# Patient Record
Sex: FEMALE | Race: WHITE | NOT HISPANIC OR LATINO | Employment: OTHER | URBAN - METROPOLITAN AREA
[De-identification: names, ages, dates, MRNs, and addresses within clinical notes are randomized per-mention and may not be internally consistent; named-entity substitution may affect disease eponyms.]

---

## 2017-01-27 ENCOUNTER — OFFICE VISIT (OUTPATIENT)
Dept: GASTROENTEROLOGY | Facility: CLINIC | Age: 77
End: 2017-01-27
Attending: INTERNAL MEDICINE
Payer: MEDICARE

## 2017-01-27 ENCOUNTER — PRE VISIT (OUTPATIENT)
Dept: ORTHOPEDICS | Facility: CLINIC | Age: 77
End: 2017-01-27

## 2017-01-27 VITALS
WEIGHT: 130.4 LBS | TEMPERATURE: 97.8 F | HEART RATE: 66 BPM | BODY MASS INDEX: 25.6 KG/M2 | DIASTOLIC BLOOD PRESSURE: 80 MMHG | SYSTOLIC BLOOD PRESSURE: 173 MMHG | HEIGHT: 60 IN

## 2017-01-27 DIAGNOSIS — M25.552 HIP PAIN, LEFT: ICD-10-CM

## 2017-01-27 DIAGNOSIS — Z94.4 HISTORY OF LIVER TRANSPLANT (H): Primary | ICD-10-CM

## 2017-01-27 PROCEDURE — 99212 OFFICE O/P EST SF 10 MIN: CPT | Mod: ZF

## 2017-01-27 ASSESSMENT — PAIN SCALES - GENERAL: PAINLEVEL: MILD PAIN (3)

## 2017-01-27 NOTE — MR AVS SNAPSHOT
After Visit Summary   1/27/2017    Lesli Lorenzana    MRN: [id number]           Patient Information     Date Of Birth          1940        Visit Information        Provider Department      1/27/2017 10:30 AM Tucker Muhammad MD [facility]        Today's Diagnoses     History of liver transplant (H)    -  1     Hip pain, left            Follow-ups after your visit        Follow-up notes from your care team     Return in about 6 months (around 7/27/2017).      Your next 10 appointments already scheduled     Feb 01, 2017  8:00 AM   LAB with AN LAB   [facility]    [address] [address] [phone]           Patient must bring picture ID.  Patient should be prepared to give a urine specimen  Please do not eat 10-12 hours before your appointment if you are coming in fasting for labs on lipids, cholesterol, or glucose (sugar).  Pregnant women should follow their Care Team instructions. Water with medications is okay. Do not drink coffee or other fluids.   If you have concerns about taking  your medications, please ask at office or if scheduling via [employer], send a message by clicking on Secure Messaging, Message Your Care Team.            Feb 06, 2017  8:45 AM   (Arrive by 8:30 AM)   NEW HIP with Zhen Armstrong MD   [facility] ([facility])    [address] [place] [id number] [phone]            Mar 23, 2017 10:30 AM   (Arrive by 10:15 AM)   Return Visit with Nain Lagunas MD   [facility] ([facility])    [address] [place] [id number] [phone]            Jul 28, 2017 10:00 AM   (Arrive by 9:45 AM)   Return Liver Transplant with Tucker Muhammad MD   [facility] ([facility])    [address] [place] [id number] [phone]               Who to contact     If you have questions or need follow up information about today's clinic visit or your schedule please contact [facility] directly at [phone].  Normal or non-critical lab and imaging results will be communicated to you by MyChart, letter or phone within 4 business days after the clinic has received the results. If you do not hear from us within 7 days, please contact the clinic through RingCaptchahart or phone. If you have a critical or abnormal lab result, we will notify you by phone as soon as possible.  Submit refill requests through [employer]ista or call your pharmacy and they will forward the refill request to us. Please allow 3 business days for your refill to be completed.          Additional Information About Your Visit        NeoVista Information     [employer]ista gives you secure access to your electronic health record. If you see a primary care provider, you can also send messages to your care team and make appointments. If you have questions, please call your primary care clinic.  If you do not have a primary care provider, please call [phone] and they will assist you.        Care EveryWhere ID     This is your Care EveryWhere ID. This could be used by other organizations to access your [place] medical records  [id number]        Your Vitals Were     Pulse Temperature Height BMI (Body Mass Index)          66 97.8  F (36.6  C) (Oral) 1.524 m (5') 25.47 kg/m2         Blood Pressure from Last 3 Encounters:   01/27/17 173/80   12/09/16 160/86   10/28/16 163/86    Weight from Last 3 Encounters:   01/27/17 59.149 kg (130 lb 6.4 oz)   12/09/16 61.689 kg (136 lb)   12/05/16 61.689 kg (136 lb)              We Performed the Following     OFFICE CONSULTATION,LEVEL IV        Primary Care Provider Office Phone # Fax #    Dee Awan [phone]       [facility] [address] [address] [facility] [address]        Thank you!     Thank you for choosing [facility]   for your care. Our goal is always to provide you with excellent care. Hearing back from our patients is one way we can continue to improve our services. Please take a few minutes to complete the written survey that you may receive in the mail after your visit with us. Thank you!             Your Updated Medication List - Protect others around you: Learn how to safely use, store and throw away your medicines at www.disposemymeds.org.          This list is accurate as of: 1/27/17 10:57 AM.  Always use your most recent med list.                   Brand Name Dispense Instructions for use    aspirin 81 MG tablet     90 tablet    Take 1 tablet by mouth daily.       CALCIUM 500 + D PO      1 tablet daily       isosorbide mononitrate 60 MG 24 hr tablet    IMDUR    180 tablet    Take 1 tablet (60 mg) by mouth 2 times daily       lisinopril 2.5 MG tablet    PRINIVIL/Zestril    93 tablet    Take 1 tablet (2.5 mg) by mouth daily       metoprolol 50 MG tablet    LOPRESSOR    182 tablet    Take 1 tablet (50 mg) by mouth 2 times daily       MULTI-VITAMIN PO      Take  by mouth daily.       nystatin-triamcinolone cream    MYCOLOG II     Apply  topically 4 times daily as needed.       tacrolimus 0.5 MG capsule    PROGRAF - GENERIC EQUIVALENT    270 capsule    Take 0.5 mg (1 cap) every AM, take 1 mg, (2 caps) every PM.

## 2017-01-27 NOTE — NURSING NOTE
Chief Complaint   Patient presents with     RECHECK     6 month follow Liver Tx       Initial /80 mmHg  Pulse 66  Temp(Src) 97.8  F (36.6  C) (Oral)  Ht 1.524 m (5')  Wt 59.149 kg (130 lb 6.4 oz)  BMI 25.47 kg/m2 Estimated body mass index is 25.47 kg/(m^2) as calculated from the following:    Height as of this encounter: 1.524 m (5').    Weight as of this encounter: 59.149 kg (130 lb 6.4 oz).  BP completed using cuff size: regular

## 2017-01-27 NOTE — Clinical Note
1/27/2017       RE: Lesli Lorenzana  [address] [facility]  [place] [address]     Dear Colleague,    Thank you for referring your patient, Lesli Lorenzana, to the [facility]PAT[facility] at [facility]. Please see a copy of my visit note below.    I had the pleasure of seeing Lesli Lorenzana for followup in the Liver Transplantation Clinic at the [facility] on 01/27/2017.  Ms. Lorenzana returns for followup now 9 years status post liver transplantation.      She is for the most part doing well at this time.  Her major complaint is that of left hip pain.  She has seen her primary for it, and she moderate degenerative arthritis of that hip.  It really is very painful for her to do anything.  She has had 3 steroid injections which have worked for short periods of time, and she has seen an orthopedic surgeon who has recommended a hip replacement.      She otherwise denies any abdominal pain, itching or skin rash or fatigue.  She denies any increased abdominal girth or lower extremity edema.  She denies any fevers or chills, cough or shortness of breath.  She denies any nausea, vomiting, diarrhea or constipation.  Her appetite has been good.  She is losing weight intentionally.     Current Outpatient Prescriptions   Medication     metoprolol (LOPRESSOR) 50 MG tablet     lisinopril (PRINIVIL,ZESTRIL) 2.5 MG tablet     isosorbide mononitrate (IMDUR) 60 MG 24 hr tablet     tacrolimus (PROGRAF - GENERIC EQUIVALENT) 0.5 MG capsule     nystatin-triamcinolone (MYCOLOG II) cream     aspirin 81 MG tablet     CALCIUM 500 + D PO     MULTI-VITAMIN PO     No current facility-administered medications for this visit.     B/P: 173/80, T: 97.8, P: 66, R: Data Unavailable    HEENT exam shows no scleral icterus and no temporal muscle wasting.  Chest is clear.  Abdominal exam shows no increase in girth.  No masses or tenderness to palpation are present.  Liver is 10  cm in span without left lobe enlargement.  No spleen tip is palpable.  Her incision is intact.  Her extremity exam shows no edema. Skin exam shows no suspicious lesions.      Her most recent laboratory tests show her white count is 3.1, hemoglobin 11.6, platelets are 211,000.  Sodium 134, potassium 4.6, BUN is 25, creatinine 1.2.  AST is 49, ALT is 36, alkaline phosphatase is 69, albumin is 3.3, total protein of 8.1, total bilirubin is 0.6.  Her last tacrolimus level was 7.      My impression is that Ms. Lorenzana is 9 years status post liver transplantation and doing well with excellent liver and kidney function.        I have referred her to Dr. Mehrdad Armstrong to get her consultation for hip replacement surgery.  He has done many of our post-transplant patients with excellent outcomes.  She is grateful for this.        She is otherwise up-to-date with regard to vaccines.  She probably will be due for one more colonoscopy, which we will schedule after she gets her hip replacement surgery.      Thank you very much for allowing me to participate in the care of this patient.  If you have any questions regarding my recommendations, please do not hesitate to contact me.       Tucker Muhammad MD    Professor of Medicine  [employer]    Executive Medical Director, Solid Organ Transplant Program  [facility]

## 2017-01-27 NOTE — PROGRESS NOTES
I had the pleasure of seeing Lesli Lorenzana for followup in the Liver Transplantation Clinic at the [facility] on 01/27/2017.  Ms. Lorenzana returns for followup now 9 years status post liver transplantation.      She is for the most part doing well at this time.  Her major complaint is that of left hip pain.  She has seen her primary for it, and she moderate degenerative arthritis of that hip.  It really is very painful for her to do anything.  She has had 3 steroid injections which have worked for short periods of time, and she has seen an orthopedic surgeon who has recommended a hip replacement.      She otherwise denies any abdominal pain, itching or skin rash or fatigue.  She denies any increased abdominal girth or lower extremity edema.  She denies any fevers or chills, cough or shortness of breath.  She denies any nausea, vomiting, diarrhea or constipation.  Her appetite has been good.  She is losing weight intentionally.     Current Outpatient Prescriptions   Medication     metoprolol (LOPRESSOR) 50 MG tablet     lisinopril (PRINIVIL,ZESTRIL) 2.5 MG tablet     isosorbide mononitrate (IMDUR) 60 MG 24 hr tablet     tacrolimus (PROGRAF - GENERIC EQUIVALENT) 0.5 MG capsule     nystatin-triamcinolone (MYCOLOG II) cream     aspirin 81 MG tablet     CALCIUM 500 + D PO     MULTI-VITAMIN PO     No current facility-administered medications for this visit.     B/P: 173/80, T: 97.8, P: 66, R: Data Unavailable    HEENT exam shows no scleral icterus and no temporal muscle wasting.  Chest is clear.  Abdominal exam shows no increase in girth.  No masses or tenderness to palpation are present.  Liver is 10 cm in span without left lobe enlargement.  No spleen tip is palpable.  Her incision is intact.  Her extremity exam shows no edema. Skin exam shows no suspicious lesions.      Her most recent laboratory tests show her white count is 3.1, hemoglobin 11.6, platelets are 211,000.  Sodium 134,  potassium 4.6, BUN is 25, creatinine 1.2.  AST is 49, ALT is 36, alkaline phosphatase is 69, albumin is 3.3, total protein of 8.1, total bilirubin is 0.6.  Her last tacrolimus level was 7.      My impression is that Ms. Lorenzana is 9 years status post liver transplantation and doing well with excellent liver and kidney function.        I have referred her to Dr. Mehrdad Armstorng to get her consultation for hip replacement surgery.  He has done many of our post-transplant patients with excellent outcomes.  She is grateful for this.        She is otherwise up-to-date with regard to vaccines.  She probably will be due for one more colonoscopy, which we will schedule after she gets her hip replacement surgery.      Thank you very much for allowing me to participate in the care of this patient.  If you have any questions regarding my recommendations, please do not hesitate to contact me.       Tucker Muhammad MD    Professor of Medicine  [employer]    Executive Medical Director, Solid Organ Transplant Program  [facility]

## 2017-01-27 NOTE — TELEPHONE ENCOUNTER
1.  Date/reason for appt: 2/6/17 - Left Hip Osteoarthritis  2.  Referring provider: Dr. Virgen/ Dr. Muhammad  3.  Call to patient (Yes / No - short description): No, internal referral  4.  Previous care at / records requested from:   -  [facility] (Dr. Dee Awan) -- Records in Epic, imaging in [facility]   -  [facility] (Dr. Jona Gonzalez) -- Records in [facility]   -  [facility] (Dr. Tucker Virgen) -- Records in [facility]   -  [facility] (Dr. Luis Felipe Duarte) -- Records in [facility]   - [facility] (Dr. Muhammad) -- Records in Epic   - Injection report in Epic

## 2017-01-28 ASSESSMENT — ENCOUNTER SYMPTOMS
MYALGIAS: 0
MUSCLE CRAMPS: 0
NECK PAIN: 0
ARTHRALGIAS: 1
JOINT SWELLING: 0
BACK PAIN: 0
MUSCLE WEAKNESS: 1
STIFFNESS: 1

## 2017-02-01 DIAGNOSIS — Z94.4 LIVER REPLACED BY TRANSPLANT (H): ICD-10-CM

## 2017-02-01 LAB
ALBUMIN SERPL-MCNC: 3.2 G/DL (ref 3.4–5)
ALP SERPL-CCNC: 74 U/L (ref 40–150)
ALT SERPL W P-5'-P-CCNC: 42 U/L (ref 0–50)
ANION GAP SERPL CALCULATED.3IONS-SCNC: 10 MMOL/L (ref 3–14)
AST SERPL W P-5'-P-CCNC: 57 U/L (ref 0–45)
BILIRUB DIRECT SERPL-MCNC: 0.1 MG/DL (ref 0–0.2)
BILIRUB SERPL-MCNC: 0.4 MG/DL (ref 0.2–1.3)
BUN SERPL-MCNC: 23 MG/DL (ref 7–30)
CALCIUM SERPL-MCNC: 8.8 MG/DL (ref 8.5–10.1)
CHLORIDE SERPL-SCNC: 98 MMOL/L (ref 94–109)
CHOLEST SERPL-MCNC: 143 MG/DL
CO2 SERPL-SCNC: 24 MMOL/L (ref 20–32)
CREAT SERPL-MCNC: 1.17 MG/DL (ref 0.52–1.04)
ERYTHROCYTE [DISTWIDTH] IN BLOOD BY AUTOMATED COUNT: 12.5 % (ref 10–15)
GFR SERPL CREATININE-BSD FRML MDRD: 45 ML/MIN/1.7M2
GLUCOSE SERPL-MCNC: 84 MG/DL (ref 70–99)
HCT VFR BLD AUTO: 33.9 % (ref 35–47)
HDLC SERPL-MCNC: 73 MG/DL
HGB BLD-MCNC: 11.7 G/DL (ref 11.7–15.7)
LDLC SERPL CALC-MCNC: 51 MG/DL
MCH RBC QN AUTO: 31 PG (ref 26.5–33)
MCHC RBC AUTO-ENTMCNC: 34.5 G/DL (ref 31.5–36.5)
MCV RBC AUTO: 90 FL (ref 78–100)
NONHDLC SERPL-MCNC: 70 MG/DL
PLATELET # BLD AUTO: 209 10E9/L (ref 150–450)
POTASSIUM SERPL-SCNC: 4.2 MMOL/L (ref 3.4–5.3)
PROT SERPL-MCNC: 8 G/DL (ref 6.8–8.8)
RBC # BLD AUTO: 3.77 10E12/L (ref 3.8–5.2)
SODIUM SERPL-SCNC: 132 MMOL/L (ref 133–144)
TACROLIMUS BLD-MCNC: 8 UG/L (ref 5–15)
TME LAST DOSE: 2000 H
TRIGL SERPL-MCNC: 93 MG/DL
WBC # BLD AUTO: 3.2 10E9/L (ref 4–11)

## 2017-02-01 PROCEDURE — 36415 COLL VENOUS BLD VENIPUNCTURE: CPT | Performed by: INTERNAL MEDICINE

## 2017-02-01 PROCEDURE — 80076 HEPATIC FUNCTION PANEL: CPT | Performed by: INTERNAL MEDICINE

## 2017-02-01 PROCEDURE — 80048 BASIC METABOLIC PNL TOTAL CA: CPT | Performed by: INTERNAL MEDICINE

## 2017-02-01 PROCEDURE — 80061 LIPID PANEL: CPT | Performed by: INTERNAL MEDICINE

## 2017-02-01 PROCEDURE — 85027 COMPLETE CBC AUTOMATED: CPT | Performed by: INTERNAL MEDICINE

## 2017-02-01 PROCEDURE — 80197 ASSAY OF TACROLIMUS: CPT | Performed by: INTERNAL MEDICINE

## 2017-02-02 ENCOUNTER — TELEPHONE (OUTPATIENT)
Dept: FAMILY MEDICINE | Facility: CLINIC | Age: 77
End: 2017-02-02

## 2017-02-02 DIAGNOSIS — I25.119 CORONARY ARTERY DISEASE INVOLVING NATIVE HEART WITH ANGINA PECTORIS, UNSPECIFIED VESSEL OR LESION TYPE (H): ICD-10-CM

## 2017-02-02 DIAGNOSIS — Z94.4 HISTORY OF LIVER TRANSPLANT (H): Primary | ICD-10-CM

## 2017-02-02 NOTE — TELEPHONE ENCOUNTER
1. Patient will stop by the pharmacy for free blood pressure check  2. She is not on statin due to transplant  3. Seeing cards every 6 months and has a pending appointment  Next month  4. Willing to do colonoscopy, once hip pain has been addressed. She is working with transplant provider for hip pain.  JAY Gurrola, NARINDERN RN

## 2017-02-02 NOTE — TELEPHONE ENCOUNTER
Have her stop in pharmacy for BP check.     Sees cards for BP and cholesterol. Assume not on statin due to liver transplant.  Chart updated.    See if pt willing to have colonoscopy or fit. Would recommend colonoscopy but if she declines,, then please do fit test.    Dee Mercado

## 2017-02-02 NOTE — TELEPHONE ENCOUNTER
Panel Management Review      Patient has the following on her problem list:       IVD   ASA: Passed    Last LDL:    CHOL      143   2/1/2017  HDL       73   2/1/2017  LDL       51   2/1/2017  TRIG       93   2/1/2017   CHOLHDLRATIO      2.4   8/11/2015     Is the patient on a Statin? NO   Is the patient on Aspirin? YES                  Medications     Salicylates    aspirin 81 MG tablet          Last three blood pressure readings:  BP Readings from Last 3 Encounters:   01/27/17 173/80   12/09/16 160/86   10/28/16 163/86        Tobacco History:     History   Smoking status     Former Smoker     Quit date: 11/23/1975   Smokeless tobacco     Never Used         Hypertension   Last three blood pressure readings:  BP Readings from Last 3 Encounters:   01/27/17 173/80   12/09/16 160/86   10/28/16 163/86     Blood pressure: Failed    HTN Guidelines:  Age 18-59 BP range:  Less than 140/90  Age 60-85 with Diabetes:  Less than 140/90  Age 60-85 without Diabetes:  less than 150/90      Composite cancer screening  Chart review shows that this patient is due/due soon for the following Colonoscopy  Summary:    Patient is due/failing the following:   BP CHECK, COLONOSCOPY and LDL    Action needed:   Routed to provider for review.    Type of outreach:    None, routed to provider for review.    Questions for provider review:    Transplant patient, sees cardiology, not on a statin, elevated bp at cardiology appointment , needs colonoscopy please advise                                                                                                                                      Miracle Simental MA      Chart routed to Provider .

## 2017-02-06 ENCOUNTER — OFFICE VISIT (OUTPATIENT)
Dept: ORTHOPEDICS | Facility: CLINIC | Age: 77
End: 2017-02-06

## 2017-02-06 VITALS — HEIGHT: 59 IN | WEIGHT: 132.5 LBS | BODY MASS INDEX: 26.71 KG/M2

## 2017-02-06 DIAGNOSIS — M16.12 PRIMARY OSTEOARTHRITIS OF LEFT HIP: Primary | ICD-10-CM

## 2017-02-06 NOTE — PROGRESS NOTES
[facility], Orthopaedic Surgery Consultation  by Zhen Armstrong M.D.    Lesli Lorenzana MRN# [id number]   Age: 76 year old YOB: 1940     Requesting physician: Tucker Muhammad, hepatology  Dee Awan, DAVE          Assessment and Plan:   Assessment:  DX:  1. Primary biliary cirrhosis s/p liver transplant  2. S/p stent for CAD.  No hx of MI.  Asymptomatic at present.  3. DJD L hip.     Plan:  1. Discussed NAREN,  She is an appropriate candidate.  Explained this was optimal and only means to completely eliminate her pain.  Discussed nature of implant, along with different surgical approaches.  She has no preference.  2. I discussed the risks, benefits, alternatives regarding the proposed surgery with the patient who both demonstrated understanding and indicated a desire to proceed with the surgery as planned.  3. Anticipate L NAREN via posterior minimal incision approach with CoChr head and poly liner.  4. TJA preop teaching class  5. PAC anesthesia visit due to liver transplant.  6. Dental eval preop  7. preop check with Dr. Awan.    8. Plans to see cardiologist Dr. Lagunas in March for regular checkup.  I advised seeing him preop instead.               History of Present Illness:   76 year old female  chief complaint     Trouble sleeping, unable to stand for long periods, cannot grocery shop, leans on cart, using cane since summer 2016, had steroid shot into hip with some benefit, had 3 steroid shots altogether..last one being 12/2/2016.    Wants to walk without pain and increase function.      Background history:  DX:  1. Primary biliary cirrhosis  2. DJD L hip.    TREATMENTS:  1. 2008, Liver transplant        Current symptoms:  Problem: hip pain :   Onset and duration: 5/2016  Awakens from sleep due to sx's:  Yes  Precipitating Injury:  No    Other joints or sites painful:  Yes, L knee clicking  Fever: none  Appetite change or weight loss: No           Physical Exam:     EXAMINATION pertinent  "findings:   VITAL SIGNS: Height 1.51 m (4' 11.45\"), weight 60.102 kg (132 lb 8 oz), not currently breastfeeding.  Body mass index is 26.36 kg/(m^2).  RESP: non labored breathing   ABD: benign   SKIN: grossly normal   LYMPHATIC: grossly normal   NEURO: grossly normal   VASCULAR: satisfactory perfusion of all extremities   MUSCULOSKELETAL:     Gait antalgic  Limited hip ROM on L and painful.  FF 90, ABD 40, ORF 10, ERF 40  R hip ROM full and pain free.           Data:   All laboratory data reviewed  All imaging studies reviewed by me    Severe DJD L hip radiographically.     Zhen Armstrong MD  [facility] [facility] Professor  Oncology and Adult Reconstructive Surgery  Dept Orthopaedic Surgery, [facility]  [phone] office, [phone] pager  [email or web address]            DATA for DOCUMENTATION:         Past Medical History:     Patient Active Problem List   Diagnosis     History of liver transplant (H)     CAD (coronary artery disease)     Advanced directives, counseling/discussion     Hyperlipidemia LDL goal <70     CKD (chronic kidney disease) stage 3, GFR 30-59 ml/min     Osteopenia     Right leg pain     Onychomycosis of toenail     Fatigue     SOB (shortness of breath)     Chest pain     Left shoulder pain     SNHL (sensory-neural hearing loss), asymmetrical     Elevated transaminase level     Liver transplant rejection (H)     Benign neoplasm of colon, unspecified part of colon     Essential hypertension with goal blood pressure less than 140/90     Osteoarthritis of left hip     Primary osteoarthritis of left hip     Past Medical History   Diagnosis Date     HTN (hypertension)      Liver transplant 2/2008     due to Primary biliary cirrhosis     CAD (coronary artery disease) 1/2009     stent     Anemia      Palpitations      Hallux valgus      Osteopenia      Cholelithiasis      gallbladder removed with liver in 2008     Hyperlipidemia LDL goal < 100      Overweight(278.02)      Spinal stenosis      " Arthritis      Osteoarthritis of left hip        Also see scanned health assessment forms.       Past Surgical History:     Past Surgical History   Procedure Laterality Date     Biopsy       liver      Surgical history of -        liver transplant 2008     Surgical history of -        CAD with stent placement 2009            Social History:     Social History     Social History     Marital Status:      Spouse Name: N/A     Number of Children: 3     Years of Education: N/A     Occupational History      Retired     Social History Main Topics     Smoking status: Former Smoker     Quit date: 11/23/1975     Smokeless tobacco: Never Used     Alcohol Use: No      Comment: Glass of wine occassionally     Drug Use: No      Comment: Never     Sexual Activity: Not Currently     Other Topics Concern     Blood Transfusions Yes     2007     Exercise No     Social History Narrative    Lives alone with her dog. . Three grown children, very supportive and all live close by.  6 grandchildren. Feels safe in her environment.            Family History:       Family History   Problem Relation Age of Onset     CANCER Father      unknown     CANCER Mother      lung--smoker     CEREBROVASCULAR DISEASE Maternal Grandmother      Unknown/Adopted Paternal Grandfather      C.A.D. Father             Medications:     Current Outpatient Prescriptions   Medication Sig     metoprolol (LOPRESSOR) 50 MG tablet Take 1 tablet (50 mg) by mouth 2 times daily     lisinopril (PRINIVIL,ZESTRIL) 2.5 MG tablet Take 1 tablet (2.5 mg) by mouth daily     isosorbide mononitrate (IMDUR) 60 MG 24 hr tablet Take 1 tablet (60 mg) by mouth 2 times daily     tacrolimus (PROGRAF - GENERIC EQUIVALENT) 0.5 MG capsule Take 0.5 mg (1 cap) every AM, take 1 mg, (2 caps) every PM.     nystatin-triamcinolone (MYCOLOG II) cream Apply  topically 4 times daily as needed.     aspirin 81 MG tablet Take 1 tablet by mouth daily.     CALCIUM 500 + D PO 1 tablet daily      MULTI-VITAMIN PO Take  by mouth daily.     No current facility-administered medications for this visit.              Review of Systems:   A comprehensive 10 point review of systems (constitutional, ENT, cardiac, peripheral vascular, lymphatic, respiratory, GI, , Musculoskeletal, skin, Neurological) was performed and found to be negative except as described in this note.     See intake form completed by patient        Answers for HPI/ROS submitted by the patient on 1/28/2017   General Symptoms: No  Skin Symptoms: No  HENT Symptoms: No  EYE SYMPTOMS: No  HEART SYMPTOMS: No  LUNG SYMPTOMS: No  INTESTINAL SYMPTOMS: No  URINARY SYMPTOMS: No  GYNECOLOGIC SYMPTOMS: No  BREAST SYMPTOMS: No  SKELETAL SYMPTOMS: Yes  BLOOD SYMPTOMS: No  NERVOUS SYSTEM SYMPTOMS: No  MENTAL HEALTH SYMPTOMS: No  Back pain: No  Muscle aches: No  Neck pain: No  Swollen joints: No  Joint pain: Yes  Bone pain: Yes  Muscle cramps: No  Muscle weakness: Yes  Joint stiffness: Yes  Bone fracture: No

## 2017-02-06 NOTE — MR AVS SNAPSHOT
After Visit Summary   2/6/2017    Lesli Lorenzana    MRN: [id number]           Patient Information     Date Of Birth          1940        Visit Information        Provider Department      2/6/2017 8:45 AM Zhen Armstrong MD [facility]        Today's Diagnoses     Primary osteoarthritis of left hip    -  1       Follow-ups after your visit        Your next 10 appointments already scheduled     Mar 23, 2017 10:30 AM CDT   (Arrive by 10:15 AM)   Return Visit with Nain Lagunas MD   [facility] ([facility])    [address] [place] [id number] [phone]            Apr 04, 2017  8:00 AM CDT   LAB with AN LAB   [facility] ([facility]    [address] [facility] [address] [phone]           Patient must bring picture ID.  Patient should be prepared to give a urine specimen  Please do not eat 10-12 hours before your appointment if you are coming in fasting for labs on lipids, cholesterol, or glucose (sugar).  Pregnant women should follow their Care Team instructions. Water with medications is okay. Do not drink coffee or other fluids.   If you have concerns about taking  your medications, please ask at office or if scheduling via Augustt, send a message by clicking on Secure Messaging, Message Your Care Team.            Jul 28, 2017 10:00 AM CDT   (Arrive by 9:45 AM)   Return Liver Transplant with Tucker Muhammad MD   [facility] ([facility])    [address] [place] [id number] [phone]              Who to contact     Please call your clinic at [phone] to:    Ask questions about your health    Make or cancel appointments    Discuss your medicines    Learn about your test results    Speak to your doctor   If you have compliments or concerns about an experience at your clinic, or if you wish to file a complaint,  "please contact [facility] Patient Relations at [phone] or email us at [email or web address]         Additional Information About Your Visit        Whyteboardhart Information     [email or web address] gives you secure access to your electronic health record. If you see a primary care provider, you can also send messages to your care team and make appointments. If you have questions, please call your primary care clinic.  If you do not have a primary care provider, please call [phone] and they will assist you.      [email or web address] is an electronic gateway that provides easy, online access to your medical records. With [email or web address], you can request a clinic appointment, read your test results, renew a prescription or communicate with your care team.     To access your existing account, please contact your [facility] or call [phone] for assistance.        Care EveryWhere ID     This is your Care EveryWhere ID. This could be used by other organizations to access your [place] medical records  [id number]        Your Vitals Were     Height BMI (Body Mass Index)                1.51 m (4' 11.45\") 26.36 kg/m2           Blood Pressure from Last 3 Encounters:   No data found for BP    Weight from Last 3 Encounters:   No data found for Wt              We Performed the Following     Lillie-Operative Worksheet (Tumor/Adult Reconstruction: Knee/Hip/Fracture )          Today's Medication Changes          These changes are accurate as of: 2/6/17 11:59 PM.  If you have any questions, ask your nurse or doctor.               Stop taking these medicines if you haven't already. Please contact your care team if you have questions.     STATIN NOT PRESCRIBED (INTENTIONAL)                    Primary Care Provider Office Phone # Fax #    Dee Awan [phone]       [facility] [address] [facility] [address]        Thank you!     Thank you for choosing [facility] " [facility]  for your care. Our goal is always to provide you with excellent care. Hearing back from our patients is one way we can continue to improve our services. Please take a few minutes to complete the written survey that you may receive in the mail after your visit with us. Thank you!             Your Updated Medication List - Protect others around you: Learn how to safely use, store and throw away your medicines at www.disposemymeds.org.          This list is accurate as of: 2/6/17 11:59 PM.  Always use your most recent med list.                   Brand Name Dispense Instructions for use    aspirin 81 MG tablet     90 tablet    Take 1 tablet by mouth daily.       CALCIUM 500 + D PO      1 tablet daily       isosorbide mononitrate 60 MG 24 hr tablet    IMDUR    180 tablet    Take 1 tablet (60 mg) by mouth 2 times daily       lisinopril 2.5 MG tablet    PRINIVIL/Zestril    93 tablet    Take 1 tablet (2.5 mg) by mouth daily       metoprolol 50 MG tablet    LOPRESSOR    182 tablet    Take 1 tablet (50 mg) by mouth 2 times daily       MULTI-VITAMIN PO      Take  by mouth daily.       nystatin-triamcinolone cream    MYCOLOG II     Apply  topically 4 times daily as needed.       tacrolimus 0.5 MG capsule    PROGRAF - GENERIC EQUIVALENT    270 capsule    Take 0.5 mg (1 cap) every AM, take 1 mg, (2 caps) every PM.

## 2017-02-06 NOTE — NURSING NOTE
"Reason For Visit:   Chief Complaint   Patient presents with     Eval/Assessment     left hip pain         Pain Assessment  Patient Currently in Pain: Yes  0-10 Pain Scale: 5  Primary Pain Location: Hip  Pain Orientation: Left  Pain Descriptors: Aching, Sharp  Alleviating Factors: Other (comment) (sitting)  Aggravating Factors: Walking     Ht 1.51 m (4' 11.45\")  Wt 60.102 kg (132 lb 8 oz)  BMI 26.36 kg/m2       Current Outpatient Prescriptions   Medication     metoprolol (LOPRESSOR) 50 MG tablet     lisinopril (PRINIVIL,ZESTRIL) 2.5 MG tablet     isosorbide mononitrate (IMDUR) 60 MG 24 hr tablet     tacrolimus (PROGRAF - GENERIC EQUIVALENT) 0.5 MG capsule     nystatin-triamcinolone (MYCOLOG II) cream     aspirin 81 MG tablet     CALCIUM 500 + D PO     MULTI-VITAMIN PO     No current facility-administered medications for this visit.          Allergies   Allergen Reactions     Amlodipine Besylate Swelling     Edema in legs     Amoxicillin Rash                                              "

## 2017-02-06 NOTE — LETTER
2/6/2017       RE: Lesli Lorenzana  [address] [facility]  [place] [address]     Dear Colleague,    Thank you for referring your patient, Lesli Lorenzana, to the [facility] at [facility]. Please see a copy of my visit note below.    [facility], Orthopaedic Surgery Consultation  by Zhen Armstrong M.D.    Lesli Lorenzana MRN# [id number]   Age: 76 year old YOB: 1940     Requesting physician: Tucker Muhammad, hepatology  Dee Awan, PCP          Assessment and Plan:   Assessment:  DX:  1. Primary biliary cirrhosis s/p liver transplant  2. S/p stent for CAD.  No hx of MI.  Asymptomatic at present.  3. DJD L hip.     Plan:  1. Discussed NAREN,  She is an appropriate candidate.  Explained this was optimal and only means to completely eliminate her pain.  Discussed nature of implant, along with different surgical approaches.  She has no preference.  2. I discussed the risks, benefits, alternatives regarding the proposed surgery with the patient who both demonstrated understanding and indicated a desire to proceed with the surgery as planned.  3. Anticipate L NAREN via posterior minimal incision approach with CoChr head and poly liner.  4. TJA preop teaching class  5. PAC anesthesia visit due to liver transplant.  6. Dental eval preop  7. preop check with Dr. Awan.    8. Plans to see cardiologist Dr. Lagunas in March for regular checkup.  I advised seeing him preop instead.               History of Present Illness:   76 year old female  chief complaint     Trouble sleeping, unable to stand for long periods, cannot grocery shop, leans on cart, using cane since summer 2016, had steroid shot into hip with some benefit, had 3 steroid shots altogether..last one being 12/2/2016.    Wants to walk without pain and increase function.      Background history:  DX:  1. Primary biliary cirrhosis  2. DJD L hip.    TREATMENTS:  1. 2008, Liver  "transplant        Current symptoms:  Problem: hip pain :   Onset and duration: 5/2016  Awakens from sleep due to sx's:  Yes  Precipitating Injury:  No    Other joints or sites painful:  Yes, L knee clicking  Fever: none  Appetite change or weight loss: No           Physical Exam:     EXAMINATION pertinent findings:   VITAL SIGNS: Height 1.51 m (4' 11.45\"), weight 60.102 kg (132 lb 8 oz), not currently breastfeeding.  Body mass index is 26.36 kg/(m^2).  RESP: non labored breathing   ABD: benign   SKIN: grossly normal   LYMPHATIC: grossly normal   NEURO: grossly normal   VASCULAR: satisfactory perfusion of all extremities   MUSCULOSKELETAL:     Gait antalgic  Limited hip ROM on L and painful.  FF 90, ABD 40, ORF 10, ERF 40  R hip ROM full and pain free.           Data:   All laboratory data reviewed  All imaging studies reviewed by me Severe DJD L hip radiographically.     MD Arnold Singh Family Professor  Oncology and Adult Reconstructive Surgery  Dept Orthopaedic Surgery, [facility]  [phone] office, [phone] pager  [email or web address]            DATA for DOCUMENTATION:         Past Medical History:     Patient Active Problem List   Diagnosis     History of liver transplant (H)     CAD (coronary artery disease)     Advanced directives, counseling/discussion     Hyperlipidemia LDL goal <70     CKD (chronic kidney disease) stage 3, GFR 30-59 ml/min     Osteopenia     Right leg pain     Onychomycosis of toenail     Fatigue     SOB (shortness of breath)     Chest pain     Left shoulder pain     SNHL (sensory-neural hearing loss), asymmetrical     Elevated transaminase level     Liver transplant rejection (H)     Benign neoplasm of colon, unspecified part of colon     Essential hypertension with goal blood pressure less than 140/90     Osteoarthritis of left hip     Primary osteoarthritis of left hip     Past Medical History   Diagnosis Date     HTN (hypertension)      Liver transplant 2/2008 "     due to Primary biliary cirrhosis     CAD (coronary artery disease) 1/2009     stent     Anemia      Palpitations      Hallux valgus      Osteopenia      Cholelithiasis      gallbladder removed with liver in 2008     Hyperlipidemia LDL goal < 100      Overweight(278.02)      Spinal stenosis      Arthritis      Osteoarthritis of left hip        Also see scanned health assessment forms.       Past Surgical History:     Past Surgical History   Procedure Laterality Date     Biopsy       liver      Surgical history of -        liver transplant 2008     Surgical history of -        CAD with stent placement 2009            Social History:     Social History     Social History     Marital Status:      Spouse Name: N/A     Number of Children: 3     Years of Education: N/A     Occupational History      Retired     Social History Main Topics     Smoking status: Former Smoker     Quit date: 11/23/1975     Smokeless tobacco: Never Used     Alcohol Use: No      Comment: Glass of wine occassionally     Drug Use: No      Comment: Never     Sexual Activity: Not Currently     Other Topics Concern     Blood Transfusions Yes     2007     Exercise No     Social History Narrative    Lives alone with her dog. . Three grown children, very supportive and all live close by.  6 grandchildren. Feels safe in her environment.            Family History:       Family History   Problem Relation Age of Onset     CANCER Father      unknown     CANCER Mother      lung--smoker     CEREBROVASCULAR DISEASE Maternal Grandmother      Unknown/Adopted Paternal Grandfather      C.A.D. Father             Medications:     Current Outpatient Prescriptions   Medication Sig     metoprolol (LOPRESSOR) 50 MG tablet Take 1 tablet (50 mg) by mouth 2 times daily     lisinopril (PRINIVIL,ZESTRIL) 2.5 MG tablet Take 1 tablet (2.5 mg) by mouth daily     isosorbide mononitrate (IMDUR) 60 MG 24 hr tablet Take 1 tablet (60 mg) by mouth 2 times daily      tacrolimus (PROGRAF - GENERIC EQUIVALENT) 0.5 MG capsule Take 0.5 mg (1 cap) every AM, take 1 mg, (2 caps) every PM.     nystatin-triamcinolone (MYCOLOG II) cream Apply  topically 4 times daily as needed.     aspirin 81 MG tablet Take 1 tablet by mouth daily.     CALCIUM 500 + D PO 1 tablet daily     MULTI-VITAMIN PO Take  by mouth daily.     No current facility-administered medications for this visit.              Review of Systems:   A comprehensive 10 point review of systems (constitutional, ENT, cardiac, peripheral vascular, lymphatic, respiratory, GI, , Musculoskeletal, skin, Neurological) was performed and found to be negative except as described in this note.     See intake form completed by patient        Again, thank you for allowing me to participate in the care of your patient.      Sincerely,    Zhen Armstrong MD

## 2017-02-23 ENCOUNTER — TELEPHONE (OUTPATIENT)
Dept: CARDIOLOGY | Facility: CLINIC | Age: 77
End: 2017-02-23

## 2017-02-23 DIAGNOSIS — E78.5 HYPERLIPIDEMIA LDL GOAL <70: Primary | ICD-10-CM

## 2017-02-23 DIAGNOSIS — Z01.810 PRE-OPERATIVE CARDIOVASCULAR EXAMINATION: ICD-10-CM

## 2017-02-23 NOTE — TELEPHONE ENCOUNTER
----- Message from Marlyn Anthony sent at 2/20/2017 11:47 AM CST -----  Regarding: PT UPCOMING SURGERY  Contact: [phone]  PT called in regards to upcoming appointment with Dr. Lagunas on March 23, 2017.  She is going to be having hip surgery and wants to know if Dr Lagunas wants to see her beforehand.  The surgery hasn't been scheduled yet as she wants this info first.  Please call PT at [phone].    Thank You,  Marlyn    Please DO NOT send this message and/or reply back to sender.  Call Center Representatives DO NOT respond to messages.

## 2017-02-25 ASSESSMENT — ENCOUNTER SYMPTOMS
SNORES LOUDLY: 0
SHORTNESS OF BREATH: 0
COUGH: 1
NECK PAIN: 0
RESPIRATORY PAIN: 0
JOINT SWELLING: 0
STIFFNESS: 1
SINUS CONGESTION: 1
MYALGIAS: 0
BACK PAIN: 1
HEMOPTYSIS: 0
SORE THROAT: 0
TROUBLE SWALLOWING: 0
SMELL DISTURBANCE: 0
NECK MASS: 0
TASTE DISTURBANCE: 0
POSTURAL DYSPNEA: 0
DYSPNEA ON EXERTION: 0
MUSCLE WEAKNESS: 1
SINUS PAIN: 0
SPUTUM PRODUCTION: 1
COUGH DISTURBING SLEEP: 0
ARTHRALGIAS: 1
HOARSE VOICE: 0
WHEEZING: 0
MUSCLE CRAMPS: 0

## 2017-03-02 ENCOUNTER — OFFICE VISIT (OUTPATIENT)
Dept: CARDIOLOGY | Facility: CLINIC | Age: 77
End: 2017-03-02
Attending: INTERNAL MEDICINE
Payer: MEDICARE

## 2017-03-02 VITALS
WEIGHT: 132.8 LBS | OXYGEN SATURATION: 98 % | DIASTOLIC BLOOD PRESSURE: 85 MMHG | BODY MASS INDEX: 26.07 KG/M2 | HEIGHT: 60 IN | HEART RATE: 71 BPM | SYSTOLIC BLOOD PRESSURE: 187 MMHG

## 2017-03-02 DIAGNOSIS — I10 ESSENTIAL HYPERTENSION WITH GOAL BLOOD PRESSURE LESS THAN 140/90: ICD-10-CM

## 2017-03-02 DIAGNOSIS — Z01.810 PRE-OPERATIVE CARDIOVASCULAR EXAMINATION: ICD-10-CM

## 2017-03-02 DIAGNOSIS — E87.70 FLUID OVERLOAD, UNSPECIFIED: ICD-10-CM

## 2017-03-02 DIAGNOSIS — I25.119 CORONARY ARTERY DISEASE INVOLVING NATIVE HEART WITH ANGINA PECTORIS, UNSPECIFIED VESSEL OR LESION TYPE (H): Primary | ICD-10-CM

## 2017-03-02 PROCEDURE — 93005 ELECTROCARDIOGRAM TRACING: CPT | Mod: ZF

## 2017-03-02 PROCEDURE — 99212 OFFICE O/P EST SF 10 MIN: CPT | Mod: ZF

## 2017-03-02 PROCEDURE — 93010 ELECTROCARDIOGRAM REPORT: CPT | Mod: ZP | Performed by: INTERNAL MEDICINE

## 2017-03-02 PROCEDURE — 99214 OFFICE O/P EST MOD 30 MIN: CPT | Mod: ZP | Performed by: INTERNAL MEDICINE

## 2017-03-02 RX ORDER — HYDRALAZINE HYDROCHLORIDE 25 MG/1
25 TABLET, FILM COATED ORAL 3 TIMES DAILY
Qty: 90 TABLET | Refills: 3 | Status: SHIPPED | OUTPATIENT
Start: 2017-03-02 | End: 2019-08-13

## 2017-03-02 RX ORDER — FUROSEMIDE 20 MG
20 TABLET ORAL DAILY
Qty: 30 TABLET | Refills: 0 | Status: SHIPPED | OUTPATIENT
Start: 2017-03-02 | End: 2017-04-19

## 2017-03-02 ASSESSMENT — PAIN SCALES - GENERAL: PAINLEVEL: NO PAIN (0)

## 2017-03-02 NOTE — PATIENT INSTRUCTIONS
Furosemide 20 mg by mouth once daily for 3 days.    Take your blood pressure at least once in the morning and once in the evening. Please send us your home blood pressure record on Tuesday.     IF your blood pressure (top number) is above more than 160 mmHg, take Furosemide 20 mg by mouth once. Do not take more than 20 mg once daily without instructions from Dr. Lagunas.       IF your blood pressure (top number) is above more than 180 mmHg, take Hydralazine 25 mg by mouth once.  Do not take more than once every 8 hours with a max of 3 doses per day.      Schedule echocardiogram soon.     Return to clinic in 6 months.  Fasting labs will be needed prior to this appointment.     Please do not hesitate to call if you have any cardiology related questions or concerns, or need to schedule an appointment, at [phone].         Cardiology Medication Refill Request Information:  * Please contact your pharmacy regarding ANY request for medication refills.  ** [facility] Prescription Fax = [phone]  * Please allow 3 business days for routine medication refills.    Cardiology Test Result notification information:  *You will be notified with in 7-10 days of your appointment day regarding the results of your test. If you are on MyChart you will be notified as soon as the provider has reviewed the results and signed off on them. Please call RN Care Coordinator with questions regarding results.

## 2017-03-02 NOTE — PROGRESS NOTES
HPI:     Ms. Lorenzana is a 76 year old female with history of liver transplant in 2008, HTN, hyperlipidemia, CAD with stent in 2009 and CKD.   BP at home at home 128-138/ 65-73.  In 2014 BP under control during the day - at night elevated.  Patient denies chest pain, shortness of breath, palpitations and intermittent claudication.  Patient feels dizzy when bending over.  Patient lost about 15 lb.  Patient complaints about left hip arthritis - she received a injection with corticosteroids in left hip 4 weeks.    Interval history:   Since Mrs. Lorenzana's last vist, Mrs. Lorenzana has been assessed by our Orthopedic colleagues for left total hip arthroplasty and presents today pre-operative cardiac risk stratification. Her exercise tolerance is still approximately two flights of stairs and at least 1-2 blocks on level ground. Mrs. Lorenzana has not been limited in her exercise tolerance by dyspnea and she does not experience PND, orthopnea, palpitations, chest pain, dizziness, pre-syncope, or syncope. ROS otherwise negative.     Of note, Mrs. Lorenzana's home BP readings range from 116-148/68-80.     PAST MEDICAL HISTORY:  Past Medical History   Diagnosis Date     Anemia      Arthritis      CAD (coronary artery disease) 1/2009     stent     Cholelithiasis      gallbladder removed with liver in 2008     Hallux valgus      HTN (hypertension)      Hyperlipidemia LDL goal < 100      Liver transplant 2/2008     due to Primary biliary cirrhosis     Osteoarthritis of left hip      Osteopenia      Overweight(278.02)      Palpitations      Spinal stenosis        CURRENT MEDICATIONS:  Current Outpatient Prescriptions   Medication Sig Dispense Refill     STATIN NOT PRESCRIBED, INTENTIONAL, Please choose reason not prescribed, below       metoprolol (LOPRESSOR) 50 MG tablet Take 1 tablet (50 mg) by mouth 2 times daily 182 tablet 3     lisinopril (PRINIVIL,ZESTRIL) 2.5 MG tablet Take 1 tablet (2.5 mg) by mouth daily 93 tablet 3      isosorbide mononitrate (IMDUR) 60 MG 24 hr tablet Take 1 tablet (60 mg) by mouth 2 times daily 180 tablet 6     tacrolimus (PROGRAF - GENERIC EQUIVALENT) 0.5 MG capsule Take 0.5 mg (1 cap) every AM, take 1 mg, (2 caps) every PM. 270 capsule 3     nystatin-triamcinolone (MYCOLOG II) cream Apply  topically 4 times daily as needed.       aspirin 81 MG tablet Take 1 tablet by mouth daily. 90 tablet 3     CALCIUM 500 + D PO 1 tablet daily       MULTI-VITAMIN PO Take  by mouth daily.         PAST SURGICAL HISTORY:  Past Surgical History   Procedure Laterality Date     Biopsy       liver      Surgical history of -        liver transplant 2008     Surgical history of -        CAD with stent placement 2009       ALLERGIES     Allergies   Allergen Reactions     Amlodipine Besylate Swelling     Edema in legs     Amoxicillin Rash       FAMILY HISTORY:  Family History   Problem Relation Age of Onset     CANCER Father      unknown     CANCER Mother      lung--smoker     CEREBROVASCULAR DISEASE Maternal Grandmother      Unknown/Adopted Paternal Grandfather      C.A.D. Father        SOCIAL HISTORY:  Social History     Social History     Marital Status:      Spouse Name: N/A     Number of Children: 3     Years of Education: N/A     Occupational History      Retired     Social History Main Topics     Smoking status: Former Smoker     Quit date: 11/23/1975     Smokeless tobacco: Never Used     Alcohol Use: No      Comment: Glass of wine occassionally     Drug Use: No      Comment: Never     Sexual Activity: Not Currently     Other Topics Concern     Blood Transfusions Yes     2007     Exercise No     Social History Narrative    Lives alone with her dog. . Three grown children, very supportive and all live close by.  6 grandchildren. Feels safe in her environment.       ROS:   Constitutional: No fever, chills, or sweats. No weight gain/loss   ENT: No visual disturbance, ear ache, epistaxis, sore  throat  Allergies/Immunologic: Negative.   Respiratory: No cough, hemoptysia  Cardiovascular: As per HPI  GI: No nausea, vomiting, hematemesis, melena, or hematochezia  : No urinary frequency, dysuria, or hematuria  Integument: Negative  Psychiatric: Negative  Neuro: Negative  Endocrinology: Negative   Musculoskeletal: Negative    EXAM:  /85  Pulse 71  Ht 1.524 m (5')  Wt 60.2 kg (132 lb 12.8 oz)  SpO2 98%  BMI 25.94 kg/m2   Extended Vitals not filed for this encounter.   Repeat check left arm with regular cuff 184/90  BP at the end of the visit: 180/82    In general, the patient is a pleasant female in no apparent distress.    HEENT: NC/AT.  EOMI.  Sclerae white, not injected.  Nares clear.  Pharynx without erythema or exudate.  Dentition intact.    Neck: No adenopathy.  No thyromegaly. Carotids +4/4 bilaterally without bruits.  No jugular venous distension.   Heart: RRR. Normal S1, S2 splits physiologically. No murmur, rub, click, or gallop.   Lungs: CTA.  No ronchi, wheezes, rales.  No dullness to percussion.   Abdomen: Soft, nontender, nondistended. No organomegaly.  No bruits.   Extremities: No clubbing, cyanosis, or edema.    Neurologic: Alert and oriented to person/place/time, normal speech, gait and affect  Skin: No petechiae, purpura or rash.      Labs:  LIPID RESULTS:  Lab Results   Component Value Date    CHOL 143 02/01/2017    HDL 73 02/01/2017    LDL 51 02/01/2017    TRIG 93 02/01/2017    CHOLHDLRATIO 2.4 08/11/2015    NHDL 70 02/01/2017       LIVER ENZYME RESULTS:  Lab Results   Component Value Date    AST 57 (H) 02/01/2017    ALT 42 02/01/2017       CBC RESULTS:  Lab Results   Component Value Date    WBC 3.2 (L) 02/01/2017    RBC 3.77 (L) 02/01/2017    HGB 11.7 02/01/2017    HCT 33.9 (L) 02/01/2017    MCV 90 02/01/2017    MCH 31.0 02/01/2017    MCHC 34.5 02/01/2017    RDW 12.5 02/01/2017     02/01/2017       BMP RESULTS:  Lab Results   Component Value Date     (L) 02/01/2017     POTASSIUM 4.2 02/01/2017    CHLORIDE 98 02/01/2017    CO2 24 02/01/2017    ANIONGAP 10 02/01/2017    GLC 84 02/01/2017    BUN 23 02/01/2017    CR 1.17 (H) 02/01/2017    CR 2.00 (H) 12/14/2007    GFRESTIMATED 45 (L) 02/01/2017    GFRESTBLACK 55 (L) 02/01/2017    HAYES 8.8 02/01/2017      Procedures    EKG 03-02-17   Sin rhythm  Inverted T-wave DI and aVL  AV 1st degree blokc    Echocardiogram 03-03-17    Left ventricular function, chamber size, wall motion, and wall thickness are  normal.The EF is 55-60%. Biplane LVEF traced at 56%.  The right ventricle is normal size. Global right ventricular function is  normal.  The inferior vena cava was normal in size with preserved respiratory  variability. Estimated mean right atrial pressure is <3 mmHg.  No pericardial effusion is present.  This study was compared with the study from 3/20/13. There has been no change.  _____________________________________________________________________________  __        Left Ventricle  Left ventricular function, chamber size, wall motion, and wall thickness are  normal.The EF is 55-60%. Biplane LVEF traced at 56%. Left ventricular  diastolic function is indeterminate.     Right Ventricle  The right ventricle is normal size. Global right ventricular function is  normal.     Atria  The right atria appears normal. Mild left atrial enlargement is present.     Mitral Valve  The mitral valve is normal.        Aortic Valve  Mild aortic valve sclerosis is present.     Tricuspid Valve  The tricuspid valve is normal. Pulmonary artery systolic pressure cannot be  assessed.     Pulmonic Valve  The pulmonic valve is normal.     Vessels  The aorta root is normal. The inferior vena cava was normal in size with  preserved respiratory variability. The pulmonary artery is normal. Estimated  mean right atrial pressure is <3 mmHg.     Pericardium  No pericardial effusion is present.        Compared to Previous Study  This study was compared with the study  from 3/20/13 . There has been no  change.  _____________________________________________________________________________  __  MMode/2D Measurements & Calculations     IVSd: 1.1 cm  LVIDd: 4.5 cm  LVIDs: 2.7 cm  LVPWd: 0.92 cm  FS: 41.4 %  EDV(Teich): 94.0 ml  ESV(Teich): 25.9 ml  LV mass(C)d: 157.7 grams  asc Aorta Diam: 2.9 cm  LVOT diam: 1.9 cm  LVOT area: 2.8 cm2  LA Volume (BP): 63.4 ml  LA Volume Index (BP): 40.6 ml/m2           Doppler Measurements & Calculations  MV E max jose: 41.4 cm/sec  MV A max jose: 67.1 cm/sec  MV E/A: 0.62  MV dec time: 0.39 sec  Lateral E/e': 6.9  Medial E/e': 11.9         EKG: Rate approx 75 bpm, NSR, 1st deg heart block. No ST changes. Longstanding TWI in aVL.    I have personally reviewed the EKG.    Assessment and Plan:   Mrs. Lesli Lorenzana is a 76-year old lady with a past medical history of a liver transplantation for primary biliary cirrhosis, HTN, and CAD who presented for pre-operative cardiac risk stratification prior to left total hip arthroplasty.     Mrs. Lorenzana's hypertension seems most consistent with white coat hypertension given her marked hypertension in the office. We will ensure that she has not developed any structural changes since her last TTE but we have also given her a strategy for managing her high blood pressure in the interim.    - Pre-operative cardiac risk stratification and Hx of HTN (white coat)   - Advised to obtain blood pressure log for the next week   - Will obtain TTE in view of severe HTN   - Also asked to take furosemide 20 mg by mouth if SBP >160 and hydralazine 25 mg if SBP >180    - Hx of CAD s/p PCI to OM1 in 2009 (for unstable angina)   - Continue home meds including aspirin 81 mg q24h, metoprolol tartrate 50 mg BID, Imdur 60 mg BID (though dosing somewhat unconventional), furosemide 20 mg q24h     RTC 6 months     The echocardiogram is unchanged -     Patient can be cleared for orthopedic surgery    Nain Lagunas MD, PhD  Professor of  Medicine  Division of Cardiology    CC  Patient Care Team:  Dee Awan MD as PCP - General  Sonny Lagunas MD as PCP - Cardiology (Cardiology)  Leah Aguilar RN as Nurse Coordinator (Cardiology)  Harika Meza [facility] as Pharmacist (Pharmacist)  Sonny Lagunas MD as MD (Cardiology)  Zhen Armstrong MD as MD (Orthopedics)  SONNY LAGUNAS      Answers for HPI/ROS submitted by the patient on 2/25/2017   General Symptoms: No  Skin Symptoms: No  HENT Symptoms: Yes  EYE SYMPTOMS: No  HEART SYMPTOMS: No  LUNG SYMPTOMS: Yes  INTESTINAL SYMPTOMS: No  URINARY SYMPTOMS: No  GYNECOLOGIC SYMPTOMS: No  BREAST SYMPTOMS: No  SKELETAL SYMPTOMS: Yes  BLOOD SYMPTOMS: No  NERVOUS SYSTEM SYMPTOMS: No  MENTAL HEALTH SYMPTOMS: No  Ear pain: No  Ear discharge: No  Hearing loss: Yes  Tinnitus: No  Nosebleeds: No  Congestion: Yes  Sinus pain: No  Trouble swallowing: No   Voice hoarseness: No  Mouth sores: No  Sore throat: No  Tooth pain: No  Gum tenderness: No  Bleeding gums: No  Change in taste: No  Change in sense of smell: No  Dry mouth: No  Hearing aid used: No  Neck lump: No  Cough: Yes  Sputum or phlegm: Yes  Coughing up blood: No  Difficulty breating or shortness of breath: No  Snoring: No  Wheezing: No  Difficulty breathing on exertion: No  Respiratory pain: No  Nighttime Cough: No  Difficulty breathing when lying flat: No  Back pain: Yes  Muscle aches: No  Neck pain: No  Swollen joints: No  Joint pain: Yes  Bone pain: Yes  Muscle cramps: No  Muscle weakness: Yes  Joint stiffness: Yes  Bone fracture: No

## 2017-03-02 NOTE — MR AVS SNAPSHOT
After Visit Summary   3/2/2017    Lesli Lorenzana    MRN: [id number]           Patient Information     Date Of Birth          1940        Visit Information        Provider Department      3/2/2017 1:00 PM Nain Lagunas MD [facility]        Today's Diagnoses     Coronary artery disease involving native heart with angina pectoris, unspecified vessel or lesion type (H)    -  1    Pre-operative cardiovascular examination        Essential hypertension with goal blood pressure less than 140/90        Fluid overload, unspecified          Care Instructions    Furosemide 20 mg by mouth once daily for 3 days.    Take your blood pressure at least once in the morning and once in the evening. Please send us your home blood pressure record on Tuesday.     IF your blood pressure (top number) is above more than 160 mmHg, take Furosemide 20 mg by mouth once. Do not take more than 20 mg once daily without instructions from Dr. Lagunas.       IF your blood pressure (top number) is above more than 180 mmHg, take Hydralazine 25 mg by mouth once.  Do not take more than once every 8 hours with a max of 3 doses per day.      Schedule echocardiogram soon.     Return to clinic in 6 months.  Fasting labs will be needed prior to this appointment.     Please do not hesitate to call if you have any cardiology related questions or concerns, or need to schedule an appointment, at [phone].         Cardiology Medication Refill Request Information:  * Please contact your pharmacy regarding ANY request for medication refills.  ** [facility] Prescription Fax = [phone]  * Please allow 3 business days for routine medication refills.    Cardiology Test Result notification information:  *You will be notified with in 7-10 days of your appointment day regarding the results of your test. If you are on MyChart you will be notified as soon as the provider has reviewed the results and signed off on them. Please call RN Care  Coordinator with questions regarding results.            Follow-ups after your visit        Follow-up notes from your care team     Write patient with results Return in about 6 months (around 9/2/2017) for Janneth, HTN, Cholesterol, CAD, Routine Visit, FASTING lab work.      Your next 10 appointments already scheduled     Mar 03, 2017  8:30 AM CST   Ech Complete with [id number]    [facility] ([facility])    [address] [place] [address] [phone]           1.  Please bring or wear a comfortable two-piece outfit. 2.  You may eat, drink and take your normal medicines. 3.  For any questions that cannot be answered, please contact the ordering physician            Apr 04, 2017  8:00 AM CDT   LAB with AN LAB   [facility]    [address] [facility] [address] [phone]           Patient must bring picture ID.  Patient should be prepared to give a urine specimen  Please do not eat 10-12 hours before your appointment if you are coming in fasting for labs on lipids, cholesterol, or glucose (sugar).  Pregnant women should follow their Care Team instructions. Water with medications is okay. Do not drink coffee or other fluids.   If you have concerns about taking  your medications, please ask at office or if scheduling via [email or web address], send a message by clicking on Secure Messaging, Message Your Care Team.            Jul 28, 2017 10:00 AM CDT   (Arrive by 9:45 AM)   Return Liver Transplant with Tucker Muhammad MD   [facility] ([facility])    [address] [place] [id number] [phone]            Sep 07, 2017 10:30 AM CDT   (Arrive by 10:15 AM)   Return Visit with Nain Lagunas MD   [facility] ([facility])    [address] [place] [address] [phone]              Future tests that were ordered  for you today     Open Future Orders        Priority Expected Expires Ordered    Comprehensive metabolic panel Routine 9/2/2017 5/2/2018 3/2/2017    Lipid panel reflex to direct LDL Routine 9/2/2017 5/2/2018 3/2/2017    Echocardiogram Complete Routine  3/2/2018 3/2/2017            Who to contact     If you have questions or need follow up information about today's clinic visit or your schedule please contact [facility] directly at [phone].  Normal or non-critical lab and imaging results will be communicated to you by [employer], letter or phone within 4 business days after the clinic has received the results. If you do not hear from us within 7 days, please contact the clinic through [employer] or phone. If you have a critical or abnormal lab result, we will notify you by phone as soon as possible.  Submit refill requests through [employer] or call your pharmacy and they will forward the refill request to us. Please allow 3 business days for your refill to be completed.          Additional Information About Your Visit        [employer] Information     [employer] gives you secure access to your electronic health record. If you see a primary care provider, you can also send messages to your care team and make appointments. If you have questions, please call your primary care clinic.  If you do not have a primary care provider, please call [phone] and they will assist you.        Care EveryWhere ID     This is your Care EveryWhere ID. This could be used by other organizations to access your [facility] medical records  [id number]        Your Vitals Were     Pulse Height Pulse Oximetry BMI (Body Mass Index)          71 1.524 m (5') 98% 25.94 kg/m2         Blood Pressure from Last 3 Encounters:   03/02/17 187/85   01/27/17 173/80   12/09/16 160/86    Weight from Last 3 Encounters:   03/02/17 60.2 kg (132 lb 12.8 oz)   02/06/17 60.1 kg (132 lb 8 oz)   01/27/17 59.1 kg (130 lb 6.4 oz)              We Performed the  Following     EKG 12-lead, tracing only (Future)          Today's Medication Changes          These changes are accurate as of: 3/2/17  2:29 PM.  If you have any questions, ask your nurse or doctor.               Start taking these medicines.        Dose/Directions    furosemide 20 MG tablet   Commonly known as:  LASIX   Used for:  Coronary artery disease involving native heart with angina pectoris, unspecified vessel or lesion type (H), Essential hypertension with goal blood pressure less than 140/90, Fluid overload, unspecified   Started by:  Nain Lagunas MD        Dose:  20 mg   Take 1 tablet (20 mg) by mouth daily , for 3 days.  Please do not take unless instructed by Dr. Lagunas.   Quantity:  30 tablet   Refills:  0       hydrALAZINE 25 MG tablet   Commonly known as:  APRESOLINE   Used for:  Essential hypertension with goal blood pressure less than 140/90   Started by:  Nain Lagunas MD        Dose:  25 mg   Take 1 tablet (25 mg) by mouth 3 times daily , as needed if systolic blood pressure (top number) is more than 180 mmHg.   Quantity:  90 tablet   Refills:  3            Where to get your medicines      These medications were sent to [address], [facility] [address]  [address] [address] [address]     Phone:  [phone]     furosemide 20 MG tablet    hydrALAZINE 25 MG tablet                Primary Care Provider Office Phone # Fax #    Dee Awan [phone]       [facility] [address] [facility] [address]        Thank you!     Thank you for choosing [facility]  for your care. Our goal is always to provide you with excellent care. Hearing back from our patients is one way we can continue to improve our services. Please take a few minutes to complete the written survey that you may receive in the mail after your visit with us. Thank you!             Your Updated Medication List - Protect others  around you: Learn how to safely use, store and throw away your medicines at www.disposemymeds.org.          This list is accurate as of: 3/2/17  2:29 PM.  Always use your most recent med list.                   Brand Name Dispense Instructions for use    aspirin 81 MG tablet     90 tablet    Take 1 tablet by mouth daily.       CALCIUM 500 + D PO      1 tablet daily       furosemide 20 MG tablet    LASIX    30 tablet    Take 1 tablet (20 mg) by mouth daily , for 3 days.  Please do not take unless instructed by Dr. Lagunas.       hydrALAZINE 25 MG tablet    APRESOLINE    90 tablet    Take 1 tablet (25 mg) by mouth 3 times daily , as needed if systolic blood pressure (top number) is more than 180 mmHg.       isosorbide mononitrate 60 MG 24 hr tablet    IMDUR    180 tablet    Take 1 tablet (60 mg) by mouth 2 times daily       lisinopril 2.5 MG tablet    PRINIVIL/Zestril    93 tablet    Take 1 tablet (2.5 mg) by mouth daily       metoprolol 50 MG tablet    LOPRESSOR    182 tablet    Take 1 tablet (50 mg) by mouth 2 times daily       MULTI-VITAMIN PO      Take  by mouth daily.       nystatin-triamcinolone cream    MYCOLOG II     Apply  topically 4 times daily as needed.       STATIN NOT PRESCRIBED (INTENTIONAL)      Please choose reason not prescribed, below       tacrolimus 0.5 MG capsule    PROGRAF - GENERIC EQUIVALENT    270 capsule    Take 0.5 mg (1 cap) every AM, take 1 mg, (2 caps) every PM.

## 2017-03-02 NOTE — LETTER
3/2/2017      RE: Lesli Lorenzana  [address] [facility] [address]       Dear Colleague,    Thank you for the opportunity to participate in the care of your patient, Lesli Lorenzana, at the [facility]. Please see a copy of my visit note below.    HPI:     Ms. Lorenzana is a 76 year old female with history of liver transplant in 2008, HTN, hyperlipidemia, CAD with stent in 2009 and CKD.   BP at home at home 128-138/ 65-73.  In 2014 BP under control during the day - at night elevated.  Patient denies chest pain, shortness of breath, palpitations and intermittent claudication.  Patient feels dizzy when bending over.  Patient lost about 15 lb.  Patient complaints about left hip arthritis - she received a injection with corticosteroids in left hip 4 weeks.    Interval history:   Since Mrs. Lorenzana's last vist, Mrs. Lorenzana has been assessed by our Orthopedic colleagues for left total hip arthroplasty and presents today pre-operative cardiac risk stratification. Her exercise tolerance is still approximately two flights of stairs and at least 1-2 blocks on level ground. Mrs. Lorenzana has not been limited in her exercise tolerance by dyspnea and she does not experience PND, orthopnea, palpitations, chest pain, dizziness, pre-syncope, or syncope. ROS otherwise negative.     Of note, Mrs. Lorenzana's home BP readings range from 116-148/68-80.     PAST MEDICAL HISTORY:  Past Medical History   Diagnosis Date     Anemia      Arthritis      CAD (coronary artery disease) 1/2009     stent     Cholelithiasis      gallbladder removed with liver in 2008     Hallux valgus      HTN (hypertension)      Hyperlipidemia LDL goal < 100      Liver transplant 2/2008     due to Primary biliary cirrhosis     Osteoarthritis of left hip      Osteopenia      Overweight(278.02)      Palpitations      Spinal stenosis        CURRENT MEDICATIONS:  Current Outpatient  Prescriptions   Medication Sig Dispense Refill     STATIN NOT PRESCRIBED, INTENTIONAL, Please choose reason not prescribed, below       metoprolol (LOPRESSOR) 50 MG tablet Take 1 tablet (50 mg) by mouth 2 times daily 182 tablet 3     lisinopril (PRINIVIL,ZESTRIL) 2.5 MG tablet Take 1 tablet (2.5 mg) by mouth daily 93 tablet 3     isosorbide mononitrate (IMDUR) 60 MG 24 hr tablet Take 1 tablet (60 mg) by mouth 2 times daily 180 tablet 6     tacrolimus (PROGRAF - GENERIC EQUIVALENT) 0.5 MG capsule Take 0.5 mg (1 cap) every AM, take 1 mg, (2 caps) every PM. 270 capsule 3     nystatin-triamcinolone (MYCOLOG II) cream Apply  topically 4 times daily as needed.       aspirin 81 MG tablet Take 1 tablet by mouth daily. 90 tablet 3     CALCIUM 500 + D PO 1 tablet daily       MULTI-VITAMIN PO Take  by mouth daily.         PAST SURGICAL HISTORY:  Past Surgical History   Procedure Laterality Date     Biopsy       liver      Surgical history of -        liver transplant 2008     Surgical history of -        CAD with stent placement 2009       ALLERGIES     Allergies   Allergen Reactions     Amlodipine Besylate Swelling     Edema in legs     Amoxicillin Rash       FAMILY HISTORY:  Family History   Problem Relation Age of Onset     CANCER Father      unknown     CANCER Mother      lung--smoker     CEREBROVASCULAR DISEASE Maternal Grandmother      Unknown/Adopted Paternal Grandfather      C.A.D. Father        SOCIAL HISTORY:  Social History     Social History     Marital Status:      Spouse Name: N/A     Number of Children: 3     Years of Education: N/A     Occupational History      Retired     Social History Main Topics     Smoking status: Former Smoker     Quit date: 11/23/1975     Smokeless tobacco: Never Used     Alcohol Use: No      Comment: Glass of wine occassionally     Drug Use: No      Comment: Never     Sexual Activity: Not Currently     Other Topics Concern     Blood Transfusions Yes     2007     Exercise No      Social History Narrative    Lives alone with her dog. . Three grown children, very supportive and all live close by.  6 grandchildren. Feels safe in her environment.       ROS:   Constitutional: No fever, chills, or sweats. No weight gain/loss   ENT: No visual disturbance, ear ache, epistaxis, sore throat  Allergies/Immunologic: Negative.   Respiratory: No cough, hemoptysia  Cardiovascular: As per HPI  GI: No nausea, vomiting, hematemesis, melena, or hematochezia  : No urinary frequency, dysuria, or hematuria  Integument: Negative  Psychiatric: Negative  Neuro: Negative  Endocrinology: Negative   Musculoskeletal: Negative    EXAM:  /85  Pulse 71  Ht 1.524 m (5')  Wt 60.2 kg (132 lb 12.8 oz)  SpO2 98%  BMI 25.94 kg/m2   Extended Vitals not filed for this encounter.   Repeat check left arm with regular cuff 184/90  BP at the end of the visit: 180/82    In general, the patient is a pleasant female in no apparent distress.    HEENT: NC/AT.  EOMI.  Sclerae white, not injected.  Nares clear.  Pharynx without erythema or exudate.  Dentition intact.    Neck: No adenopathy.  No thyromegaly. Carotids +4/4 bilaterally without bruits.  No jugular venous distension.   Heart: RRR. Normal S1, S2 splits physiologically. No murmur, rub, click, or gallop.   Lungs: CTA.  No ronchi, wheezes, rales.  No dullness to percussion.   Abdomen: Soft, nontender, nondistended. No organomegaly.  No bruits.   Extremities: No clubbing, cyanosis, or edema.    Neurologic: Alert and oriented to person/place/time, normal speech, gait and affect  Skin: No petechiae, purpura or rash.      Labs:  LIPID RESULTS:  Lab Results   Component Value Date    CHOL 143 02/01/2017    HDL 73 02/01/2017    LDL 51 02/01/2017    TRIG 93 02/01/2017    CHOLHDLRATIO 2.4 08/11/2015    NHDL 70 02/01/2017       LIVER ENZYME RESULTS:  Lab Results   Component Value Date    AST 57 (H) 02/01/2017    ALT 42 02/01/2017       CBC RESULTS:  Lab Results    Component Value Date    WBC 3.2 (L) 02/01/2017    RBC 3.77 (L) 02/01/2017    HGB 11.7 02/01/2017    HCT 33.9 (L) 02/01/2017    MCV 90 02/01/2017    MCH 31.0 02/01/2017    MCHC 34.5 02/01/2017    RDW 12.5 02/01/2017     02/01/2017       BMP RESULTS:  Lab Results   Component Value Date     (L) 02/01/2017    POTASSIUM 4.2 02/01/2017    CHLORIDE 98 02/01/2017    CO2 24 02/01/2017    ANIONGAP 10 02/01/2017    GLC 84 02/01/2017    BUN 23 02/01/2017    CR 1.17 (H) 02/01/2017    CR 2.00 (H) 12/14/2007    GFRESTIMATED 45 (L) 02/01/2017    GFRESTBLACK 55 (L) 02/01/2017    HAYES 8.8 02/01/2017      Procedures    EKG 03-02-17   Sin rhythm  Inverted T-wave DI and aVL  AV 1st degree blokc    Echocardiogram 03-03-17    Left ventricular function, chamber size, wall motion, and wall thickness are  normal.The EF is 55-60%. Biplane LVEF traced at 56%.  The right ventricle is normal size. Global right ventricular function is  normal.  The inferior vena cava was normal in size with preserved respiratory  variability. Estimated mean right atrial pressure is <3 mmHg.  No pericardial effusion is present.  This study was compared with the study from 3/20/13. There has been no change.  _____________________________________________________________________________  __        Left Ventricle  Left ventricular function, chamber size, wall motion, and wall thickness are  normal.The EF is 55-60%. Biplane LVEF traced at 56%. Left ventricular  diastolic function is indeterminate.     Right Ventricle  The right ventricle is normal size. Global right ventricular function is  normal.     Atria  The right atria appears normal. Mild left atrial enlargement is present.     Mitral Valve  The mitral valve is normal.        Aortic Valve  Mild aortic valve sclerosis is present.     Tricuspid Valve  The tricuspid valve is normal. Pulmonary artery systolic pressure cannot be  assessed.     Pulmonic Valve  The pulmonic valve is  normal.     Vessels  The aorta root is normal. The inferior vena cava was normal in size with  preserved respiratory variability. The pulmonary artery is normal. Estimated  mean right atrial pressure is <3 mmHg.     Pericardium  No pericardial effusion is present.        Compared to Previous Study  This study was compared with the study from 3/20/13 . There has been no  change.  _____________________________________________________________________________  __  MMode/2D Measurements & Calculations     IVSd: 1.1 cm  LVIDd: 4.5 cm  LVIDs: 2.7 cm  LVPWd: 0.92 cm  FS: 41.4 %  EDV(Teich): 94.0 ml  ESV(Teich): 25.9 ml  LV mass(C)d: 157.7 grams  asc Aorta Diam: 2.9 cm  LVOT diam: 1.9 cm  LVOT area: 2.8 cm2  LA Volume (BP): 63.4 ml  LA Volume Index (BP): 40.6 ml/m2           Doppler Measurements & Calculations  MV E max jose: 41.4 cm/sec  MV A max jose: 67.1 cm/sec  MV E/A: 0.62  MV dec time: 0.39 sec  Lateral E/e': 6.9  Medial E/e': 11.9         EKG: Rate approx 75 bpm, NSR, 1st deg heart block. No ST changes. Longstanding TWI in aVL.    I have personally reviewed the EKG.    Assessment and Plan:   Mrs. Lesli Lorenzana is a 76-year old lady with a past medical history of a liver transplantation for primary biliary cirrhosis, HTN, and CAD who presented for pre-operative cardiac risk stratification prior to left total hip arthroplasty.     Mrs. Lorenzana's hypertension seems most consistent with white coat hypertension given her marked hypertension in the office. We will ensure that she has not developed any structural changes since her last TTE but we have also given her a strategy for managing her high blood pressure in the interim.    - Pre-operative cardiac risk stratification and Hx of HTN (white coat)   - Advised to obtain blood pressure log for the next week   - Will obtain TTE in view of severe HTN   - Also asked to take furosemide 20 mg by mouth if SBP >160 and hydralazine 25 mg if SBP >180    - Hx of CAD s/p PCI to  OM1 in 2009 (for unstable angina)   - Continue home meds including aspirin 81 mg q24h, metoprolol tartrate 50 mg BID, Imdur 60 mg BID (though dosing somewhat unconventional), furosemide 20 mg q24h     RTC 6 months     The echocardiogram is unchanged -     Patient can be cleared for orthopedic surgery    Sonny Lagunas MD, PhD  Professor of Medicine  Division of Cardiology    CC  Patient Care Team:  Dee Awan MD as PCP - General  Sonny Lagunas MD as PCP - Cardiology (Cardiology)  Leah Aguilar RN as Nurse Coordinator (Cardiology)  Harika Meza [facility] as Pharmacist (Pharmacist)  Sonny Lagunas MD as MD (Cardiology)  Zhen Armstrong MD as MD (Orthopedics)  SONNY LAGUNAS      Answers for HPI/ROS submitted by the patient on 2/25/2017   General Symptoms: No  Skin Symptoms: No  HENT Symptoms: Yes  EYE SYMPTOMS: No  HEART SYMPTOMS: No  LUNG SYMPTOMS: Yes  INTESTINAL SYMPTOMS: No  URINARY SYMPTOMS: No  GYNECOLOGIC SYMPTOMS: No  BREAST SYMPTOMS: No  SKELETAL SYMPTOMS: Yes  BLOOD SYMPTOMS: No  NERVOUS SYSTEM SYMPTOMS: No  MENTAL HEALTH SYMPTOMS: No  Ear pain: No  Ear discharge: No  Hearing loss: Yes  Tinnitus: No  Nosebleeds: No  Congestion: Yes  Sinus pain: No  Trouble swallowing: No   Voice hoarseness: No  Mouth sores: No  Sore throat: No  Tooth pain: No  Gum tenderness: No  Bleeding gums: No  Change in taste: No  Change in sense of smell: No  Dry mouth: No  Hearing aid used: No  Neck lump: No  Cough: Yes  Sputum or phlegm: Yes  Coughing up blood: No  Difficulty breating or shortness of breath: No  Snoring: No  Wheezing: No  Difficulty breathing on exertion: No  Respiratory pain: No  Nighttime Cough: No  Difficulty breathing when lying flat: No  Back pain: Yes  Muscle aches: No  Neck pain: No  Swollen joints: No  Joint pain: Yes  Bone pain: Yes  Muscle cramps: No  Muscle weakness: Yes  Joint stiffness: Yes  Bone fracture: No      Please do not hesitate to contact me if you have any  questions/concerns.     Sincerely,     Nain Lagunas MD

## 2017-03-03 ENCOUNTER — RADIANT APPOINTMENT (OUTPATIENT)
Dept: CARDIOLOGY | Facility: CLINIC | Age: 77
End: 2017-03-03

## 2017-03-03 DIAGNOSIS — I10 ESSENTIAL HYPERTENSION WITH GOAL BLOOD PRESSURE LESS THAN 140/90: ICD-10-CM

## 2017-03-03 DIAGNOSIS — Z01.810 PRE-OPERATIVE CARDIOVASCULAR EXAMINATION: ICD-10-CM

## 2017-03-03 DIAGNOSIS — I25.119 CORONARY ARTERY DISEASE INVOLVING NATIVE HEART WITH ANGINA PECTORIS, UNSPECIFIED VESSEL OR LESION TYPE (H): ICD-10-CM

## 2017-03-03 LAB — INTERPRETATION ECG - MUSE: NORMAL

## 2017-03-29 ENCOUNTER — OFFICE VISIT (OUTPATIENT)
Dept: FAMILY MEDICINE | Facility: CLINIC | Age: 77
End: 2017-03-29
Payer: COMMERCIAL

## 2017-03-29 VITALS
TEMPERATURE: 96.5 F | HEART RATE: 78 BPM | DIASTOLIC BLOOD PRESSURE: 80 MMHG | WEIGHT: 131 LBS | BODY MASS INDEX: 25.58 KG/M2 | SYSTOLIC BLOOD PRESSURE: 150 MMHG

## 2017-03-29 DIAGNOSIS — I10 ESSENTIAL HYPERTENSION WITH GOAL BLOOD PRESSURE LESS THAN 140/90: ICD-10-CM

## 2017-03-29 DIAGNOSIS — Z01.818 PREOP GENERAL PHYSICAL EXAM: Primary | ICD-10-CM

## 2017-03-29 DIAGNOSIS — Z94.4 LIVER REPLACED BY TRANSPLANT (H): ICD-10-CM

## 2017-03-29 DIAGNOSIS — I25.119 CORONARY ARTERY DISEASE INVOLVING NATIVE HEART WITH ANGINA PECTORIS, UNSPECIFIED VESSEL OR LESION TYPE (H): ICD-10-CM

## 2017-03-29 DIAGNOSIS — M16.12 ARTHRITIS OF LEFT HIP: ICD-10-CM

## 2017-03-29 LAB
ALBUMIN SERPL-MCNC: 3.4 G/DL (ref 3.4–5)
ALBUMIN UR-MCNC: NEGATIVE MG/DL
ALP SERPL-CCNC: 70 U/L (ref 40–150)
ALT SERPL W P-5'-P-CCNC: 35 U/L (ref 0–50)
ANION GAP SERPL CALCULATED.3IONS-SCNC: 10 MMOL/L (ref 3–14)
APPEARANCE UR: CLEAR
AST SERPL W P-5'-P-CCNC: 52 U/L (ref 0–45)
BILIRUB DIRECT SERPL-MCNC: 0.2 MG/DL (ref 0–0.2)
BILIRUB SERPL-MCNC: 0.5 MG/DL (ref 0.2–1.3)
BILIRUB UR QL STRIP: NEGATIVE
BUN SERPL-MCNC: 18 MG/DL (ref 7–30)
CALCIUM SERPL-MCNC: 8.9 MG/DL (ref 8.5–10.1)
CHLORIDE SERPL-SCNC: 97 MMOL/L (ref 94–109)
CO2 SERPL-SCNC: 25 MMOL/L (ref 20–32)
COLOR UR AUTO: YELLOW
CREAT SERPL-MCNC: 1.1 MG/DL (ref 0.52–1.04)
ERYTHROCYTE [DISTWIDTH] IN BLOOD BY AUTOMATED COUNT: 12.4 % (ref 10–15)
GFR SERPL CREATININE-BSD FRML MDRD: 48 ML/MIN/1.7M2
GLUCOSE SERPL-MCNC: 77 MG/DL (ref 70–99)
GLUCOSE UR STRIP-MCNC: NEGATIVE MG/DL
HCT VFR BLD AUTO: 32.9 % (ref 35–47)
HGB BLD-MCNC: 11.5 G/DL (ref 11.7–15.7)
HGB UR QL STRIP: NEGATIVE
KETONES UR STRIP-MCNC: NEGATIVE MG/DL
LEUKOCYTE ESTERASE UR QL STRIP: NEGATIVE
MCH RBC QN AUTO: 30.7 PG (ref 26.5–33)
MCHC RBC AUTO-ENTMCNC: 35 G/DL (ref 31.5–36.5)
MCV RBC AUTO: 88 FL (ref 78–100)
NITRATE UR QL: NEGATIVE
PH UR STRIP: 5.5 PH (ref 5–7)
PLATELET # BLD AUTO: 197 10E9/L (ref 150–450)
POTASSIUM SERPL-SCNC: 4.3 MMOL/L (ref 3.4–5.3)
PROT SERPL-MCNC: 8.1 G/DL (ref 6.8–8.8)
RBC # BLD AUTO: 3.74 10E12/L (ref 3.8–5.2)
SODIUM SERPL-SCNC: 132 MMOL/L (ref 133–144)
SP GR UR STRIP: <=1.005 (ref 1–1.03)
URN SPEC COLLECT METH UR: NORMAL
UROBILINOGEN UR STRIP-ACNC: 0.2 EU/DL (ref 0.2–1)
WBC # BLD AUTO: 3.6 10E9/L (ref 4–11)

## 2017-03-29 PROCEDURE — 99215 OFFICE O/P EST HI 40 MIN: CPT | Mod: 25 | Performed by: FAMILY MEDICINE

## 2017-03-29 PROCEDURE — 36415 COLL VENOUS BLD VENIPUNCTURE: CPT | Performed by: INTERNAL MEDICINE

## 2017-03-29 PROCEDURE — 81003 URINALYSIS AUTO W/O SCOPE: CPT | Performed by: FAMILY MEDICINE

## 2017-03-29 PROCEDURE — 85027 COMPLETE CBC AUTOMATED: CPT | Performed by: INTERNAL MEDICINE

## 2017-03-29 PROCEDURE — 80053 COMPREHEN METABOLIC PANEL: CPT | Performed by: INTERNAL MEDICINE

## 2017-03-29 PROCEDURE — 82248 BILIRUBIN DIRECT: CPT | Performed by: INTERNAL MEDICINE

## 2017-03-29 NOTE — MR AVS SNAPSHOT
After Visit Summary   3/29/2017    Lesli Lorenzana    MRN: [id number]           Patient Information     Date Of Birth          1940        Visit Information        Provider Department      3/29/2017 10:20 AM Dee Awan MD [facility]        Today's Diagnoses     Preop general physical exam    -  1    Arthritis of left hip        Coronary artery disease involving native heart with angina pectoris, unspecified vessel or lesion type (H)        Essential hypertension with goal blood pressure less than 140/90        Liver replaced by transplant (H)          Care Instructions      Before Your Surgery      Call your surgeon if there is any change in your health. This includes signs of a cold or flu (such as a sore throat, runny nose, cough, rash or fever).    Do not smoke, drink alcohol or take over the counter medicine (unless your surgeon or primary care doctor tells you to) for the 24 hours before and after surgery.    If you take prescribed drugs: Follow your doctor s orders about which medicines to take and which to stop until after surgery.    Eating and drinking prior to surgery: follow the instructions from your surgeon    Take a shower or bath the night before surgery. Use the soap your surgeon gave you to gently clean your skin. If you do not have soap from your surgeon, use your regular soap. Do not shave or scrub the surgery site.  Wear clean pajamas and have clean sheets on your bed.         Follow-ups after your visit        Your next 10 appointments already scheduled     Apr 04, 2017  8:00 AM CDT   LAB with AN LAB   [facility] ([facility])    [address] [facility] [address] [phone]           Patient must bring picture ID.  Patient should be prepared to give a urine specimen  Please do not eat 10-12 hours before your appointment if you are coming in fasting for labs on lipids, cholesterol, or glucose (sugar).  Pregnant women  should follow their Care Team instructions. Water with medications is okay. Do not drink coffee or other fluids.   If you have concerns about taking  your medications, please ask at office or if scheduling via [employer], send a message by clicking on Secure Messaging, Message Your Care Team.            Apr 11, 2017   Procedure with Zhen Armstrong MD   [facility] [place], Same Day Surgery (--)    [address]  [facility] [address]   [phone]            Jul 28, 2017 10:00 AM CDT   (Arrive by 9:45 AM)   Return Liver Transplant with Tucker Muhammad MD   [facility] ([facility])    [address] [place] [id number] [phone]            Sep 07, 2017 10:30 AM CDT   (Arrive by 10:15 AM)   Return Visit with Nain Lagunas MD   [facility] ([facility])    [address] [place] [id number] [phone]              Who to contact     If you have questions or need follow up information about today's clinic visit or your schedule please contact [facility] directly at [phone].  Normal or non-critical lab and imaging results will be communicated to you by PingMehart, letter or phone within 4 business days after the clinic has received the results. If you do not hear from us within 7 days, please contact the clinic through PingMehart or phone. If you have a critical or abnormal lab result, we will notify you by phone as soon as possible.  Submit refill requests through [employer] or call your pharmacy and they will forward the refill request to us. Please allow 3 business days for your refill to be completed.          Additional Information About Your Visit        [employer] Information     [employer] gives you secure access to your electronic health record. If you see a primary care provider, you can also send messages to your care team and make appointments. If you have questions, please call your  primary care clinic.  If you do not have a primary care provider, please call [phone] and they will assist you.        Care EveryWhere ID     This is your Care EveryWhere ID. This could be used by other organizations to access your [place] medical records  [id number]        Your Vitals Were     Pulse Temperature BMI (Body Mass Index)             78 96.5  F (35.8  C) (Oral) 25.58 kg/m2          Blood Pressure from Last 3 Encounters:   03/29/17 150/80   03/02/17 187/85   01/27/17 173/80    Weight from Last 3 Encounters:   03/29/17 131 lb (59.4 kg)   03/02/17 132 lb 12.8 oz (60.2 kg)   02/06/17 132 lb 8 oz (60.1 kg)              We Performed the Following     *UA reflex to Microscopic and Culture ([place] and [facility] (except [place] and [place])     Bilirubin direct     CBC with platelets     Comprehensive metabolic panel        Primary Care Provider Office Phone # Fax #    Dee Awan [phone]       [facility] [address] [facility] [address]        Thank you!     Thank you for choosing [facility]  for your care. Our goal is always to provide you with excellent care. Hearing back from our patients is one way we can continue to improve our services. Please take a few minutes to complete the written survey that you may receive in the mail after your visit with us. Thank you!             Your Updated Medication List - Protect others around you: Learn how to safely use, store and throw away your medicines at www.disposemymeds.org.          This list is accurate as of: 3/29/17  8:27 PM.  Always use your most recent med list.                   Brand Name Dispense Instructions for use    aspirin 81 MG tablet     90 tablet    Take 1 tablet by mouth daily.       CALCIUM 500 + D PO      1 tablet daily       furosemide 20 MG tablet    LASIX    30 tablet    Take 1 tablet (20 mg) by mouth daily , for 3 days.  Please do not take unless instructed by   Janneth.       hydrALAZINE 25 MG tablet    APRESOLINE    90 tablet    Take 1 tablet (25 mg) by mouth 3 times daily , as needed if systolic blood pressure (top number) is more than 180 mmHg.       isosorbide mononitrate 60 MG 24 hr tablet    IMDUR    180 tablet    Take 1 tablet (60 mg) by mouth 2 times daily       lisinopril 2.5 MG tablet    PRINIVIL/Zestril    93 tablet    Take 1 tablet (2.5 mg) by mouth daily       metoprolol 50 MG tablet    LOPRESSOR    182 tablet    Take 1 tablet (50 mg) by mouth 2 times daily       MULTI-VITAMIN PO      Take  by mouth daily.       nystatin-triamcinolone cream    MYCOLOG II     Apply  topically 4 times daily as needed.       STATIN NOT PRESCRIBED (INTENTIONAL)      Please choose reason not prescribed, below       tacrolimus 0.5 MG capsule    PROGRAF - GENERIC EQUIVALENT    270 capsule    Take 0.5 mg (1 cap) every AM, take 1 mg, (2 caps) every PM.

## 2017-03-29 NOTE — PROGRESS NOTES
[facility]  [address] [facility] [id number] [phone]  Dept: [phone]    PRE-OP EVALUATION:  Today's date: 3/29/2017    Lesli Lorenzana (: 1940) presents for pre-operative evaluation assessment as requested by Dr. Zhen Armstrong.  She requires evaluation and anesthesia risk assessment prior to undergoing surgery/procedure for treatment of hip pain  .  Proposed procedure: hip replacement     Date of Surgery/ Procedure: 17  Time of Surgery/ Procedure: 12:00  [facility]   Fax number for surgical facility:   Primary Physician: Dee Awan  Type of Anesthesia Anticipated: General    Patient has a Health Care Directive or Living Will:  YES     Preop Questions 3/26/2017   1.  Do you have a history of heart attack, stroke, stent, bypass or surgery on an artery in the head, neck, heart or legs? YES - yes CAD. Pt has been cleared by cardiology earlier this  Month for surgery   2.  Do you ever have any pain or discomfort in your chest? No   3.  Do you have a history of  Heart Failure? No   4.   Are you troubled by shortness of breath when:  walking on a level surface, or up a slight hill, or at night? No   5.  Do you currently have a cold, bronchitis or other respiratory infection? No   6.  Do you have a cough, shortness of breath, or wheezing? No   7.  Do you sometimes get pains in the calves of your legs when you walk? No   8. Do you or anyone in your family have previous history of blood clots? No   9.  Do you or does anyone in your family have a serious bleeding problem such as prolonged bleeding following surgeries or cuts? No   10. Have you ever had problems with anemia or been told to take iron pills? No   11. Have you had any abnormal blood loss such as black, tarry or bloody stools, or abnormal vaginal bleeding? No   12. Have you ever had a blood transfusion? YES - with liver transplant   13. Have you or any of your relatives ever had problems  with anesthesia? No   14. Do you have sleep apnea, excessive snoring or daytime drowsiness? No   15. Do you have any prosthetic heart valves? No   16. Do you have prosthetic joints? No   17. Is there any chance that you may be pregnant? No         HPI:                                                      Brief HPI related to upcoming procedure: Pt with osteoarthritis in left hip, causing her pain and plans on left hip replacement      HYPERTENSION - Patient has longstanding history of mod-severe HTN , currently denies any symptoms referable to elevated blood pressure. Specifically denies chest pain, palpitations, dyspnea, orthopnea, PND or peripheral edema. Blood pressure readings have been in normal range. Current medication regimen is as listed below. Patient denies any side effects of medication.  Pt has white coat HTN, but home BPs are within desired range. Again, pt has been cleared by cardiology                                                                                                                                                                                     .  CAD - Patient has a longstanding history of mod-severe CAD. Patient denies recent chest pain or NTG use, denies exercise induced dyspnea or PND. Last Stress test cleared by cardiology earlier this month.                                                                                                                            .    MEDICAL HISTORY:                                                      Patient Active Problem List    Diagnosis Date Noted     Osteoarthritis of left hip 07/26/2016     Priority: Medium     Primary osteoarthritis of left hip 07/26/2016     Priority: Medium     Essential hypertension with goal blood pressure less than 140/90 07/24/2016     Priority: Medium     Benign neoplasm of colon, unspecified part of colon 03/14/2016     Priority: Medium     Liver transplant rejection (H) 08/11/2014     Priority: Medium      Mild acute liver transplant rejection - treated as inpatient with IV steroids.       Elevated transaminase level 08/10/2014     Priority: Medium     SNHL (sensory-neural hearing loss), asymmetrical 07/17/2014     Priority: Medium     Chest pain 03/15/2014     Priority: Medium     Left shoulder pain 03/15/2014     Priority: Medium     Fatigue 03/20/2013     Priority: Medium     SOB (shortness of breath) 03/20/2013     Priority: Medium     Onychomycosis of toenail 01/28/2013     Priority: Medium     Right leg pain 07/10/2012     Priority: Medium     Osteopenia 05/16/2012     Priority: Medium     Hyperlipidemia LDL goal <70 05/15/2012     Priority: Medium     CKD (chronic kidney disease) stage 3, GFR 30-59 ml/min 05/15/2012     Priority: Medium     Advanced directives, counseling/discussion 04/14/2011     Priority: Medium     Patient states has an ADand will bring in a copy to clinic         CAD (coronary artery disease) 01/01/2009     Priority: Medium     stent       History of liver transplant (H) 02/01/2008     Priority: Medium     (Problem list name updated by automated process. 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  Please do not take unless  instructed by Dr. Lagunas. 30 tablet 0     hydrALAZINE (APRESOLINE) 25 MG tablet Take 1 tablet (25 mg) by mouth 3 times daily , as needed if systolic blood pressure (top number) is more than 180 mmHg. 90 tablet 3     STATIN NOT PRESCRIBED, INTENTIONAL, Please choose reason not prescribed, below       metoprolol (LOPRESSOR) 50 MG tablet Take 1 tablet (50 mg) by mouth 2 times daily 182 tablet 3     lisinopril (PRINIVIL,ZESTRIL) 2.5 MG tablet Take 1 tablet (2.5 mg) by mouth daily 93 tablet 3     isosorbide mononitrate (IMDUR) 60 MG 24 hr tablet Take 1 tablet (60 mg) by mouth 2 times daily 180 tablet 6     tacrolimus (PROGRAF - GENERIC EQUIVALENT) 0.5 MG capsule Take 0.5 mg (1 cap) every AM, take 1 mg, (2 caps) every PM. 270 capsule 3     nystatin-triamcinolone (MYCOLOG II) cream Apply  topically 4 times daily as needed.       aspirin 81 MG tablet Take 1 tablet by mouth daily. 90 tablet 3     CALCIUM 500 + D PO 1 tablet daily       MULTI-VITAMIN PO Take  by mouth daily.       OTC products: None, except as noted above    Allergies   Allergen Reactions     Amlodipine Besylate Swelling     Edema in legs     Amoxicillin Rash      Latex Allergy: NO    Social History   Substance Use Topics     Smoking status: Former Smoker     Quit date: 11/23/1975     Smokeless tobacco: Never Used     Alcohol use No      Comment: Glass of wine occassionally     History   Drug Use No     Comment: Never       REVIEW OF SYSTEMS:                                                    Constitutional, HEENT, cardiovascular, pulmonary, gi and gu systems are negative, except as otherwise noted.    EXAM:                                                    /80  Pulse 78  Temp 96.5  F (35.8  C) (Oral)  Wt 131 lb (59.4 kg)  BMI 25.58 kg/m2    GENERAL APPEARANCE: healthy, alert and no distress     EYES: EOMI, PERRL     HENT: ear canals and TM's normal and nose and mouth without ulcers or lesions, upper dentures, lower partials     NECK: no  adenopathy, no asymmetry, masses, or scars and thyroid normal to palpation     RESP: lungs clear to auscultation - no rales, rhonchi or wheezes     CV: regular rates and rhythm, normal S1 S2, no S3 or S4 and no murmur, click or rub     ABDOMEN:  soft, nontender, no HSM or masses and bowel sounds normal     MS: extremities normal- no gross deformities noted, no evidence of inflammation in joints, FROM in all extremities.     NEURO: Normal strength and tone, sensory exam grossly normal, mentation intact and speech normal     PSYCH: mentation appears normal. and affect normal/bright    DIAGNOSTICS:                                                    EKG: appears normal, NSR, normal axis, normal intervals, no acute ST/T changes c/w ischemia, no LVH by voltage criteria, unchanged from previous tracings, 1st degree AV block    UA RESULTS:  Recent Labs   Lab Test  03/29/17   1124  08/12/15   0630   COLOR  Yellow  Yellow   APPEARANCE  Clear  Cloudy   URINEGLC  Negative  Negative   URINEBILI  Negative  Negative   URINEKETONE  Negative  Negative   SG  <=1.005  <=1.005   UBLD  Negative  Negative   URINEPH  5.5  7.0   PROTEIN  Negative  Negative   UROBILINOGEN  0.2  0.2   NITRITE  Negative  Negative   LEUKEST  Negative  Moderate*   RBCU   --   O - 2   WBCU   --   25-50*         Lab Results   Component Value Date    WBC 3.6 03/29/2017     Lab Results   Component Value Date    RBC 3.74 03/29/2017     Lab Results   Component Value Date    HGB 11.5 03/29/2017     Lab Results   Component Value Date    HCT 32.9 03/29/2017     No components found for: MCT  Lab Results   Component Value Date    MCV 88 03/29/2017     Lab Results   Component Value Date    MCH 30.7 03/29/2017     Lab Results   Component Value Date    MCHC 35.0 03/29/2017     Lab Results   Component Value Date    RDW 12.4 03/29/2017     Lab Results   Component Value Date     03/29/2017       Last Basic Metabolic Panel:  Lab Results   Component Value Date      03/29/2017      Lab Results   Component Value Date    POTASSIUM 4.3 03/29/2017     Lab Results   Component Value Date    CHLORIDE 97 03/29/2017     Lab Results   Component Value Date    HAYES 8.9 03/29/2017     Lab Results   Component Value Date    CO2 25 03/29/2017     Lab Results   Component Value Date    BUN 18 03/29/2017     Lab Results   Component Value Date    CR 1.10 03/29/2017     Lab Results   Component Value Date    GLC 77 03/29/2017           IMPRESSION:                                                    Reason for surgery/procedure: left hip replacement due to arthritis    The proposed surgical procedure is considered INTERMEDIATE risk.    REVISED CARDIAC RISK INDEX  The patient has the following serious cardiovascular risks for perioperative complications such as (MI, PE, VFib and 3  AV Block):  No serious cardiac risks  INTERPRETATION: 2 risks: Class III (moderate risk - 6.6% complication rate)    The patient has the following additional risks for perioperative complications:  No identified additional risks      ICD-10-CM    1. Preop general physical exam Z01.818 Bilirubin direct     *UA reflex to Microscopic and Culture ([facility] (except [place] and [place])     CBC with platelets   2. Arthritis of left hip M19.90    3. Coronary artery disease involving native heart with angina pectoris, unspecified vessel or lesion type (H) I25.119 Comprehensive metabolic panel   4. Essential hypertension with goal blood pressure less than 140/90 I10 Comprehensive metabolic panel   5. Liver replaced by transplant (H) Z94.4 CANCELED: Basic metabolic panel     CANCELED: Hepatic panel     CANCELED: Tacrolimus level       RECOMMENDATIONS:                                                      --Consult hospital rounder / IM to assist post-op medical management    Cardiovascular Risk  Performs 4 METs exercise without symptoms (Light housework (dusting, washing dishes)) .   Patient is already on a Beta  Blocker. Continue Betablocker therapy after surgery, using Beta blocker order set as necessary for NPO status.    BP elevated today but pt with white coat HTN and home BPs are normal      --Patient is to take all scheduled medications on the day of surgery EXCEPT for modifications listed below.  Hold lisinopril    APPROVAL GIVEN to proceed with proposed procedure, without further diagnostic evaluation       Signed Electronically by: Dee Mercado MD    Copy of this evaluation report is provided to requesting physician.    [place] Preop Guidelines

## 2017-03-30 NOTE — PROGRESS NOTES
No need to fax pre-op, in [facility]. [facility].    Erlinda Pelayo,

## 2017-03-31 ENCOUNTER — DOCUMENTATION ONLY (OUTPATIENT)
Dept: EDUCATION SERVICES | Facility: CLINIC | Age: 77
End: 2017-03-31

## 2017-04-04 DIAGNOSIS — Z94.4 LIVER REPLACED BY TRANSPLANT (H): ICD-10-CM

## 2017-04-04 LAB
TACROLIMUS BLD-MCNC: 5.1 UG/L (ref 5–15)
TME LAST DOSE: 2010 H

## 2017-04-04 PROCEDURE — 80197 ASSAY OF TACROLIMUS: CPT | Performed by: INTERNAL MEDICINE

## 2017-04-04 PROCEDURE — 36415 COLL VENOUS BLD VENIPUNCTURE: CPT | Performed by: INTERNAL MEDICINE

## 2017-04-05 ENCOUNTER — OFFICE VISIT (OUTPATIENT)
Dept: SURGERY | Facility: CLINIC | Age: 77
End: 2017-04-05

## 2017-04-05 ENCOUNTER — ALLIED HEALTH/NURSE VISIT (OUTPATIENT)
Dept: SURGERY | Facility: CLINIC | Age: 77
End: 2017-04-05

## 2017-04-05 ENCOUNTER — ANESTHESIA EVENT (OUTPATIENT)
Dept: SURGERY | Facility: CLINIC | Age: 77
DRG: 470 | End: 2017-04-05
Payer: MEDICARE

## 2017-04-05 VITALS
HEIGHT: 60 IN | HEART RATE: 82 BPM | WEIGHT: 132.7 LBS | BODY MASS INDEX: 26.05 KG/M2 | TEMPERATURE: 97.8 F | SYSTOLIC BLOOD PRESSURE: 184 MMHG | DIASTOLIC BLOOD PRESSURE: 83 MMHG | OXYGEN SATURATION: 97 % | RESPIRATION RATE: 16 BRPM

## 2017-04-05 DIAGNOSIS — M16.12 OSTEOARTHRITIS OF LEFT HIP, UNSPECIFIED OSTEOARTHRITIS TYPE: Primary | ICD-10-CM

## 2017-04-05 DIAGNOSIS — M16.12 OSTEOARTHRITIS OF LEFT HIP, UNSPECIFIED OSTEOARTHRITIS TYPE: ICD-10-CM

## 2017-04-05 DIAGNOSIS — Z01.818 PREOP EXAMINATION: Primary | ICD-10-CM

## 2017-04-05 DIAGNOSIS — Z94.4 HISTORY OF LIVER TRANSPLANT (H): ICD-10-CM

## 2017-04-05 LAB — INR PPP: 1.19 (ref 0.86–1.14)

## 2017-04-05 NOTE — ANESTHESIA PREPROCEDURE EVALUATION
Anesthesia Evaluation     . Pt has had prior anesthetic. Type: General    History of anesthetic complications          ROS/MED HX    ENT/Pulmonary:  - neg pulmonary ROS     Neurologic:  - neg neurologic ROS     Cardiovascular:     (+) hypertension-[place]: Has white coat well managed at home, -CAD, --stent,2009  1 Drug Eluting Stent .. : . . . :. . Previous cardiac testing Echodate:3/2017results:   Interpretation Summary  Left ventricular function, chamber size, wall motion, and wall thickness are  normal.The EF is 55-60%. Biplane LVEF traced at 56%.  The right ventricle is normal size. Global right ventricular function is  normal.  The inferior vena cava was normal in size with preserved respiratory  variability. Estimated mean right atrial pressure is <3 mmHg.  No pericardial effusion is present.  This study was compared with the study from 3/20/13. There has been no change.date: results:ECG reviewed date:3/2017 results:Sinus rhythm with 1st degree A-V block  Otherwise normal ECG  When compared with ECG of 09-AUG-2014 20:21, date: results:          METS/Exercise Tolerance:  1 - Eating, dressing   Hematologic:     (+) History of Transfusion no previous transfusion reaction -      Musculoskeletal: Comment: Osteoarthritis  (+) arthritis, , , -       GI/Hepatic: Comment: Liver transplant 2008    (+) liver disease,       Renal/Genitourinary:     (+) chronic renal disease, type: CRI, Pt does not require dialysis, Pt has no history of transplant,       Endo:  - neg endo ROS       Psychiatric:  - neg psychiatric ROS       Infectious Disease:  - neg infectious disease ROS       Malignancy:      - no malignancy   Other:    (+) No chance of pregnancy C-spine cleared: N/A, no H/O Chronic Pain,no other significant disability                    Physical Exam  Normal systems: cardiovascular and pulmonary    Airway   Mallampati: II  TM distance: <3 FB  Neck ROM: full    Dental   (+) upper dentures and partials    Cardiovascular    Rhythm and rate: regular and normal  (-) no peripheral edema and no murmur    Pulmonary    breath sounds clear to auscultation    Other findings: Last Basic Metabolic Panel:  Lab Results      Component                Value               Date                      NA                       132                 03/29/2017             Lab Results      Component                Value               Date                      POTASSIUM                4.3                 03/29/2017            Lab Results      Component                Value               Date                      CHLORIDE                 97                  03/29/2017            Lab Results      Component                Value               Date                      HAYES                      8.9                 03/29/2017            Lab Results      Component                Value               Date                      CO2                      25                  03/29/2017            Lab Results      Component                Value               Date                      BUN                      18                  03/29/2017            Lab Results      Component                Value               Date                      CR                       1.10                03/29/2017            Lab Results      Component                Value               Date                      GLC                      77                  03/29/2017            CBC RESULTS: Lab Test        03/29/17                       1109          WBC          3.6*          RBC          3.74*         HGB          11.5*         HCT          32.9*         MCV          88            MCH          30.7          MCHC         35.0          RDW          12.4          PLT          197                    PAC Discussion and Assessment    ASA Classification: 3  Case is suitable for: [facility]  Anesthetic techniques and relevant risks discussed: GA  Invasive monitoring and risk discussed: Yes  Types:    Possibility and Risk of blood transfusion discussed: Yes  NPO instructions given: NPO after midnight  Additional anesthetic preparation and risks discussed: Invasive monitoring  Needs early admission to pre-op area:   Other:     PAC Resident/NP Anesthesia Assessment:      ASSESSMENT and PLAN  Lesli Lorenzana is a 76 year old female scheduled to undergo a left total hip replacement d/t osteoarthritis.  History of a liver transplant d/t idiopathic cirrhosis in 2008 and CAD with a stent placed in 2009- see hx below   She has the following specific operative considerations:     1. Cardiac- HTN- has well documented white coat syndrome, takes her BP daily and it is in the 130s'/70s at home however was elevated today 184/83 and 168/82, HLD, CAD with a 1 vessel stent in 2009- <4 METS- limited to hip pain, ECHO- EF- 55-60%- no changes, no significant valvular disease   - Seen by Dr. Lagunas for cardiac risk stratification pre-operatively- ok to proceed  - Instructed to take her Metoprolol and Imdur the DOS  - Hold Lisinopril- ( she will hold Lasix- takes PRN for elevated BP) Also takes hydralazine PRN for elevated BP - has never taken  -Aspirin- has been holding since March 28th   2. Renal- CKD-stage 3 CR- 1.1- and GFR- 48- has been stable  - Avoid nephrotoxic medications   3. Anemia- stable, Hgb 11.5  4. GI- Liver transplant- in 2008 has been stable.  - Seen pre-op by Dr. Muhammad  - Recent LFTs- WNL  - Instructed to cont her tacrolimus the DOS  5. Anesthesia- airway appears feasible, last anesthesia she had a sore throat/raspy voice for a few days  6. Pre-op per Dr. Awan  -T & S and INR today    - RCRI : 0.9%   risk of major adverse cardiac event.   - VTE risk: 0.5%  - OTILIA # of risks /8 = Low risk  - Risk of PONV score = 2  If > 2, anti-emetic intervention recommended.      Patient was discussed with Dr. Andrew Rivas, APRN CNP  Preoperative Assessment Center  [facility]  Phone: [phone]  Fax: [phone]            Mid-Level Provider/Resident:   Date:   Time:     Attending Anesthesiologist Anesthesia Assessment:  76 year old for left total hip replacement in a patient S/P liver transplant 2008. Chart reviewed, patient seen and evaluated; agree with above assessment. Patient has known CAD with stent placed 2009; stable and followed closely by Dr. Lagunas here. Today her systolic was 180, but diastolic only 80. Dr. Lagunas knows these pressures, does not want to add another medication as she is normotensive at home. CRD with creatinine 1.1.  Patient reports PONV (mild) and very sore throat with hoarseness after prior surgery. She is relatively small, so would consider using a smaller tube.     Patient is appropriate for the planned procedure without further workup or medical management change. The final anesthesia plan will be determined by the physician anesthesiologist caring for the patient on the day of surgery.       Reviewed and Signed by PAC Anesthesiologist  Anesthesiologist: sammi  Date: 4/5/2017  Time:   Pass/Fail: Pass  Disposition:     PAC Pharmacist Assessment:        Pharmacist:   Date:   Time:      Anesthesia Plan      History & Physical Review  History and physical reviewed and following examination; no interval change.    ASA Status:  3 .    NPO Status:  > 8 hours    Plan for General with Propofol induction. Maintenance will be Balanced.    PONV prophylaxis:  Ondansetron (or other 5HT-3)  Additional equipment: Videolaryngoscope               ===================================================    PAC assessment reviewed and appreciated. Pt seen and examined.      EKG today - NSR @ 73 bpm      Airway - MAL 2, upper full denture, lower partial      Plan - GETA with CMAC assistance.  Pt had h/o throat irritation and hoarseness for 2 days post op.  Will use smaller ETT today.        Risks and possible complications of GETA discussed in full with patient.  She  voices understanding and wishes to proceed.    Dr. Ewelina Nelson MD  Anesthesia  04/11/2017 @ 1048 am            Postoperative Care  Postoperative pain management:  IV analgesics.      Consents  Anesthetic plan, risks, benefits and alternatives discussed with:  Patient..                          .

## 2017-04-05 NOTE — MR AVS SNAPSHOT
After Visit Summary   4/5/2017    Lesli Lorenzana    MRN: [id number]           Patient Information     Date Of Birth          1940        Visit Information        Provider Department      4/5/2017 10:00 AM Pharmacist, Rasheed Rodriguez [facility]        Today's Diagnoses     Preop examination    -  1       Follow-ups after your visit        Your next 10 appointments already scheduled     Apr 05, 2017 10:30 AM CDT   (Arrive by 10:15 AM)   PAC EVALUATION with Rasheed Pac Deandra [address] [place] [address] [facility] [place] ([facility])    [address] [place] [address]   [phone]            Apr 05, 2017 11:30 AM CDT   (Arrive by 11:15 AM)   PAC RN ASSESSMENT with Rasheed Pac Rn   [facility] ([facility])    [address] [place] [address]   [phone]            Apr 05, 2017 12:00 PM CDT   (Arrive by 11:45 AM)   PAC Anesthesia Consult with Rasheed Pac Anesthesiologist   [facility] ([facility])    [address] [place] [address]   [phone]            Apr 05, 2017 12:30 PM CDT   LAB with RASHEED LAB   [facility])    [address] [place] [address]   [phone]           Patient must bring picture ID.  Patient should be prepared to give a urine specimen  Please do not eat 10-12 hours before your appointment if you are coming in fasting for labs on lipids, cholesterol, or glucose (sugar).  Pregnant women should follow their Care Team instructions. Water with medications is okay. Do not drink coffee or other fluids.   If you have concerns about taking  your medications, please ask at office or if scheduling via [employer], send a message by clicking on Secure Messaging, Message Your Care Team.            Apr 11, 2017    Procedure with Zhen Armstrong MD   [facility], [place], Same Day Surgery (--)    [address]  [place] [address] [phone]            Jun 06, 2017  8:00 AM CDT   LAB with AN LAB   [facility] ([facility])    [address] [facility] [address] [phone]           Patient must bring picture ID.  Patient should be prepared to give a urine specimen  Please do not eat 10-12 hours before your appointment if you are coming in fasting for labs on lipids, cholesterol, or glucose (sugar).  Pregnant women should follow their Care Team instructions. Water with medications is okay. Do not drink coffee or other fluids.   If you have concerns about taking  your medications, please ask at office or if scheduling via [employer], send a message by clicking on Secure Messaging, Message Your Care Team.            Jul 28, 2017 10:00 AM CDT   (Arrive by 9:45 AM)   Return Liver Transplant with Tucker Muhammad MD   [facility] ([facility])    [address] [place] [id number] [phone]            Sep 07, 2017 10:30 AM CDT   (Arrive by 10:15 AM)   Return Visit with Nain Lagunas MD   [facility] ([facility])    [address] [place] [id number] [phone]              Who to contact     Please call your clinic at [phone] to:    Ask questions about your health    Make or cancel appointments    Discuss your medicines    Learn about your test results    Speak to your doctor   If you have compliments or concerns about an experience at your clinic, or if you wish to file a complaint, please contact [facility] Patient Relations at [phone] or email us at [email or web address]         Additional Information About Your Visit        [employer] Information     [employer] gives you secure access to your electronic health record. If you see a  primary care provider, you can also send messages to your care team and make appointments. If you have questions, please call your primary care clinic.  If you do not have a primary care provider, please call [phone] and they will assist you.      [employer] is an electronic gateway that provides easy, online access to your medical records. With [employer], you can request a clinic appointment, read your test results, renew a prescription or communicate with your care team.     To access your existing account, please contact your [facility] or call [phone] for assistance.        Care EveryWhere ID     This is your Care EveryWhere ID. This could be used by other organizations to access your [place] medical records  [id number]         Blood Pressure from Last 3 Encounters:   03/29/17 150/80   03/02/17 187/85   01/27/17 173/80    Weight from Last 3 Encounters:   03/29/17 59.4 kg (131 lb)   03/02/17 60.2 kg (132 lb 12.8 oz)   02/06/17 60.1 kg (132 lb 8 oz)              Today, you had the following     No orders found for display         Today's Medication Changes          These changes are accurate as of: 4/5/17 10:23 AM.  If you have any questions, ask your nurse or doctor.               These medicines have changed or have updated prescriptions.        Dose/Directions    calcium-vitamin D 600-400 MG-UNIT per tablet   Commonly known as:  CALTRATE   This may have changed:  Another medication with the same name was removed. Continue taking this medication, and follow the directions you see here.   Changed by:  Pharmacist, Uc Pac        Dose:  1 tablet   Take 1 tablet by mouth daily   Refills:  0       lisinopril 2.5 MG tablet   Commonly known as:  PRINIVIL/Zestril   This may have changed:  when to take this   Used for:  Coronary artery disease involving native heart without angina pectoris, unspecified vessel or lesion type        Dose:  2.5 mg   Take 1 tablet (2.5 mg) by mouth daily    Quantity:  93 tablet   Refills:  3                Primary Care Provider Office Phone # Fax #    Dee Awan [phone]       [facility] [address] [facility] [address]        Thank you!     Thank you for choosing [facility] PREOPER[facility]  for your care. Our goal is always to provide you with excellent care. Hearing back from our patients is one way we can continue to improve our services. Please take a few minutes to complete the written survey that you may receive in the mail after your visit with us. Thank you!             Your Updated Medication List - Protect others around you: Learn how to safely use, store and throw away your medicines at www.disposemymeds.org.          This list is accurate as of: 4/5/17 10:23 AM.  Always use your most recent med list.                   Brand Name Dispense Instructions for use    aspirin 81 MG tablet     90 tablet    Take 1 tablet by mouth daily.       calcium-vitamin D 600-400 MG-UNIT per tablet    CALTRATE     Take 1 tablet by mouth daily       furosemide 20 MG tablet    LASIX    30 tablet    Take 1 tablet (20 mg) by mouth daily , for 3 days.  Please do not take unless instructed by Dr. Lagunas.       hydrALAZINE 25 MG tablet    APRESOLINE    90 tablet    Take 1 tablet (25 mg) by mouth 3 times daily , as needed if systolic blood pressure (top number) is more than 180 mmHg.       isosorbide mononitrate 60 MG 24 hr tablet    IMDUR    180 tablet    Take 1 tablet (60 mg) by mouth 2 times daily       lisinopril 2.5 MG tablet    PRINIVIL/Zestril    93 tablet    Take 1 tablet (2.5 mg) by mouth daily       metoprolol 50 MG tablet    LOPRESSOR    182 tablet    Take 1 tablet (50 mg) by mouth 2 times daily       MULTI-VITAMIN PO      Take 1 tablet by mouth daily       nystatin-triamcinolone cream    MYCOLOG II     Apply topically 4 times daily as needed Reported on 4/5/2017       STATIN NOT PRESCRIBED (INTENTIONAL)      Please  choose reason not prescribed, below       tacrolimus 0.5 MG capsule    PROGRAF - GENERIC EQUIVALENT    270 capsule    Take 0.5 mg (1 cap) every AM, take 1 mg, (2 caps) every PM.

## 2017-04-05 NOTE — MR AVS SNAPSHOT
After Visit Summary   2017    Lesli Lorenzana    MRN: [id number]           Patient Information     Date Of Birth          1940        Visit Information        Provider Department      2017 11:30 AM Rn, [facility]        Care Instructions    Preparing for Your Surgery      Name:  Lesli Lorenzana   MRN:  [id number]   :  1940   Today's Date:  2017     Arriving for surgery:  Surgery date:  17  Surgery time:  12:00 p.m.  Arrival time:  09:30 a.m.  Please come to:     [facility] [address]  [address] [place]  [address]    - parking is available in front of [facility] from 5:15AM to 8:00PM. If you prefer, park your car in the Green Lot.    -Proceed to the 3rd floor, check in at the Adult Surgery Waiting Lounge. [phone]    If an escort is needed stop at the Information Desk in the lobby. Inform the information person that you are here for surgery. An escort to the Adult Surgery Waiting Lounge will be provided.        What can I eat or drink?  -  You may have solid food or milk products until 8 hours prior to your surgery  02:00 a.m.  -  You may have water, apple juice or 7up/Sprite until 2 hours prior to your surgery.09:30 a.m.    Which medicines can I take?  -  Do NOT take these medications in the morning, the day of surgery:  (please stop all vitamins, minerals  Stop seven days prior to)      Lasix, lisinopril,     -  Please take these medications the day of surgery:  Usual morning medications      How do I prepare myself?  -  Take two showers: one the night before surgery; and one the morning of surgery.         Use Scrubcare or Hibiclens to wash from neck down.  You may use your own shampoo and conditioner. No other hair products.   -  Do NOT use lotion, powder, deodorant, or antiperspirant the day of your surgery.  -  Do NOT wear any makeup, fingernail polish or  perry.  -Do not bring your own medications to the hospital, except for inhalers and eye drops.  -  Bring your ID and insurance card.    Questions or Concerns:  If you have questions or concerns, please call the  Preoperative Assessment Center, Monday-Friday 7AM-7PM:  [phone]    AFTER YOUR SURGERY  Breathing exercises   Breathing exercises help you recover faster. Take deep breaths and let the air out slowly. This will:     Help you wake up after surgery.    Help prevent complications like pneumonia.  Preventing complications will help you go home sooner.   We may give you a breathing device (incentive spirometer) to encourage you to breathe deeply.   Nausea and vomiting   You may feel sick to your stomach after surgery; if so, let your nurse know.    Pain control:  After surgery, you may have pain. Our goal is to help you manage your pain. Pain medicine will help you feel comfortable enough to do activities that will help you heal.  These activities may include breathing exercises, walking and physical therapy.   To help your health care team treat your pain we will ask: 1) If you have pain  2) where it is located 3) describe your pain in your words  Methods of pain control include medications given by mouth, vein or by nerve block for some surgeries.  We may give you a pain control pump that will:  1) Deliver the medicine through a tube placed in your vein  2) Control the amount of medicine you receive  3) Allow you to push a button to deliver a dose of pain medicine  Sequential Compression Device (SCD) or Pneumo Boots:  You may need to wear SCD S on your legs or feet. These are wraps connected to a machine that pumps in air and releases it. The repeated pumping helps prevent blood clots from forming.         Follow-ups after your visit        Your next 10 appointments already scheduled     Apr 05, 2017 11:30 AM CDT   (Arrive by 11:15 AM)   PAC RN ASSESSMENT with Rasheed Pac Rn   [facility] ([facility])    [address] [facility]  4th Floor  [facility] [address]   [phone]            Apr 05, 2017 12:00 PM CDT   (Arrive by 11:45 AM)   PAC Anesthesia Consult with  Pac Anesthesiologist   [facility] Preoperative [facility] ([facility])    [address] [place] [address]  [address] [facility] [address]   [phone]            Apr 05, 2017 12:30 PM CDT   LAB with UC LAB   [facility] ([facility])    [address] [place] [address]  [address] [facility] [address]   [phone]           Patient must bring picture ID.  Patient should be prepared to give a urine specimen  Please do not eat 10-12 hours before your appointment if you are coming in fasting for labs on lipids, cholesterol, or glucose (sugar).  Pregnant women should follow their Care Team instructions. Water with medications is okay. Do not drink coffee or other fluids.   If you have concerns about taking  your medications, please ask at office or if scheduling via [employer], send a message by clicking on Secure Messaging, Message Your Care Team.            Apr 11, 2017   Procedure with Zhen Armstrong MD   [facility], [place], Same Day Surgery (--)    [address]  [place] [address]   [phone]            Jun 06, 2017  8:00 AM CDT   LAB with AN LAB   [facility] ([facility])    [address] [facility] [address] [phone]           Patient must bring picture ID.  Patient should be prepared to give a urine specimen  Please do not eat 10-12 hours before your appointment if you are coming in fasting for labs on lipids, cholesterol, or glucose (sugar).  Pregnant women should follow their Care Team instructions. Water with medications is okay. Do not drink coffee or other fluids.   If you have concerns about taking  your medications, please ask at office or if scheduling via [employer], send a message by clicking on Secure  Messaging, Message Your Care Team.            Jul 28, 2017 10:00 AM CDT   (Arrive by 9:45 AM)   Return Liver Transplant with Tucker Muhammad MD   [facility] ([facility])    [address] [place] [id number] [phone]            Sep 07, 2017 10:30 AM CDT   (Arrive by 10:15 AM)   Return Visit with Nain Lagunas MD   [facility] ([facility])    [address] [place] [id number] [phone]              Future tests that were ordered for you today     Open Future Orders        Priority Expected Expires Ordered    INR Routine 4/5/2017 5/5/2017 4/5/2017    ABO/Rh type and screen Routine 4/5/2017 5/5/2017 4/5/2017            Who to contact     Please call your clinic at [phone] to:    Ask questions about your health    Make or cancel appointments    Discuss your medicines    Learn about your test results    Speak to your doctor   If you have compliments or concerns about an experience at your clinic, or if you wish to file a complaint, please contact [facility] Patient Relations at [phone] or email us at [email or web address]         Additional Information About Your Visit        [email or web address] Information     [employer] gives you secure access to your electronic health record. If you see a primary care provider, you can also send messages to your care team and make appointments. If you have questions, please call your primary care clinic.  If you do not have a primary care provider, please call [phone] and they will assist you.      [employer] is an electronic gateway that provides easy, online access to your medical records. With [employer], you can request a clinic appointment, read your test results, renew a prescription or communicate with your care team.     To access your existing account, please contact your [facility] or call  [phone] for assistance.        Care EveryWhere ID     This is your Care EveryWhere ID. This could be used by other organizations to access your [place] medical records  [id number]         Blood Pressure from Last 3 Encounters:   04/05/17 184/83   03/29/17 150/80   03/02/17 187/85    Weight from Last 3 Encounters:   04/05/17 60.2 kg (132 lb 11.2 oz)   03/29/17 59.4 kg (131 lb)   03/02/17 60.2 kg (132 lb 12.8 oz)              Today, you had the following     No orders found for display         Today's Medication Changes          These changes are accurate as of: 4/5/17 11:12 AM.  If you have any questions, ask your nurse or doctor.               These medicines have changed or have updated prescriptions.        Dose/Directions    calcium-vitamin D 600-400 MG-UNIT per tablet   Commonly known as:  CALTRATE   This may have changed:  Another medication with the same name was removed. Continue taking this medication, and follow the directions you see here.   Changed by:  Pharmacist, Rasheed Pac        Dose:  1 tablet   Take 1 tablet by mouth daily   Refills:  0       lisinopril 2.5 MG tablet   Commonly known as:  PRINIVIL/Zestril   This may have changed:  when to take this   Used for:  Coronary artery disease involving native heart without angina pectoris, unspecified vessel or lesion type        Dose:  2.5 mg   Take 1 tablet (2.5 mg) by mouth daily   Quantity:  93 tablet   Refills:  3                Primary Care Provider Office Phone # Fax #    Dee Awan [phone]       [facility] [address] [facility] [address]        Thank you!     Thank you for choosing [facility] PREOPERAT[facility]ENTER  for your care. Our goal is always to provide you with excellent care. Hearing back from our patients is one way we can continue to improve our services. Please take a few minutes to complete the written survey that you may receive in the mail after your visit with us. Thank you!              Your Updated Medication List - Protect others around you: Learn how to safely use, store and throw away your medicines at www.disposemymeds.org.          This list is accurate as of: 4/5/17 11:12 AM.  Always use your most recent med list.                   Brand Name Dispense Instructions for use    aspirin 81 MG tablet     90 tablet    Take 1 tablet by mouth daily.       calcium-vitamin D 600-400 MG-UNIT per tablet    CALTRATE     Take 1 tablet by mouth daily       furosemide 20 MG tablet    LASIX    30 tablet    Take 1 tablet (20 mg) by mouth daily , for 3 days.  Please do not take unless instructed by Dr. Lagunas.       hydrALAZINE 25 MG tablet    APRESOLINE    90 tablet    Take 1 tablet (25 mg) by mouth 3 times daily , as needed if systolic blood pressure (top number) is more than 180 mmHg.       isosorbide mononitrate 60 MG 24 hr tablet    IMDUR    180 tablet    Take 1 tablet (60 mg) by mouth 2 times daily       lisinopril 2.5 MG tablet    PRINIVIL/Zestril    93 tablet    Take 1 tablet (2.5 mg) by mouth daily       metoprolol 50 MG tablet    LOPRESSOR    182 tablet    Take 1 tablet (50 mg) by mouth 2 times daily       MULTI-VITAMIN PO      Take 1 tablet by mouth daily       nystatin-triamcinolone cream    MYCOLOG II     Apply topically 4 times daily as needed Reported on 4/5/2017       STATIN NOT PRESCRIBED (INTENTIONAL)      Please choose reason not prescribed, below       tacrolimus 0.5 MG capsule    PROGRAF - GENERIC EQUIVALENT    270 capsule    Take 0.5 mg (1 cap) every AM, take 1 mg, (2 caps) every PM.

## 2017-04-05 NOTE — PROGRESS NOTES
Preoperative Assessment Center medication history for April 5, 2017 is complete.  See Epic admission navigator for allergy information, pharmacy, prior to admission medications and immunization status.    Operating room staff will still need to confirm medications and last dose information on day of surgery.     Medication history interview sources:  Patient Interview    Changes made to PTA medication list (reason)  Added: None    Deleted: None    Changed: None    Additional medication history information (including reliability of information, actions taken by pharmacist):  -- patient reports using the furosemide and hydralazine as needed when her SBP gets above 160 and 180 respectively. She reports that she hasn't required/used any doses  -- She has been holding the aspirin since 3/28 per information given to her by Dr. Armstrong's nurse  -- verified with pharmacy that patient is getting generic tacrolimus       Prior to Admission medications    Medication Sig Last Dose Taking? Auth Provider   calcium-vitamin D (CALTRATE) 600-400 MG-UNIT per tablet Take 1 tablet by mouth daily Taking Yes Unknown, Entered By History   metoprolol (LOPRESSOR) 50 MG tablet Take 1 tablet (50 mg) by mouth 2 times daily Taking Yes Nain Lagunas MD   lisinopril (PRINIVIL,ZESTRIL) 2.5 MG tablet Take 1 tablet (2.5 mg) by mouth daily  Patient taking differently: Take 2.5 mg by mouth every morning  Taking Yes Nain Lagunas MD   isosorbide mononitrate (IMDUR) 60 MG 24 hr tablet Take 1 tablet (60 mg) by mouth 2 times daily Taking Yes Nain Lagunas MD   tacrolimus (PROGRAF - GENERIC EQUIVALENT) 0.5 MG capsule Take 0.5 mg (1 cap) every AM, take 1 mg, (2 caps) every PM. Taking Yes Tucker Muhammad MD   MULTI-VITAMIN PO Take 1 tablet by mouth daily  Taking Yes Reported, Patient   furosemide (LASIX) 20 MG tablet Take 1 tablet (20 mg) by mouth daily , for 3 days.  Please do not take unless instructed by Dr. Lagunas.  Patient not taking: Reported  on 4/5/2017 Not Taking  Nain Lagunas MD   hydrALAZINE (APRESOLINE) 25 MG tablet Take 1 tablet (25 mg) by mouth 3 times daily , as needed if systolic blood pressure (top number) is more than 180 mmHg.  Patient not taking: Reported on 4/5/2017 Not Taking  Nain Lagunas MD   STATIN NOT PRESCRIBED, INTENTIONAL, Please choose reason not prescribed, below   Nain Lagunas MD   nystatin-triamcinolone (MYCOLOG II) cream Apply topically 4 times daily as needed Reported on 4/5/2017 Not Taking  Reported, Patient   aspirin 81 MG tablet Take 1 tablet by mouth daily.  Patient not taking: Reported on 4/5/2017 Not Taking  Dee Awan MD         Medication history completed by:   Maged Fletcher, Pharm.D, BCPS

## 2017-04-05 NOTE — PATIENT INSTRUCTIONS
Preparing for Your Surgery      Name:  Lesli Lorenzana   MRN:  [id number]   :  1940   Today's Date:  2017     Arriving for surgery:  Surgery date:  17  Surgery time:  12:00 p.m.  Arrival time:  09:30 a.m.  Please come to:     [facility] [address]  [address] [place]  [address]    - parking is available in front of [facility] from 5:15AM to 8:00PM. If you prefer, park your car in the Green Lot.    -Proceed to the 3rd floor, check in at the Adult Surgery Waiting Lounge. [phone]    If an escort is needed stop at the Information Desk in the lobby. Inform the information person that you are here for surgery. An escort to the Adult Surgery Waiting Lounge will be provided.        What can I eat or drink?  -  You may have solid food or milk products until 8 hours prior to your surgery  02:00 a.m.  -  You may have water, apple juice or 7up/Sprite until 2 hours prior to your surgery.09:30 a.m.    Which medicines can I take?  -  Do NOT take these medications in the morning, the day of surgery:  (please stop all vitamins, minerals  Stop seven days prior to)      Lasix, lisinopril,     -  Please take these medications the day of surgery:  Usual morning medications      How do I prepare myself?  -  Take two showers: one the night before surgery; and one the morning of surgery.         Use Scrubcare or Hibiclens to wash from neck down.  You may use your own shampoo and conditioner. No other hair products.   -  Do NOT use lotion, powder, deodorant, or antiperspirant the day of your surgery.  -  Do NOT wear any makeup, fingernail polish or jewelry.  -Do not bring your own medications to the hospital, except for inhalers and eye drops.  -  Bring your ID and insurance card.    Questions or Concerns:  If you have questions or concerns, please call the  Preoperative Assessment Center, Monday-Friday 7AM-7PM:  [phone]    AFTER YOUR  SURGERY  Breathing exercises   Breathing exercises help you recover faster. Take deep breaths and let the air out slowly. This will:     Help you wake up after surgery.    Help prevent complications like pneumonia.  Preventing complications will help you go home sooner.   We may give you a breathing device (incentive spirometer) to encourage you to breathe deeply.   Nausea and vomiting   You may feel sick to your stomach after surgery; if so, let your nurse know.    Pain control:  After surgery, you may have pain. Our goal is to help you manage your pain. Pain medicine will help you feel comfortable enough to do activities that will help you heal.  These activities may include breathing exercises, walking and physical therapy.   To help your health care team treat your pain we will ask: 1) If you have pain  2) where it is located 3) describe your pain in your words  Methods of pain control include medications given by mouth, vein or by nerve block for some surgeries.  We may give you a pain control pump that will:  1) Deliver the medicine through a tube placed in your vein  2) Control the amount of medicine you receive  3) Allow you to push a button to deliver a dose of pain medicine  Sequential Compression Device (SCD) or Pneumo Boots:  You may need to wear SCD S on your legs or feet. These are wraps connected to a machine that pumps in air and releases it. The repeated pumping helps prevent blood clots from forming.

## 2017-04-11 ENCOUNTER — APPOINTMENT (OUTPATIENT)
Dept: GENERAL RADIOLOGY | Facility: CLINIC | Age: 77
DRG: 470 | End: 2017-04-11
Attending: ORTHOPAEDIC SURGERY
Payer: MEDICARE

## 2017-04-11 ENCOUNTER — HOSPITAL ENCOUNTER (INPATIENT)
Facility: CLINIC | Age: 77
LOS: 4 days | Discharge: HOME HEALTH | DRG: 470 | End: 2017-04-15
Attending: ORTHOPAEDIC SURGERY | Admitting: ORTHOPAEDIC SURGERY
Payer: MEDICARE

## 2017-04-11 ENCOUNTER — ANESTHESIA (OUTPATIENT)
Dept: SURGERY | Facility: CLINIC | Age: 77
DRG: 470 | End: 2017-04-11
Payer: MEDICARE

## 2017-04-11 DIAGNOSIS — M16.12 PRIMARY OSTEOARTHRITIS OF LEFT HIP: ICD-10-CM

## 2017-04-11 DIAGNOSIS — Z98.890 STATUS POST HIP SURGERY: Primary | ICD-10-CM

## 2017-04-11 LAB
ABO + RH BLD: NORMAL
ABO + RH BLD: NORMAL
BLD GP AB SCN SERPL QL: NORMAL
BLD PROD TYP BPU: NORMAL
BLOOD BANK CMNT PATIENT-IMP: NORMAL
BLOOD BANK CMNT PATIENT-IMP: NORMAL
CREAT SERPL-MCNC: 0.96 MG/DL (ref 0.52–1.04)
GFR SERPL CREATININE-BSD FRML MDRD: 57 ML/MIN/1.7M2
HGB BLD-MCNC: 11.5 G/DL (ref 11.7–15.7)
INR PPP: 1.22 (ref 0.86–1.14)
NUM BPU REQUESTED: 1
POTASSIUM SERPL-SCNC: 4.3 MMOL/L (ref 3.4–5.3)
SPECIMEN EXP DATE BLD: NORMAL

## 2017-04-11 PROCEDURE — 37000009 ZZH ANESTHESIA TECHNICAL FEE, EACH ADDTL 15 MIN: Performed by: ORTHOPAEDIC SURGERY

## 2017-04-11 PROCEDURE — 82565 ASSAY OF CREATININE: CPT | Performed by: ANESTHESIOLOGY

## 2017-04-11 PROCEDURE — 25800025 ZZH RX 258: Performed by: NURSE ANESTHETIST, CERTIFIED REGISTERED

## 2017-04-11 PROCEDURE — 84132 ASSAY OF SERUM POTASSIUM: CPT | Performed by: ANESTHESIOLOGY

## 2017-04-11 PROCEDURE — 36415 COLL VENOUS BLD VENIPUNCTURE: CPT | Performed by: ANESTHESIOLOGY

## 2017-04-11 PROCEDURE — 25000128 H RX IP 250 OP 636: Performed by: ANESTHESIOLOGY

## 2017-04-11 PROCEDURE — 25000125 ZZHC RX 250: Performed by: ANESTHESIOLOGY

## 2017-04-11 PROCEDURE — 25000128 H RX IP 250 OP 636: Performed by: ORTHOPAEDIC SURGERY

## 2017-04-11 PROCEDURE — 36415 COLL VENOUS BLD VENIPUNCTURE: CPT | Performed by: ORTHOPAEDIC SURGERY

## 2017-04-11 PROCEDURE — 36000064 ZZH SURGERY LEVEL 4 EA 15 ADDTL MIN - UMMC: Performed by: ORTHOPAEDIC SURGERY

## 2017-04-11 PROCEDURE — 40000171 ZZH STATISTIC PRE-PROCEDURE ASSESSMENT III: Performed by: ORTHOPAEDIC SURGERY

## 2017-04-11 PROCEDURE — 25000125 ZZHC RX 250: Performed by: NURSE ANESTHETIST, CERTIFIED REGISTERED

## 2017-04-11 PROCEDURE — 25000125 ZZHC RX 250: Performed by: ORTHOPAEDIC SURGERY

## 2017-04-11 PROCEDURE — 99207 ZZC CONSULT E&M CHANGED TO SUBSEQUENT LEVEL: CPT | Performed by: PHYSICIAN ASSISTANT

## 2017-04-11 PROCEDURE — 25000128 H RX IP 250 OP 636: Performed by: INTERNAL MEDICINE

## 2017-04-11 PROCEDURE — 27210794 ZZH OR GENERAL SUPPLY STERILE: Performed by: ORTHOPAEDIC SURGERY

## 2017-04-11 PROCEDURE — C1776 JOINT DEVICE (IMPLANTABLE): HCPCS | Performed by: ORTHOPAEDIC SURGERY

## 2017-04-11 PROCEDURE — 40000940 XR PELVIS AND HIP PORTALBLE LEFT 2 VIEWS

## 2017-04-11 PROCEDURE — A9270 NON-COVERED ITEM OR SERVICE: HCPCS | Mod: GY | Performed by: ORTHOPAEDIC SURGERY

## 2017-04-11 PROCEDURE — 37000008 ZZH ANESTHESIA TECHNICAL FEE, 1ST 30 MIN: Performed by: ORTHOPAEDIC SURGERY

## 2017-04-11 PROCEDURE — 25000132 ZZH RX MED GY IP 250 OP 250 PS 637: Mod: GY | Performed by: PHYSICIAN ASSISTANT

## 2017-04-11 PROCEDURE — 25000132 ZZH RX MED GY IP 250 OP 250 PS 637: Mod: GY | Performed by: ORTHOPAEDIC SURGERY

## 2017-04-11 PROCEDURE — 36000062 ZZH SURGERY LEVEL 4 1ST 30 MIN - UMMC: Performed by: ORTHOPAEDIC SURGERY

## 2017-04-11 PROCEDURE — S0077 INJECTION, CLINDAMYCIN PHOSP: HCPCS | Performed by: ORTHOPAEDIC SURGERY

## 2017-04-11 PROCEDURE — 99232 SBSQ HOSP IP/OBS MODERATE 35: CPT | Performed by: PHYSICIAN ASSISTANT

## 2017-04-11 PROCEDURE — 40000940 XR PELVIS PORT 1/2 VW

## 2017-04-11 PROCEDURE — 71000015 ZZH RECOVERY PHASE 1 LEVEL 2 EA ADDTL HR: Performed by: ORTHOPAEDIC SURGERY

## 2017-04-11 PROCEDURE — 71000014 ZZH RECOVERY PHASE 1 LEVEL 2 FIRST HR: Performed by: ORTHOPAEDIC SURGERY

## 2017-04-11 PROCEDURE — 25800025 ZZH RX 258: Performed by: STUDENT IN AN ORGANIZED HEALTH CARE EDUCATION/TRAINING PROGRAM

## 2017-04-11 PROCEDURE — 40000065 ZZH STATISTIC EKG NON-CHARGEABLE

## 2017-04-11 PROCEDURE — C1713 ANCHOR/SCREW BN/BN,TIS/BN: HCPCS | Performed by: ORTHOPAEDIC SURGERY

## 2017-04-11 PROCEDURE — 25000131 ZZH RX MED GY IP 250 OP 636 PS 637: Mod: GY | Performed by: STUDENT IN AN ORGANIZED HEALTH CARE EDUCATION/TRAINING PROGRAM

## 2017-04-11 PROCEDURE — 99207 ZZC CDG-HISTORY COMP: MEETS EXP. PROBLEM FOCUSED - DOWN CODED LACK OF HPI: CPT | Performed by: PHYSICIAN ASSISTANT

## 2017-04-11 PROCEDURE — 25000128 H RX IP 250 OP 636: Performed by: NURSE ANESTHETIST, CERTIFIED REGISTERED

## 2017-04-11 PROCEDURE — 85610 PROTHROMBIN TIME: CPT | Performed by: ORTHOPAEDIC SURGERY

## 2017-04-11 PROCEDURE — 12000001 ZZH R&B MED SURG/OB UMMC

## 2017-04-11 PROCEDURE — 25000565 ZZH ISOFLURANE, EA 15 MIN: Performed by: ORTHOPAEDIC SURGERY

## 2017-04-11 PROCEDURE — 93010 ELECTROCARDIOGRAM REPORT: CPT | Performed by: INTERNAL MEDICINE

## 2017-04-11 PROCEDURE — 85018 HEMOGLOBIN: CPT | Performed by: ANESTHESIOLOGY

## 2017-04-11 PROCEDURE — 0SRB01A REPLACEMENT OF LEFT HIP JOINT WITH METAL SYNTHETIC SUBSTITUTE, UNCEMENTED, OPEN APPROACH: ICD-10-PCS | Performed by: ORTHOPAEDIC SURGERY

## 2017-04-11 DEVICE — IMPLANTABLE DEVICE: Type: IMPLANTABLE DEVICE | Site: HIP | Status: FUNCTIONAL

## 2017-04-11 RX ORDER — ESMOLOL HYDROCHLORIDE 10 MG/ML
INJECTION INTRAVENOUS PRN
Status: DISCONTINUED | OUTPATIENT
Start: 2017-04-11 | End: 2017-04-11

## 2017-04-11 RX ORDER — SODIUM CHLORIDE, SODIUM LACTATE, POTASSIUM CHLORIDE, CALCIUM CHLORIDE 600; 310; 30; 20 MG/100ML; MG/100ML; MG/100ML; MG/100ML
INJECTION, SOLUTION INTRAVENOUS CONTINUOUS
Status: DISCONTINUED | OUTPATIENT
Start: 2017-04-11 | End: 2017-04-11 | Stop reason: HOSPADM

## 2017-04-11 RX ORDER — NEOSTIGMINE METHYLSULFATE 1 MG/ML
VIAL (ML) INJECTION PRN
Status: DISCONTINUED | OUTPATIENT
Start: 2017-04-11 | End: 2017-04-11

## 2017-04-11 RX ORDER — HYDRALAZINE HYDROCHLORIDE 20 MG/ML
10 INJECTION INTRAMUSCULAR; INTRAVENOUS EVERY 6 HOURS PRN
Status: DISCONTINUED | OUTPATIENT
Start: 2017-04-11 | End: 2017-04-15 | Stop reason: HOSPADM

## 2017-04-11 RX ORDER — ONDANSETRON 2 MG/ML
4 INJECTION INTRAMUSCULAR; INTRAVENOUS EVERY 30 MIN PRN
Status: DISCONTINUED | OUTPATIENT
Start: 2017-04-11 | End: 2017-04-11 | Stop reason: HOSPADM

## 2017-04-11 RX ORDER — HYDROMORPHONE HYDROCHLORIDE 1 MG/ML
.3-.5 INJECTION, SOLUTION INTRAMUSCULAR; INTRAVENOUS; SUBCUTANEOUS EVERY 5 MIN PRN
Status: DISCONTINUED | OUTPATIENT
Start: 2017-04-11 | End: 2017-04-11 | Stop reason: HOSPADM

## 2017-04-11 RX ORDER — ACETAMINOPHEN 325 MG/1
650 TABLET ORAL EVERY 4 HOURS PRN
Status: DISCONTINUED | OUTPATIENT
Start: 2017-04-14 | End: 2017-04-15 | Stop reason: HOSPADM

## 2017-04-11 RX ORDER — ONDANSETRON 4 MG/1
4 TABLET, ORALLY DISINTEGRATING ORAL EVERY 6 HOURS PRN
Status: DISCONTINUED | OUTPATIENT
Start: 2017-04-11 | End: 2017-04-15 | Stop reason: HOSPADM

## 2017-04-11 RX ORDER — HYDROMORPHONE HCL/0.9% NACL/PF 0.2MG/0.2
0.2 SYRINGE (ML) INTRAVENOUS EVERY 4 HOURS PRN
Status: DISCONTINUED | OUTPATIENT
Start: 2017-04-11 | End: 2017-04-15 | Stop reason: HOSPADM

## 2017-04-11 RX ORDER — GLYCOPYRROLATE 0.2 MG/ML
INJECTION, SOLUTION INTRAMUSCULAR; INTRAVENOUS PRN
Status: DISCONTINUED | OUTPATIENT
Start: 2017-04-11 | End: 2017-04-11

## 2017-04-11 RX ORDER — HYDROMORPHONE HYDROCHLORIDE 1 MG/ML
.3-.5 INJECTION, SOLUTION INTRAMUSCULAR; INTRAVENOUS; SUBCUTANEOUS
Status: DISCONTINUED | OUTPATIENT
Start: 2017-04-11 | End: 2017-04-11

## 2017-04-11 RX ORDER — LIDOCAINE 40 MG/G
CREAM TOPICAL
Status: DISCONTINUED | OUTPATIENT
Start: 2017-04-11 | End: 2017-04-15 | Stop reason: HOSPADM

## 2017-04-11 RX ORDER — TACROLIMUS 0.5 MG/1
1 CAPSULE, GELATIN COATED ORAL
Status: DISCONTINUED | OUTPATIENT
Start: 2017-04-11 | End: 2017-04-12

## 2017-04-11 RX ORDER — SODIUM CHLORIDE 9 MG/ML
INJECTION, SOLUTION INTRAVENOUS CONTINUOUS
Status: DISCONTINUED | OUTPATIENT
Start: 2017-04-11 | End: 2017-04-13

## 2017-04-11 RX ORDER — TACROLIMUS 0.5 MG/1
0.5 CAPSULE ORAL
Status: DISCONTINUED | OUTPATIENT
Start: 2017-04-12 | End: 2017-04-15 | Stop reason: HOSPADM

## 2017-04-11 RX ORDER — NALOXONE HYDROCHLORIDE 0.4 MG/ML
.1-.4 INJECTION, SOLUTION INTRAMUSCULAR; INTRAVENOUS; SUBCUTANEOUS
Status: DISCONTINUED | OUTPATIENT
Start: 2017-04-11 | End: 2017-04-15 | Stop reason: HOSPADM

## 2017-04-11 RX ORDER — FENTANYL CITRATE 50 UG/ML
INJECTION, SOLUTION INTRAMUSCULAR; INTRAVENOUS PRN
Status: DISCONTINUED | OUTPATIENT
Start: 2017-04-11 | End: 2017-04-11

## 2017-04-11 RX ORDER — AMOXICILLIN 250 MG
1-2 CAPSULE ORAL 2 TIMES DAILY
Status: DISCONTINUED | OUTPATIENT
Start: 2017-04-11 | End: 2017-04-15 | Stop reason: HOSPADM

## 2017-04-11 RX ORDER — CLINDAMYCIN PHOSPHATE 900 MG/50ML
900 INJECTION, SOLUTION INTRAVENOUS EVERY 8 HOURS
Status: COMPLETED | OUTPATIENT
Start: 2017-04-11 | End: 2017-04-12

## 2017-04-11 RX ORDER — OXYCODONE HYDROCHLORIDE 5 MG/1
5 TABLET ORAL
Status: DISCONTINUED | OUTPATIENT
Start: 2017-04-11 | End: 2017-04-11

## 2017-04-11 RX ORDER — ONDANSETRON 2 MG/ML
INJECTION INTRAMUSCULAR; INTRAVENOUS PRN
Status: DISCONTINUED | OUTPATIENT
Start: 2017-04-11 | End: 2017-04-11

## 2017-04-11 RX ORDER — PROPOFOL 10 MG/ML
INJECTION, EMULSION INTRAVENOUS PRN
Status: DISCONTINUED | OUTPATIENT
Start: 2017-04-11 | End: 2017-04-11

## 2017-04-11 RX ORDER — SODIUM CHLORIDE, SODIUM LACTATE, POTASSIUM CHLORIDE, CALCIUM CHLORIDE 600; 310; 30; 20 MG/100ML; MG/100ML; MG/100ML; MG/100ML
INJECTION, SOLUTION INTRAVENOUS CONTINUOUS PRN
Status: DISCONTINUED | OUTPATIENT
Start: 2017-04-11 | End: 2017-04-11

## 2017-04-11 RX ORDER — HYDROXYZINE HYDROCHLORIDE 10 MG/1
10 TABLET, FILM COATED ORAL EVERY 6 HOURS PRN
Status: DISCONTINUED | OUTPATIENT
Start: 2017-04-11 | End: 2017-04-15 | Stop reason: HOSPADM

## 2017-04-11 RX ORDER — CLINDAMYCIN PHOSPHATE 600 MG/50ML
600 INJECTION, SOLUTION INTRAVENOUS
Status: COMPLETED | OUTPATIENT
Start: 2017-04-11 | End: 2017-04-11

## 2017-04-11 RX ORDER — ONDANSETRON 2 MG/ML
4 INJECTION INTRAMUSCULAR; INTRAVENOUS EVERY 6 HOURS PRN
Status: DISCONTINUED | OUTPATIENT
Start: 2017-04-11 | End: 2017-04-15 | Stop reason: HOSPADM

## 2017-04-11 RX ORDER — LIDOCAINE HYDROCHLORIDE 20 MG/ML
INJECTION, SOLUTION INFILTRATION; PERINEURAL PRN
Status: DISCONTINUED | OUTPATIENT
Start: 2017-04-11 | End: 2017-04-11

## 2017-04-11 RX ORDER — NALOXONE HYDROCHLORIDE 0.4 MG/ML
.1-.4 INJECTION, SOLUTION INTRAMUSCULAR; INTRAVENOUS; SUBCUTANEOUS
Status: DISCONTINUED | OUTPATIENT
Start: 2017-04-11 | End: 2017-04-12

## 2017-04-11 RX ORDER — LIDOCAINE 40 MG/G
CREAM TOPICAL
Status: DISCONTINUED | OUTPATIENT
Start: 2017-04-11 | End: 2017-04-11 | Stop reason: HOSPADM

## 2017-04-11 RX ORDER — METOPROLOL TARTRATE 50 MG
50 TABLET ORAL 2 TIMES DAILY
Status: DISCONTINUED | OUTPATIENT
Start: 2017-04-11 | End: 2017-04-15 | Stop reason: HOSPADM

## 2017-04-11 RX ORDER — WARFARIN SODIUM 5 MG/1
5 TABLET ORAL
Status: COMPLETED | OUTPATIENT
Start: 2017-04-11 | End: 2017-04-11

## 2017-04-11 RX ORDER — ONDANSETRON 4 MG/1
4 TABLET, ORALLY DISINTEGRATING ORAL EVERY 30 MIN PRN
Status: DISCONTINUED | OUTPATIENT
Start: 2017-04-11 | End: 2017-04-11 | Stop reason: HOSPADM

## 2017-04-11 RX ORDER — FENTANYL CITRATE 50 UG/ML
25-50 INJECTION, SOLUTION INTRAMUSCULAR; INTRAVENOUS
Status: DISCONTINUED | OUTPATIENT
Start: 2017-04-11 | End: 2017-04-11 | Stop reason: HOSPADM

## 2017-04-11 RX ORDER — ACETAMINOPHEN 325 MG/1
975 TABLET ORAL EVERY 8 HOURS
Status: DISPENSED | OUTPATIENT
Start: 2017-04-11 | End: 2017-04-14

## 2017-04-11 RX ADMIN — CLINDAMYCIN PHOSPHATE 900 MG: 18 INJECTION, SOLUTION INTRAVENOUS at 19:43

## 2017-04-11 RX ADMIN — SODIUM CHLORIDE 1 G: 9 INJECTION, SOLUTION INTRAVENOUS at 12:05

## 2017-04-11 RX ADMIN — LIDOCAINE HYDROCHLORIDE 60 MG: 20 INJECTION, SOLUTION INFILTRATION; PERINEURAL at 11:54

## 2017-04-11 RX ADMIN — Medication 40 MG: at 11:54

## 2017-04-11 RX ADMIN — PHENYLEPHRINE HYDROCHLORIDE 100 MCG: 10 INJECTION, SOLUTION INTRAMUSCULAR; INTRAVENOUS; SUBCUTANEOUS at 13:17

## 2017-04-11 RX ADMIN — ACETAMINOPHEN 975 MG: 325 TABLET, FILM COATED ORAL at 15:25

## 2017-04-11 RX ADMIN — ESMOLOL HYDROCHLORIDE 10 MG: 10 INJECTION, SOLUTION INTRAVENOUS at 14:20

## 2017-04-11 RX ADMIN — PHENYLEPHRINE HYDROCHLORIDE 100 MCG: 10 INJECTION, SOLUTION INTRAMUSCULAR; INTRAVENOUS; SUBCUTANEOUS at 12:04

## 2017-04-11 RX ADMIN — NEOSTIGMINE METHYLSULFATE 3 MG: 1 INJECTION INTRAMUSCULAR; INTRAVENOUS; SUBCUTANEOUS at 13:46

## 2017-04-11 RX ADMIN — PROPOFOL 80 MG: 10 INJECTION, EMULSION INTRAVENOUS at 11:54

## 2017-04-11 RX ADMIN — WARFARIN SODIUM 5 MG: 5 TABLET ORAL at 19:42

## 2017-04-11 RX ADMIN — ESMOLOL HYDROCHLORIDE 10 MG: 10 INJECTION, SOLUTION INTRAVENOUS at 14:27

## 2017-04-11 RX ADMIN — SODIUM CHLORIDE, POTASSIUM CHLORIDE, SODIUM LACTATE AND CALCIUM CHLORIDE: 600; 310; 30; 20 INJECTION, SOLUTION INTRAVENOUS at 11:54

## 2017-04-11 RX ADMIN — FENTANYL CITRATE 50 MCG: 50 INJECTION, SOLUTION INTRAMUSCULAR; INTRAVENOUS at 12:39

## 2017-04-11 RX ADMIN — TACROLIMUS 1 MG: 0.5 CAPSULE, GELATIN COATED ORAL at 20:32

## 2017-04-11 RX ADMIN — GLYCOPYRROLATE 0.4 MG: 0.2 INJECTION, SOLUTION INTRAMUSCULAR; INTRAVENOUS at 13:45

## 2017-04-11 RX ADMIN — FENTANYL CITRATE 25 MCG: 50 INJECTION, SOLUTION INTRAMUSCULAR; INTRAVENOUS at 13:53

## 2017-04-11 RX ADMIN — CLINDAMYCIN IN 5 PERCENT DEXTROSE 600 MG: 12 INJECTION, SOLUTION INTRAVENOUS at 12:00

## 2017-04-11 RX ADMIN — SODIUM CHLORIDE, POTASSIUM CHLORIDE, SODIUM LACTATE AND CALCIUM CHLORIDE 500 ML: 600; 310; 30; 20 INJECTION, SOLUTION INTRAVENOUS at 22:19

## 2017-04-11 RX ADMIN — LIDOCAINE HYDROCHLORIDE 0.1 ML: 10 INJECTION, SOLUTION EPIDURAL; INFILTRATION; INTRACAUDAL; PERINEURAL at 11:27

## 2017-04-11 RX ADMIN — PHENYLEPHRINE HYDROCHLORIDE 50 MCG: 10 INJECTION, SOLUTION INTRAMUSCULAR; INTRAVENOUS; SUBCUTANEOUS at 13:14

## 2017-04-11 RX ADMIN — ACETAMINOPHEN 975 MG: 325 TABLET, FILM COATED ORAL at 23:25

## 2017-04-11 RX ADMIN — ESMOLOL HYDROCHLORIDE 10 MG: 10 INJECTION, SOLUTION INTRAVENOUS at 14:24

## 2017-04-11 RX ADMIN — FENTANYL CITRATE 25 MCG: 50 INJECTION, SOLUTION INTRAMUSCULAR; INTRAVENOUS at 11:52

## 2017-04-11 RX ADMIN — SODIUM CHLORIDE 1000 ML: 9 INJECTION, SOLUTION INTRAVENOUS at 17:03

## 2017-04-11 RX ADMIN — SODIUM CHLORIDE, POTASSIUM CHLORIDE, SODIUM LACTATE AND CALCIUM CHLORIDE: 600; 310; 30; 20 INJECTION, SOLUTION INTRAVENOUS at 13:48

## 2017-04-11 RX ADMIN — FENTANYL CITRATE 25 MCG: 50 INJECTION, SOLUTION INTRAMUSCULAR; INTRAVENOUS at 14:13

## 2017-04-11 RX ADMIN — PHENYLEPHRINE HYDROCHLORIDE 100 MCG: 10 INJECTION, SOLUTION INTRAMUSCULAR; INTRAVENOUS; SUBCUTANEOUS at 12:07

## 2017-04-11 RX ADMIN — MIDAZOLAM HYDROCHLORIDE 1 MG: 1 INJECTION, SOLUTION INTRAMUSCULAR; INTRAVENOUS at 11:49

## 2017-04-11 RX ADMIN — HYDROMORPHONE HYDROCHLORIDE 0.3 MG: 10 INJECTION, SOLUTION INTRAMUSCULAR; INTRAVENOUS; SUBCUTANEOUS at 15:16

## 2017-04-11 RX ADMIN — PROCHLORPERAZINE EDISYLATE 2.5 MG: 5 INJECTION INTRAMUSCULAR; INTRAVENOUS at 18:27

## 2017-04-11 RX ADMIN — METOPROLOL TARTRATE 50 MG: 50 TABLET ORAL at 19:42

## 2017-04-11 RX ADMIN — FENTANYL CITRATE 25 MCG: 50 INJECTION, SOLUTION INTRAMUSCULAR; INTRAVENOUS at 14:15

## 2017-04-11 RX ADMIN — ONDANSETRON 4 MG: 2 INJECTION INTRAMUSCULAR; INTRAVENOUS at 13:22

## 2017-04-11 RX ADMIN — FENTANYL CITRATE 25 MCG: 50 INJECTION, SOLUTION INTRAMUSCULAR; INTRAVENOUS at 14:36

## 2017-04-11 RX ADMIN — SENNOSIDES AND DOCUSATE SODIUM 2 TABLET: 8.6; 5 TABLET ORAL at 19:42

## 2017-04-11 RX ADMIN — FENTANYL CITRATE 25 MCG: 50 INJECTION, SOLUTION INTRAMUSCULAR; INTRAVENOUS at 12:08

## 2017-04-11 NOTE — CONSULTS
[facility]   [facility]AL[facility]  BRAXTON Harveyyared Lorenzana MRN# [id number]   Age: 76 year old YOB: 1940   Date of Admission: 4/11/2017     Reason for consult: Blood pressure management, immunosuppression management in liver transplant patient       Requesting physician Dr. Armstrong      ASSESSMENT:   1. Status post L total hip arthroplasty: POD #0 for end stage left hip osteoarthritis. Events of surgery reviewed and noted in HPI. On clindamycin x24 hours. Pain medication w/ scheduled tylenol, PRN oxycodone. Bowel regimen ordered. DVT prophylaxis w/ warfarin. Pre operative hgb 11.5.  -Management per primary team, orthopedics  -CMP and CBC in AM  2. Coronary artery status, Known CAD, HTN: S/p PCI to OM1 in 2009 for unstable angina. Dobutamine stress 03/2014 was non diagnostic due to hypotension w/ dobutamine, but no WMA seen and no inducible ischemia, sensitivity reduced. ECHO 03/03/2017 w/ EF of 55-60%, normal wall thickness, no WMA, normal RV size and function. On aspirin (held since 03/28), metoprolol 50 BID, lisinopril 2.5 mg qday, imdur 60 BID, lasix 20 mg qday PRN.   -Continue BB   -Hold ACE and Imdur for now, will reinitiate pending BP trends  -Will need to discuss with ortho timing of aspirin initiation  3. No history of DVT/PE: Initiated on warfarin x4 weeks w/ goal INR of 2 per ortho.   -Agree w/ warfarin and daily INR  4. PBC S/P liver transplant in 2008: Follows with Dr. Muhammad.  On Tacro 0.5 mg qam, 1 mg qhs.   -Continue tacro  -LFTs in AM  5. CKD stage 3: Labs w/ normal creat this AM, GFR of 57 which is improved from baseline of ~45.   -BMP in AM  6. Leukopenia: Likely due to immunosuppression.  -CBC In AM    RECOMMENDATIONS:   Routine postoperative labs are obtained.   The patient maintained on NS at 75 cc/hour (to TKO when tolerating diet), clear liquid diet, ADAT  Pain management: Scheduled tylenol (would limit to 3 G) and PRN oxy PO and dilaudid  IV  Anticoagulation: Warfarin initiated by orthopedics  Thank you for having me see this patient. We will continue to follow with you for these medical issues.     DISCUSSION:   Lesli Lorenzana is a very pleasant 76 year old female who underwent a left total hip arthrpolasty today by Dr. Armstrong for treatment of left hip end stage osteoarthritis. I have been asked to see her by Dr. Armstrong to assess liver transplant management, blood pressure management in post operative state.   Please see Dr. Armstrong's note from 02/06/2017 and the charting for details regarding the patient's orthopedic history and the indications for surgery. The events of surgery are noted. The patient did receive general anesthesia. Estimated blood loss was 560 mL intraoperatively (per anesthesia, 150 cc per op note). Urine output was 250 mL. She received 1500 mL of LR, no Cell Saver. Blood pressures elevated in PACU, 136/61 on floor w/ normal HR.     PAST MEDICAL HISTORY:   Reviewed and is remarkable for:   1.  PBC s/p liver transplant in 2008  2. HTN  3. CAD s/p PCI to OM1  4. CKD stage 3  5. Left hip osteoarthritis  6. Anemia    PAST SURGICAL HISTORY:   Past Surgical History:   Procedure Laterality Date     BIOPSY      liver      SURGICAL HISTORY OF -       liver transplant 2008     SURGICAL HISTORY OF -       CAD with stent placement 2009       MEDICATIONS PREOPERATIVE:     No current facility-administered medications on file prior to encounter.   Current Outpatient Prescriptions on File Prior to Encounter:  metoprolol (LOPRESSOR) 50 MG tablet Take 1 tablet (50 mg) by mouth 2 times daily   lisinopril (PRINIVIL,ZESTRIL) 2.5 MG tablet Take 1 tablet (2.5 mg) by mouth daily (Patient taking differently: Take 2.5 mg by mouth every morning )   isosorbide mononitrate (IMDUR) 60 MG 24 hr tablet Take 1 tablet (60 mg) by mouth 2 times daily   tacrolimus (PROGRAF - GENERIC EQUIVALENT) 0.5 MG capsule Take 0.5 mg (1 cap) every AM, take 1 mg, (2 caps) every  PM.   MULTI-VITAMIN PO Take 1 tablet by mouth daily    furosemide (LASIX) 20 MG tablet Take 1 tablet (20 mg) by mouth daily , for 3 days.  Please do not take unless instructed by Dr. Lagunas. (Patient not taking: Reported on 4/5/2017)   hydrALAZINE (APRESOLINE) 25 MG tablet Take 1 tablet (25 mg) by mouth 3 times daily , as needed if systolic blood pressure (top number) is more than 180 mmHg. (Patient not taking: Reported on 4/5/2017)   nystatin-triamcinolone (MYCOLOG II) cream Apply topically 4 times daily as needed Reported on 4/5/2017   aspirin 81 MG tablet Take 1 tablet by mouth daily. (Patient not taking: Reported on 4/5/2017)      ALLERGIES:      Allergies   Allergen Reactions     Amlodipine Besylate Swelling     Edema in legs     Amoxicillin Rash       SOCIAL HISTORY:   Social History     Social History     Marital status:      Spouse name: N/A     Number of children: 3     Years of education: N/A     Occupational History      Retired     Social History Main Topics     Smoking status: Former Smoker     Quit date: 11/23/1975     Smokeless tobacco: Never Used     Alcohol use No      Comment: Glass of wine occassionally     Drug use: No      Comment: Never     Sexual activity: Not Currently     Other Topics Concern     Blood Transfusions Yes     2007     Exercise No     Social History Narrative    Lives alone with her dog. . Three grown children, very supportive and all live close by.  6 grandchildren. Feels safe in her environment.       REVIEW OF SYSTEMS:   Ten point review of systems negative except as stated above in History of Present Illness.    PHYSICAL EXAMINATION:   Vitals were reviewed  Blood pressure 136/61, temperature 95.8  F (35.4  C), temperature source Oral, resp. rate 27, height 1.524 m (5'), weight 59.6 kg (131 lb 6.3 oz), SpO2 98 %, not currently breastfeeding.  General:alert, NAD, Drowsy but conversant   HEENT: NCAT, anicteric sclera, EOMI, moist mucous membranes  Neck: supple, no  lymphadenopathy or thyromegaly  Cardiovascular: RRR, S1S2, no murmurs appreciated  Lungs:CTAB, no wheezing or crackles  Abdomen: soft, nontender, nondistended with normoactive bowel sounds  Extremities: no edema, distal pulses palpable  Neurologic: grossly nonfocal     LABORATORY DATA: Preoperative labs are reviewed from 04/11 and 03/29. EKG from 4/11 was normal.     Ratna Baca PA-C  [facility]  Pager: [phone]

## 2017-04-11 NOTE — LETTER
Transition Communication Hand-off for Care Transitions to Next Level of Care Provider    Name: Lesli Lorenzana  MRN #: [id number]  Primary Care Provider: Dee Mercado     [facility] [address] [facility] [address]     Reason for Hospitalization:  Priamry Osteoarthritis Left Hip   Status post hip surgery  Admit Date/Time: 4/11/2017  9:36 AM  Discharge Date: 4/14/2017  Payor Source: Payor: MEDICA / Plan: MEDICA PRIME SOLUTION / Product Type: Indemnity /          Reason for Communication Hand-off Referral: Avoidable readmission within 30 days    Discharge Needs Assessment:  Needs       Most Recent Value    Equipment Currently Used at Home walker, standard, cane, straight, raised toilet, grab bar [has reacher and long shoe horn]    Transportation Available car, family or friend will provide        Follow-up specialty is recommended: Yes    Follow-up plan:  Future Appointments  Date Time Provider Department Center   4/12/2017 7:00 PM UR PT OVERFLOW [facility] [place]   4/13/2017 8:00 AM Grazyna Juárez OT [facility] [place]   4/13/2017 9:30 AM Katelyn Artis, PT [facility] [place]   4/13/2017 2:45 PM Katelyn Artis PT [facility] [place]   6/6/2017 8:00 AM AN LAB [facility] [place] [facility]   7/28/2017 10:00 AM Tucker Muhammad MD [facility]   9/7/2017 10:30 AM Nain Lgaunas MD [facility]   5/18/2099 10:30 AM [facility]       Any outstanding tests or procedures:        Referrals     Future Labs/Procedures    Home care nursing referral     Comments:    _______________________________________________________________________  [facility]  Phone  [phone]  Fax  [phone]  _______________________________________________________________________  RN skilled nursing visit. RN to assess vital signs and weight, respiratory and cardiac status, pain level and activity tolerance, incision for signs/symptoms of infection, hydration, nutrition and bowel status and home safety.  RN  to teach medication management.  RN to provide site care and management.    Your provider has ordered home care nursing services. If you have not been contacted within 2 days of your discharge please call the inpatient department phone number at [phone] .            Harika Sandoval RN, BSN  Care Coordinator, 8A  Phone [phone]  Pager [phone]    AVS/Discharge Summary is the source of truth; this is a helpful guide for improved communication of patient story

## 2017-04-11 NOTE — ANESTHESIA CARE TRANSFER NOTE
Patient: Lesli Lorenzana    Procedure(s):  Left Total Hip Replacement  - Wound Class: I-Clean    Diagnosis: Priamry Osteoarthritis Left Hip   Diagnosis Additional Information: No value filed.    Anesthesia Type:   General     Note:  Airway :Face Mask  Patient transferred to:PACU        Vitals: (Last set prior to Anesthesia Care Transfer)    CRNA VITALS  4/11/2017 1400 - 4/11/2017 1437      4/11/2017             Pulse: 98    SpO2: 100 %    Resp Rate (observed): 12    Resp Rate (set): 10                Electronically Signed By: RUSH Ramirez CRNA  April 11, 2017  2:37 PM

## 2017-04-11 NOTE — BRIEF OP NOTE
Orthopaedic Surgery Brief Op-Note     Patient: Lesli Lorenzana  : 1940  Date of Service: 2017 2:29 PM    Pre-operative Diagnosis: left end stage osteoarthritis hip  Post-operative Diagnosis: same    Procedure(s) Performed: left total hip arthroplasty    Staff: Dr. armstrong  Resident: megan troncoso    Anesthesia: General  EBL: 150 cc  Tourniquet Time: n/a    Implants:   Biomet:   1.  Ring loc acetabular shell 52 mm/23 liner  2.  Bone screw x 2 (35 mm x 2)  3.  Acetabular liner XL Hi-Wall 32 mm head size, size 23 liner  4.  Echo std femoral stem, RPP size 11, 135 mm  5.  Modular head -6mm neck, 32 mm head  Drains: AZAM  Intra-op Labs/Cxs/Specimens: none  Complications: none apparent  Findings: see dictated op note    Dispo: Stable to PACU, then to ortho floor.    Post-Op Plan:  Assessment/Plan: Lesli Lorenzana is a 76 year old female s/p left NAREN on 2017 with Dr. Armstrong.    Activity: Up with assist, posterior hip precautions  Weight bearing status: WBAT LLE  Antibiotics: Clinda x 24 hours.  Diet: Begin with clear fluids and progress diet as tolerated.   DVT prophylaxis: Coumadin x 4 weeks (INR goal 2.0)   Bracing/Splinting: abduction pillow   Wound Care: aquacel x 7-10 days   Drains: Document output per shift  Pain management: transition from IV to orals as tolerated.    X-rays: PACU left hip   Physical Therapy: eval and treat, ROM, ADL's.   Occupational Therapy: eval and treat  Labs: Trend Hgb on POD #1, 2  Consults: PT, OT. medicine, appreciate assistance in caring for this patient.   Follow-up: Clinic with Dr. Armstrong in 4 weeks     Disposition: Pending progress with therapies, pain control on orals, and medical stability, anticipate discharge to home vs TCU on POD #2-4.    Future Appointments  Date Time Provider Department [place]   2017 10:30 AM Katelyn Artis, PT URPT [place]   2017 2:00 PM Ondina Espinosa, OT [facility] [place]   2017 8:00 [facility]   2017  10:00 AM Tukcer Muhammad MD [facility]   9/7/2017 10:30 AM Nain Lagunas MD [facility]   5[facility]18/2099 10:30 AM [facility]GGIE Alvarado MD  Orthopaedic Surgery PGY-4  [phone]      For questions about this patient, please attempt to contact me at my pager prior to contacting the orthopaedics resident on call. Thank you!

## 2017-04-11 NOTE — PROGRESS NOTES
Orthopaedic Surgery Post op check    S: Pain well controlled. Denies numbness or tingling. Denies chest pain, shortness of breath, nausea, or vomiting.   Discussed POC    O:    Intake/Output Summary (Last 24 hours) at 04/11/17 1824  Last data filed at 04/11/17 1530   Gross per 24 hour   Intake             1500 ml   Output              935 ml   Net              565 ml     Vitals:    04/11/17 1545 04/11/17 1600 04/11/17 1620 04/11/17 1706   BP: 127/68 131/62 136/61 145/72   Resp: 9 27 11   Temp:  97.7  F (36.5  C) 95.8  F (35.4  C)    TempSrc:  Oral Oral    SpO2: 93% 99% 98%    Weight:       Height:               Exam:  Gen: No acute distress, resting comfortably in bed.  Resp: Non-labored breathing  LLE  -aquacel is c/d/i  -thigh is soft/compressible  -drain site with ss fluid surroundingi, SS fluid in reservoir  -abduction pillow donned  --CMS intact    -motor intact to ehl/fhl/ta/gsc    -SILT to sp/dp/sural/saph/tibial    -foot wwp, cap refill <2 sec, DP 2+ palpable    Drain output: NR.    Left hip AP/lat XR: stable, intact ANREN implant.  No acute osseous abnormalities    Assessment/Plan: Lesli Lorenzana is a 76 year old female s/p left NAREN on 4/11/2017 with Dr. Nathaniel Castanon Primary  Activity: Up with assist.  Standard protocol.  Posterior hip precautions.  Weight bearing status: WBAT LLE   Antibiotics: Clinda x 24 hours.  Diet: Begin with clear fluids and progress diet as tolerated.   DVT prophylaxis: Coumadin x 4 weeks (INR goal 1.5-2.5) starting POD #0  Bracing/Splinting: abduction pillow until reliable with posterior hip precautions   Wound Care: Aquacel 7-10 days.  Reinforce drain site PRN   Drains: Record output per shift   Pain management: transition from IV to orals as tolerated   X-rays: Left AP/lat XR PACU  Physical Therapy: eval and treat, ROM, ADL's.   Occupational Therapy: ADL's.  Labs: Trend Hgb on POD #1, 2, 3  Consults: PT, OT. medicine, appreciate assistance in caring for this patient.    Follow-up: Clinic with Dr. Armstrong in 4 weeks with repeat XR     Disposition: Pending progress with therapies, pain control on orals, and medical stability, anticipate discharge to home vs TCU on POD #2-4.    Jovan Alvarado MD  Orthopaedic Surgery PGY-4  [phone]

## 2017-04-11 NOTE — IP AVS SNAPSHOT
MRN:[id number]                      After Visit Summary   4/11/2017    Lesli Lorenzana    MRN: [id number]           Thank you!     Thank you for choosing [facility] for your care. Our goal is always to provide you with excellent care. Hearing back from our patients is one way we can continue to improve our services. Please take a few minutes to complete the written survey that you may receive in the mail after you visit with us. Thank you!        Patient Information     Date Of Birth          1940        Designated Caregiver       Most Recent Value    Caregiver    Will someone help with your care after discharge? no      About your hospital stay     You were admitted on:  April 11, 2017 You last received care in the:  [facility]    You were discharged on:  April 15, 2017        Reason for your hospital stay       76F s/p left NAREN                  Who to Call     For medical emergencies, please call 911.  For non-urgent questions about your medical care, please call your primary care provider or clinic, [phone]  For questions related to your surgery, please call your surgery clinic        Attending Provider     Provider Specialty    Zhen Armstrong MD Orthopedics       Primary Care Provider Office Phone # Fax #    Dee Awan [phone]       [facility] [address] [facility] [address]        After Care Instructions     Discharge Instructions       Post Operative Instructions for Lesli Lorenzana is a 76 year old female    Surgery: Left Total Hip Replacement on 4/11/2017.    If you have any questions contact Dr. Armstrong at the following number: [facility] call [phone].    Follow up appointment:   You are scheduled to follow up with Dr. Armstrong approximately 4 weeks after your surgery.           Anticoagulation:   Take coumadin x 4 weeks (INR goal 1.5-2.5).  This is given to help minimize your risk of blood clot.    Activity:   -Continue to weight  bear on your operative leg as tolerated by pain.  As you are more comfortable, you can discontinue use of the walker and transition to a cane.  Once you are comfortable with the cane, you can also wean from the cane and progress to using no assistive devices.  Do not transition until you are comfortable and have good balance.      -Follow the posterior hip precautions you were taught while at the hospital.        Pain Medications:   -Take pain medications as prescribed.  Wean off the the narcotic pain medications (Oxycodone, Dilaudid, etc) as tolerated by pain.  To help with this, take the Tylenol and Celebrex (if prescribed) to minimize the amount of narcotic pain medication you need to take.      -Do not drive while on pain medications or until your leg feels well enough to do so.      Bandage Care and Showering:   -You can shower with the adhesive bandage covering your knee at the time of discharge.    -Remove the adhesive bandage 10 days after your surgery.  This can be removed at home.  Once removed, it is common to have a few scabs.  Let the scabs fall off on their own - they will do so over the next week or two.  The sutures are resorbable and nothing needs to be removed.    --Once the bandage is removed, you can shower without covering the incision.  Do not soak or bathe the incision in water.  Do not scrub the incision.  Just let the water from the shower run over the incision.    --Contact Dr. Armstrong or his staff if there is any drainage from the incision or redness.    Leg Swelling and Icing  -Continue icing the hip 5-6 times per day for approximately 20 minutes each time.  This will help with swelling.    -Some swelling in the leg is normal after surgery.  This type of swelling is usually gravity dependent and is improved in the morning after sleeping.  If the swelling is painful in the calf or the swelling is getting worse, contact Dr. Armstrong's office.  We may recommend presenting to the Emergency  Department to obtain an ultrasound of the leg if there is concern for a DVT.      -Elevate the leg if you are sitting as this will help reduce swelling.    Contact clinic if you note any of the following:  -Fevers greater than 101.5 chills  -Increased pain, redness, swelling or discharge at the surgical site.   -During regular business hours call Dr Armstrong's office and request to speak with his nurse or the triage nurses.   -After hours or on weekends call the hospital  at [phone] and ask to speak with the Orthopaedic resident on call.    Take 0.5 mg Coumadin tonight (Saturday) and Sunday. Repeat INR with Home care on Monday to determine future dosing.                  Follow-up Appointments     Follow Up and recommended labs and tests       4 weeks with Dr Armstrong *Appointment scheduled on 5/15/17 at 10:15AM with Dr. Armstrong*    Follow up with Primary care Provider within one week of discharge. Would recommend having a BMP and INR at this time.     Home Health will see you on Monday, they will need to draw an INR to make sure coumadin dose is appropriate.                  Your next 10 appointments already scheduled     May 15, 2017 10:15 AM CDT   (Arrive by 10:00 AM)   Return Visit with Zhen Armstrong MD   [facility] ([facility])    [address] [facility] [address] [phone]            Jun 06, 2017  8:00 AM CDT   [facility]   [facility]    [address] [address] [address] [phone]           Patient must bring picture ID.  Patient should be prepared to give a urine specimen  Please do not eat 10-12 hours before your appointment if you are coming in fasting for labs on lipids, cholesterol, or glucose (sugar).  Pregnant women should follow their Care Team instructions. Water with medications is okay. Do not drink coffee or other fluids.   If you have concerns about taking   your medications, please ask at office or if scheduling via [employer], send a message by clicking on Secure Messaging, Message Your Care Team.            Jul 28, 2017 10:00 AM CDT   (Arrive by 9:45 AM)   Return Liver Transplant with Tucker Muhammad MD   [facility] ([facility])    [address] [place] [id number] [phone]            Sep 07, 2017 10:30 AM CDT   (Arrive by 10:15 AM)   Return Visit with Nain Lagunas MD   [facility] ([facility])    [address] [place] [id number] [phone]              Additional Services     Home care nursing referral       [facility]  Phone  [phone]  Fax  [phone]    RN skilled nursing visit. RN to assess vital signs and weight, respiratory and cardiac status, pain level and activity tolerance, incision for signs/symptoms of infection, hydration, nutrition and bowel status and home safety.  RN to teach medication management.  RN to provide site care and management.  RN to draw INR on Mon, 4/17 and communicate results to [facility] (Phone: [phone], Fax: [phone])    Your provider has ordered home care nursing services. If you have not been contacted within 2 days of your discharge please call the inpatient department phone number at [phone] .            INR Clinic Referral       Indication: DVT/PE Prophylaxis  Therapy Goal: 1.5-2.5  Provider/Team: ortho  Warfarin Prior to Admission: No                  Warfarin Instruction     You have started taking a medicine called warfarin. This is a blood-thinning medicine (anticoagulant). It helps prevent and treat blood clots.      Before leaving the hospital, make sure you know how much to take and how long to take it.      You will need regular blood tests to make sure your blood is clotting safely. It is very important to see your doctor for regular blood  "tests.    Talk to your doctor before taking any new medicine (this includes over-the-counter drugs and herbal products). Many medicines can interact with warfarin. This may cause more bleeding or too much clotting.     Eating a lot of vitamin K--found in green, leafy vegetables--can change the way warfarin works in your body. Do NOT avoid these foods. Instead, try to eat the same amount each day.     Bleeding is the most common side-effect of warfarin. You may notice bleeding gums, a bloody nose, bruises and bleeding longer when you cut yourself. See a doctor at once if:   o You cough up blood  o You find blood in your stool (poop)  o You have a deep cut, or a cut that bleeds longer than 10 minutes   o You have a bad cut, hard fall, accident or hit your head (go to urgent care or the emergency room).    For women who can get pregnant: This medicine can harm an unborn baby. Be very careful not to get pregnant while taking this medicine. If you think you might be pregnant, call your doctor right away.    For more information, read \"Guide to Warfarin Therapy,  the booklet you received in the hospital.        Pending Results     No orders found from 4/9/2017 to 4/12/2017.            Statement of Approval     Ordered          04/14/17 0749  I have reviewed and agree with all the recommendations and orders detailed in this document.  EFFECTIVE NOW     Approved and electronically signed by:  Zhen Armstrong MD             Admission Information     Date & Time Department Dept. Phone    4/11/2017 [facility] [phone]      Your Vitals Were     Blood Pressure Pulse Temperature Respirations Height Weight    154/60 (BP Location: Left arm) 87 97.7  F (36.5  C) (Oral) 16 1.524 m (5') 59.6 kg (131 lb 6.3 oz)    Pulse Oximetry BMI (Body Mass Index)                93% 25.66 kg/m2          [email or web address] Information     [employer] gives you secure access to your electronic health record. If you see a primary care provider, you can also send " messages to your care team and make appointments. If you have questions, please call your primary care clinic.  If you do not have a primary care provider, please call [phone] and they will assist you.        Care EveryWhere ID     This is your Care EveryWhere ID. This could be used by other organizations to access your [facility] medical records  [id number]           Review of your medicines      START taking        Dose / Directions    acetaminophen 325 MG tablet   Commonly known as:  TYLENOL   Used for:  Status post hip surgery        Dose:  650 mg   Take 2 tablets (650 mg) by mouth every 4 hours as needed for other (surgical pain)   Quantity:  100 tablet   Refills:  0       oxyCODONE 5 MG IR tablet   Commonly known as:  ROXICODONE   Used for:  Status post hip surgery        Dose:  2.5-5 mg   Take 0.5-1 tablets (2.5-5 mg) by mouth every 3 hours as needed for moderate to severe pain   Quantity:  40 tablet   Refills:  0       senna-docusate 8.6-50 MG per tablet   Commonly known as:  SENOKOT-S;PERICOLACE   Used for:  Status post hip surgery        Dose:  1-2 tablet   Take 1-2 tablets by mouth 2 times daily   Quantity:  100 tablet   Refills:  0       warfarin 1 MG tablet   Commonly known as:  COUMADIN   Used for:  Status post hip surgery        Dose:  0.5 mg   Take 0.5 tablets (0.5 mg) by mouth daily   Quantity:  30 tablet   Refills:  0         CONTINUE these medicines which may have CHANGED, or have new prescriptions. If we are uncertain of the size of tablets/capsules you have at home, strength may be listed as something that might have changed.        Dose / Directions    lisinopril 2.5 MG tablet   Commonly known as:  PRINIVIL/Zestril   This may have changed:  when to take this   Used for:  Coronary artery disease involving native heart without angina pectoris, unspecified vessel or lesion type        Dose:  2.5 mg   Take 1 tablet (2.5 mg) by mouth daily   Quantity:  93 tablet   Refills:  3         CONTINUE  these medicines which have NOT CHANGED        Dose / Directions    aspirin 81 MG tablet   Used for:  CAD (coronary artery disease)        Dose:  1 tablet   Take 1 tablet by mouth daily.   Quantity:  90 tablet   Refills:  3       calcium-vitamin D 600-400 MG-UNIT per tablet   Commonly known as:  CALTRATE        Dose:  1 tablet   Take 1 tablet by mouth daily   Refills:  0       furosemide 20 MG tablet   Commonly known as:  LASIX   Used for:  Coronary artery disease involving native heart with angina pectoris, unspecified vessel or lesion type (H), Essential hypertension with goal blood pressure less than 140/90, Fluid overload, unspecified        Dose:  20 mg   Take 1 tablet (20 mg) by mouth daily , for 3 days.  Please do not take unless instructed by Dr. Lagunas.   Quantity:  30 tablet   Refills:  0       hydrALAZINE 25 MG tablet   Commonly known as:  APRESOLINE   Used for:  Essential hypertension with goal blood pressure less than 140/90        Dose:  25 mg   Take 1 tablet (25 mg) by mouth 3 times daily , as needed if systolic blood pressure (top number) is more than 180 mmHg.   Quantity:  90 tablet   Refills:  3       isosorbide mononitrate 60 MG 24 hr tablet   Commonly known as:  IMDUR   Used for:  Coronary artery disease due to lipid rich plaque, Secondary hypertension with goal blood pressure less than 140/90        Dose:  60 mg   Take 1 tablet (60 mg) by mouth 2 times daily   Quantity:  180 tablet   Refills:  6       metoprolol 50 MG tablet   Commonly known as:  LOPRESSOR   Used for:  Essential hypertension with goal blood pressure less than 140/90, Coronary artery disease involving native heart with angina pectoris, unspecified vessel or lesion type (H)        Dose:  50 mg   Take 1 tablet (50 mg) by mouth 2 times daily   Quantity:  182 tablet   Refills:  3       MULTI-VITAMIN PO        Dose:  1 tablet   Take 1 tablet by mouth daily   Refills:  0       nystatin-triamcinolone cream   Commonly known as:  MYCOLOG  II        Apply topically 4 times daily as needed Reported on 4/5/2017   Refills:  0       tacrolimus 0.5 MG capsule   Commonly known as:  PROGRAF - GENERIC EQUIVALENT   Used for:  Liver replaced by transplant (H)        Take 0.5 mg (1 cap) every AM, take 1 mg, (2 caps) every PM.   Quantity:  270 capsule   Refills:  3            Where to get your medicines      These medications were sent to [facility] [place], [place] - [address]  [address] [facility] [address], [facility] [address]     Phone:  [phone]     acetaminophen 325 MG tablet    senna-docusate 8.6-50 MG per tablet    warfarin 1 MG tablet         Some of these will need a paper prescription and others can be bought over the counter. Ask your nurse if you have questions.     Bring a paper prescription for each of these medications     oxyCODONE 5 MG IR tablet                Protect others around you: Learn how to safely use, store and throw away your medicines at www.disposemymeds.org.             Medication List: This is a list of all your medications and when to take them. Check marks below indicate your daily home schedule. Keep this list as a reference.      Medications           Morning Afternoon Evening Bedtime As Needed    acetaminophen 325 MG tablet   Commonly known as:  TYLENOL   Take 2 tablets (650 mg) by mouth every 4 hours as needed for other (surgical pain)   Last time this was given:  975 mg on 4/13/2017  3:52 PM                                aspirin 81 MG tablet   Take 1 tablet by mouth daily.                                calcium-vitamin D 600-400 MG-UNIT per tablet   Commonly known as:  CALTRATE   Take 1 tablet by mouth daily                                furosemide 20 MG tablet   Commonly known as:  LASIX   Take 1 tablet (20 mg) by mouth daily , for 3 days.  Please do not take unless instructed by Dr. Lagunas.                                hydrALAZINE 25 MG tablet   Commonly known as:  APRESOLINE   Take 1 tablet (25  mg) by mouth 3 times daily , as needed if systolic blood pressure (top number) is more than 180 mmHg.                                isosorbide mononitrate 60 MG 24 hr tablet   Commonly known as:  IMDUR   Take 1 tablet (60 mg) by mouth 2 times daily   Last time this was given:  60 mg on 4/15/2017  9:02 AM                                lisinopril 2.5 MG tablet   Commonly known as:  PRINIVIL/Zestril   Take 1 tablet (2.5 mg) by mouth daily                                metoprolol 50 MG tablet   Commonly known as:  LOPRESSOR   Take 1 tablet (50 mg) by mouth 2 times daily   Last time this was given:  50 mg on 4/15/2017  9:02 AM                                MULTI-VITAMIN PO   Take 1 tablet by mouth daily                                nystatin-triamcinolone cream   Commonly known as:  MYCOLOG II   Apply topically 4 times daily as needed Reported on 4/5/2017                                oxyCODONE 5 MG IR tablet   Commonly known as:  ROXICODONE   Take 0.5-1 tablets (2.5-5 mg) by mouth every 3 hours as needed for moderate to severe pain   Last time this was given:  2.5 mg on 4/15/2017  1:48 PM                                senna-docusate 8.6-50 MG per tablet   Commonly known as:  SENOKOT-S;PERICOLACE   Take 1-2 tablets by mouth 2 times daily   Last time this was given:  2 tablets on 4/15/2017  9:02 AM                                tacrolimus 0.5 MG capsule   Commonly known as:  PROGRAF - GENERIC EQUIVALENT   Take 0.5 mg (1 cap) every AM, take 1 mg, (2 caps) every PM.   Last time this was given:  0.5 mg on 4/15/2017  9:02 AM                                warfarin 1 MG tablet   Commonly known as:  COUMADIN   Take 0.5 tablets (0.5 mg) by mouth daily   Last time this was given:  5 mg on 4/12/2017  6:04 PM

## 2017-04-11 NOTE — PHARMACY-ANTICOAGULATION SERVICE
Clinical Pharmacy - Warfarin Dosing Consult     Pharmacy has been consulted to manage this patient s warfarin therapy.  Indication: DVT/PE Prophylaxis  Therapy Goal: 1.5-2.5  Provider/Team: ortho  Warfarin Prior to Admission: No  Current medications that may interact with WARFARIN and impact the INR:  Acetaminophen, clindamycin    INR   Date Value Ref Range Status   04/05/2017 1.19 (H) 0.86 - 1.14 Final   08/12/2014 1.67 (H) 0.86 - 1.14 Final       .  Pharmacy will monitor Lesli LUIS Lorenzana daily and order warfarin doses to achieve specified goal.      Please contact pharmacy as soon as possible if the warfarin needs to be held for a procedure or if the warfarin goals change.

## 2017-04-11 NOTE — ANESTHESIA POSTPROCEDURE EVALUATION
Patient: Lesli Lorenzana    Procedure(s):  Left Total Hip Replacement  - Wound Class: I-Clean    Diagnosis:Priamry Osteoarthritis Left Hip   Diagnosis Additional Information: No value filed.    Anesthesia Type:  General    Note:  Anesthesia Post Evaluation    Patient location during evaluation: PACU  Patient participation: Able to fully participate in evaluation  Level of consciousness: awake  Pain management: adequate  Airway patency: patent  Cardiovascular status: acceptable  Respiratory status: acceptable  Hydration status: acceptable  PONV: none     Anesthetic complications: None    Comments:         Pt seen and examined.  Comfortable and resting.  Fully responsive and appropriate.  VSS.  Tolerating po.  Will discharge to floor soon.    Dr. Ewelina Nelson MD  Anesthesia  04/11/2017 @ 1519pm        Last vitals:  Vitals:    04/11/17 1433 04/11/17 1445 04/11/17 1500   BP: 181/86 173/81 161/75   Resp: 12 12 19   Temp: 36.4  C (97.5  F)  36.5  C (97.7  F)   SpO2: 100% 97% 99%         Electronically Signed By: Ewelina Nelson MD  April 11, 2017  3:17 PM

## 2017-04-11 NOTE — IP AVS SNAPSHOT
[facility]    [address]    [place] [address]    Phone:  [phone]                                       After Visit Summary   4/11/2017    Lesli Lorenzana    MRN: [id number]           After Visit Summary Signature Page     I have received my discharge instructions, and my questions have been answered. I have discussed any challenges I see with this plan with the nurse or doctor.    ..........................................................................................................................................  Patient/Patient Representative Signature      ..........................................................................................................................................  Patient Representative Print Name and Relationship to Patient    ..................................................               ................................................  Date                                            Time    ..........................................................................................................................................  Reviewed by Signature/Title    ...................................................              ..............................................  Date                                                            Time

## 2017-04-12 ENCOUNTER — DOCUMENTATION ONLY (OUTPATIENT)
Dept: TRANSPLANT | Facility: CLINIC | Age: 77
End: 2017-04-12

## 2017-04-12 ENCOUNTER — APPOINTMENT (OUTPATIENT)
Dept: PHYSICAL THERAPY | Facility: CLINIC | Age: 77
DRG: 470 | End: 2017-04-12
Attending: ORTHOPAEDIC SURGERY
Payer: MEDICARE

## 2017-04-12 ENCOUNTER — APPOINTMENT (OUTPATIENT)
Dept: OCCUPATIONAL THERAPY | Facility: CLINIC | Age: 77
DRG: 470 | End: 2017-04-12
Attending: ORTHOPAEDIC SURGERY
Payer: MEDICARE

## 2017-04-12 LAB
ALBUMIN SERPL-MCNC: 2.2 G/DL (ref 3.4–5)
ALBUMIN UR-MCNC: 10 MG/DL
ALP SERPL-CCNC: 54 U/L (ref 40–150)
ALT SERPL W P-5'-P-CCNC: 22 U/L (ref 0–50)
ANION GAP SERPL CALCULATED.3IONS-SCNC: 4 MMOL/L (ref 3–14)
APPEARANCE UR: CLEAR
AST SERPL W P-5'-P-CCNC: 37 U/L (ref 0–45)
BACTERIA #/AREA URNS HPF: ABNORMAL /HPF
BACTERIA SPEC CULT: NORMAL
BILIRUB SERPL-MCNC: 0.6 MG/DL (ref 0.2–1.3)
BILIRUB UR QL STRIP: NEGATIVE
BUN SERPL-MCNC: 26 MG/DL (ref 7–30)
CALCIUM SERPL-MCNC: 8.2 MG/DL (ref 8.5–10.1)
CHLORIDE SERPL-SCNC: 102 MMOL/L (ref 94–109)
CO2 SERPL-SCNC: 27 MMOL/L (ref 20–32)
COLOR UR AUTO: YELLOW
CREAT SERPL-MCNC: 1.38 MG/DL (ref 0.52–1.04)
CREAT UR-MCNC: 132 MG/DL
ERYTHROCYTE [DISTWIDTH] IN BLOOD BY AUTOMATED COUNT: 13.3 % (ref 10–15)
FRACT EXCRET NA UR+SERPL-RTO: 0.3 %
GFR SERPL CREATININE-BSD FRML MDRD: 37 ML/MIN/1.7M2
GLUCOSE SERPL-MCNC: 93 MG/DL (ref 70–99)
GLUCOSE UR STRIP-MCNC: NEGATIVE MG/DL
HCT VFR BLD AUTO: 23.5 % (ref 35–47)
HGB BLD-MCNC: 8.3 G/DL (ref 11.7–15.7)
HGB UR QL STRIP: NEGATIVE
HYALINE CASTS #/AREA URNS LPF: 8 /LPF (ref 0–2)
INR PPP: 1.3 (ref 0.86–1.14)
INTERPRETATION ECG - MUSE: NORMAL
KETONES UR STRIP-MCNC: NEGATIVE MG/DL
LEUKOCYTE ESTERASE UR QL STRIP: ABNORMAL
MCH RBC QN AUTO: 30.7 PG (ref 26.5–33)
MCHC RBC AUTO-ENTMCNC: 35.3 G/DL (ref 31.5–36.5)
MCV RBC AUTO: 87 FL (ref 78–100)
MICRO REPORT STATUS: NORMAL
MUCOUS THREADS #/AREA URNS LPF: PRESENT /LPF
NITRATE UR QL: NEGATIVE
OSMOLALITY UR: 450 MMOL/KG (ref 100–1200)
PH UR STRIP: 6 PH (ref 5–7)
PLATELET # BLD AUTO: 136 10E9/L (ref 150–450)
POTASSIUM SERPL-SCNC: 4.4 MMOL/L (ref 3.4–5.3)
PROT SERPL-MCNC: 6.2 G/DL (ref 6.8–8.8)
RBC # BLD AUTO: 2.7 10E12/L (ref 3.8–5.2)
RBC #/AREA URNS AUTO: 3 /HPF (ref 0–2)
SODIUM SERPL-SCNC: 133 MMOL/L (ref 133–144)
SODIUM UR-SCNC: 36 MMOL/L
SP GR UR STRIP: 1.01 (ref 1–1.03)
SPECIMEN SOURCE: NORMAL
URN SPEC COLLECT METH UR: ABNORMAL
UROBILINOGEN UR STRIP-MCNC: NORMAL MG/DL (ref 0–2)
WBC # BLD AUTO: 4.5 10E9/L (ref 4–11)
WBC #/AREA URNS AUTO: 31 /HPF (ref 0–2)

## 2017-04-12 PROCEDURE — 40000193 ZZH STATISTIC PT WARD VISIT: Performed by: PHYSICAL THERAPIST

## 2017-04-12 PROCEDURE — 87088 URINE BACTERIA CULTURE: CPT | Performed by: ORTHOPAEDIC SURGERY

## 2017-04-12 PROCEDURE — 84300 ASSAY OF URINE SODIUM: CPT | Performed by: PHYSICIAN ASSISTANT

## 2017-04-12 PROCEDURE — 97530 THERAPEUTIC ACTIVITIES: CPT | Mod: GP | Performed by: PHYSICAL THERAPIST

## 2017-04-12 PROCEDURE — 25000132 ZZH RX MED GY IP 250 OP 250 PS 637: Mod: GY | Performed by: ORTHOPAEDIC SURGERY

## 2017-04-12 PROCEDURE — 85610 PROTHROMBIN TIME: CPT | Performed by: PHYSICIAN ASSISTANT

## 2017-04-12 PROCEDURE — 97165 OT EVAL LOW COMPLEX 30 MIN: CPT | Mod: GO | Performed by: OCCUPATIONAL THERAPIST

## 2017-04-12 PROCEDURE — 99232 SBSQ HOSP IP/OBS MODERATE 35: CPT | Performed by: INTERNAL MEDICINE

## 2017-04-12 PROCEDURE — 97110 THERAPEUTIC EXERCISES: CPT | Mod: GP | Performed by: PHYSICAL THERAPIST

## 2017-04-12 PROCEDURE — 36415 COLL VENOUS BLD VENIPUNCTURE: CPT | Performed by: PHYSICIAN ASSISTANT

## 2017-04-12 PROCEDURE — 25000131 ZZH RX MED GY IP 250 OP 636 PS 637: Mod: GY | Performed by: ORTHOPAEDIC SURGERY

## 2017-04-12 PROCEDURE — 99207 ZZC APP CREDIT; MD BILLING SHARED VISIT: CPT | Performed by: PHYSICIAN ASSISTANT

## 2017-04-12 PROCEDURE — A9270 NON-COVERED ITEM OR SERVICE: HCPCS | Mod: GY | Performed by: INTERNAL MEDICINE

## 2017-04-12 PROCEDURE — 87186 SC STD MICRODIL/AGAR DIL: CPT | Performed by: ORTHOPAEDIC SURGERY

## 2017-04-12 PROCEDURE — 12000001 ZZH R&B MED SURG/OB UMMC

## 2017-04-12 PROCEDURE — A9270 NON-COVERED ITEM OR SERVICE: HCPCS | Mod: GY | Performed by: ORTHOPAEDIC SURGERY

## 2017-04-12 PROCEDURE — 25000128 H RX IP 250 OP 636: Performed by: PHYSICIAN ASSISTANT

## 2017-04-12 PROCEDURE — 83935 ASSAY OF URINE OSMOLALITY: CPT | Performed by: PHYSICIAN ASSISTANT

## 2017-04-12 PROCEDURE — S0077 INJECTION, CLINDAMYCIN PHOSP: HCPCS | Performed by: ORTHOPAEDIC SURGERY

## 2017-04-12 PROCEDURE — 80053 COMPREHEN METABOLIC PANEL: CPT | Performed by: PHYSICIAN ASSISTANT

## 2017-04-12 PROCEDURE — 87086 URINE CULTURE/COLONY COUNT: CPT | Performed by: ORTHOPAEDIC SURGERY

## 2017-04-12 PROCEDURE — 25000132 ZZH RX MED GY IP 250 OP 250 PS 637: Mod: GY | Performed by: INTERNAL MEDICINE

## 2017-04-12 PROCEDURE — 25000125 ZZHC RX 250: Performed by: ORTHOPAEDIC SURGERY

## 2017-04-12 PROCEDURE — 97161 PT EVAL LOW COMPLEX 20 MIN: CPT | Mod: GP | Performed by: PHYSICAL THERAPIST

## 2017-04-12 PROCEDURE — 25800025 ZZH RX 258: Performed by: STUDENT IN AN ORGANIZED HEALTH CARE EDUCATION/TRAINING PROGRAM

## 2017-04-12 PROCEDURE — 40000133 ZZH STATISTIC OT WARD VISIT: Performed by: OCCUPATIONAL THERAPIST

## 2017-04-12 PROCEDURE — A9270 NON-COVERED ITEM OR SERVICE: HCPCS | Mod: GY

## 2017-04-12 PROCEDURE — 25000132 ZZH RX MED GY IP 250 OP 250 PS 637: Mod: GY

## 2017-04-12 PROCEDURE — 81001 URINALYSIS AUTO W/SCOPE: CPT | Performed by: PHYSICIAN ASSISTANT

## 2017-04-12 PROCEDURE — 82570 ASSAY OF URINE CREATININE: CPT | Performed by: PHYSICIAN ASSISTANT

## 2017-04-12 PROCEDURE — 97530 THERAPEUTIC ACTIVITIES: CPT | Mod: GO | Performed by: OCCUPATIONAL THERAPIST

## 2017-04-12 PROCEDURE — 25000131 ZZH RX MED GY IP 250 OP 636 PS 637: Mod: GY | Performed by: STUDENT IN AN ORGANIZED HEALTH CARE EDUCATION/TRAINING PROGRAM

## 2017-04-12 PROCEDURE — 99207 ZZC CDG-CHARGE REQUIRED MANUAL ENTRY: CPT | Performed by: INTERNAL MEDICINE

## 2017-04-12 PROCEDURE — 97535 SELF CARE MNGMENT TRAINING: CPT | Mod: GO | Performed by: OCCUPATIONAL THERAPIST

## 2017-04-12 PROCEDURE — 85027 COMPLETE CBC AUTOMATED: CPT | Performed by: PHYSICIAN ASSISTANT

## 2017-04-12 PROCEDURE — 97116 GAIT TRAINING THERAPY: CPT | Mod: GP | Performed by: PHYSICAL THERAPIST

## 2017-04-12 PROCEDURE — 25000132 ZZH RX MED GY IP 250 OP 250 PS 637: Mod: GY | Performed by: PHYSICIAN ASSISTANT

## 2017-04-12 RX ORDER — TACROLIMUS 0.5 MG/1
1 CAPSULE ORAL
Status: DISCONTINUED | OUTPATIENT
Start: 2017-04-12 | End: 2017-04-15 | Stop reason: HOSPADM

## 2017-04-12 RX ORDER — WARFARIN SODIUM 5 MG/1
5 TABLET ORAL
Status: COMPLETED | OUTPATIENT
Start: 2017-04-12 | End: 2017-04-12

## 2017-04-12 RX ADMIN — METOPROLOL TARTRATE 50 MG: 50 TABLET ORAL at 20:22

## 2017-04-12 RX ADMIN — SODIUM CHLORIDE: 9 INJECTION, SOLUTION INTRAVENOUS at 13:23

## 2017-04-12 RX ADMIN — SODIUM CHLORIDE, POTASSIUM CHLORIDE, SODIUM LACTATE AND CALCIUM CHLORIDE 500 ML: 600; 310; 30; 20 INJECTION, SOLUTION INTRAVENOUS at 05:16

## 2017-04-12 RX ADMIN — SENNOSIDES AND DOCUSATE SODIUM 2 TABLET: 8.6; 5 TABLET ORAL at 20:22

## 2017-04-12 RX ADMIN — ACETAMINOPHEN 975 MG: 325 TABLET, FILM COATED ORAL at 16:18

## 2017-04-12 RX ADMIN — SENNOSIDES AND DOCUSATE SODIUM 2 TABLET: 8.6; 5 TABLET ORAL at 07:42

## 2017-04-12 RX ADMIN — CLINDAMYCIN PHOSPHATE 900 MG: 18 INJECTION, SOLUTION INTRAVENOUS at 04:00

## 2017-04-12 RX ADMIN — OXYCODONE HYDROCHLORIDE 2.5 MG: 5 TABLET ORAL at 12:44

## 2017-04-12 RX ADMIN — WARFARIN SODIUM 5 MG: 5 TABLET ORAL at 18:04

## 2017-04-12 RX ADMIN — ACETAMINOPHEN 975 MG: 325 TABLET, FILM COATED ORAL at 07:26

## 2017-04-12 RX ADMIN — TACROLIMUS 0.5 MG: 0.5 CAPSULE ORAL at 07:42

## 2017-04-12 RX ADMIN — TACROLIMUS 1 MG: 0.5 CAPSULE ORAL at 20:22

## 2017-04-12 RX ADMIN — METOPROLOL TARTRATE 50 MG: 50 TABLET ORAL at 07:43

## 2017-04-12 ASSESSMENT — ACTIVITIES OF DAILY LIVING (ADL): PREVIOUS_RESPONSIBILITIES: MEAL PREP;HOUSEKEEPING;LAUNDRY;SHOPPING;YARDWORK;MEDICATION MANAGEMENT;FINANCES;DRIVING

## 2017-04-12 NOTE — PROGRESS NOTES
"          [facility]   Date of Service: 4/12/2017  Patient: Lesli Lorenzana  MRN: [id number]  Admission Date: 4/11/2017  Hospital Day # 1    Assessment & Plan    Lesli \"Fani\" Harriett is a 76 year old female with a pmh of PBC s/p liver transplant (2008), CAD s/p PCI to OM1 (2009), HTN, and CDK who was admitted post op following L NAREN on 4/11/17.    #POD #1 L total hip arthoplasty. With Dr. Armstrong. For the treatment of end stage left hip osteoarthritis. On clindamycin x 24 hr. Pain medication with scheduled tylneol, prn oxycodone. DVT prophylaxis with Warfarin x 4 weeks. Hgb 11.5 pre-op, down to 8.3 today. No s/s of active bleeding, likely dilutional componant as well as small amount of BL (560mL) intraoperatively. Will continue to monitor.   - Management per primary team  - Will follow CBC in am    BOZENA on CKD. Creatinine 1.38 GFR 37 today up from 0.96 pre-op with GFR 57. BL Creatinine, per chart review, ranging from 1.1-1.3.   - UA, FeNa, Urine Osm pending  - Increase IVF to 100 cc/hr  - Repeat BMP in am    CAD, HTN. S/p PCI to OM1 in 2009 for unstable angina. Dobutamine stress 03/2014 was non diagnostic due to hypotension w/ dobutamine, but no WMA seen and no inducible ischemia, sensitivity reduced. ECHO 03/03/2017 w/ EF of 55-60%, normal wall thickness, no WMA, normal RV size and function. On aspirin (held since 03/28), metoprolol 50 BID, lisinopril 2.5 mg qday, imdur 60 BID, lasix 20 mg qday PRN.  - Continue to hold Imdur and Lisinopril  - Metoprolol BID    PBC S/p Liver transplant (2008). Follows with Dr. Muhammad. On Tacro 0.5mg qam, 1 mg qhs.  - Continue tacro    GERD. Complains of chest discomfort which she describes as \"food stuck in my esophagus. No hx of GERD.   - GI Cocktail prn        CODE: FULL  DVT: Warfarin  Diet/fluids: Regular diet, NS @ 100   Disposition: per primary team      Ratna Baca PA-C  Internal Medicine [facility]" "[facility]   Pager: [phone]  ___________________________________________________________________    Subjective & Interval Hx:  Rested well overnight. Did not require prn IV pain medication. Denies pain at this time. Eating and drinking without difficulty. Does complain of feeling as though \"food is stuck in my esophagus\" causing some heartburn and discomfort. Otherwise denies chest pain, shortness of breath or difficulty breathing. No BM. Urinary catheter in place. No other complaints at this time    Last 24 hr care team notes reviewed.   ROS:  4 point ROS including Respiratory, CV, GI and , other than that noted in the HPI, is negative.    Medications: Reviewed in EPIC. List below for reference    Physical Exam:    /57  Pulse 64  Temp 96.2  F (35.7  C)  Resp 14  Ht 1.524 m (5')  Wt 59.6 kg (131 lb 6.3 oz)  SpO2 100%  BMI 25.66 kg/m2     GENERAL: Alert and oriented x 3. NAD.   HEENT: Anicteric sclera. Mucous membranes moist.   CV: RRR. S1, S2. No murmurs appreciated.   RESPIRATORY: Effort normal on 2L NC. Lungs CTAB with no wheezing, rales, rhonchi.   GI: Abdomen soft and non distended with normoactive bowel sounds present in all quadrants. No tenderness, rebound, guarding.   NEUROLOGICAL: No focal deficits. Moves all extremities.    EXTREMITIES: No peripheral edema. Intact bilateral pedal pulses.   SKIN: No jaundice. No rashes.         "

## 2017-04-12 NOTE — PLAN OF CARE
"Problem: Goal Outcome Summary  Goal: Goal Outcome Summary  Pt evaluated and treatment initiated this morning. She requires min A of 1 for transfers out of bed.  No dizziness but beginning to have more pain. (Previously stated \"no pain.\") Pt's son is living with her temporarily.  He works nights so there will be some mornings where she will be on her on.  Her dtr will assist on Friday and the weekends. Pt lives in a rambler with a walk-out basement. She has a dog that she will have to let out. Pt's home has 2 steps to enter from garage and a full flight to basement.  She was using a walker to ambulate in her house prior to surgery.  Pt desires to discharge to home. She will need to be independent with bed mobility and transfers.  Anticipate need for home PT.       "

## 2017-04-12 NOTE — PROGRESS NOTES
Orthopaedic Surgery Progress Note    E:  No acute issues.     S: Pain well controlled this AM.  Denies numbness or tingling. Denies chest pain, shortness of breath, nausea, or vomiting.   Discussed POC    O:    Vitals:    04/11/17 2043 04/11/17 2249 04/12/17 0001 04/12/17 0358   BP: 122/60 135/58  122/51   BP Location:  Left arm  Left arm   Pulse:    61   Resp:  12  12   Temp:  95.4  F (35.2  C)  96.7  F (35.9  C)   TempSrc:  Axillary  Oral   SpO2:  99% 98%    Weight:       Height:               Exam:  Gen: No acute distress, resting comfortably in bed.  Resp: Non-labored breathing  LLE  -aquacel is c/d/i  -thigh is soft/compressible  -drain site with ss fluid surroundingi, SS fluid in reservoir  -abduction pillow donned  --CMS intact    -motor intact to ehl/fhl/ta/gsc    -SILT to sp/dp/sural/saph/tibial    -foot wwp, cap refill <2 sec, DP 2+ palpable    Drain output: 65/NR last two shifts.    Assessment/Plan: Lesli Lorenzana is a 76 year old female s/p left NAREN on 4/11/2017 with Dr. Armstrong    Ortho Primary  Activity: Up with assist.  Standard protocol.  Posterior hip precautions.  Weight bearing status: WBAT LLE   Antibiotics: Clinda x 24 hours.  Diet: Begin with clear fluids and progress diet as tolerated.   DVT prophylaxis: Coumadin x 4 weeks (INR goal 1.5-2.5) starting POD #0  Bracing/Splinting: abduction pillow until reliable with posterior hip precautions   Wound Care: Aquacel 7-10 days.  Reinforce drain site PRN   Drains: Record output per shift   Pain management: transition from IV to orals as tolerated   X-rays: Left AP/lat XR PACU  Physical Therapy: eval and treat, ROM, ADL's.   Occupational Therapy: ADL's.  Labs: Trend Hgb on POD #1, 2, 3  Consults: PT, OT. medicine, appreciate assistance in caring for this patient.   Follow-up: Clinic with Dr. Armstrong in 4 weeks with repeat XR     Disposition: Pending progress with therapies, pain control on orals, and medical stability, anticipate discharge to home vs  TCU on POD #2-4.    Jovan Alvarado MD  Orthopaedic Surgery PGY-4  [phone]

## 2017-04-12 NOTE — PLAN OF CARE
Problem: Goal Outcome Summary  Goal: Goal Outcome Summary  Outcome: No Change  Arrived from PACU at 16:15 via bed.  A&O x 4. Denies CP,SOB. LungsCTA.  Coronel catheterr in place.BS hypoactive. Compazine 2.5 mg IV given for nausea with relief. Tolerating clear liquid. IS encouraged. CDBE encouraged. Denies pain. CMS intact. Hip abductor pillow in place. Aquacel dressing CDI. AZAM draining, site dressing reinforced.  Ice applied. On 2L O2 /nc . Oriented to room/call light. Able to make needs known. Will continue to monitor.    Low UO, moonlighter Dr. Db Staples notified, order received .   cc bolus over 2 hrs  infusing. Dangled at the edge of the bed for few minutes.

## 2017-04-12 NOTE — PLAN OF CARE
Problem: Goal Outcome Summary  Goal: Goal Outcome Summary  Outcome: Improving  Focus: Physio  D: Doing well.  Up with assist of one and walker.  Pain controlled with oxycodone 2.5 mg.  Dressing clean, dry and intact.  AZAM patent.  Coronel patent.  Coronel kept due to low urine output overnight and need for 2 boluses.  Will ask austin FARR if we can discontinue it this afternoon.  P: Continue current plan of care.

## 2017-04-12 NOTE — PROGRESS NOTES
04/12/17 1029   Quick Adds   Type of Visit Initial PT Evaluation   Living Environment   Lives With child(jessica), adult   Living Arrangements house   Home Accessibility stairs (1 railing present);stairs (2 railings present)   Number of Stairs to Enter Home 2  (railing on the left)   Number of Stairs Within Home 12  (landing 1/2 way down, railing on both sidesw)   Stair Railings at Home outside, present on left side;inside, present on left side   Transportation Available car;family or friend will provide   Living Environment Comment Son is living with her now (temporarily)  Walk out basement.  Lives with her dog.    Self-Care   Dominant Hand right   Usual Activity Tolerance fair   Current Activity Tolerance fair   Regular Exercise no   Equipment Currently Used at Home walker, rolling   Activity/Exercise/Self-Care Comment walking from one room to the next, uses electric cart at the store,  needed to use her walker the last couple weeks instead of her cane.    Functional Level Prior   Ambulation 1-->assistive equipment  (walker)   Transferring 0-->independent   Toileting 0-->independent   Bathing 0-->independent  (high toilet)   Dressing 0-->independent  (slip on shoes)   Eating 0-->independent   Communication 0-->understands/communicates without difficulty   Swallowing 0-->swallows foods/liquids without difficulty   Cognition 0 - no cognition issues reported   Fall history within last six months no   Which of the above functional risks had a recent onset or change? ambulation   Prior Functional Level Comment stairs, one at a time with her cane, Used cane to go downstairs to let the dog out but  otherwise used the walker in the house.     General Information   Onset of Illness/Injury or Date of Surgery - Date 04/11/17   Referring Physician Dr. Armstrong   Patient/Family Goals Statement Pt wants to discharge to home when medically stable.    Pertinent History of Current Problem (include personal factors and/or comorbidities  that impact the POC) Pt POD #1 left NAREN.  PMH: liver transplant,  CAD, HTN, CKD, leukopenia,   PCI to MARIA C 2009   Precautions/Limitations left hip precautions   Weight-Bearing Status - LLE weight-bearing as tolerated   Weight-Bearing Status - RLE full weight-bearing   Heart Disease Risk Factors High blood pressure;Medical history;Age;Lack of physical activity   General Observations Pt has abductor pillow strapped to her leg.  capnography at 8-10,   General Info Comments son works nights and comes home at 10-11 in the morning. He is home 4 days a week. Dtr will help fri and weekends.    Cognitive Status Examination   Orientation orientation to person, place and time   Level of Consciousness alert   Follows Commands and Answers Questions 100% of the time   Personal Safety and Judgment intact   Memory intact   Pain Assessment   Patient Currently in Pain No   Integumentary/Edema   Integumentary/Edema Comments bruised at  lower leg   Posture    Posture Forward head position;Protracted shoulders   Range of Motion (ROM)   ROM Comment Adequate for mobility within hip precaution restrictions,  Limited by pain but able to flex to 90 degrees with heelslide in supine.    Strength   Strength Comments DF/PF, Quad and glut strength limited by pain but at leasst 3+/5 with hip flexion.    Bed Mobility   Bed Mobility Comments supine>sit with mod A of 1.  Sit>supine with min A of 1   Transfer Skills   Transfer Comments Sit<>stand with 1 cue for foot position..  Good strategy for hand postion on bed and ww.    Gait   Gait Comments walked about 40' with wheeled walker and cues for sequencing.    Balance   Balance Comments needs bilateral UE support for gait   Sensory Examination   Sensory Perception Comments denies deficit   Coordination   Coordination no deficits were identified   Muscle Tone   Muscle Tone no deficits were identified   Modality Interventions   Planned Modality Interventions Cryotherapy   General Therapy Interventions  "  Planned Therapy Interventions bed mobility training;gait training;transfer training;strengthening;home program guidelines   Clinical Impression   Criteria for Skilled Therapeutic Intervention yes, treatment indicated   PT Diagnosis impaired functional mobility s/p left NAREN   Influenced by the following impairments decreased AROM, decreased strength    Functional limitations due to impairments decr tolerance and independence for transfers, bed mobility and gait.    Clinical Presentation Stable/Uncomplicated   Clinical Presentation Rationale Pt moving well POD #1,  PMH complicated by liver transfer and CKD    Clinical Decision Making (Complexity) Low complexity   Therapy Frequency` 2 times/day   Predicted Duration of Therapy Intervention (days/wks) 3 days   Anticipated Equipment Needs at Discharge (owns a wheeled walker. )   Anticipated Discharge Disposition Home with Home Therapy   Risk & Benefits of therapy have been explained Yes   Patient, Family & other staff in agreement with plan of care Yes   [facility] TM \"6 Clicks\"   2016, Trustees of [facility], under license to [employer].  All rights reserved.   6 Clicks Short Forms Basic Mobility Inpatient Short Form   [facility]  \"6 Clicks\" V.2 Basic Mobility Inpatient Short Form   1. Turning from your back to your side while in a flat bed without using bedrails? 3 - A Little   2. Moving from lying on your back to sitting on the side of a flat bed without using bedrails? 3 - A Little   3. Moving to and from a bed to a chair (including a wheelchair)? 3 - A Little   4. Standing up from a chair using your arms (e.g., wheelchair, or bedside chair)? 3 - A Little   5. To walk in hospital room? 3 - A Little   6. Climbing 3-5 steps with a railing? 2 - A Lot   Basic Mobility Raw Score (Score out of 24.Lower scores equate to lower levels of function) 17   Total Evaluation Time   Total Evaluation Time (Minutes) 11     "

## 2017-04-12 NOTE — PROGRESS NOTES
Care Coordinator- Discharge Planning     Admission Date/Time:  4/11/2017  Attending MD:  Zhen Armstrong MD     Data  Date of initial CC assessment:  4/12/2017  Chart reviewed, discussed with interdisciplinary team.   Patient was admitted for:   1. Status post hip surgery    2. Primary osteoarthritis of left hip         Assessment  Concerns with insurance coverage for discharge needs: None.  Current Living Situation: Patient lives alone.  Support System: Family  Services Involved: Home Care  Transportation: Family or Friend will provide  Barriers to Discharge: Recent surgical procedure      Coordination of Care and Referrals: Provided patient/family with options for Home Care. Met with patient at bedside to discuss discharge planning. Patient recommended to have home PT. Patient declined home PT at this time. She states that she can do her exercises by herself and she has plenty of help at home with her daughters. A referral was made to [facility] for skilled nursing and INR lab draws. No further discharge concerns at this time. Home Care orders done. Patient will let this writer know if she changes her mind about having home physical therapy. CC to follow as needed.  _______________________________________________________________________  [facility]  Phone  [phone]  Fax  [phone]  _______________________________________________________________________        Plan  Anticipated Discharge Date:  4/14/2017  Anticipated Discharge Plan:  Home with Home Care    CTS Handoff completed:  YES    Harika Sandoval RN, BSN  Care Coordinator,   Phone [phone]  Pager [phone]

## 2017-04-12 NOTE — PLAN OF CARE
Problem: Individualization  Goal: Patient Preferences  Urine output still low, 75cc, mathis still in. Bladder scanner 121cc, pt denies bad discomfort. Assisted to standing positioned to make sure it's not positional. Moonlighter updated and ordered bolus. Pt A&O x's 4. VSS. Afebrile. 02 sats in 90s on 1LPM, Lungs clear. IS encouraged. Denies SOB, CP and nausea. Tolerating po fluid. Bowel sound active in all quadrants. Last BM yesterday before surgery. pt has not passed gas yet. Denies pain, uses ice pack, AZAM site dressing was reinforced in the evening shift per report and remaine clean,dry and intact.  CMS intact, denies N/T.  PIV patent and running. Pt slept able to make needs known, call light with in reach. Will continue to monitor.

## 2017-04-12 NOTE — PROGRESS NOTES
Annual chart review completed.    Pt doing labs at regular advised interval.  [facility].

## 2017-04-13 ENCOUNTER — APPOINTMENT (OUTPATIENT)
Dept: PHYSICAL THERAPY | Facility: CLINIC | Age: 77
DRG: 470 | End: 2017-04-13
Attending: ORTHOPAEDIC SURGERY
Payer: MEDICARE

## 2017-04-13 ENCOUNTER — APPOINTMENT (OUTPATIENT)
Dept: OCCUPATIONAL THERAPY | Facility: CLINIC | Age: 77
DRG: 470 | End: 2017-04-13
Attending: ORTHOPAEDIC SURGERY
Payer: MEDICARE

## 2017-04-13 LAB
ANION GAP SERPL CALCULATED.3IONS-SCNC: 9 MMOL/L (ref 3–14)
BUN SERPL-MCNC: 16 MG/DL (ref 7–30)
CALCIUM SERPL-MCNC: 7.7 MG/DL (ref 8.5–10.1)
CHLORIDE SERPL-SCNC: 101 MMOL/L (ref 94–109)
CO2 SERPL-SCNC: 20 MMOL/L (ref 20–32)
CREAT SERPL-MCNC: 1.12 MG/DL (ref 0.52–1.04)
GFR SERPL CREATININE-BSD FRML MDRD: 47 ML/MIN/1.7M2
GLUCOSE SERPL-MCNC: 133 MG/DL (ref 70–99)
HGB BLD-MCNC: 9.6 G/DL (ref 11.7–15.7)
INR PPP: 2.28 (ref 0.86–1.14)
POTASSIUM SERPL-SCNC: 4.2 MMOL/L (ref 3.4–5.3)
SODIUM SERPL-SCNC: 130 MMOL/L (ref 133–144)

## 2017-04-13 PROCEDURE — 25000132 ZZH RX MED GY IP 250 OP 250 PS 637: Mod: GY | Performed by: PHYSICIAN ASSISTANT

## 2017-04-13 PROCEDURE — 36415 COLL VENOUS BLD VENIPUNCTURE: CPT | Performed by: ORTHOPAEDIC SURGERY

## 2017-04-13 PROCEDURE — 97116 GAIT TRAINING THERAPY: CPT | Mod: GP | Performed by: REHABILITATION PRACTITIONER

## 2017-04-13 PROCEDURE — A9270 NON-COVERED ITEM OR SERVICE: HCPCS | Mod: GY | Performed by: ORTHOPAEDIC SURGERY

## 2017-04-13 PROCEDURE — 25000132 ZZH RX MED GY IP 250 OP 250 PS 637: Mod: GY | Performed by: ORTHOPAEDIC SURGERY

## 2017-04-13 PROCEDURE — 85018 HEMOGLOBIN: CPT | Performed by: ORTHOPAEDIC SURGERY

## 2017-04-13 PROCEDURE — 85610 PROTHROMBIN TIME: CPT | Performed by: ORTHOPAEDIC SURGERY

## 2017-04-13 PROCEDURE — 97110 THERAPEUTIC EXERCISES: CPT | Mod: GP | Performed by: REHABILITATION PRACTITIONER

## 2017-04-13 PROCEDURE — 99207 ZZC APP CREDIT; MD BILLING SHARED VISIT: CPT | Performed by: PHYSICIAN ASSISTANT

## 2017-04-13 PROCEDURE — A9270 NON-COVERED ITEM OR SERVICE: HCPCS | Mod: GY | Performed by: INTERNAL MEDICINE

## 2017-04-13 PROCEDURE — 36415 COLL VENOUS BLD VENIPUNCTURE: CPT | Performed by: PHYSICIAN ASSISTANT

## 2017-04-13 PROCEDURE — 97535 SELF CARE MNGMENT TRAINING: CPT | Mod: GO | Performed by: OCCUPATIONAL THERAPIST

## 2017-04-13 PROCEDURE — 25000131 ZZH RX MED GY IP 250 OP 636 PS 637: Mod: GY | Performed by: STUDENT IN AN ORGANIZED HEALTH CARE EDUCATION/TRAINING PROGRAM

## 2017-04-13 PROCEDURE — 40000193 ZZH STATISTIC PT WARD VISIT: Performed by: REHABILITATION PRACTITIONER

## 2017-04-13 PROCEDURE — 25000131 ZZH RX MED GY IP 250 OP 636 PS 637: Mod: GY | Performed by: ORTHOPAEDIC SURGERY

## 2017-04-13 PROCEDURE — 40000133 ZZH STATISTIC OT WARD VISIT: Performed by: OCCUPATIONAL THERAPIST

## 2017-04-13 PROCEDURE — 99232 SBSQ HOSP IP/OBS MODERATE 35: CPT | Performed by: INTERNAL MEDICINE

## 2017-04-13 PROCEDURE — A9270 NON-COVERED ITEM OR SERVICE: HCPCS | Mod: GY | Performed by: PHYSICIAN ASSISTANT

## 2017-04-13 PROCEDURE — 25000132 ZZH RX MED GY IP 250 OP 250 PS 637: Mod: GY | Performed by: INTERNAL MEDICINE

## 2017-04-13 PROCEDURE — 97530 THERAPEUTIC ACTIVITIES: CPT | Mod: GP | Performed by: REHABILITATION PRACTITIONER

## 2017-04-13 PROCEDURE — 80048 BASIC METABOLIC PNL TOTAL CA: CPT | Performed by: PHYSICIAN ASSISTANT

## 2017-04-13 PROCEDURE — 12000001 ZZH R&B MED SURG/OB UMMC

## 2017-04-13 RX ORDER — HYDROXYZINE HYDROCHLORIDE 10 MG/1
10 TABLET, FILM COATED ORAL EVERY 6 HOURS PRN
Qty: 90 TABLET | Refills: 0 | Status: SHIPPED | OUTPATIENT
Start: 2017-04-13 | End: 2017-04-14

## 2017-04-13 RX ORDER — ISOSORBIDE MONONITRATE 60 MG/1
60 TABLET, EXTENDED RELEASE ORAL 2 TIMES DAILY
Status: DISCONTINUED | OUTPATIENT
Start: 2017-04-13 | End: 2017-04-15 | Stop reason: HOSPADM

## 2017-04-13 RX ORDER — OXYCODONE HYDROCHLORIDE 5 MG/1
2.5-5 TABLET ORAL
Qty: 85 TABLET | Refills: 0 | Status: SHIPPED | OUTPATIENT
Start: 2017-04-13 | End: 2017-04-14

## 2017-04-13 RX ORDER — AMOXICILLIN 250 MG
1-2 CAPSULE ORAL 2 TIMES DAILY
Qty: 100 TABLET | Refills: 0 | Status: SHIPPED | OUTPATIENT
Start: 2017-04-13 | End: 2017-05-15

## 2017-04-13 RX ADMIN — METOPROLOL TARTRATE 50 MG: 50 TABLET ORAL at 19:37

## 2017-04-13 RX ADMIN — ISOSORBIDE MONONITRATE 60 MG: 60 TABLET, EXTENDED RELEASE ORAL at 19:38

## 2017-04-13 RX ADMIN — OXYCODONE HYDROCHLORIDE 2.5 MG: 5 TABLET ORAL at 13:53

## 2017-04-13 RX ADMIN — TACROLIMUS 1 MG: 0.5 CAPSULE ORAL at 19:38

## 2017-04-13 RX ADMIN — METOPROLOL TARTRATE 50 MG: 50 TABLET ORAL at 08:03

## 2017-04-13 RX ADMIN — SENNOSIDES AND DOCUSATE SODIUM 2 TABLET: 8.6; 5 TABLET ORAL at 08:03

## 2017-04-13 RX ADMIN — OXYCODONE HYDROCHLORIDE 2.5 MG: 5 TABLET ORAL at 04:17

## 2017-04-13 RX ADMIN — ACETAMINOPHEN 975 MG: 325 TABLET, FILM COATED ORAL at 15:52

## 2017-04-13 RX ADMIN — ISOSORBIDE MONONITRATE 60 MG: 60 TABLET, EXTENDED RELEASE ORAL at 11:32

## 2017-04-13 RX ADMIN — ACETAMINOPHEN 975 MG: 325 TABLET, FILM COATED ORAL at 08:03

## 2017-04-13 RX ADMIN — TACROLIMUS 0.5 MG: 0.5 CAPSULE ORAL at 08:03

## 2017-04-13 NOTE — PLAN OF CARE
Problem: Goal Outcome Summary  Goal: Goal Outcome Summary  Outcome: Improving  Focus: Physio  D: Doing well today. Appetite improving.  Up with assist of one and walker.  Voiding well.  Starting to pass gas. Dressing to left hip oozing some serosang drainage after drain pulled this afternoon.  Reinforced with compressing tape and ABD pad.  Pain well controlled on 2.5 or oxycodone.  P: Continue current plan of care.

## 2017-04-13 NOTE — PLAN OF CARE
Problem: Goal Outcome Summary  Goal: Goal Outcome Summary  OT-OT eval completed and treatment initiated. Pt appears to be progressing well and is hoping to go home. It is anticipated that pt will be able to return to home at discharge.

## 2017-04-13 NOTE — PROGRESS NOTES
Orthopaedic Surgery Progress Note    E:  No acute issues.     S: comfortable this AM.  Coronel d/c, voiding spontaneously.  -flatus, denies abd pain, no n/v.  Denies numbness or tingling. Planning for d/c home with home care.      O:    Vitals:    04/12/17 0700 04/12/17 1616 04/12/17 2022 04/13/17 0009   BP: 117/57 138/59 135/60 148/63   BP Location:    Left arm   Pulse: 64   77   Resp: 14 18 16   Temp: 96.2  F (35.7  C) 97.7  F (36.5  C)  98.1  F (36.7  C)   TempSrc:    Oral   SpO2: 100% 98%  97%   Weight:       Height:               Exam:  Gen: No acute distress, resting comfortably in bed.  Resp: Non-labored breathing  LLE  -aquacel is c/d/i  -thigh is soft/compressible  -drain site reinforced, SS fluid in reservoir  -abduction pillow donned  --CMS intact    -motor intact to ehl/fhl/ta/gsc    -SILT to sp/dp/sural/saph/tibial    -foot wwp, cap refill <2 sec, DP 2+ palpable    Drain output: 105/NR    Assessment/Plan: Lesli Lorenzana is a 76 year old female s/p left NAREN on 4/11/2017 with Dr. Nathaniel Castanon Primary  Activity: Up with assist.  Standard protocol.  Posterior hip precautions.  Weight bearing status: WBAT LLE   Antibiotics: Clinda x 24 hours.  Diet: Begin with clear fluids and progress diet as tolerated.   DVT prophylaxis: Coumadin x 4 weeks (INR goal 1.5-2.5) starting POD #0  Bracing/Splinting: abduction pillow until reliable with posterior hip precautions   Wound Care: Aquacel 7-10 days.  Reinforce drain site PRN   Drains: Record output per shift   Pain management: transition from IV to orals as tolerated   X-rays: Left AP/lat XR PACU  Physical Therapy: eval and treat, ROM, ADL's.   Occupational Therapy: ADL's.  Labs: Trend Hgb on POD #1, 2, 3  Consults: PT, OT. medicine, appreciate assistance in caring for this patient.   Follow-up: Clinic with Dr. Armstrong in 4 weeks with repeat XR     Disposition: Pending progress with therapies, pain control on orals, and medical stability, anticipate discharge to  home vs TCU on POD #2-4.    Jovan Alvarado MD  Orthopaedic Surgery PGY-4  [phone]

## 2017-04-13 NOTE — PLAN OF CARE
Problem: Goal Outcome Summary  Goal: Goal Outcome Summary  OT-Pt is doing well and progressing towards goals. It is anticipated pt will return to home.

## 2017-04-13 NOTE — OP NOTE
DATE OF PROCEDURE:  4/11/2017      PREOPERATIVE DIAGNOSIS:  Left end-stage hip osteoarthritis.      POSTOPERATIVE DIAGNOSIS:  Left end-stage hip osteoarthritis.      PROCEDURES PERFORMED:  Left total hip arthroplasty.      STAFF:  Zhen Armstrong MD      RESIDENT:  Jovan Alvarado MD      ASSISTANT:  Dr. Pineda, fellow      ANESTHESIA:  General endotracheal anesthesia or GETA.      ESTIMATED BLOOD LOSS:  150       COMPLICATIONS:  None apparent.      COMPONENTS:     1.  Biomet RingLoc Leticia hip system, 52 mm RB cup.   2.  Two 6.5 x 35 mm screws.   3.  Mm size 23 high walled liner.   4.  Femoral stem was size 111, 135 RPP, standard offset stem.   5.  Femoral head is 32 mm -6.      INDICATIONS:  End-stage osteoarthritis.      DESCRIPTION OF PROCEDURE:  Lesli Lorenzana was met in the preoperative area where her left side was agreed upon by both the surgeon and the patient.  It was marked.  The patient was brought back to the operating suite.  General anesthesia was induced.  The patient was transferred over to the operating suite with all bony prominences well padded.  The patient was then prepped and draped in the usual standard fashion in the lateral decubitus position with the left side up using a Wixson table struts.  The patient was prepped and draped in the usual sterile fashion for a left total hip.  Prior to the beginning of the case, a surgical timeout was performed per hospital policy.  All in the room were in agreement.      We began the case by marking our incision for a standard posterior approach based over the greater trochanter.  Using a 10 blade approximately an 11 cm incision was made based off the 5 cm distal and proximal to the mid aspect of the greater trochanter.  The skin was incised and the fascia and the underlying subcutaneous tissues were cauterized using a Bovie.  The IT band was encountered and was incised in longitudinal line in line with the skin incision.  This extended up proximally into  the gluteal fascia in line with the gluteal fibers.  Once dissection was made we then palpated the sciatic nerve.  A standard East/West retractor was placed to expose the greater trochanter and the short external rotators.  We carefully dissected off the bursa to identify the piriformis and obturator internus.  A stay suture was applied.  We then resected off the insertion of the piriformis tendon and  the tendon from the underlying capsule.  We then continued our dissection distally along the short external rotators until the lesser troch was appreciated.  We skeletonized the short external rotators from the intertrochanteric line and  the short rotators from the remaining aspects of our capsule.  A capsulotomy was then performed to expose the femoral neck.  Femoral neck was then made after dislocation of the hip.  It was approximately 12 to 13 mm proximal to the lesser trochanter.  Once this was completed, we then exposed the acetabulum using retractors, and these were directly inserted onto bone.  We removed the labrum and exposed the medial aspect of the joint by removing pulvinar tissue.  The acetabulum was medialized to the medial wall with the starting reamer.  We sequentially enlarged the reaming until 1 mm undersized relative to the cup size was planted.  Our version was in line with the transverse acetabular ligaments.  It was impacted into 45-degree abducted and 20 degree anteverted positions.  Fixation screws using two 6.5 mm diameter were placed for stability.  Trial liner was then placed.      We then directed our attention to the femoral preparation.  Using a cookie cutter, we exposed the superior lateral shoulder of the femur.  We placed the cookie cutter to set our version.  We then placed our femoral canal finder and then used our diaphyseal reaming via fit and fill technique.  Once diaphyseal reamings were performed, we then went on to broaching.  Sequential broaching was  performed to allow for the above-mentioned sized components.  Then through a process of trialing and using a broach the hip was found to be secure and stable in full extension, external rotation greater than 45 degrees as well as flexion of 90 degrees combined with internal rotation of greater than 45 degrees.  Leg lengths were judged to be within 5 mm of symmetry.      At this time the acetabulum was re-exposed and we inserted the above liner after washing out any debris from the acetabular shell.  We then subsequently placed our final femoral component, and this seated nicely into the femoral shaft.  We then placed the femoral head and secured it into place along the trunnion of the femoral stem.  The hip was easily reduced.  We again checked for stability and it was again stable.  The wound was then copiously irrigated.  A AZAM drain was placed.  Posterior capsular soft tissues as well as obturator internus and piriformis was reapproximated.  We then closed the remainder of the wound in a layered fashion, first the IT band and gluteal fascia, the dermis and then the subcuticular layers.  A sterile dressing was applied.      POSTOPERATIVE PLAN:  The patient will be weightbearing as tolerated and follow postoperative precautions.  Will obtain x-rays in the PACU.  She will be on Coumadin x4 weeks.  Will plan to see her back in 4 weeks' time with repeat x-ray.         ANDREW ANNE MD       As dictated by ABISAI HOLMAN MD            D: 2017 22:23   T: 2017 05:17   MT: suellen      Name:     LILIANE DUNN   MRN:      [id number]        Account:        [id number]   :      1940           Procedure Date: 2017      Document: [id number]

## 2017-04-13 NOTE — PLAN OF CARE
Problem: Goal Outcome Summary  Goal: Goal Outcome Summary  Outcome: Improving  Pt A/O X 4. Afebrile. VSS. Lungs clear bilaterally. IS encouraged. PP+ DP+. CMS and Neuro's are intact. Denies numbness and tingling in all extremities. Denies nausea, SOB, and CP. Pain controlled with 2.5mg oxycodone. Voids without difficulty in the BR. LBM 4/13/2017. Tolerating regular diet. L hip incisional dressing is c/d/i. AZAM d/c today, covered with ACE wrap. Pt up 1 assist with walker. Hip abducted pillow in place. Bilateral heels are elevated off the bed. Pt is able to make needs known and the call light is within the pt's reach. Continue to monitor.

## 2017-04-13 NOTE — PROGRESS NOTES
04/12/17 1410   Quick Adds   Type of Visit Initial Occupational Therapy Evaluation   Living Environment   Lives With child(jessica), adult  (Lives with adult son who works nights.)   Living Arrangements house  (Rambler style house with basement)   Home Accessibility stairs within home;stairs to enter home;stairs (2 railings present);stairs (1 railing present);bed and bath on same level;grab bars present (bathtub)   Number of Stairs to Enter Home 2   Number of Stairs Within Home 12  (12 steps to basement with landing 1/2 way down.)   Stair Railings at Home outside, present on left side;inside, present at both sides   Transportation Available car;family or friend will provide   Living Environment Comment Pt. lives in a rambler style house with her adult son.   Self-Care   Dominant Hand right   Usual Activity Tolerance fair   Current Activity Tolerance fair   Regular Exercise no   Equipment Currently Used at Home walker, standard;cane, straight;raised toilet;grab bar  (has reacher and long shoe horn)   Activity/Exercise/Self-Care Comment Prior to admit, pt. was independent with functional mobility with assistive device and she was able to complete ADL's herself.    Functional Level Prior   Ambulation 1-->assistive equipment   Transferring 1-->assistive equipment   Toileting 0-->independent   Bathing 0-->independent   Dressing 0-->independent   Eating 0-->independent   Communication 0-->understands/communicates without difficulty   Swallowing 0-->swallows foods/liquids without difficulty   Cognition 0 - no cognition issues reported   Fall history within last six months no   Which of the above functional risks had a recent onset or change? ambulation;transferring;toileting;bathing;dressing   Prior Functional Level Comment Prior to admit, pt. was independent with functional mobility with assistive device and she was able to  complete self cares.   General Information   Onset of Illness/Injury or Date of Surgery - Date  04/11/17   Patient/Family Goals Statement go home   Additional Occupational Profile Info/Pertinent History of Current Problem Pt. admitted for L NAREN. PMH consists of CAD, HTN, liver transplant, CKD, leukopenia.   Precautions/Limitations left hip precautions   Weight-Bearing Status - LLE weight-bearing as tolerated   Cognitive Status Examination   Orientation orientation to person, place and time   Level of Consciousness alert   Able to Follow Commands WNL/WFL   Personal Safety (Cognitive) WNL/WFL   Memory intact   Attention No deficits were identified   Organization/Problem Solving No deficits were identified   Executive Function No deficits were identified   Pain Assessment   Patient Currently in Pain Yes, see Vital Sign flowsheet   Posture   Posture not impaired   Range of Motion (ROM)   ROM Comment Intact for B UE's.    Strength   Strength Comments Intact for B UE's.    Mobility   Bed Mobility Comments Pt. completed supine to sit with SBA and verbal cues.   Transfer Skill: Bed to Chair/Chair to Bed   Level of [place]: Bed to Chair contact guard   Physical Assist/Nonphysical Assist: Bed to Chair 1 person assist;verbal cues;set-up required   Transfer Skill: Sit to Stand   Level of [place]: Sit/Stand contact guard   Physical Assist/Nonphysical Assist: Sit/Stand 1 person assist;verbal cues;set-up required   Transfer Skill: Toilet Transfer   Level of [place]: Toilet contact guard   Physical Assist/Nonphysical Assist: Toilet 1 person assist;verbal cues;set-up required   Tub/Shower Transfer   Tub/Shower Transfer Comments Pt. has walk in shower at home with built in seat.   Lower Body Dressing   Level of [place]: Dress Lower Body maximum assist (25% patients effort)   Instrumental Activities of Daily Living (IADL)   Previous Responsibilities meal prep;housekeeping;laundry;shopping;yardwork;medication management;finances;driving   Activities of Daily Living Analysis   Impairments Contributing to  "Impaired Activities of Daily Living fear and anxiety;flexibility decreased;pain;post surgical precautions;ROM decreased;strength decreased   General Therapy Interventions   Planned Therapy Interventions ADL retraining;bed mobility training;transfer training;home program guidelines   Clinical Impression   Criteria for Skilled Therapeutic Interventions Met yes, treatment indicated   OT Diagnosis Decreased independence with functional mobility and ADL's.   Assessment of Occupational Performance 3-5 Performance Deficits   Identified Performance Deficits Decreased independence with dressing, toileting, bathing, and decreased independence with functional mobility.    Clinical Decision Making (Complexity) Low complexity   Therapy Frequency daily   Predicted Duration of Therapy Intervention (days/wks) 3 days   Anticipated Equipment Needs at Discharge reacher   Anticipated Discharge Disposition Home with Assist   Risks and Benefits of Treatment have been explained. Yes   Patient, Family & other staff in agreement with plan of care Yes   [facility] TM \"6 Clicks\"   2016, Trustees of [facility], under license to [email or web address].  All rights reserved.   6 Clicks Short Forms Daily Activity Inpatient Short Form   [facility]  \"6 Clicks\" Daily Activity Inpatient Short Form   1. Putting on and taking off regular lower body clothing? 3 - A Little   2. Bathing (including washing, rinsing, drying)? 2 - A Lot   3. Toileting, which includes using toilet, bedpan or urinal? 3 - A Little   4. Putting on and taking off regular upper body clothing? 3 - A Little   5. Taking care of personal grooming such as brushing teeth? 3 - A Little   6. Eating meals? 4 - None   Daily Activity Raw Score (Score out of 24.Lower scores equate to lower levels of function) 18   Total Evaluation Time   Total Evaluation Time (Minutes) 10     "

## 2017-04-13 NOTE — PLAN OF CARE
Problem: Goal Outcome Summary  Goal: Goal Outcome Summary  PT - per plan established by the Physical Therapist, according to functional mobility the  discharge recommendation is home with home PT. Pt is needing V.c for tech to follow hip precautions. Pt needing SBA to CGA for all bed mob and sit to stand to WW. Pt abm up to 100'x 1 with good up right posture and step through gait patten . Pt demo supine there x program x 10.

## 2017-04-13 NOTE — PLAN OF CARE
Problem: Goal Outcome Summary  Goal: Goal Outcome Summary  Outcome: Improving  Patient A/Ox4. VSS. Denies CP, SOB, dizziness/LH. LSCTA. BS hypoactive. Denies pain. CMS intact. Dressing to affected hipCDI, ice applied. AZAM draining. Tolerating regular diet. IS encouraged. Activity as tolerated. IV infusing. Contact precaution maintained.  Patient has demonstrated ability to call appropriately. Patient is resting with call light within reach. Will continue to monitor.  Trying to void with assist of 1 using walker

## 2017-04-13 NOTE — PLAN OF CARE
Problem: Goal Outcome Summary  Goal: Goal Outcome Summary  Outcome: Improving  A/Ox's 4. Pt rated pain as tolerable. Oxycodone given. for pain control. Dressing CDI. CMS intact. Tolerated regular diet. Pt has a drain. Denied any nausea, CP, SOB, lightheadedness or dizziness. Voiding without pain or difficulty post mathis removal. PVR 60ml. Up with assist of one. Encouraged increased/continued IS use. Bilateral heels elevated off bed. Resting in bed at this time with call light in reach. Able to make needs known. Continue to monitor.

## 2017-04-13 NOTE — PLAN OF CARE
Problem: Goal Outcome Summary  Goal: Goal Outcome Summary  PT - per plan established by the Physical Therapist, according to functional mobility the  discharge recommendation is home with assist from family. Pt is SBA to IND for all bed mob and sit to stand. Pt still gets close to hip precautions when scooting to EOB. Pt amb up to 100'x 2 with WW with good up right posture and equal step length all with WW. Pt demo up and down 3 steps x 2 with Bobby rail needing V.c for tech. Pt ED and demo tech for car transfer. Pt able to demo IND and pt stating should be able to demo.

## 2017-04-13 NOTE — PROGRESS NOTES
"          [facility]ist - Internal Medicine Daily Note   Date of Service: 4/13/2017  Patient: Lesli Lorenzana  MRN: [id number]  Admission Date: 4/11/2017  Hospital Day # 2    Assessment & Plan    Lesli \"Fani\" Harriett is a 76 year old female with a pmh of PBC s/p liver transplant (2008), CAD s/p PCI to OM1 (2009), HTN, and CDK who was admitted post op following L NAREN on 4/11/17.    #POD #2 L total hip arthoplasty. With Dr. Armstrong. For the treatment of end stage left hip osteoarthritis. S/p clindamycin x 24 hr. Pain medication with scheduled tylneol, prn oxycodone. DVT prophylaxis with Warfarin x 4 weeks. Hgb 9.6, stable. No s/s of active bleeding.  - Management per primary team  - Daily Hgb    BOZENA on CKD. Creatinine 1.38 GFR 37 4/12 up from 0.96 pre-op with GFR 57. BL Creatinine, per chart review, ranging from 1.1-1.3. FenA <1% suggesting pre-renal etiology. UA with mod LE, 31 WBC, and few bacteria, but no s/s of UTI such as dysuria, frequency or urgency. UCx with NGTD. Increased rate of IVF with improvement of Creatinine to 1.12 4/13.  - Continue to monitor with BMP in am  - DC IVF as with good PO intake    CAD, HTN. S/p PCI to OM1 in 2009 for unstable angina. Dobutamine stress 03/2014 was non diagnostic due to hypotension w/ dobutamine, but no WMA seen and no inducible ischemia, sensitivity reduced. ECHO 03/03/2017 w/ EF of 55-60%, normal wall thickness, no WMA, normal RV size and function. Home regimen includes aspirin (held since 03/28), metoprolol 50 BID, lisinopril 2.5 mg qday, imdur 60 BID, lasix 20 mg qday PRN.  - Continue to hold Lasix and Lisinopril  - Metoprolol BID, will restart PTA Imdur today    PBC S/p Liver transplant (2008). Follows with Dr. Muhammad. On Tacro 0.5mg qam, 1 mg qhs.  - Continue tacro    GERD, resolved. Complains=ed of chest discomfort which she describes as \"food stuck in my esophagus\" 4/12. No hx of GERD.   - GI Cocktail prn        CODE: FULL  DVT: Warfarin  Diet/fluids: " Regular diet  Disposition: per primary team      Ratna Baca PA-C  Internal Medicine [facility] Hospitalist   [facility]   Pager: [phone]  ___________________________________________________________________    Subjective & Interval Hx:  Slept well overnight. Able to participate in PT. Endorses mild pain, but well controlled with prn medications. Heartburn improved yesterday without intervention, ate sitting up this am which she feels helped. Catheter pulled yesterday afternoon, now urinating without difficulty. She denies dysuria, frequency, or urgency. No BM yet. Good PO intake.     Last 24 hr care team notes reviewed.   ROS:  4 point ROS including Respiratory, CV, GI and , other than that noted in the HPI, is negative.    Medications: Reviewed in EPIC. List below for reference    Physical Exam:    /56  Pulse 82  Temp 98.5  F (36.9  C) (Oral)  Resp 16  Ht 1.524 m (5')  Wt 59.6 kg (131 lb 6.3 oz)  SpO2 100%  BMI 25.66 kg/m2     GENERAL: Alert and oriented x 3. NAD.   HEENT: Anicteric sclera. Mucous membranes moist.   CV: RRR. S1, S2. No murmurs appreciated.   RESPIRATORY: Effort normal on 2L NC. Lungs CTAB with no wheezing, rales, rhonchi.   GI: Abdomen soft and non distended with normoactive bowel sounds present in all quadrants. No tenderness, rebound, guarding.   NEUROLOGICAL: No focal deficits. Moves all extremities.    EXTREMITIES: No peripheral edema. Intact bilateral pedal pulses.   SKIN: No jaundice. No rashes.

## 2017-04-14 ENCOUNTER — APPOINTMENT (OUTPATIENT)
Dept: PHYSICAL THERAPY | Facility: CLINIC | Age: 77
DRG: 470 | End: 2017-04-14
Attending: ORTHOPAEDIC SURGERY
Payer: MEDICARE

## 2017-04-14 LAB
ANION GAP SERPL CALCULATED.3IONS-SCNC: 7 MMOL/L (ref 3–14)
BACTERIA SPEC CULT: ABNORMAL
BUN SERPL-MCNC: 20 MG/DL (ref 7–30)
CALCIUM SERPL-MCNC: 7.3 MG/DL (ref 8.5–10.1)
CHLORIDE SERPL-SCNC: 98 MMOL/L (ref 94–109)
CO2 SERPL-SCNC: 22 MMOL/L (ref 20–32)
CREAT SERPL-MCNC: 1.51 MG/DL (ref 0.52–1.04)
GFR SERPL CREATININE-BSD FRML MDRD: 34 ML/MIN/1.7M2
GLUCOSE SERPL-MCNC: 110 MG/DL (ref 70–99)
INR PPP: 4.1 (ref 0.86–1.14)
Lab: ABNORMAL
MICRO REPORT STATUS: ABNORMAL
MICROORGANISM SPEC CULT: ABNORMAL
MICROORGANISM SPEC CULT: ABNORMAL
POTASSIUM SERPL-SCNC: 4.3 MMOL/L (ref 3.4–5.3)
SODIUM SERPL-SCNC: 127 MMOL/L (ref 133–144)
SPECIMEN SOURCE: ABNORMAL

## 2017-04-14 PROCEDURE — 99232 SBSQ HOSP IP/OBS MODERATE 35: CPT | Performed by: INTERNAL MEDICINE

## 2017-04-14 PROCEDURE — 25000131 ZZH RX MED GY IP 250 OP 636 PS 637: Mod: GY | Performed by: STUDENT IN AN ORGANIZED HEALTH CARE EDUCATION/TRAINING PROGRAM

## 2017-04-14 PROCEDURE — 97116 GAIT TRAINING THERAPY: CPT | Mod: GP | Performed by: PHYSICAL THERAPIST

## 2017-04-14 PROCEDURE — 36415 COLL VENOUS BLD VENIPUNCTURE: CPT | Performed by: ORTHOPAEDIC SURGERY

## 2017-04-14 PROCEDURE — 99207 ZZC APP CREDIT; MD BILLING SHARED VISIT: CPT | Performed by: PHYSICIAN ASSISTANT

## 2017-04-14 PROCEDURE — 25000132 ZZH RX MED GY IP 250 OP 250 PS 637: Mod: GY | Performed by: ORTHOPAEDIC SURGERY

## 2017-04-14 PROCEDURE — A9270 NON-COVERED ITEM OR SERVICE: HCPCS | Mod: GY | Performed by: INTERNAL MEDICINE

## 2017-04-14 PROCEDURE — A9270 NON-COVERED ITEM OR SERVICE: HCPCS | Mod: GY | Performed by: PHYSICIAN ASSISTANT

## 2017-04-14 PROCEDURE — 25000132 ZZH RX MED GY IP 250 OP 250 PS 637: Mod: GY | Performed by: PHYSICIAN ASSISTANT

## 2017-04-14 PROCEDURE — 25000128 H RX IP 250 OP 636: Performed by: PHYSICIAN ASSISTANT

## 2017-04-14 PROCEDURE — 12000001 ZZH R&B MED SURG/OB UMMC

## 2017-04-14 PROCEDURE — 40000193 ZZH STATISTIC PT WARD VISIT: Performed by: PHYSICAL THERAPIST

## 2017-04-14 PROCEDURE — 85610 PROTHROMBIN TIME: CPT | Performed by: ORTHOPAEDIC SURGERY

## 2017-04-14 PROCEDURE — 25000131 ZZH RX MED GY IP 250 OP 636 PS 637: Mod: GY | Performed by: ORTHOPAEDIC SURGERY

## 2017-04-14 PROCEDURE — A9270 NON-COVERED ITEM OR SERVICE: HCPCS | Mod: GY | Performed by: ORTHOPAEDIC SURGERY

## 2017-04-14 PROCEDURE — 99207 ZZC CDG-CHARGE REQUIRED MANUAL ENTRY: CPT | Performed by: INTERNAL MEDICINE

## 2017-04-14 PROCEDURE — 97530 THERAPEUTIC ACTIVITIES: CPT | Mod: GP | Performed by: PHYSICAL THERAPIST

## 2017-04-14 PROCEDURE — 25000132 ZZH RX MED GY IP 250 OP 250 PS 637: Mod: GY | Performed by: INTERNAL MEDICINE

## 2017-04-14 PROCEDURE — 80048 BASIC METABOLIC PNL TOTAL CA: CPT | Performed by: ORTHOPAEDIC SURGERY

## 2017-04-14 RX ORDER — CIPROFLOXACIN 250 MG/1
250 TABLET, FILM COATED ORAL EVERY 12 HOURS SCHEDULED
Status: DISCONTINUED | OUTPATIENT
Start: 2017-04-14 | End: 2017-04-14

## 2017-04-14 RX ORDER — SODIUM CHLORIDE 9 MG/ML
INJECTION, SOLUTION INTRAVENOUS CONTINUOUS
Status: DISCONTINUED | OUTPATIENT
Start: 2017-04-14 | End: 2017-04-15 | Stop reason: HOSPADM

## 2017-04-14 RX ORDER — CIPROFLOXACIN 500 MG/1
500 TABLET, FILM COATED ORAL
Status: DISCONTINUED | OUTPATIENT
Start: 2017-04-14 | End: 2017-04-15 | Stop reason: HOSPADM

## 2017-04-14 RX ORDER — ACETAMINOPHEN 325 MG/1
650 TABLET ORAL EVERY 4 HOURS PRN
Qty: 100 TABLET | Refills: 0 | Status: SHIPPED | OUTPATIENT
Start: 2017-04-14 | End: 2017-07-10

## 2017-04-14 RX ORDER — OXYCODONE HYDROCHLORIDE 5 MG/1
2.5-5 TABLET ORAL
Qty: 40 TABLET | Refills: 0 | Status: SHIPPED | OUTPATIENT
Start: 2017-04-14 | End: 2017-05-15

## 2017-04-14 RX ORDER — NITROFURANTOIN 25; 75 MG/1; MG/1
100 CAPSULE ORAL EVERY 12 HOURS SCHEDULED
Status: DISCONTINUED | OUTPATIENT
Start: 2017-04-14 | End: 2017-04-14

## 2017-04-14 RX ADMIN — ISOSORBIDE MONONITRATE 60 MG: 60 TABLET, EXTENDED RELEASE ORAL at 08:32

## 2017-04-14 RX ADMIN — SENNOSIDES AND DOCUSATE SODIUM 1 TABLET: 8.6; 5 TABLET ORAL at 20:12

## 2017-04-14 RX ADMIN — CIPROFLOXACIN HYDROCHLORIDE 500 MG: 500 TABLET, FILM COATED ORAL at 11:40

## 2017-04-14 RX ADMIN — SODIUM CHLORIDE: 9 INJECTION, SOLUTION INTRAVENOUS at 18:42

## 2017-04-14 RX ADMIN — METOPROLOL TARTRATE 50 MG: 50 TABLET ORAL at 08:32

## 2017-04-14 RX ADMIN — OXYCODONE HYDROCHLORIDE 2.5 MG: 5 TABLET ORAL at 12:53

## 2017-04-14 RX ADMIN — OXYCODONE HYDROCHLORIDE 2.5 MG: 5 TABLET ORAL at 06:19

## 2017-04-14 RX ADMIN — TACROLIMUS 0.5 MG: 0.5 CAPSULE ORAL at 08:31

## 2017-04-14 RX ADMIN — OXYCODONE HYDROCHLORIDE 2.5 MG: 5 TABLET ORAL at 20:12

## 2017-04-14 RX ADMIN — TACROLIMUS 1 MG: 0.5 CAPSULE ORAL at 20:12

## 2017-04-14 RX ADMIN — SODIUM CHLORIDE: 9 INJECTION, SOLUTION INTRAVENOUS at 09:45

## 2017-04-14 RX ADMIN — ISOSORBIDE MONONITRATE 60 MG: 60 TABLET, EXTENDED RELEASE ORAL at 20:12

## 2017-04-14 RX ADMIN — METOPROLOL TARTRATE 50 MG: 50 TABLET ORAL at 20:12

## 2017-04-14 RX ADMIN — OXYCODONE HYDROCHLORIDE 2.5 MG: 5 TABLET ORAL at 16:19

## 2017-04-14 NOTE — DISCHARGE SUMMARY
Orthopaedic Surgery  Discharge Summary    Lesli Lorenzana  : 1940    Date of Service: 2017 11:19 PM      Date of Admission:  2017  Date of Discharge::  4/15/2017  Attending Physician:  Nathaniel Eckert Diagnosis:  S/p NAREN    Procedures Performed:  Left NAREN with Dr Armstrong on 2017    Future Appointments:  Date Time Provider Department Center   2017 9:45 AM Yael Valdez, NATHALIA [facility] [place]   2017 10:30 AM Lyn Ruiz Pt, PT [facility] [place]   2017 2:15 PM Lyn Ruiz Pt, PT [facility] [place]   2017 8:00 AM [facility]   2017 10:00 AM Tucker Muhammad MD [facility]   2017 10:30 AM Nain Lagunas MD [facility]   [facility]       Medications Prior to Admission:  Prescriptions Prior to Admission   Medication Sig Dispense Refill Last Dose     calcium-vitamin D (CALTRATE) 600-400 MG-UNIT per tablet Take 1 tablet by mouth daily   Past Week at Unknown time     metoprolol (LOPRESSOR) 50 MG tablet Take 1 tablet (50 mg) by mouth 2 times daily 182 tablet 3 2017 at 0800     lisinopril (PRINIVIL,ZESTRIL) 2.5 MG tablet Take 1 tablet (2.5 mg) by mouth daily (Patient taking differently: Take 2.5 mg by mouth every morning ) 93 tablet 3 4/10/2017 at 0800     isosorbide mononitrate (IMDUR) 60 MG 24 hr tablet Take 1 tablet (60 mg) by mouth 2 times daily 180 tablet 6 2017 at 0800     tacrolimus (PROGRAF - GENERIC EQUIVALENT) 0.5 MG capsule Take 0.5 mg (1 cap) every AM, take 1 mg, (2 caps) every PM. 270 capsule 3 2017 at 0800     MULTI-VITAMIN PO Take 1 tablet by mouth daily    Past Week at Unknown time     furosemide (LASIX) 20 MG tablet Take 1 tablet (20 mg) by mouth daily , for 3 days.  Please do not take unless instructed by Dr. Lagunas. (Patient not taking: Reported on 2017) 30 tablet 0 Unknown at Unknown time     hydrALAZINE (APRESOLINE) 25 MG tablet Take 1 tablet (25 mg) by mouth 3 times daily , as needed  if systolic blood pressure (top number) is more than 180 mmHg. (Patient not taking: Reported on 4/5/2017) 90 tablet 3 Unknown at Unknown time     nystatin-triamcinolone (MYCOLOG II) cream Apply topically 4 times daily as needed Reported on 4/5/2017   More than a month at Unknown time     aspirin 81 MG tablet Take 1 tablet by mouth daily. (Patient not taking: Reported on 4/5/2017) 90 tablet 3 3/28/2017       Discharge Medications:  Current Discharge Medication List      START taking these medications    Details   hydrOXYzine (ATARAX) 10 MG tablet Take 1 tablet (10 mg) by mouth every 6 hours as needed for itching  Qty: 90 tablet, Refills: 0    Associated Diagnoses: Status post hip surgery      oxyCODONE (ROXICODONE) 5 MG IR tablet Take 0.5-1 tablets (2.5-5 mg) by mouth every 3 hours as needed for moderate to severe pain  Qty: 85 tablet, Refills: 0    Associated Diagnoses: Status post hip surgery      senna-docusate (SENOKOT-S;PERICOLACE) 8.6-50 MG per tablet Take 1-2 tablets by mouth 2 times daily  Qty: 100 tablet, Refills: 0    Associated Diagnoses: Status post hip surgery         CONTINUE these medications which have NOT CHANGED    Details   calcium-vitamin D (CALTRATE) 600-400 MG-UNIT per tablet Take 1 tablet by mouth daily      metoprolol (LOPRESSOR) 50 MG tablet Take 1 tablet (50 mg) by mouth 2 times daily  Qty: 182 tablet, Refills: 3    Associated Diagnoses: Essential hypertension with goal blood pressure less than 140/90; Coronary artery disease involving native heart with angina pectoris, unspecified vessel or lesion type (H)      lisinopril (PRINIVIL,ZESTRIL) 2.5 MG tablet Take 1 tablet (2.5 mg) by mouth daily  Qty: 93 tablet, Refills: 3    Associated Diagnoses: Coronary artery disease involving native heart without angina pectoris, unspecified vessel or lesion type      isosorbide mononitrate (IMDUR) 60 MG 24 hr tablet Take 1 tablet (60 mg) by mouth 2 times daily  Qty: 180 tablet, Refills: 6    Associated  Diagnoses: Coronary artery disease due to lipid rich plaque; Secondary hypertension with goal blood pressure less than 140/90      tacrolimus (PROGRAF - GENERIC EQUIVALENT) 0.5 MG capsule Take 0.5 mg (1 cap) every AM, take 1 mg, (2 caps) every PM.  Qty: 270 capsule, Refills: 3    Associated Diagnoses: Liver replaced by transplant (H)      MULTI-VITAMIN PO Take 1 tablet by mouth daily       furosemide (LASIX) 20 MG tablet Take 1 tablet (20 mg) by mouth daily , for 3 days.  Please do not take unless instructed by Dr. Lagunas.  Qty: 30 tablet, Refills: 0    Associated Diagnoses: Coronary artery disease involving native heart with angina pectoris, unspecified vessel or lesion type (H); Essential hypertension with goal blood pressure less than 140/90; Fluid overload, unspecified      hydrALAZINE (APRESOLINE) 25 MG tablet Take 1 tablet (25 mg) by mouth 3 times daily , as needed if systolic blood pressure (top number) is more than 180 mmHg.  Qty: 90 tablet, Refills: 3    Associated Diagnoses: Essential hypertension with goal blood pressure less than 140/90      nystatin-triamcinolone (MYCOLOG II) cream Apply topically 4 times daily as needed Reported on 4/5/2017      aspirin 81 MG tablet Take 1 tablet by mouth daily.  Qty: 90 tablet, Refills: 3    Associated Diagnoses: CAD (coronary artery disease)             Brief History of Presenting Illness:  76F with left hip OA who failed nonoperativemanagement and elected to proceed with left NAREN    Hospital Course:  Uncomplicated.  Admitted to hospital for routine post-op cares. Drain was removed POD # 2.  Physical and occupational therapy both worked with the patient.  On the day of discharge she had adequate pain control on oral meds, was tolerating a diet, voiding independently, and was ambulating safely.  The patient was determined to be safe to d/c home      Discharge Instructions    Discharge Procedure Orders  Home care nursing referral   Referral Type: Home Health Therapies  & [facility] face to face encounter   Order Comments: Documentation of Face to Face and Certification for Home Health Services    I certify that patient: Lesli Lorenzana is under my care and that I, or a nurse practitioner or physician's assistant working with me, had a face-to-face encounter that meets the physician face-to-face encounter requirements with this patient on: April 12, 2017.    This encounter with the patient was in whole, or in part, for the following medical condition, which is the primary reason for home health care: Left Hip Replacement.    I certify that, based on my findings, the following services are medically necessary home health services: Nursing.    My clinical findings support the need for the above services because: Nurse is needed: For complex aftercare of surgical procedures because the patient needs instruction and cannot perform care on their own due to: Left hip replacement.    Further, I certify that my clinical findings support that this patient is homebound (i.e. absences from home require considerable and taxing effort and are for medical reasons or [employer] services or infrequently or of short duration when for other reasons) because: Requires assistance of another person or specialized equipment to access medical services because patient: Is unable to operate assistive equipment on their own., Range of motion limitations prevents ability to exit home safely. and Requires supervision of another for safe transfer...    Based on the above findings. I certify that this patient is confined to the home and needs intermittent skilled nursing care, physical therapy and/or speech therapy.  The patient is under my care, and I have initiated the establishment of the plan of care.  This patient will be followed by a physician who will periodically review the plan of care.  Physician/Provider to provide follow up care: Dee Awan    Attending hospital physician (the Medicare  certified [facility] provider): Zhen Armstrong MD  Physician Signature: See electronic signature associated with these discharge orders.  Date: 4/12/2017     Reason for your hospital stay   Order Comments: 76F s/p left NAREN     Follow Up and recommended labs and tests   Order Comments: 4 weeks with Dr Armstrogn     Discharge Instructions   Order Comments: Post Operative Instructions for Lesli Lorenzana is a 76 year old female    Surgery: Left Total Hip Replacement on 4/11/2017.    If you have any questions contact Dr. Armstrong at the following number: [facility] call [phone].    Follow up appointment:   You are scheduled to follow up with Dr. Armstrong approximately 4 weeks after your surgery.           Anticoagulation:   Take coumadin x 4 weeks (INR goal 1.5-2.5).  This is given to help minimize your risk of blood clot.    Activity:   -Continue to weight bear on your operative leg as tolerated by pain.  As you are more comfortable, you can discontinue use of the walker and transition to a cane.  Once you are comfortable with the cane, you can also wean from the cane and progress to using no assistive devices.  Do not transition until you are comfortable and have good balance.      -Follow the posterior hip precautions you were taught while at the hospital.        Pain Medications:   -Take pain medications as prescribed.  Wean off the the narcotic pain medications (Oxycodone, Dilaudid, etc) as tolerated by pain.  To help with this, take the Tylenol and Celebrex (if prescribed) to minimize the amount of narcotic pain medication you need to take.      -Do not drive while on pain medications or until your leg feels well enough to do so.      Bandage Care and Showering:   -You can shower with the adhesive bandage covering your knee at the time of discharge.    -Remove the adhesive bandage 10 days after your surgery.  This can be removed at home.  Once removed, it is common to have a few scabs.  Let the scabs fall off on  their own - they will do so over the next week or two.  The sutures are resorbable and nothing needs to be removed.    --Once the bandage is removed, you can shower without covering the incision.  Do not soak or bathe the incision in water.  Do not scrub the incision.  Just let the water from the shower run over the incision.    --Contact Dr. Armstrong or his staff if there is any drainage from the incision or redness.    Leg Swelling and Icing  -Continue icing the hip 5-6 times per day for approximately 20 minutes each time.  This will help with swelling.    -Some swelling in the leg is normal after surgery.  This type of swelling is usually gravity dependent and is improved in the morning after sleeping.  If the swelling is painful in the calf or the swelling is getting worse, contact Dr. Armstrong's office.  We may recommend presenting to the Emergency Department to obtain an ultrasound of the leg if there is concern for a DVT.      -Elevate the leg if you are sitting as this will help reduce swelling.    Contact clinic if you note any of the following:  -Fevers greater than 101.5 chills  -Increased pain, redness, swelling or discharge at the surgical site.   -During regular business hours call Dr Armstrong's office and request to speak with his nurse or the triage nurses.   -After hours or on weekends call the hospital  at [phone] and ask to speak with the Orthopaedic resident on call.     Full Code         Discharge Disposition:  home    Jovan Alvarado MD  Orthopaedic Surgery PGY-4  [phone]

## 2017-04-14 NOTE — PROGRESS NOTES
"          [facility] - Internal Medicine Daily Note   Date of Service: 4/14/2017  Patient: Lesli Lorenzana  MRN: [id number]  Admission Date: 4/11/2017  Hospital Day # 3    Assessment & Plan    Lesli \"Fani\" Harriett is a 76 year old female with a pmh of PBC s/p liver transplant (2008), CAD s/p PCI to OM1 (2009), HTN, and CDK who was admitted post op following L NAREN on 4/11/17.    #BOZENA on CKD. Creatinine 1.38 GFR 37 4/12 up from 0.96 pre-op with GFR 57. BL Creatinine, per chart review, ranging from 1.1-1.3. FenA <1% suggesting pre-renal etiology.  Increased rate of IVF with improvement of Creatinine to 1.12 4/13. IVF discontinued 4/13. Repeat creatinine today 1.51. UA with mod LE, 31 WBC, and few bacteria. UCx growing E. Coli (see below). No s/s of UTI such as dysuria, frequency or urgency  - Restart IVF  - Treat UTI (see below)  - Repeat BMP in am    UTI, E.Coli. UA with mod LE, 31 WBC, and few bacteria. UCx growing E.coli sensitive to flouroquinolones. Denies dysuria, frequency, urgency.   - Ciprofloxacin 500 mg QDx 3 days    POD #3 L total hip arthoplasty. With Dr. Armstrong. For the treatment of end stage left hip osteoarthritis. S/p clindamycin x 24 hr. Pain medication with scheduled tylneol, prn oxycodone. DVT prophylaxis with Warfarin x 4 weeks. Hgb stable. No s/s of active bleeding.  - Management per primary team    Hyponatremia. Na 127. Urine Na and Osm WNL. Slightly hypovolemic on exam. Etiology likely 2/2 BOZENA (as above).  - IVF  - Repeat BMP in am    CAD, HTN. S/p PCI to OM1 in 2009 for unstable angina. Dobutamine stress 03/2014 was non diagnostic due to hypotension w/ dobutamine, but no WMA seen and no inducible ischemia, sensitivity reduced. ECHO 03/03/2017 w/ EF of 55-60%, normal wall thickness, no WMA, normal RV size and function. Home regimen includes aspirin (held since 03/28), metoprolol 50 BID, lisinopril 2.5 mg qday, imdur 60 BID, lasix 20 mg qday PRN.  - Continue to hold Lasix and " Lisinopril  - Metoprolol BID, Imdur 60 mg BID    PBC S/p Liver transplant (2008). Follows with Dr. Muhammad. On Tacro 0.5mg qam, 1 mg qhs.  - Continue tacro            CODE: FULL  DVT: Warfarin  Diet/fluids: Regular diet, IVF @ 100  Disposition: per primary team      Ratna Baca PA-C  Internal Medicine [facility] Hospitalist   [facility]   Pager: [phone]  ___________________________________________________________________    Subjective & Interval Hx:  Feeling good today, anxious to go home. Had drained pulled yesterday. Urinating without difficulty and continues to deny frequency, urgency pr dysuria. Bowels moving without difficulty. Good appetite.     Last 24 hr care team notes reviewed.   ROS:  4 point ROS including Respiratory, CV, GI and , other than that noted in the HPI, is negative.    Medications: Reviewed in EPIC. List below for reference    Physical Exam:    /51 (BP Location: Left arm)  Pulse 76  Temp 97.9  F (36.6  C) (Oral)  Resp 18  Ht 1.524 m (5')  Wt 59.6 kg (131 lb 6.3 oz)  SpO2 99%  BMI 25.66 kg/m2     GENERAL: Alert and oriented x 3. NAD.   HEENT: Anicteric sclera. Mucous membranes moist.   CV: RRR. S1, S2. No murmurs appreciated.   RESPIRATORY: Effort normal on 2L NC. Lungs CTAB with no wheezing, rales, rhonchi.   GI: Abdomen soft and non distended with normoactive bowel sounds present in all quadrants. No tenderness, rebound, guarding.   NEUROLOGICAL: No focal deficits. Moves all extremities.    EXTREMITIES: No peripheral edema. Intact bilateral pedal pulses.   SKIN: No jaundice. No rashes.

## 2017-04-14 NOTE — PROGRESS NOTES
Orthopaedic Surgery Progress Note    E:  No acute issues.     S: comfortable this AM.  Coronel d/c, voiding spontaneously, +BM.  Denies numbness or tingling. Planning for d/c home with home care today.      O:    Vitals:    04/13/17 0700 04/13/17 1543 04/13/17 1900 04/14/17 0016   BP: 149/56 120/79 124/47 135/58   BP Location:  Left arm  Left arm   Pulse: 82 76     Resp: 16   16   Temp: 98.5  F (36.9  C) 96.7  F (35.9  C)  97.5  F (36.4  C)   TempSrc: Oral Oral  Oral   SpO2: 100% 97%  97%   Weight:       Height:               Exam:  Gen: No acute distress, resting comfortably in bed.  Resp: Non-labored breathing  LLE  -aquacel is c/d/i  -thigh is soft/compressible  -drain site reinforced, SS fluid in reservoir  -abduction pillow donned  --CMS intact    -motor intact to ehl/fhl/ta/gsc    -SILT to sp/dp/sural/saph/tibial    -foot wwp, cap refill <2 sec, DP 2+ palpable    Drain output: removed    Assessment/Plan: Lesli Lorenzana is a 76 year old female s/p left NAREN on 4/11/2017 with Dr. Nathaniel Castanon Primary  Activity: Up with assist.  Standard protocol.  Posterior hip precautions.  Weight bearing status: WBAT LLE   Antibiotics: Clinda x 24 hours.  Diet: Begin with clear fluids and progress diet as tolerated.   DVT prophylaxis: Coumadin x 4 weeks (INR goal 1.5-2.5) starting POD #0  Bracing/Splinting: abduction pillow until reliable with posterior hip precautions   Wound Care: Aquacel 7-10 days.  Reinforce drain site PRN   Drains: Record output per shift   Pain management: transition from IV to orals as tolerated   X-rays: Left AP/lat XR PACU  Physical Therapy: eval and treat, ROM, ADL's.   Occupational Therapy: ADL's.  Labs: Trend Hgb on POD #1, 2, 3  Consults: PT, OT. medicine, appreciate assistance in caring for this patient.   Follow-up: Clinic with Dr. Armstrong in 4 weeks with repeat XR     Disposition: Pending progress with therapies, pain control on orals, and medical stability, anticipate discharge to home on POD  #3.    Jovan Alvarado MD  Orthopaedic Surgery PGY-4  [phone]

## 2017-04-14 NOTE — PLAN OF CARE
Problem: Goal Outcome Summary  Goal: Goal Outcome Summary  Outcome: Therapy, unable to show any progress toward functional goals  Attempted PT session x 2 this PM, but pt declined secondary to pain.  PT session cancelled and re-scheduled for 4/15/17

## 2017-04-14 NOTE — PLAN OF CARE
Problem: Goal Outcome Summary  Goal: Goal Outcome Summary  Outcome: Improving  A&O. VSS. LSC. BS+, non-tender. Tolerating regular diet. CMS & neuro intact. Denied numbness/ tingling. A1 with walker. Pain managed with infrequent oxycodone, 2.5 mg. Pt refuses tylenol- hx liver tx. NAD. Creatinine elevated, IVF initiated. Continue POC.

## 2017-04-14 NOTE — PLAN OF CARE
Problem: Goal Outcome Summary  Goal: Goal Outcome Summary  Outcome: Therapy, progress toward functional goals as expected  Supine to sitting with HOB elevated and SBA x 1.  Sit to/from standing from varying surfaces with FWW and SBA x 1.  Pt ambulated 200' with FWW and SBA x 1.  Pt ascended/descended 3 steps with 2 railings and 1 railing with SEC and CGA x 1.  Recommend discharge home with family for assistance and home PT.

## 2017-04-14 NOTE — PLAN OF CARE
Problem: Goal Outcome Summary  Goal: Goal Outcome Summary  Pt is alert and oriented. Temp max 97.5 other VSS. Denies chest pain, dizziness or nausea/vomiting. Lung sounds clear to auscultation in all fields. Voiding spontaneously in bathroom. Bowel sounds present in all 4 quadrants, passing flatus. Up with assist of 1 with walker to bathroom. Turned and repositioned PRN for comfort, abductor pillow in place. Bilateral heels elevated off bed. Dressing c/d/i. Denied pain this shift. CMS intact, denied numbness and tingling. Right PIV is patent and intact. Uses call light appropriately, able to make needs known. Will continue to monitor.

## 2017-04-14 NOTE — PLAN OF CARE
Problem: Goal Outcome Summary  Goal: Goal Outcome Summary  OT:  Patient reports tried to cut down pain meds and now with increased pain, declined therapy.

## 2017-04-15 ENCOUNTER — APPOINTMENT (OUTPATIENT)
Dept: PHYSICAL THERAPY | Facility: CLINIC | Age: 77
DRG: 470 | End: 2017-04-15
Attending: ORTHOPAEDIC SURGERY
Payer: MEDICARE

## 2017-04-15 ENCOUNTER — APPOINTMENT (OUTPATIENT)
Dept: OCCUPATIONAL THERAPY | Facility: CLINIC | Age: 77
DRG: 470 | End: 2017-04-15
Attending: ORTHOPAEDIC SURGERY
Payer: MEDICARE

## 2017-04-15 VITALS
RESPIRATION RATE: 16 BRPM | HEART RATE: 87 BPM | TEMPERATURE: 97.7 F | DIASTOLIC BLOOD PRESSURE: 60 MMHG | HEIGHT: 60 IN | BODY MASS INDEX: 25.8 KG/M2 | SYSTOLIC BLOOD PRESSURE: 154 MMHG | OXYGEN SATURATION: 93 % | WEIGHT: 131.39 LBS

## 2017-04-15 LAB
ANION GAP SERPL CALCULATED.3IONS-SCNC: 8 MMOL/L (ref 3–14)
BLD PROD TYP BPU: NORMAL
BLD UNIT ID BPU: 0
BLOOD PRODUCT CODE: NORMAL
BPU ID: NORMAL
BUN SERPL-MCNC: 16 MG/DL (ref 7–30)
CALCIUM SERPL-MCNC: 7 MG/DL (ref 8.5–10.1)
CHLORIDE SERPL-SCNC: 102 MMOL/L (ref 94–109)
CO2 SERPL-SCNC: 21 MMOL/L (ref 20–32)
CREAT SERPL-MCNC: 1.23 MG/DL (ref 0.52–1.04)
GFR SERPL CREATININE-BSD FRML MDRD: 43 ML/MIN/1.7M2
GLUCOSE SERPL-MCNC: 94 MG/DL (ref 70–99)
INR PPP: 2.96 (ref 0.86–1.14)
POTASSIUM SERPL-SCNC: 4.2 MMOL/L (ref 3.4–5.3)
SODIUM SERPL-SCNC: 131 MMOL/L (ref 133–144)
TRANSFUSION STATUS PATIENT QL: NORMAL
TRANSFUSION STATUS PATIENT QL: NORMAL

## 2017-04-15 PROCEDURE — 25000132 ZZH RX MED GY IP 250 OP 250 PS 637: Mod: GY | Performed by: PHYSICIAN ASSISTANT

## 2017-04-15 PROCEDURE — 25000132 ZZH RX MED GY IP 250 OP 250 PS 637: Mod: GY | Performed by: INTERNAL MEDICINE

## 2017-04-15 PROCEDURE — 25000131 ZZH RX MED GY IP 250 OP 636 PS 637: Mod: GY | Performed by: STUDENT IN AN ORGANIZED HEALTH CARE EDUCATION/TRAINING PROGRAM

## 2017-04-15 PROCEDURE — A9270 NON-COVERED ITEM OR SERVICE: HCPCS | Mod: GY | Performed by: INTERNAL MEDICINE

## 2017-04-15 PROCEDURE — 40000133 ZZH STATISTIC OT WARD VISIT

## 2017-04-15 PROCEDURE — 25000132 ZZH RX MED GY IP 250 OP 250 PS 637: Mod: GY | Performed by: ORTHOPAEDIC SURGERY

## 2017-04-15 PROCEDURE — 85610 PROTHROMBIN TIME: CPT | Performed by: ORTHOPAEDIC SURGERY

## 2017-04-15 PROCEDURE — 99231 SBSQ HOSP IP/OBS SF/LOW 25: CPT | Performed by: INTERNAL MEDICINE

## 2017-04-15 PROCEDURE — 99207 ZZC APP CREDIT; MD BILLING SHARED VISIT: CPT | Performed by: PHYSICIAN ASSISTANT

## 2017-04-15 PROCEDURE — 40000193 ZZH STATISTIC PT WARD VISIT: Performed by: PHYSICAL THERAPIST

## 2017-04-15 PROCEDURE — A9270 NON-COVERED ITEM OR SERVICE: HCPCS | Mod: GY | Performed by: ORTHOPAEDIC SURGERY

## 2017-04-15 PROCEDURE — 80048 BASIC METABOLIC PNL TOTAL CA: CPT | Performed by: ORTHOPAEDIC SURGERY

## 2017-04-15 PROCEDURE — A9270 NON-COVERED ITEM OR SERVICE: HCPCS | Mod: GY | Performed by: PHYSICIAN ASSISTANT

## 2017-04-15 PROCEDURE — 97535 SELF CARE MNGMENT TRAINING: CPT | Mod: GO

## 2017-04-15 PROCEDURE — 36415 COLL VENOUS BLD VENIPUNCTURE: CPT | Performed by: ORTHOPAEDIC SURGERY

## 2017-04-15 PROCEDURE — 97110 THERAPEUTIC EXERCISES: CPT | Mod: GP | Performed by: PHYSICAL THERAPIST

## 2017-04-15 PROCEDURE — 97116 GAIT TRAINING THERAPY: CPT | Mod: GP | Performed by: PHYSICAL THERAPIST

## 2017-04-15 RX ORDER — WARFARIN SODIUM 1 MG/1
0.5 TABLET ORAL DAILY
Qty: 30 TABLET | Refills: 0 | Status: SHIPPED | OUTPATIENT
Start: 2017-04-15 | End: 2017-04-25

## 2017-04-15 RX ADMIN — TACROLIMUS 0.5 MG: 0.5 CAPSULE ORAL at 09:02

## 2017-04-15 RX ADMIN — ISOSORBIDE MONONITRATE 60 MG: 60 TABLET, EXTENDED RELEASE ORAL at 09:02

## 2017-04-15 RX ADMIN — CIPROFLOXACIN HYDROCHLORIDE 500 MG: 500 TABLET, FILM COATED ORAL at 09:02

## 2017-04-15 RX ADMIN — SENNOSIDES AND DOCUSATE SODIUM 2 TABLET: 8.6; 5 TABLET ORAL at 09:02

## 2017-04-15 RX ADMIN — OXYCODONE HYDROCHLORIDE 2.5 MG: 5 TABLET ORAL at 13:48

## 2017-04-15 RX ADMIN — METOPROLOL TARTRATE 50 MG: 50 TABLET ORAL at 09:02

## 2017-04-15 RX ADMIN — OXYCODONE HYDROCHLORIDE 2.5 MG: 5 TABLET ORAL at 09:01

## 2017-04-15 RX ADMIN — OXYCODONE HYDROCHLORIDE 2.5 MG: 5 TABLET ORAL at 04:45

## 2017-04-15 RX ADMIN — OXYCODONE HYDROCHLORIDE 2.5 MG: 5 TABLET ORAL at 00:04

## 2017-04-15 ASSESSMENT — PAIN DESCRIPTION - DESCRIPTORS: DESCRIPTORS: ACHING

## 2017-04-15 NOTE — PROGRESS NOTES
Orthopaedic Surgery Progress Note    E:  No acute issues.     S: comfortable this AM.  Coronel d/c, voiding spontaneously, +BM.  Denies numbness or tingling. Planning for d/c home with home care today.      O:    Vitals:    04/14/17 1620 04/14/17 2011 04/14/17 2355 04/15/17 0847   BP:  148/65 139/57 154/60   BP Location:   Left arm Left arm   Pulse:  93  87   Resp:  16 16 16   Temp: 100.2  F (37.9  C) 100.3  F (37.9  C) 99.4  F (37.4  C) 97.7  F (36.5  C)   TempSrc:   Oral Oral   SpO2:  97% 93% 93%   Weight:       Height:               Exam:  Gen: No acute distress, resting comfortably in bed.  Resp: Non-labored breathing  LLE  -aquacel is c/d/i  -thigh is soft/compressible  -drain site reinforced, SS fluid in reservoir  -abduction pillow donned  --CMS intact    -motor intact to ehl/fhl/ta/gsc    -SILT to sp/dp/sural/saph/tibial    -foot wwp, cap refill <2 sec, DP 2+ palpable    Drain output: removed    Assessment/Plan: Lesli Lorenzana is a 76 year old female s/p left NAREN on 4/11/2017 with Dr. Nathaniel Castanon Primary  Activity: Up with assist.  Standard protocol.  Posterior hip precautions.  Weight bearing status: WBAT LLE   Antibiotics: Clinda x 24 hours.  Diet: Begin with clear fluids and progress diet as tolerated.   DVT prophylaxis: Coumadin x 4 weeks (INR goal 1.5-2.5) starting POD #0  Bracing/Splinting: abduction pillow until reliable with posterior hip precautions   Wound Care: Aquacel 7-10 days.  Reinforce drain site PRN   Drains: Record output per shift   Pain management: transition from IV to orals as tolerated   X-rays: Left AP/lat XR PACU  Physical Therapy: eval and treat, ROM, ADL's.   Occupational Therapy: ADL's.  Labs: Trend Hgb on POD #1, 2, 3  Consults: PT, OT. medicine, appreciate assistance in caring for this patient.   Follow-up: Clinic with Dr. Armstrong in 4 weeks with repeat XR     Disposition:Home today     Miriam  [id number]

## 2017-04-15 NOTE — PROGRESS NOTES
"          [facility] Hospitalist - Internal Medicine Daily Note   Date of Service: 4/15/2017  Patient: Lesli Lorenzana  MRN: [id number]  Admission Date: 4/11/2017  Hospital Day # 4    Assessment & Plan    Lesli \"Fani\" Harriett is a 76 year old female with a pmh of PBC s/p liver transplant (2008), CAD s/p PCI to OM1 (2009), HTN, and CDK who was admitted post op following L NAREN on 4/11/17.    #BOZENA on CKD. Creatinine 1.38 GFR 37 4/12 up from 0.96 pre-op with GFR 57. BL Creatinine, per chart review, ranging from 1.1-1.3. FenA <1% suggesting pre-renal etiology.  Increased rate of IVF with improvement of Creatinine to 1.12 4/13. IVF discontinued 4/13. Repeat creatinine 1.51 (4/14). UA with mod LE, 31 WBC, and few bacteria. UCx growing E. Coli (see below). No s/s of UTI such as dysuria, frequency or urgency. IVG restarted and UTI treated. Repeat Creatinine on day of discharge 1.23.   - Will need repeat BMP within one week of discharge and f/u with PCP to monitor Kidney function    UTI, E.Coli. UA with mod LE, 31 WBC, and few bacteria. UCx growing E.coli sensitive to flouroquinolones. Denies dysuria, frequency, urgency. Started on Ciprofloxacin 500 mg BID on 4/14. Received 2nd dose prior to discharge  - Ciprofloxacin 500 mg x 1 day, to complete 3 day course    POD #3 L total hip arthoplasty. With Dr. Armstrong. For the treatment of end stage left hip osteoarthritis. S/p clindamycin x 24 hr. Pain medication with scheduled tylneol, prn oxycodone. DVT prophylaxis with Warfarin x 4 weeks. Hgb stable. No s/s of active bleeding.  - Follow up with Dr. Armstrong in 4 weeks for repeat XR    Hyponatremia. Na 127 on 4/14. Urine Na and Osm WNL. Slightly hypovolemic on exam. Etiology likely 2/2 BOZENA (as above). Restarted IVF and treated UTI with improvement in sodium to 131 on day of discharge.  - Complete treatment of UTI  - Encouraged adequate fluid intake on discharge  - Repeat BMP within one week and f/u with PCP to monitor " sodium    CAD, HTN. S/p PCI to OM1 in 2009 for unstable angina. Dobutamine stress 03/2014 was non diagnostic due to hypotension w/ dobutamine, but no WMA seen and no inducible ischemia, sensitivity reduced. ECHO 03/03/2017 w/ EF of 55-60%, normal wall thickness, no WMA, normal RV size and function. Home regimen includes aspirin (held since 03/28), metoprolol 50 BID, lisinopril 2.5 mg qday, imdur 60 BID, lasix 20 mg qday PRN. Held Lasix, lisinopril and Imdur immediately post op, continued metoprolol. Restarted Imdur during hospital stay and lisinopril on discharge.     PBC S/p Liver transplant (2008). Follows with Dr. Muhammad. On Tacro 0.5mg qam, 1 mg qhs.  - Continue tacro            CODE: FULL  DVT: Warfarin  Diet/fluids: Regular diet  Disposition: discharge today      Ratna Baca PA-C  Internal Medicine [facility] Hospitalist   [facility]   Pager: [phone]  ___________________________________________________________________    Subjective & Interval Hx:  Doing really well today. Ready for discharge. Continues to deny dysuria, frequency or urgency. No abdominal pain, nausea, vomiting or diarrhea. Denies chest pain, shortness of breath, or difficulty breathing. Able to participate in PT. No BM yet but passing flatus.     Last 24 hr care team notes reviewed.   ROS:  4 point ROS including Respiratory, CV, GI and , other than that noted in the HPI, is negative.    Medications: Reviewed in EPIC. List below for reference    Physical Exam:    /60 (BP Location: Left arm)  Pulse 87  Temp 97.7  F (36.5  C) (Oral)  Resp 16  Ht 1.524 m (5')  Wt 59.6 kg (131 lb 6.3 oz)  SpO2 93%  BMI 25.66 kg/m2     GENERAL: Alert and oriented x 3. NAD.   HEENT: Anicteric sclera. Mucous membranes moist.   CV: RRR. S1, S2. No murmurs appreciated.   RESPIRATORY: Effort normal on 2L NC. Lungs CTAB with no wheezing, rales, rhonchi.   GI: Abdomen soft and non distended with normoactive bowel sounds present in all  quadrants. No tenderness, rebound, guarding.   NEUROLOGICAL: No focal deficits. Moves all extremities.    EXTREMITIES: No peripheral edema. Intact bilateral pedal pulses.   SKIN: No jaundice. No rashes.

## 2017-04-15 NOTE — PLAN OF CARE
Problem: Goal Outcome Summary  Goal: Goal Outcome Summary  Outcome: Improving  Patient A&O x 4. Neuros and CMS intact. VSS and afebrile. LS clear and BS present in all quadrants. Patient passing flatus. Patient voiding adequate amounts of clear, yellow urine. Patient states pain is tolerable. Pain meds given as ordered and PRN (see MAR), and ice packs used for comfort. Surgical site was reinforced on evenings and dressing is CDI. PIV infusing without issues. Patient is on regular diet and tolerating it well. Patient up ad emigdio with assist x 1 with walker in room. Patient able to make needs known, will continue to monitor and notify MD of any changes.

## 2017-04-15 NOTE — PLAN OF CARE
Problem: Goal Outcome Summary  Goal: Goal Outcome Summary  Outcome: Improving  Pt a/o x 4.pain tolerable and pain control adequate with 2.5 mg oxycodone.Pt calls for pain med as needed.PIV infusing NS.hip dressing reinforced due to small amount of drainage.voiding adequate amounts.denies other acute complaints.follow POC.

## 2017-04-15 NOTE — PLAN OF CARE
Problem: Goal Outcome Summary  Goal: Goal Outcome Summary  Outcome: Therapy, progress toward functional goals as expected  Supine to/from sitting with HOB flat, bed rail, and mod I.  Sit to/from standing from EOB with FWW and mod I.  Pt ambulated 200' with FWW and mod I.  Pt ascended/descended 3 steps with 1 railing, SEC, and SBA.  Pt has been completing HEP independently.  Recommend discharge home with home PT.       Physical Therapy Discharge Summary     Reason for therapy discharge:    Discharged to home with home therapy.     Progress towards therapy goal(s). See goals on Care Plan in [facility] electronic health record for goal details.  Goals met     Therapy recommendation(s):    Continued therapy is recommended.  Rationale/Recommendations:  Pt would benefit from further skilled PT for LE strengthening and gait training. .

## 2017-04-15 NOTE — PLAN OF CARE
Problem: Individualization  Goal: Patient Preferences  07:30-15:00  Left hip dressing intact, drain site dressing moist.  Pt stating the doctor told her the dressing will be changed prior to discharge.  Per ortho note from this morning, drain site dressing to be reinforced.  When drain site dressing removed, aquacel dressing was moist and loose.  Text page out to on-call ortho resident RAGHAV Fernando MD.  Call returned.  He statedince pt is POD 4, ok to change aquacel and replace with island dressing.  Dressings removed, using sterile technique, new dressings reapplied.  Incision WDL, steri-strips intact.  Lungs CTA, denies chest pain/SOB.  BS+ all quadrants, + flatus.  Tolerating regular diet.  Voids spontaneously without difficulty.  Coumadin education completed this shift, educational material supplied and reviewed with pt.  Discharge instructions reviewed with pt and her daughter.  Verbalized understanding.  Discharge medications given to pt at time of discharge.  INR instructions were added to discharge orders, care coordinator notified of coumadin/INR and home health was notified. Discharge orders faxed to [facility].

## 2017-04-15 NOTE — PLAN OF CARE
Problem: Goal Outcome Summary  Goal: Goal Outcome Summary  OT: Reviewed LE dressing techniques with AE. Patient ambulated around room using FWW with SBA. Completed simulated walk in shower and toilet transfer using DME with SBA. Patient was issued reacher and sock aide. Patient met OT goals, plans to discharge home with assist.      Occupational Therapy Discharge Summary     Reason for therapy discharge:    All goals and outcomes met, no further needs identified.     Progress towards therapy goal(s). See goals on Care Plan in [facility] electronic health record for goal details.  Goals met     Therapy recommendation(s):    No further therapy is recommended.   Home with assist and use of DME.

## 2017-04-15 NOTE — PROGRESS NOTES
Care Coordinator- Discharge Planning     Admission Date/Time:  4/11/2017  Attending MD:  Zhen Armstrong MD     Data  Date of initial CC assessment:  4/11/2017  Chart reviewed, discussed with interdisciplinary team.   Patient was admitted for:   1. Status post hip surgery    2. Primary osteoarthritis of left hip         Assessment  Full assessment completed in previous note     Writer notified by bedside RN that patient discharging today and will need INR checked on Monday. Previous care coordinator made referral to [facility] for RN visits. Referral made to INR clinic.     Writer notified [facility] of patient's discharge and need for Monday's home care visit by email and confirmed they will be able to see her Monday by phone.     Plan  Anticipated Discharge Date:  4/15/2017  Anticipated Discharge Plan:  Home with home care services and clinic follow up    CTS Handoff completed:  NO    Monica Leahy, RN

## 2017-04-17 ENCOUNTER — TELEPHONE (OUTPATIENT)
Dept: FAMILY MEDICINE | Facility: CLINIC | Age: 77
End: 2017-04-17

## 2017-04-17 ENCOUNTER — ANTICOAGULATION THERAPY VISIT (OUTPATIENT)
Dept: ANTICOAGULATION | Facility: CLINIC | Age: 77
End: 2017-04-17

## 2017-04-17 ENCOUNTER — TELEPHONE (OUTPATIENT)
Dept: ORTHOPEDICS | Facility: CLINIC | Age: 77
End: 2017-04-17

## 2017-04-17 DIAGNOSIS — Z96.649 S/P TOTAL HIP ARTHROPLASTY: ICD-10-CM

## 2017-04-17 DIAGNOSIS — Z96.642 S/P HIP REPLACEMENT, LEFT: Primary | ICD-10-CM

## 2017-04-17 DIAGNOSIS — Z79.01 LONG-TERM (CURRENT) USE OF ANTICOAGULANTS: ICD-10-CM

## 2017-04-17 NOTE — TELEPHONE ENCOUNTER
This pt was Discharged from [facility] on 4/15/17 for Post Hip Surgery.      Please note this information was received from either Astrid or Pawel MEJIA  IP daily report with Dr Awan identified as the PCP.    A follow-up visit has not been scheduled.    Please follow-up with patient accordingly.

## 2017-04-17 NOTE — PROGRESS NOTES
Dates of hospitalization: 4/11 to 4/15  Reason for hospitalization: Left NAREN  Procedures performed: NAREN on 4/11  Vitamin K or FFP administered? No  Inpatient warfarin doses added to calendar? Yes  Medication changes at discharge: Started warfarin this admission  Warfarin dosing after DC: 0.5 mg daily (please confirm)  Patient discharged on Lovenox? No  Next INR date: 4/17  Will patient have home care? Yes, Astrid.     Sent message to care coordinator to confirm goal range. Written as 1.5-2.5, while typical for Dr. Armstrong is 2-2.5

## 2017-04-17 NOTE — MR AVS SNAPSHOT
Lesli Lorenzana   4/17/2017   Anticoagulation Therapy Visit    Description:  76 year old female   Provider:  Eboni Kitchen, RN   Department:  [facility]           INR as of 4/17/2017     Today's INR       Anticoagulation Summary as of 4/17/2017     INR goal 1.5-2.5   Today's INR    Full instructions No maintenance plan   Next INR check 4/17/2017    Indications   Long-term (current) use of anticoagulants [Z79.01] [Z79.01]  S/P total hip arthroplasty [Z96.649]         April 2017 Details    Sun Mon Tue Wed Thu Fri Sat           1                 2               3               4               5               6               7               8                 9               10               11               12               13               14               15                 16               17      See details      18               19               20               21               22                 23               24               25               26               27               28               29                 30                      Date Details   04/17 This INR check       Date of next INR:  4/17/2017

## 2017-04-17 NOTE — TELEPHONE ENCOUNTER
"Hospital/TCU/ED for chronic condition Discharge Protocol    \"Hi, my name is Yelena Gurrola, a registered nurse, and I am calling from [facility].  I am calling to follow up and see how things are going for you after your recent emergency visit/hospital/TCU stay.\"    Tell me how you are doing now that you are home?\" patient is doing well. S/p NAREN. Her swelling and pain is controlled. She had a bowel movement today. She is taking stool softer with her pain meds. No need to follow up with PCP. She will follow her discharge instructions which she has with her at home. Will make 4 wk follow up with Dr. Armstrong.   Discharge Instructions    \"Let's review your discharge instructions.  What is/are the follow-up recommendations?  Pt. Response: she is anticipating that home care will be calling her to set up a visit. She is also taking coumadin. This is new for her. Upon her discharge, she stated nursing was going to do her INR at home as she is home bound. Patient has the phone # for nursing. She will call now to inquire. OT stated they only work on sat and sun, therefore, she will also call them to set up the appointment    \"Has an appointment with your primary care provider been scheduled?\"   NA, see above    \"When you see the provider, I would recommend that you bring your medications with you.\"    Medications    \"Tell me what changed about your medicines when you discharged?\"    Changes to chronic meds?    0-1    \"What questions do you have about your medications?\"    None     New diagnoses of heart failure, COPD, diabetes, or MI?    No     On warfarin: \"Were you given any recommendations for follow-up with the anticoagulation clinic?\" pt is home bound, Home care RN will do INR at home    Medication reconciliation completed? Yes  Was MTM referral placed (*Make sure to put transitions as reason for referral)? No  Call Summary\"What questions or concerns do you have about your recent visit and your follow-up " "care?\"  Advised that if patient has not been contacted by skilled nursing, home care about her follow up visit and INR to call the clinic. At that time, clinic contact [facility] to inquire of nursing visit and INR.    \"If you have questions or things don't continue to improve, we encourage you contact us through the main clinic number (give number).  Even if the clinic is not open, triage nurses are available 24/7 to help you.   We would like you to know that our clinic has extended hours (provide information).  We also have urgent care (provide details on closest location and hours/contact info)\"  \"Thank you for your time and take care!\" FLOYD Marie RN              "

## 2017-04-17 NOTE — TELEPHONE ENCOUNTER
"Home care nurse called for [facility]IVERA to send nurse to patient's home tomorrow for INR check d/t staffing constraints instead of within 24 hours. Nurse mentioned patient's daughter stated she \"accidentally doubled up on Coumadin 4/15/17\" but is now back on schedule so they expect the result to be high.   "

## 2017-04-17 NOTE — TELEPHONE ENCOUNTER
"Writer spoke to patient today. See the hospital discharge encounter.     Bala the home care nurse working with patient stated that due to staffing, patient will be seen tomorrow for visit and INR. Orthopedic surgeon gave the \"OK\" for delay in start of care-Dr. Armstrong until 4/18/17.    Asking Dr. Awan to sign the INR referral - pending for the next 4 weeks.   Per 4/11/17 discharge summary:   Anticoagulation:   Take coumadin x 4 weeks (INR goal 1.5-2.5). This is given to help minimize your risk of blood clot.    Gave home care nurse the main line to call int he INR.   To provider to advise.  NARINDER MarieN RN   "

## 2017-04-18 ENCOUNTER — DOCUMENTATION ONLY (OUTPATIENT)
Dept: CARE COORDINATION | Facility: CLINIC | Age: 77
End: 2017-04-18

## 2017-04-18 ENCOUNTER — CARE COORDINATION (OUTPATIENT)
Dept: CARDIOLOGY | Facility: CLINIC | Age: 77
End: 2017-04-18

## 2017-04-18 ENCOUNTER — TELEPHONE (OUTPATIENT)
Dept: FAMILY MEDICINE | Facility: CLINIC | Age: 77
End: 2017-04-18

## 2017-04-18 ENCOUNTER — TELEPHONE (OUTPATIENT)
Dept: CARDIOLOGY | Facility: CLINIC | Age: 77
End: 2017-04-18

## 2017-04-18 DIAGNOSIS — I10 HTN (HYPERTENSION): Primary | ICD-10-CM

## 2017-04-18 DIAGNOSIS — R60.0 EDEMA OF LOWER EXTREMITY, UNSPECIFIED LATERALITY: ICD-10-CM

## 2017-04-18 DIAGNOSIS — E87.70 FLUID OVERLOAD, UNSPECIFIED: ICD-10-CM

## 2017-04-18 DIAGNOSIS — I25.119 CORONARY ARTERY DISEASE INVOLVING NATIVE HEART WITH ANGINA PECTORIS, UNSPECIFIED VESSEL OR LESION TYPE (H): ICD-10-CM

## 2017-04-18 DIAGNOSIS — I82.409 DVT (DEEP VENOUS THROMBOSIS) (H): Primary | ICD-10-CM

## 2017-04-18 DIAGNOSIS — I10 ESSENTIAL HYPERTENSION WITH GOAL BLOOD PRESSURE LESS THAN 140/90: ICD-10-CM

## 2017-04-18 DIAGNOSIS — R06.02 SOB (SHORTNESS OF BREATH): ICD-10-CM

## 2017-04-18 NOTE — TELEPHONE ENCOUNTER
Nurse returned call and left message on voicemail. Reports patient took 1 mg on sat and 0.5 mg messina, mon. Reports patient has some ankle and leg edema and she is waiting for call back from cardiology to advise on this.  Verbal orders left on voicemail for nurse with dose and recheck in 2 days. See anticoagulation encounter.  Bhumika Snyder RN

## 2017-04-18 NOTE — PROGRESS NOTES
Date: 4/18/2017    Time of Call: 5:10 PM     Diagnosis:  HTN, bilateral lower extremity edema, SOB     [ TORB ] Ordering provider: Dr. Lagunas  Order: Take Lasix 20 mg by mouth once daily.  CBC, BMP and BNP in 1 week.      Order received by: Leah Aguilar, RN, BSN     Follow-up/additional notes: Returned call to patient.  Her home blood pressure on 4/16 was 140/65, 4/17 was 149/67, 4/18 was 157/86. She states that her weight before her surgery was 131 pounds at home, but was 133 upon return home at discharge.  Her weight is up to 145 pounds today.  She does feel short of breath with activity.  She did not require any Lasix prior to the surgery.  It was prescribed with blood pressure parameters previously.  New orders reviewed with patient and she is agreeable. She has a scheduled appointment next Tuesday and will have labs at that time.  Voice message left with patient's home care RN with orders as well.

## 2017-04-19 ENCOUNTER — ANTICOAGULATION THERAPY VISIT (OUTPATIENT)
Dept: ANTICOAGULATION | Facility: CLINIC | Age: 77
End: 2017-04-19

## 2017-04-19 DIAGNOSIS — Z96.649 STATUS POST TOTAL REPLACEMENT OF HIP, UNSPECIFIED LATERALITY: ICD-10-CM

## 2017-04-19 DIAGNOSIS — Z79.01 LONG-TERM (CURRENT) USE OF ANTICOAGULANTS: ICD-10-CM

## 2017-04-19 RX ORDER — FUROSEMIDE 20 MG
20 TABLET ORAL DAILY
Qty: 90 TABLET | Refills: 3 | Status: SHIPPED | OUTPATIENT
Start: 2017-04-19 | End: 2017-05-15

## 2017-04-19 NOTE — PROGRESS NOTES
Patient will have her Anticoagulation Monitoring done with the  [facility].  She had it done with them yesterday, and I confirmed with Bhumika Snyder today that they will continue to work with her.  Will inactivate her from our clinic.

## 2017-04-19 NOTE — PROGRESS NOTES
Patient reports feel much better this morning.  Her weight is 141 this morning.  She understands that she will be contacted with lab results next week.  She denies any questions or concerns at this time.

## 2017-04-19 NOTE — PROGRESS NOTES
[facility] now requests orders and shares plan of care/discharge summaries for some patients through [facility].  Please REPLY TO THIS MESSAGE in order to give authorization for orders when needed.  This is considered a verbal order, you will still receive a faxed copy of orders for signature.  Thank you for your assistance in improving collaboration for our patients.    ORDER PT 1d1 eval and treat, SN 2w3, 1w5, 2PRN     MD SUMMARY/PLAN OF CARE    SUMMARY TO MD  SITUATION  76 y.o. female admitted to  following LTHA.  Client lives in [place] with her adult son who works outside the home.  She has been independent up until surgery.  Her adult daughter is able to be with her during the daytime hours, son in the evening.  Client has a history of HTN with cardiac stent placement.  She states understanding of her medications, but gets confused with any changes.  She is on coumadin s/p LTHA and had taken the wrong dose prior to homecare involvement.  She has written instruction on dosing.  Client requires teaching on pain mgmt, s/sx of coumdin complications, teaching on edema mgmt.  Cardiology was contacted on HC SOC visit due to 2-3+ pitting edema in lower legs and diuretics stopped during hospitalization with instruction to continue to hold until advised by cardiology.  Client has difficulty getting legs elevated above the heart level due to no reclining chair she is able to get out of.  She elevates feet on a foot stool, but will lay in the bed to get feet elevated.  LSC to auscultation throughout VS 97.9, 73, 18, 150/80, ox sats 97percent on room air.  Client is ambulating with walker in home.   Client has several wounds as listed below.    WOUND DESCRIPTION AND MEASUREMENTS  1.  left hip surgical wound 4/11/17 [id number]  2.  left hip old drain site 4/11/17  0.2X0.2X0  3.  left lateral hip superior to surgical incision trauma from tape 4/11/17 0.3X0.3X0.1  4.  left medial hip superior to surgical incsion  trauma from tape 4/11/17 0.5X2X0.1  5.  superior to surgical incision near gluteal crease trauma from tape 4/11/17 1.1X1.8X0  6.  right lower buttocks stage 1 PU 4/16/17 3X1.3X0  PHYSICAL PSYCHOSOCIAL IMPAIRMENT OR LIMITATIONS left hip pain, edema limits mobility  NUTRITION CONCERNS...low sodium diet  HOME ENVIRONMENT AFFECTING CARE...stairs, different andrzej surfaces, doorway thresholds, small dog  CAREGIVER SUPPORT..supportive children.  RECOMMENDATION Recommended action PT eval and treat.  SN for med teaching and mgmt, anticoag assessment and teaching, wound assessment and teaching, pain mgmt, HTN assessment,  BRANDON Saenz  [phone]  [email or web address]

## 2017-04-19 NOTE — MR AVS SNAPSHOT
Lesli Lorenzana   4/19/2017   Anticoagulation Therapy Visit    Description:  76 year old female   Provider:  Ratna Phelan RN   Department:  [facility]           INR as of 4/19/2017     Today's INR No new INR was available at the time of this encounter.      Anticoagulation Summary as of 4/19/2017     INR goal 2.0-2.5   Today's INR No new INR was available at the time of this encounter.   Full instructions 4/19: 1 mg   Next INR check     Indications   Long-term (current) use of anticoagulants [Z79.01] [Z79.01]  S/P total hip arthroplasty [Z96.649]         Anticoagulation Episode Summary     Resolved date 4/19/2017    Resolved reason Patient moved

## 2017-04-20 ENCOUNTER — TELEPHONE (OUTPATIENT)
Dept: FAMILY MEDICINE | Facility: CLINIC | Age: 77
End: 2017-04-20

## 2017-04-20 ENCOUNTER — ANTICOAGULATION THERAPY VISIT (OUTPATIENT)
Dept: FAMILY MEDICINE | Facility: CLINIC | Age: 77
End: 2017-04-20
Payer: COMMERCIAL

## 2017-04-20 DIAGNOSIS — Z79.01 LONG-TERM (CURRENT) USE OF ANTICOAGULANTS: ICD-10-CM

## 2017-04-20 DIAGNOSIS — Z96.649 STATUS POST TOTAL REPLACEMENT OF HIP, UNSPECIFIED LATERALITY: ICD-10-CM

## 2017-04-20 DIAGNOSIS — Z47.1 AFTERCARE FOLLOWING JOINT REPLACEMENT: ICD-10-CM

## 2017-04-20 LAB — INR PPP: 1.1

## 2017-04-20 PROCEDURE — 99207 ZZC NO CHARGE NURSE ONLY: CPT | Performed by: FAMILY MEDICINE

## 2017-04-20 NOTE — MR AVS SNAPSHOT
Lesli Lorenzana   4/20/2017   Anticoagulation Therapy Visit    Description:  76 year old female   Provider:  Dee Awan MD   [facility]           INR as of 4/20/2017     Today's INR 1.1!      Anticoagulation Summary as of 4/20/2017     INR goal 2.0-2.5   Today's INR 1.1!   Full instructions 4/20: 2 mg; 4/21: 2 mg; 4/22: 2 mg; 4/23: 2 mg   Next INR check 4/24/2017    Indications   Long-term (current) use of anticoagulants [Z79.01] [Z79.01]  Aftercare following joint replacement [Z47.1] [Z47.1]  S/P total hip arthroplasty [Z96.649]         April 2017 Details    Sun Mon Tue Wed Thu Fri Sat           1                 2               3               4               5               6               7               8                 9               10               11               12               13               14               15                 16               17               18               19               20      2 mg   See details      21      2 mg         22      2 mg           23      2 mg         24            25               26               27               28               29                 30                      Date Details   04/20 This INR check       Date of next INR:  4/24/2017         How to take your warfarin dose     To take:  2 mg Take 2 of the 1 mg tablets.

## 2017-04-20 NOTE — PROGRESS NOTES
ANTICOAGULATION FOLLOW-UP CLINIC VISIT    Patient Name:  Lesli Lorenzana  Date:  4/20/2017  Contact Type:  Telephone    SUBJECTIVE:     Patient Findings     Positives Initiation of therapy    Comments INR 1.1 reported by home care nurse Kindra. No missed dose. Eating ok. Lasix increased for 3 days for leg edema.  Dose adjusted and verbal orders given to Kindra. Recheck in home on Monday.            OBJECTIVE    INR   Date Value Ref Range Status   04/20/2017 1.1  Final       ASSESSMENT / PLAN  INR assessment SUB    Recheck INR In: 4 DAYS    INR Location Homecare INR      Anticoagulation Summary as of 4/20/2017     INR goal 2.0-2.5   Today's INR 1.1!   Maintenance plan No maintenance plan   Full instructions 4/20: 2 mg; 4/21: 2 mg; 4/22: 2 mg; 4/23: 2 mg   Plan last modified Bhumika Snyder RN (4/20/2017)   Next INR check 4/24/2017   Target end date     Indications   Long-term (current) use of anticoagulants [Z79.01] [Z79.01]  Aftercare following joint replacement [Z47.1] [Z47.1]  S/P total hip arthroplasty [Z96.649]         Anticoagulation Episode Summary     INR check location     Preferred lab     Send INR reminders to [facility]    Comments       Anticoagulation Care Providers     Provider Role Specialty Phone number    Dee Awan MD [facility] [phone]            See the Encounter Report to view Anticoagulation Flowsheet and Dosing Calendar (Go to Encounters tab in chart review, and find the Anticoagulation Therapy Visit)        Bhumika Snyder, RN

## 2017-04-20 NOTE — TELEPHONE ENCOUNTER
INR result is 1.1.  Lesli sees Dr Awan on 4/25.  If INR needs to be checked again, it can be done in clinic that day.

## 2017-04-21 DIAGNOSIS — Z53.9 DIAGNOSIS NOT YET DEFINED: Primary | ICD-10-CM

## 2017-04-24 ENCOUNTER — ANTICOAGULATION THERAPY VISIT (OUTPATIENT)
Dept: FAMILY MEDICINE | Facility: CLINIC | Age: 77
End: 2017-04-24
Payer: COMMERCIAL

## 2017-04-24 ENCOUNTER — OFFICE VISIT (OUTPATIENT)
Dept: ORTHOPEDICS | Facility: CLINIC | Age: 77
End: 2017-04-24

## 2017-04-24 ENCOUNTER — TELEPHONE (OUTPATIENT)
Dept: FAMILY MEDICINE | Facility: CLINIC | Age: 77
End: 2017-04-24

## 2017-04-24 VITALS — HEIGHT: 60 IN | BODY MASS INDEX: 26.07 KG/M2 | WEIGHT: 132.8 LBS

## 2017-04-24 DIAGNOSIS — Z47.1 AFTERCARE FOLLOWING JOINT REPLACEMENT: ICD-10-CM

## 2017-04-24 DIAGNOSIS — Z79.01 LONG-TERM (CURRENT) USE OF ANTICOAGULANTS: ICD-10-CM

## 2017-04-24 DIAGNOSIS — M16.12 PRIMARY OSTEOARTHRITIS OF LEFT HIP: Primary | ICD-10-CM

## 2017-04-24 DIAGNOSIS — Z96.642 STATUS POST TOTAL REPLACEMENT OF LEFT HIP: Primary | ICD-10-CM

## 2017-04-24 DIAGNOSIS — Z96.642 STATUS POST TOTAL REPLACEMENT OF LEFT HIP: ICD-10-CM

## 2017-04-24 LAB — INR PPP: 1.3

## 2017-04-24 PROCEDURE — 99207 ZZC NO CHARGE NURSE ONLY: CPT | Performed by: FAMILY MEDICINE

## 2017-04-24 NOTE — NURSING NOTE
Reason For Visit:   Chief Complaint   Patient presents with     Surgical Followup     POP F/U Left Total Hip Replacement DOS: 4/11/17     RECHECK     Pt states that she has been experiecning pain that just started today, along with redness of the posterior side of her Knee, and swelling to Bilat. feet that started the day of discharge.                Ht 1.524 m (5')  Wt 60.2 kg (132 lb 12.8 oz)  BMI 25.94 kg/m2                Current Outpatient Prescriptions   Medication     furosemide (LASIX) 20 MG tablet     warfarin (COUMADIN) 1 MG tablet     oxyCODONE (ROXICODONE) 5 MG IR tablet     acetaminophen (TYLENOL) 325 MG tablet     senna-docusate (SENOKOT-S;PERICOLACE) 8.6-50 MG per tablet     calcium-vitamin D (CALTRATE) 600-400 MG-UNIT per tablet     hydrALAZINE (APRESOLINE) 25 MG tablet     metoprolol (LOPRESSOR) 50 MG tablet     lisinopril (PRINIVIL,ZESTRIL) 2.5 MG tablet     isosorbide mononitrate (IMDUR) 60 MG 24 hr tablet     tacrolimus (PROGRAF - GENERIC EQUIVALENT) 0.5 MG capsule     nystatin-triamcinolone (MYCOLOG II) cream     aspirin 81 MG tablet     MULTI-VITAMIN PO     No current facility-administered medications for this visit.        Allergies   Allergen Reactions     Amlodipine Besylate Swelling     Edema in legs     Amoxicillin Rash       Orin Gray C.M.AJose

## 2017-04-24 NOTE — TELEPHONE ENCOUNTER
Spoke with nurse Cony. Reports patient is seeing provider today for severe pain in her hip and seeing pcp tomorrow for follow up. Dosing instructions given to Cony. See anticoagulation encounter. Bhumika Snyder RN

## 2017-04-24 NOTE — MR AVS SNAPSHOT
Lesli Lorenzana   4/24/2017   Anticoagulation Therapy Visit    Description:  76 year old female   Provider:  Dee Awan MD   [facility]           INR as of 4/24/2017     Today's INR 1.3!      Anticoagulation Summary as of 4/24/2017     INR goal 2.0-2.5   Today's INR 1.3!   Full instructions 4/24: 4 mg; 4/25: 4 mg   Next INR check 4/26/2017    Indications   Long-term (current) use of anticoagulants [Z79.01] [Z79.01]  Aftercare following joint replacement [Z47.1] [Z47.1]  S/P total hip arthroplasty [Z96.649]         April 2017 Details    Sun Mon Tue Wed Thu Fri Sat           1                 2               3               4               5               6               7               8                 9               10               11               12               13               14               15                 16               17               18               19               20               21               22                 23               24      4 mg   See details      25      4 mg         26            27               28               29                 30                      Date Details   04/24 This INR check       Date of next INR:  4/26/2017         How to take your warfarin dose     To take:  4 mg Take 4 of the 1 mg tablets.

## 2017-04-24 NOTE — PROGRESS NOTES
MD Arnold Singh Family Professor    Oncology and Adult Reconstructive Surgery    Dept Orthopaedic Surgery, [facility]    [phone] office, [phone] pager    [email or web address]

## 2017-04-24 NOTE — MR AVS SNAPSHOT
After Visit Summary   4/24/2017    Lesli Lorenzana    MRN: [id number]           Patient Information     Date Of Birth          1940        Visit Information        Provider Department      4/24/2017 10:15 AM Zhen Armstrong MD [facility]        Today's Diagnoses     Primary osteoarthritis of left hip    -  1       Follow-ups after your visit        Your next 10 appointments already scheduled     Jun 06, 2017  8:00 AM CDT   LAB with [facility]   [facility]    [address] [employer] [address] [phone]           Patient must bring picture ID.  Patient should be prepared to give a urine specimen  Please do not eat 10-12 hours before your appointment if you are coming in fasting for labs on lipids, cholesterol, or glucose (sugar).  Pregnant women should follow their Care Team instructions. Water with medications is okay. Do not drink coffee or other fluids.   If you have concerns about taking  your medications, please ask at office or if scheduling via Team Everestt, send a message by clicking on Secure Messaging, Message Your Care Team.            Jul 28, 2017 10:00 AM CDT   (Arrive by 9:45 AM)   Return Liver Transplant with Tucker Muhammad MD   [facility] Hepatology ([facility])    [address] [place] [address] [phone]            Sep 07, 2017 10:30 AM CDT   (Arrive by 10:15 AM)   Return Visit with Nain Lagunas MD   [facility] Heart Care ([facility])    [address] [place] [id number] [phone]              Who to contact     Please call your clinic at [phone] to:    Ask questions about your health    Make or cancel appointments    Discuss your medicines    Learn about your test results    Speak to your doctor   If you have compliments or concerns about an experience at your clinic, or if you wish to file a  complaint, please contact [facility] Patient Relations at [phone] or email us at [email or web address]         Additional Information About Your Visit        [employer] Information     E[employer] gives you secure access to your electronic health record. If you see a primary care provider, you can also send messages to your care team and make appointments. If you have questions, please call your primary care clinic.  If you do not have a primary care provider, please call [phone] and they will assist you.      Evestra is an electronic gateway that provides easy, online access to your medical records. With E[employer], you can request a clinic appointment, read your test results, renew a prescription or communicate with your care team.     To access your existing account, please contact your [facility] or call [phone] for assistance.        Care EveryWhere ID     This is your Care EveryWhere ID. This could be used by other organizations to access your [place] medical records  [id number]        Your Vitals Were     Height BMI (Body Mass Index)                1.524 m (5') 25.94 kg/m2           Blood Pressure from Last 3 Encounters:   04/25/17 125/63   04/15/17 154/60   04/05/17 184/83    Weight from Last 3 Encounters:   05/15/17 57.2 kg (126 lb 3.2 oz)   04/25/17 60.8 kg (134 lb)   04/24/17 60.2 kg (132 lb 12.8 oz)                 Today's Medication Changes          These changes are accurate as of: 4/24/17 11:59 PM.  If you have any questions, ask your nurse or doctor.               These medicines have changed or have updated prescriptions.        Dose/Directions    lisinopril 2.5 MG tablet   Commonly known as:  PRINIVIL/Zestril   This may have changed:  when to take this   Used for:  Coronary artery disease involving native heart without angina pectoris, unspecified vessel or lesion type        Dose:  2.5 mg   Take 1 tablet (2.5 mg) by mouth  daily   Quantity:  93 tablet   Refills:  3                Primary Care Provider Office Phone # Fax #    Dee Awan [phone]       [facility] [address] [facility] [address]        Thank you!     Thank you for choosing [facility]  for your care. Our goal is always to provide you with excellent care. Hearing back from our patients is one way we can continue to improve our services. Please take a few minutes to complete the written survey that you may receive in the mail after your visit with us. Thank you!             Your Updated Medication List - Protect others around you: Learn how to safely use, store and throw away your medicines at www.disposemymeds.org.          This list is accurate as of: 4/24/17 11:59 PM.  Always use your most recent med list.                   Brand Name Dispense Instructions for use    acetaminophen 325 MG tablet    TYLENOL    100 tablet    Take 2 tablets (650 mg) by mouth every 4 hours as needed for other (surgical pain)       aspirin 81 MG tablet     90 tablet    Take 1 tablet by mouth daily.       calcium-vitamin D 600-400 MG-UNIT per tablet    CALTRATE     Take 1 tablet by mouth daily       furosemide 20 MG tablet    LASIX    90 tablet    Take 1 tablet (20 mg) by mouth daily       hydrALAZINE 25 MG tablet    APRESOLINE    90 tablet    Take 1 tablet (25 mg) by mouth 3 times daily , as needed if systolic blood pressure (top number) is more than 180 mmHg.       isosorbide mononitrate 60 MG 24 hr tablet    IMDUR    180 tablet    Take 1 tablet (60 mg) by mouth 2 times daily       lisinopril 2.5 MG tablet    PRINIVIL/Zestril    93 tablet    Take 1 tablet (2.5 mg) by mouth daily       metoprolol 50 MG tablet    LOPRESSOR    182 tablet    Take 1 tablet (50 mg) by mouth 2 times daily       MULTI-VITAMIN PO      Take 1 tablet by mouth daily       nystatin-triamcinolone cream    MYCOLOG II     Apply topically 4 times daily as needed  Reported on 4/5/2017       oxyCODONE 5 MG IR tablet    ROXICODONE    40 tablet    Take 0.5-1 tablets (2.5-5 mg) by mouth every 3 hours as needed for moderate to severe pain       senna-docusate 8.6-50 MG per tablet    SENOKOT-S;PERICOLACE    100 tablet    Take 1-2 tablets by mouth 2 times daily       tacrolimus 0.5 MG capsule    PROGRAF - GENERIC EQUIVALENT    270 capsule    Take 0.5 mg (1 cap) every AM, take 1 mg, (2 caps) every PM.

## 2017-04-24 NOTE — LETTER
4/24/2017       RE: Lesli Lorenzana  [address] [place] [facility] [address]     Dear Colleague,    Thank you for referring your patient, Lesli Lorenzana, to the [facility] at [facility]. Please see a copy of my visit note below.    Postoperative hip replacement follow-up clinic visit    Lesli Lorenzana is doing well after last surgery listed above on 4/11/17.  Preoperative hip pain greatly improved. Home care nurse noted LLE swelling in LLE and sent to clinic for DVT concern. Has been taking coumadin by most recent INR 1.3. Recently told to increase coumadin dosage to 4MG from 2MG.Denies CP,SOB     EXAM:  Incision healing, clean and dry.  Effusion: none  Periarticular swelling or leg edema:   Peroneal and tibial nerve function: intact  Gait: Ambulating with walker     Intraoperative cultures:  none     IMAGING:  Radiographs demonstrate the hip implant in satisfactory position without evidence of any complication.    IMPRESSION:  Excellent outcome to date after hip replacement.     PLAN:    Venous duplex today     Continue range of motion exercises and stretching.    Quadriceps strengthening exercises.    May be weight bearing as tolerated.    Next follow-up visit at 1 month s/p surgery        Zhen Armstrong M.D.  Arnold Family Professor  Oncology and Adult Reconstructive Surgery  Dept Orthopaedic Surgery, [facility]  [phone] office  [email or web address]

## 2017-04-24 NOTE — PROGRESS NOTES
Postoperative hip replacement follow-up clinic visit    Lesli Lorenzana is doing well after last surgery listed above on 4/11/17.  Preoperative hip pain greatly improved. Home care nurse noted LLE swelling in LLE and sent to clinic for DVT concern. Has been taking coumadin by most recent INR 1.3. Recently told to increase coumadin dosage to 4MG from 2MG.Denies CP,SOB     EXAM:  Incision healing, clean and dry.  Effusion: none  Periarticular swelling or leg edema:   Peroneal and tibial nerve function: intact  Gait: Ambulating with walker     Intraoperative cultures:  none     IMAGING:  Radiographs demonstrate the hip implant in satisfactory position without evidence of any complication.    IMPRESSION:  Excellent outcome to date after hip replacement.     PLAN:    Venous duplex today     Continue range of motion exercises and stretching.    Quadriceps strengthening exercises.    May be weight bearing as tolerated.    Next follow-up visit at 1 month s/p surgery        Zhen Armstrong M.D.  Arnold Family Professor  Oncology and Adult Reconstructive Surgery  Dept Orthopaedic Surgery, [facility]  [phone] office  [email or web address]    Total Time = 15 min, 50% of which was spent in counseling and coordination of care as documented above.

## 2017-04-24 NOTE — PROGRESS NOTES
ANTICOAGULATION FOLLOW-UP CLINIC VISIT    Patient Name:  Lesli Lorenzana  Date:  4/24/2017  Contact Type:  Telephone    SUBJECTIVE:     Patient Findings     Positives Initiation of therapy    Comments Spoke with nurse Cony. Reports patient is seeing provider today for severe pain in her hip, surgical area, and seeing pcp tomorrow for follow up. No missed dose. No bleeding or swelling. Did have increase in Lasix last week for leg edema and this has improved. Dosing instructions given to Cony.            OBJECTIVE    INR   Date Value Ref Range Status   04/24/2017 1.3  Final       ASSESSMENT / PLAN  INR assessment SUB    Recheck INR In: 2 DAYS    INR Location [facility] INR      Anticoagulation Summary as of 4/24/2017     INR goal 2.0-2.5   Today's INR 1.3!   Maintenance plan No maintenance plan   Full instructions 4/24: 4 mg; 4/25: 4 mg   Plan last modified Bhumika Snyder RN (4/20/2017)   Next INR check 4/26/2017   Target end date     Indications   Long-term (current) use of anticoagulants [Z79.01] [Z79.01]  Aftercare following joint replacement [Z47.1] [Z47.1]  S/P total hip arthroplasty [Z96.649]         Anticoagulation Episode Summary     INR check location     Preferred lab     Send INR reminders to [facility]    Comments       Anticoagulation Care Providers     Provider Role Specialty Phone number    Dee Awan MD [facility] [phone]            See the Encounter Report to view Anticoagulation Flowsheet and Dosing Calendar (Go to Encounters tab in chart review, and find the Anticoagulation Therapy Visit)        Bhumika Snyder RN

## 2017-04-24 NOTE — TELEPHONE ENCOUNTER
INR today was 1.3. She is having more pain in leg today, they are going to call ortho. States that her diet has a lot of tomato products. Please call with instructions.

## 2017-04-25 ENCOUNTER — OFFICE VISIT (OUTPATIENT)
Dept: FAMILY MEDICINE | Facility: CLINIC | Age: 77
End: 2017-04-25
Payer: COMMERCIAL

## 2017-04-25 ENCOUNTER — TELEPHONE (OUTPATIENT)
Dept: FAMILY MEDICINE | Facility: CLINIC | Age: 77
End: 2017-04-25

## 2017-04-25 VITALS
TEMPERATURE: 97.3 F | SYSTOLIC BLOOD PRESSURE: 125 MMHG | HEART RATE: 78 BPM | WEIGHT: 134 LBS | BODY MASS INDEX: 26.17 KG/M2 | DIASTOLIC BLOOD PRESSURE: 63 MMHG

## 2017-04-25 DIAGNOSIS — Z98.890 STATUS POST HIP SURGERY: ICD-10-CM

## 2017-04-25 DIAGNOSIS — R60.0 PEDAL EDEMA: Primary | ICD-10-CM

## 2017-04-25 DIAGNOSIS — Z96.642 S/P HIP REPLACEMENT, LEFT: ICD-10-CM

## 2017-04-25 LAB
ALBUMIN SERPL-MCNC: 2.7 G/DL (ref 3.4–5)
ALP SERPL-CCNC: 75 U/L (ref 40–150)
ALT SERPL W P-5'-P-CCNC: 25 U/L (ref 0–50)
ANION GAP SERPL CALCULATED.3IONS-SCNC: 11 MMOL/L (ref 3–14)
AST SERPL W P-5'-P-CCNC: 38 U/L (ref 0–45)
BASOPHILS # BLD AUTO: 0 10E9/L (ref 0–0.2)
BASOPHILS NFR BLD AUTO: 0.3 %
BILIRUB SERPL-MCNC: 0.4 MG/DL (ref 0.2–1.3)
BUN SERPL-MCNC: 18 MG/DL (ref 7–30)
CALCIUM SERPL-MCNC: 8.7 MG/DL (ref 8.5–10.1)
CHLORIDE SERPL-SCNC: 92 MMOL/L (ref 94–109)
CO2 SERPL-SCNC: 25 MMOL/L (ref 20–32)
CREAT SERPL-MCNC: 1.22 MG/DL (ref 0.52–1.04)
DIFFERENTIAL METHOD BLD: ABNORMAL
EOSINOPHIL # BLD AUTO: 0.1 10E9/L (ref 0–0.7)
EOSINOPHIL NFR BLD AUTO: 2 %
ERYTHROCYTE [DISTWIDTH] IN BLOOD BY AUTOMATED COUNT: 13 % (ref 10–15)
GFR SERPL CREATININE-BSD FRML MDRD: 43 ML/MIN/1.7M2
GLUCOSE SERPL-MCNC: 89 MG/DL (ref 70–99)
HCT VFR BLD AUTO: 24.8 % (ref 35–47)
HGB BLD-MCNC: 8.5 G/DL (ref 11.7–15.7)
LYMPHOCYTES # BLD AUTO: 0.9 10E9/L (ref 0.8–5.3)
LYMPHOCYTES NFR BLD AUTO: 24.9 %
MCH RBC QN AUTO: 30.1 PG (ref 26.5–33)
MCHC RBC AUTO-ENTMCNC: 34.3 G/DL (ref 31.5–36.5)
MCV RBC AUTO: 88 FL (ref 78–100)
MONOCYTES # BLD AUTO: 0.5 10E9/L (ref 0–1.3)
MONOCYTES NFR BLD AUTO: 13.4 %
NEUTROPHILS # BLD AUTO: 2.1 10E9/L (ref 1.6–8.3)
NEUTROPHILS NFR BLD AUTO: 59.4 %
NT-PROBNP SERPL-MCNC: 844 PG/ML (ref 0–125)
PLATELET # BLD AUTO: 351 10E9/L (ref 150–450)
POTASSIUM SERPL-SCNC: 3.9 MMOL/L (ref 3.4–5.3)
PROT SERPL-MCNC: 7.4 G/DL (ref 6.8–8.8)
RBC # BLD AUTO: 2.82 10E12/L (ref 3.8–5.2)
SODIUM SERPL-SCNC: 128 MMOL/L (ref 133–144)
TSH SERPL DL<=0.005 MIU/L-ACNC: 1.81 MU/L (ref 0.4–4)
WBC # BLD AUTO: 3.5 10E9/L (ref 4–11)

## 2017-04-25 PROCEDURE — 99214 OFFICE O/P EST MOD 30 MIN: CPT | Performed by: FAMILY MEDICINE

## 2017-04-25 PROCEDURE — 36415 COLL VENOUS BLD VENIPUNCTURE: CPT | Performed by: FAMILY MEDICINE

## 2017-04-25 PROCEDURE — 80053 COMPREHEN METABOLIC PANEL: CPT | Performed by: FAMILY MEDICINE

## 2017-04-25 PROCEDURE — 85025 COMPLETE CBC W/AUTO DIFF WBC: CPT | Performed by: FAMILY MEDICINE

## 2017-04-25 PROCEDURE — 84443 ASSAY THYROID STIM HORMONE: CPT | Performed by: FAMILY MEDICINE

## 2017-04-25 PROCEDURE — 83880 ASSAY OF NATRIURETIC PEPTIDE: CPT | Performed by: FAMILY MEDICINE

## 2017-04-25 RX ORDER — WARFARIN SODIUM 1 MG/1
TABLET ORAL
Qty: 30 TABLET | Refills: 0 | Status: SHIPPED | OUTPATIENT
Start: 2017-04-25 | End: 2017-04-26

## 2017-04-25 NOTE — NURSING NOTE
Chief Complaint   Patient presents with     RECHECK     after hip surgery       Initial /63  Pulse 78  Temp 97.3  F (36.3  C) (Oral)  Wt 134 lb (60.8 kg)  BMI 26.17 kg/m2 Estimated body mass index is 26.17 kg/(m^2) as calculated from the following:    Height as of 4/24/17: 5' (1.524 m).    Weight as of this encounter: 134 lb (60.8 kg).  Medication Reconciliation: complete     Miracle Simental MA

## 2017-04-25 NOTE — MR AVS SNAPSHOT
After Visit Summary   4/25/2017    Lesli Lorenzana    MRN: [id number]           Patient Information     Date Of Birth          1940        Visit Information        Provider Department      4/25/2017 8:40 AM Dee Awan MD [facility]        Today's Diagnoses     Pedal edema    -  1    S/P hip replacement, left           Follow-ups after your visit        Your next 10 appointments already scheduled     May 15, 2017 10:15 AM CDT   (Arrive by 10:00 AM)   Return Visit with Zhen Armstrong MD   [facility] Orthopaedic [facility] ([facility])    [address] [place] [address]  [address] [facility] [address] [phone]            Jun 06, 2017  8:00 AM CDT   LAB with [facility]   [facility] ([facility])    [address] [facility] [address] [phone]           Patient must bring picture ID.  Patient should be prepared to give a urine specimen  Please do not eat 10-12 hours before your appointment if you are coming in fasting for labs on lipids, cholesterol, or glucose (sugar).  Pregnant women should follow their Care Team instructions. Water with medications is okay. Do not drink coffee or other fluids.   If you have concerns about taking  your medications, please ask at office or if scheduling via Dime[employer]art, send a message by clicking on Secure Messaging, Message Your Care Team.            Jul 28, 2017 10:00 AM CDT   (Arrive by 9:45 AM)   Return Liver Transplant with Tucker Muhammad MD   [facility] Hepatology ([facility])    [address] [place] [id number] [phone]            Sep 07, 2017 10:30 AM CDT   (Arrive by 10:15 AM)   Return Visit with Nain Lagunas MD   [facility] Heart Care ([facility])    [address] [place] [id number] [phone]              Who to contact     If you have questions or need follow up information  about today's clinic visit or your schedule please contact [facility] directly at [phone].  Normal or non-critical lab and imaging results will be communicated to you by MyChart, letter or phone within 4 business days after the clinic has received the results. If you do not hear from us within 7 days, please contact the clinic through [employer] or phone. If you have a critical or abnormal lab result, we will notify you by phone as soon as possible.  Submit refill requests through Raptr or call your pharmacy and they will forward the refill request to us. Please allow 3 business days for your refill to be completed.          Additional Information About Your Visit        [employer] Information     [employer] gives you secure access to your electronic health record. If you see a primary care provider, you can also send messages to your care team and make appointments. If you have questions, please call your primary care clinic.  If you do not have a primary care provider, please call [phone] and they will assist you.        Care EveryWhere ID     This is your Care EveryWhere ID. This could be used by other organizations to access your [place] medical records  [id number]        Your Vitals Were     Pulse Temperature BMI (Body Mass Index)             78 97.3  F (36.3  C) (Oral) 26.17 kg/m2          Blood Pressure from Last 3 Encounters:   04/25/17 125/63   04/15/17 154/60   04/05/17 184/83    Weight from Last 3 Encounters:   04/25/17 134 lb (60.8 kg)   04/24/17 132 lb 12.8 oz (60.2 kg)   04/11/17 131 lb 6.3 oz (59.6 kg)              We Performed the Following     CBC with platelets and differential     Comprehensive metabolic panel     N terminal pro BNP outpatient     TSH with free T4 reflex          Today's Medication Changes          These changes are accurate as of: 4/25/17 10:02 AM.  If you have any questions, ask your nurse or doctor.               These medicines have changed or have updated  prescriptions.        Dose/Directions    lisinopril 2.5 MG tablet   Commonly known as:  PRINIVIL/Zestril   This may have changed:  when to take this   Used for:  Coronary artery disease involving native heart without angina pectoris, unspecified vessel or lesion type        Dose:  2.5 mg   Take 1 tablet (2.5 mg) by mouth daily   Quantity:  93 tablet   Refills:  3       warfarin 1 MG tablet   Commonly known as:  COUMADIN   This may have changed:    - how much to take  - additional instructions   Used for:  Status post hip surgery        Dose:  0.5 mg   Take 0.5 tablets (0.5 mg) by mouth daily   Quantity:  30 tablet   Refills:  0                Primary Care Provider Office Phone # Fax #    Dee Awan [phone]       [facility] [address] [facility] [address]        Thank you!     Thank you for choosing [facility]  for your care. Our goal is always to provide you with excellent care. Hearing back from our patients is one way we can continue to improve our services. Please take a few minutes to complete the written survey that you may receive in the mail after your visit with us. Thank you!             Your Updated Medication List - Protect others around you: Learn how to safely use, store and throw away your medicines at www.disposemymeds.org.          This list is accurate as of: 4/25/17 10:02 AM.  Always use your most recent med list.                   Brand Name Dispense Instructions for use    acetaminophen 325 MG tablet    TYLENOL    100 tablet    Take 2 tablets (650 mg) by mouth every 4 hours as needed for other (surgical pain)       aspirin 81 MG tablet     90 tablet    Take 1 tablet by mouth daily.       calcium-vitamin D 600-400 MG-UNIT per tablet    CALTRATE     Take 1 tablet by mouth daily       furosemide 20 MG tablet    LASIX    90 tablet    Take 1 tablet (20 mg) by mouth daily       hydrALAZINE 25 MG tablet    APRESOLINE    90 tablet    Take 1  tablet (25 mg) by mouth 3 times daily , as needed if systolic blood pressure (top number) is more than 180 mmHg.       isosorbide mononitrate 60 MG 24 hr tablet    IMDUR    180 tablet    Take 1 tablet (60 mg) by mouth 2 times daily       lisinopril 2.5 MG tablet    PRINIVIL/Zestril    93 tablet    Take 1 tablet (2.5 mg) by mouth daily       metoprolol 50 MG tablet    LOPRESSOR    182 tablet    Take 1 tablet (50 mg) by mouth 2 times daily       MULTI-VITAMIN PO      Take 1 tablet by mouth daily       nystatin-triamcinolone cream    MYCOLOG II     Apply topically 4 times daily as needed Reported on 4/5/2017       oxyCODONE 5 MG IR tablet    ROXICODONE    40 tablet    Take 0.5-1 tablets (2.5-5 mg) by mouth every 3 hours as needed for moderate to severe pain       senna-docusate 8.6-50 MG per tablet    SENOKOT-S;PERICOLACE    100 tablet    Take 1-2 tablets by mouth 2 times daily       tacrolimus 0.5 MG capsule    PROGRAF - GENERIC EQUIVALENT    270 capsule    Take 0.5 mg (1 cap) every AM, take 1 mg, (2 caps) every PM.       warfarin 1 MG tablet    COUMADIN    30 tablet    Take 0.5 tablets (0.5 mg) by mouth daily

## 2017-04-25 NOTE — PROGRESS NOTES
SUBJECTIVE:                                                    Lesli Lorenzana is a 76 year old female who presents to clinic today for the following health issues:      Follow up after hip surgery, was off pain medication but had new pain yesterday and started pain medications again. Left leg swelling (left hip surgery).    Pt with left hip replacement on 4/11/17. Came home on the 15th.   Ankles little swollen when went home, and progressively worse since then  Started getting better on the 23rd.   Ortho surgeon did US of left leg for DVT  Yesterday and was negative.     Notes redness on back of left leg per RN yesterday. Feels slightly tender.  ? If from resting her leg in that spot when elevating it.     No chest pain or sob. Otherwise recovering well from surgery.  Has PT and RN visits.           Problem list and histories reviewed & adjusted, as indicated.  Additional history: as documented    Labs reviewed in EPIC    Reviewed and updated as needed this visit by clinical staff  Tobacco  Allergies  Med Hx  Surg Hx  Fam Hx  Soc Hx      Reviewed and updated as needed this visit by Provider         ROS:  Constitutional, HEENT, cardiovascular, pulmonary, gi and gu systems are negative, except as otherwise noted.    OBJECTIVE:                                                    /63  Pulse 78  Temp 97.3  F (36.3  C) (Oral)  Wt 134 lb (60.8 kg)  BMI 26.17 kg/m2  Body mass index is 26.17 kg/(m^2).  GENERAL: healthy, alert and no distress  RESP: lungs clear to auscultation - no rales, rhonchi or wheezes  CV: RRR without m/r/g  ABDOMEN: soft, nontender, no hepatosplenomegaly, no masses and bowel sounds normal  MS: no gross musculoskeletal defects noted, no 1-2 + pitting edema half way up shins, left> right,  Small path about 5-6 m in diameter of mild erythema on backside of left lower leg, no skin breakdown noted. No increased warmth, mild discomfort to palpation    Diagnostic Test Results:          ASSESSMENT/PLAN:                                                      1. Pedal edema  No DVT, await labs, is improving. Pt on lasix, monitor for signs of infection  - N terminal pro BNP outpatient  - Comprehensive metabolic panel  - TSH with free T4 reflex  - CBC with platelets and differential    2. S/P hip replacement, left  Per ortho          Dee Mercado MD  [facility]

## 2017-04-25 NOTE — TELEPHONE ENCOUNTER
Pharmacy needing updated coumadin prescription. Medication refilled per RN protocol.    NARINDER MarieN RN

## 2017-04-26 ENCOUNTER — TELEPHONE (OUTPATIENT)
Dept: FAMILY MEDICINE | Facility: CLINIC | Age: 77
End: 2017-04-26

## 2017-04-26 ENCOUNTER — ANTICOAGULATION THERAPY VISIT (OUTPATIENT)
Dept: FAMILY MEDICINE | Facility: CLINIC | Age: 77
End: 2017-04-26
Payer: COMMERCIAL

## 2017-04-26 DIAGNOSIS — Z79.01 LONG-TERM (CURRENT) USE OF ANTICOAGULANTS: ICD-10-CM

## 2017-04-26 DIAGNOSIS — Z96.642 STATUS POST TOTAL REPLACEMENT OF LEFT HIP: ICD-10-CM

## 2017-04-26 DIAGNOSIS — Z98.890 STATUS POST HIP SURGERY: ICD-10-CM

## 2017-04-26 DIAGNOSIS — Z47.1 AFTERCARE FOLLOWING JOINT REPLACEMENT: ICD-10-CM

## 2017-04-26 LAB — INR PPP: 1.7

## 2017-04-26 PROCEDURE — 99207 ZZC NO CHARGE NURSE ONLY: CPT | Performed by: FAMILY MEDICINE

## 2017-04-26 RX ORDER — WARFARIN SODIUM 1 MG/1
TABLET ORAL
Qty: 50 TABLET | Refills: 0 | Status: SHIPPED | OUTPATIENT
Start: 2017-04-26 | End: 2017-05-15

## 2017-04-26 NOTE — PROGRESS NOTES
ANTICOAGULATION FOLLOW-UP CLINIC VISIT    Patient Name:  Lesli Lorenzana  Date:  4/26/2017  Contact Type:  Telephone    SUBJECTIVE:     Patient Findings     Comments INR 1.7 today reported by home care nurse. This is up from 1.3 on 2 days ago. No medication changes. No missed dose. Dose adjusted and nurse will recheck in home on Friday. Verbal orders given to nurse Cony.             OBJECTIVE    INR   Date Value Ref Range Status   04/26/2017 1.7  Final       ASSESSMENT / PLAN  INR assessment SUB    Recheck INR In: 2 DAYS    INR Location [facility]R      Anticoagulation Summary as of 4/26/2017     INR goal 2.0-2.5   Today's INR 1.7!   Maintenance plan No maintenance plan   Full instructions 4/26: 5 mg; 4/27: 5 mg   Plan last modified Bhumika Snyder RN (4/20/2017)   Next INR check 4/28/2017   Target end date     Indications   Long-term (current) use of anticoagulants [Z79.01] [Z79.01]  Aftercare following joint replacement [Z47.1] [Z47.1]  S/P total hip arthroplasty [Z96.649]         Anticoagulation Episode Summary     INR check location     Preferred lab     Send INR reminders to [facility]    Comments       Anticoagulation Care Providers     Provider Role Specialty Phone number    Dee Awan MD [facility] [phone]            See the Encounter Report to view Anticoagulation Flowsheet and Dosing Calendar (Go to Encounters tab in chart review, and find the Anticoagulation Therapy Visit)        Bhumika Snyder RN

## 2017-04-26 NOTE — MR AVS SNAPSHOT
Lesli Lorenzana   4/26/2017   Anticoagulation Therapy Visit    Description:  76 year old female   Provider:  Dee Awan MD   [facility]           INR as of 4/26/2017     Today's INR 1.7!      Anticoagulation Summary as of 4/26/2017     INR goal 2.0-2.5   Today's INR 1.7!   Full instructions 4/26: 5 mg; 4/27: 5 mg   Next INR check 4/28/2017    Indications   Long-term (current) use of anticoagulants [Z79.01] [Z79.01]  Aftercare following joint replacement [Z47.1] [Z47.1]  S/P total hip arthroplasty [Z96.649]         April 2017 Details    Sun Mon Tue Wed Thu Fri Sat           1                 2               3               4               5               6               7               8                 9               10               11               12               13               14               15                 16               17               18               19               20               21               22                 23               24               25               26      5 mg   See details      27      5 mg         28            29                 30                      Date Details   04/26 This INR check       Date of next INR:  4/28/2017         How to take your warfarin dose     To take:  5 mg Take 5 of the 1 mg tablets.

## 2017-04-28 ENCOUNTER — TELEPHONE (OUTPATIENT)
Dept: FAMILY MEDICINE | Facility: CLINIC | Age: 77
End: 2017-04-28

## 2017-04-28 ENCOUNTER — ANTICOAGULATION THERAPY VISIT (OUTPATIENT)
Dept: FAMILY MEDICINE | Facility: CLINIC | Age: 77
End: 2017-04-28
Payer: COMMERCIAL

## 2017-04-28 DIAGNOSIS — Z79.01 LONG-TERM (CURRENT) USE OF ANTICOAGULANTS: ICD-10-CM

## 2017-04-28 DIAGNOSIS — Z96.642 STATUS POST TOTAL REPLACEMENT OF LEFT HIP: ICD-10-CM

## 2017-04-28 DIAGNOSIS — Z47.1 AFTERCARE FOLLOWING JOINT REPLACEMENT: ICD-10-CM

## 2017-04-28 LAB — INR PPP: 2.8

## 2017-04-28 PROCEDURE — 99207 ZZC NO CHARGE NURSE ONLY: CPT | Performed by: FAMILY MEDICINE

## 2017-04-28 NOTE — MR AVS SNAPSHOT
Lesli LUIS Lorenzana   4/28/2017   Anticoagulation Therapy Visit    Description:  76 year old female   Provider:  Dee Awan MD   [facility]  [facility]           INR as of 4/28/2017     Today's INR 2.8!      Anticoagulation Summary as of 4/28/2017     INR goal 2.0-2.5   Today's INR 2.8!   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]         April 2017 Details    Sun Mon Tue Wed Thu Fri Sat           1                 2               3               4               5               6               7               8                 9               10               11               12               13               14               15                 16               17               18               19               20               21               22                 23               24               25               26               27               28      3 mg   See details      29      3 mg           30      3 mg                Date Details   04/28 This INR check               How to take your warfarin dose     To take:  3 mg Take 3 of the 1 mg tablets.           May 2017 Details    Sun Mon Tue Wed Thu Fri Sat      1            2               3               4               5               6                 7               8               9               10               11               12               13                 14               15               16               17               18               19               20                 21               22               23               24               25               26               27                 28               29               30               31                   Date Details   No additional details    Date of next INR:   5/1/2017

## 2017-04-28 NOTE — PROGRESS NOTES
ANTICOAGULATION FOLLOW-UP CLINIC VISIT    Patient Name:  Lesli Lorenzana  Date:  4/28/2017  Contact Type:  Telephone    SUBJECTIVE:     Patient Findings     Positives Initiation of therapy    Comments INR 2.8 today reported by home care nurse. Patient will be switching from oxycodone to tylenol for pain control. Eating ok. No new concerns. Coumadin dose adjusted and recheck in 3 days. Verbal orders given to Cony.            OBJECTIVE    INR   Date Value Ref Range Status   04/28/2017 2.8  Final       ASSESSMENT / PLAN  INR assessment THER    Recheck INR In: 3 DAYS    INR Location [facility] INR      Anticoagulation Summary as of 4/28/2017     INR goal 2.0-2.5   Today's INR 2.8!   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

## 2017-05-01 ENCOUNTER — ANTICOAGULATION THERAPY VISIT (OUTPATIENT)
Dept: FAMILY MEDICINE | Facility: CLINIC | Age: 77
End: 2017-05-01
Payer: COMMERCIAL

## 2017-05-01 ENCOUNTER — TELEPHONE (OUTPATIENT)
Dept: FAMILY MEDICINE | Facility: CLINIC | Age: 77
End: 2017-05-01

## 2017-05-01 DIAGNOSIS — Z47.1 AFTERCARE FOLLOWING JOINT REPLACEMENT: ICD-10-CM

## 2017-05-01 DIAGNOSIS — Z96.642 STATUS POST TOTAL REPLACEMENT OF LEFT HIP: ICD-10-CM

## 2017-05-01 DIAGNOSIS — Z79.01 LONG-TERM (CURRENT) USE OF ANTICOAGULANTS: ICD-10-CM

## 2017-05-01 LAB — INR PPP: 4.9

## 2017-05-01 PROCEDURE — 99207 ZZC NO CHARGE NURSE ONLY: CPT | Performed by: FAMILY MEDICINE

## 2017-05-01 NOTE — PROGRESS NOTES
ANTICOAGULATION FOLLOW-UP CLINIC VISIT    Patient Name:  Lesli Lorenzana  Date:  5/1/2017  Contact Type:  Telephone    SUBJECTIVE:     Patient Findings     Positives Unexplained INR or factor level change    Comments INR 4.9. This is increase from 2.8 on 3 days ago with dose decrease. No reported changes or concerns. Eating well. Using tylenol for pain. No bleeding or bruising. Noted INR was supra at start of warfarin while in hospital. Will hold coumadin 2 days. Nurse will recheck INR in home in 2 days. Add salad to diet today. Cautioned on increased risk of bleeding or bruising. Verbal orders given to nurse Cony.            OBJECTIVE    INR   Date Value Ref Range Status   05/01/2017 4.9  Final       ASSESSMENT / PLAN  INR assessment SUPRA    Recheck INR In: 2 DAYS    INR Location [facility] INR      Anticoagulation Summary as of 5/1/2017     INR goal 2.0-2.5   Today's INR 4.9!   Maintenance plan No maintenance plan   Full instructions 5/1: Hold; 5/2: Hold   Plan last modified Bhumika Snyder RN (4/20/2017)   Next INR check 5/3/2017   Target end date     Indications   Long-term (current) use of anticoagulants [Z79.01] [Z79.01]  Aftercare following joint replacement [Z47.1] [Z47.1]  S/P total hip arthroplasty [Z96.649]         Anticoagulation Episode Summary     INR check location     Preferred lab     Send INR reminders to [facility]    Comments       Anticoagulation Care Providers     Provider Role Specialty Phone number    Dee Awan MD [facility] [phone]            See the Encounter Report to view Anticoagulation Flowsheet and Dosing Calendar (Go to Encounters tab in chart review, and find the Anticoagulation Therapy Visit)        Bhumika Snyder RN

## 2017-05-01 NOTE — MR AVS SNAPSHOT
Lesli Lorenzana   5/1/2017   Anticoagulation Therapy Visit    Description:  76 year old female   Provider:  Dee Awan MD   Department:  [facility]           INR as of 5/1/2017     Today's INR 4.9!      Anticoagulation Summary as of 5/1/2017     INR goal 2.0-2.5   Today's INR 4.9!   Full instructions 5/1: Hold; 5/2: Hold   Next INR check 5/3/2017    Indications   Long-term (current) use of anticoagulants [Z79.01] [Z79.01]  Aftercare following joint replacement [Z47.1] [Z47.1]  S/P total hip arthroplasty [Z96.649]         May 2017 Details    Sun Mon Tue Wed Thu Fri Sat      1      Hold   See details      2      Hold         3            4               5               6                 7               8               9               10               11               12               13                 14               15               16               17               18               19               20                 21               22               23               24               25               26               27                 28               29               30               31                   Date Details   05/01 This INR check       Date of next INR:  5/3/2017         How to take your warfarin dose     Hold Do not take your warfarin dose. See the Details table to the right for additional instructions.

## 2017-05-01 NOTE — TELEPHONE ENCOUNTER
Cony states that patient's INR was 4.9 today and would like to talk to you before she leaves the patients home in 15 minutes.  Please call.\    Thank you.

## 2017-05-03 ENCOUNTER — TELEPHONE (OUTPATIENT)
Dept: FAMILY MEDICINE | Facility: CLINIC | Age: 77
End: 2017-05-03

## 2017-05-03 ENCOUNTER — ANTICOAGULATION THERAPY VISIT (OUTPATIENT)
Dept: FAMILY MEDICINE | Facility: CLINIC | Age: 77
End: 2017-05-03
Payer: COMMERCIAL

## 2017-05-03 DIAGNOSIS — Z47.1 AFTERCARE FOLLOWING JOINT REPLACEMENT: ICD-10-CM

## 2017-05-03 DIAGNOSIS — Z96.642 STATUS POST TOTAL REPLACEMENT OF LEFT HIP: ICD-10-CM

## 2017-05-03 DIAGNOSIS — Z79.01 LONG-TERM (CURRENT) USE OF ANTICOAGULANTS: ICD-10-CM

## 2017-05-03 LAB — INR PPP: 3.8

## 2017-05-03 PROCEDURE — 99207 ZZC NO CHARGE NURSE ONLY: CPT | Performed by: FAMILY MEDICINE

## 2017-05-03 NOTE — TELEPHONE ENCOUNTER
[facility] home care nurse calling with INR results 3.8  Please call with orders ok to leave message on secure line  Thank you

## 2017-05-03 NOTE — MR AVS SNAPSHOT
Lesli LUIS Lorenzana   5/3/2017   Anticoagulation Therapy Visit    Description:  76 year old female   Provider:  Dee Awan MD   [facility]:  [facility]           INR as of 5/3/2017     Today's INR 3.8!      Anticoagulation Summary as of 5/3/2017     INR goal 2.0-2.5   Today's INR 3.8!   Full instructions 5/3: Hold; 5/4: 1 mg   Next INR check 5/5/2017    Indications   Long-term (current) use of anticoagulants [Z79.01] [Z79.01]  Aftercare following joint replacement [Z47.1] [Z47.1]  S/P total hip arthroplasty [Z96.649]         May 2017 Details    Sun Mon Tue Wed Thu Fri Sat      1               2               3      Hold   See details      4      1 mg         5            6                 7               8               9               10               11               12               13                 14               15               16               17               18               19               20                 21               22               23               24               25               26               27                 28               29               30               31                   Date Details   05/03 This INR check       Date of next INR:  5/5/2017         How to take your warfarin dose     To take:  1 mg Take 1 of the 1 mg tablets.    Hold Do not take your warfarin dose. See the Details table to the right for additional instructions.

## 2017-05-03 NOTE — PROGRESS NOTES
ANTICOAGULATION FOLLOW-UP CLINIC VISIT    Patient Name:  Lesli Lorenzana  Date:  5/3/2017  Contact Type:  Telephone    SUBJECTIVE:     Patient Findings     Positives Initiation of therapy, Unexplained INR or factor level change    Comments Spoke with home care nurse Cony. INR 3.8 today. This is down from 4.9 on 2 days ago with 2 days hold of coumadin. No new concerns reported. Patient is comfortable. Hold dose today and take 1 mg coumadin tomorrow. Recheck INR in 2 days. Verbal orders given to Cony.           OBJECTIVE    INR   Date Value Ref Range Status   05/03/2017 3.8  Final       ASSESSMENT / PLAN  INR assessment SUPRA    Recheck INR In: 2 DAYS    INR Location [facility] INR      Anticoagulation Summary as of 5/3/2017     INR goal 2.0-2.5   Today's INR 3.8!   Maintenance plan No maintenance plan   Full instructions 5/3: Hold; 5/4: 1 mg   Plan last modified Bhumika Snyder RN (4/20/2017)   Next INR check 5/5/2017   Target end date     Indications   Long-term (current) use of anticoagulants [Z79.01] [Z79.01]  Aftercare following joint replacement [Z47.1] [Z47.1]  S/P total hip arthroplasty [Z96.649]         Anticoagulation Episode Summary     INR check location     Preferred lab     Send INR reminders to [facility]    Comments       Anticoagulation Care Providers     Provider Role Specialty Phone number    Dee Awan MD [facility] [phone]            See the Encounter Report to view Anticoagulation Flowsheet and Dosing Calendar (Go to Encounters tab in chart review, and find the Anticoagulation Therapy Visit)        Bhumika Snyder, RN

## 2017-05-05 ENCOUNTER — ANTICOAGULATION THERAPY VISIT (OUTPATIENT)
Dept: FAMILY MEDICINE | Facility: CLINIC | Age: 77
End: 2017-05-05
Payer: COMMERCIAL

## 2017-05-05 DIAGNOSIS — Z96.642 STATUS POST TOTAL REPLACEMENT OF LEFT HIP: ICD-10-CM

## 2017-05-05 DIAGNOSIS — Z79.01 LONG-TERM (CURRENT) USE OF ANTICOAGULANTS: ICD-10-CM

## 2017-05-05 DIAGNOSIS — Z47.1 AFTERCARE FOLLOWING JOINT REPLACEMENT: ICD-10-CM

## 2017-05-05 LAB — INR PPP: 2.2

## 2017-05-05 PROCEDURE — 99207 ZZC NO CHARGE NURSE ONLY: CPT | Performed by: FAMILY MEDICINE

## 2017-05-05 NOTE — PROGRESS NOTES
ANTICOAGULATION FOLLOW-UP CLINIC VISIT    Patient Name:  Lesli Lorenzana  Date:  5/5/2017  Contact Type:  Telephone    SUBJECTIVE:     Patient Findings     Positives Initiation of therapy    Comments Spoke with home care nurse. Reports INR 2.2 today. Will adjust dose and recheck in 3 days. Verbal order given to nurse Rebecca.            OBJECTIVE    INR   Date Value Ref Range Status   05/05/2017 2.2  Final       ASSESSMENT / PLAN  INR assessment THER    Recheck INR In: 3 DAYS    INR Location [facility] INR      Anticoagulation Summary as of 5/5/2017     INR goal 2.0-2.5   Today's INR 2.2   Maintenance plan No maintenance plan   Full instructions 5/5: 2 mg; 5/6: 1 mg; 5/7: 2 mg   Plan last modified Bhumika Snyder RN (4/20/2017)   Next INR check 5/8/2017   Target end date     Indications   Long-term (current) use of anticoagulants [Z79.01] [Z79.01]  Aftercare following joint replacement [Z47.1] [Z47.1]  S/P total hip arthroplasty [Z96.649]         Anticoagulation Episode Summary     INR check location     Preferred lab     Send INR reminders to [facility]    Comments       Anticoagulation Care Providers     Provider Role Specialty Phone number    Dee Awan MD [facility] [phone]            See the Encounter Report to view Anticoagulation Flowsheet and Dosing Calendar (Go to Encounters tab in chart review, and find the Anticoagulation Therapy Visit)    Dosage adjustment made based on physician directed care plan.    Bhumika Snyder RN

## 2017-05-05 NOTE — MR AVS SNAPSHOT
Lesli Lorenzana   5/5/2017   Anticoagulation Therapy Visit    Description:  76 year old female   Provider:  Dee Awan MD   [facility]  [facility]           INR as of 5/5/2017     Today's INR 2.2      Anticoagulation Summary as of 5/5/2017     INR goal 2.0-2.5   Today's INR 2.2   Full instructions 5/5: 2 mg; 5/6: 1 mg; 5/7: 2 mg   Next INR check 5/8/2017    Indications   Long-term (current) use of anticoagulants [Z79.01] [Z79.01]  Aftercare following joint replacement [Z47.1] [Z47.1]  S/P total hip arthroplasty [Z96.649]         May 2017 Details    Sun Mon Tue Wed Thu Fri Sat      1               2               3               4               5      2 mg   See details      6      1 mg           7      2 mg         8            9               10               11               12               13                 14               15               16               17               18               19               20                 21               22               23               24               25               26               27                 28               29               30               31                   Date Details   05/05 This INR check       Date of next INR:  5/8/2017         How to take your warfarin dose     To take:  1 mg Take 1 of the 1 mg tablets.    To take:  2 mg Take 2 of the 1 mg tablets.

## 2017-05-08 LAB — INR PPP: 2

## 2017-05-09 ENCOUNTER — ANTICOAGULATION THERAPY VISIT (OUTPATIENT)
Dept: FAMILY MEDICINE | Facility: CLINIC | Age: 77
End: 2017-05-09
Payer: COMMERCIAL

## 2017-05-09 DIAGNOSIS — Z47.1 AFTERCARE FOLLOWING JOINT REPLACEMENT: ICD-10-CM

## 2017-05-09 DIAGNOSIS — Z79.01 LONG-TERM (CURRENT) USE OF ANTICOAGULANTS: ICD-10-CM

## 2017-05-09 DIAGNOSIS — Z96.642 STATUS POST TOTAL REPLACEMENT OF LEFT HIP: ICD-10-CM

## 2017-05-09 PROCEDURE — 99207 ZZC NO CHARGE NURSE ONLY: CPT | Performed by: FAMILY MEDICINE

## 2017-05-09 NOTE — MR AVS SNAPSHOT
Lesli Lorenzana   5/9/2017   Anticoagulation Therapy Visit    Description:  76 year old female   Provider:  Dee Awan MD   [facility]  [facility]           INR as of 5/9/2017     Today's INR 2.0 (5/8/2017)      Anticoagulation Summary as of 5/9/2017     INR goal 2.0-2.5   Today's INR 2.0 (5/8/2017)   Full instructions No maintenance plan   Next INR check 5/9/2017    Indications   Long-term (current) use of anticoagulants [Z79.01] [Z79.01]  Aftercare following joint replacement [Z47.1] [Z47.1]  S/P total hip arthroplasty [Z96.649]         May 2017 Details    Sun Mon Tue Wed Thu Fri Sat      1               2               3               4               5               6                 7               8               9      See details      10               11               12               13                 14               15               16               17               18               19               20                 21               22               23               24               25               26               27                 28               29               30               31                   Date Details   05/09 This INR check       Date of next INR:  5/9/2017

## 2017-05-09 NOTE — PROGRESS NOTES
ANTICOAGULATION FOLLOW-UP CLINIC VISIT    Patient Name:  Lesli Lorenzana  Date:  5/9/2017  Contact Type:  Telephone    SUBJECTIVE:     Patient Findings     Comments INR 2.0 reported by home care nurse. Reports patient doing very well. Walking with cane only. Comfortable. No concerns. Discharging from home care services today. Will be 4 weeks post op on tomorrow 5/9. Patient has one dose of warfarin left and nurse reports patient does not want to refill this. Orders were for warfarin 4 wks post op. Patient will take last dose Monday. Message sent to provider of this update .            OBJECTIVE    INR   Date Value Ref Range Status   05/08/2017 2.0  Final       ASSESSMENT / PLAN  INR assessment THER    Recheck INR In: 1 DAY    INR Location [facility] INR      Anticoagulation Summary as of 5/9/2017     INR goal 2.0-2.5   Today's INR 2.0 (5/8/2017)   Maintenance plan No maintenance plan   Full instructions No maintenance plan   Plan last modified Bhumika Snyder RN (4/20/2017)   Next INR check 5/9/2017   Target end date     Indications   Long-term (current) use of anticoagulants [Z79.01] [Z79.01]  Aftercare following joint replacement [Z47.1] [Z47.1]  S/P total hip arthroplasty [Z96.649]         Anticoagulation Episode Summary     INR check location     Preferred lab     Send INR reminders to [facility]    Comments       Anticoagulation Care Providers     Provider Role Specialty Phone number    Dee Awan MD [facility] [phone]            See the Encounter Report to view Anticoagulation Flowsheet and Dosing Calendar (Go to Encounters tab in chart review, and find the Anticoagulation Therapy Visit)        Bhumika Snyder RN

## 2017-05-11 ENCOUNTER — ANTICOAGULATION THERAPY VISIT (OUTPATIENT)
Dept: FAMILY MEDICINE | Facility: CLINIC | Age: 77
End: 2017-05-11

## 2017-05-11 DIAGNOSIS — Z79.01 LONG-TERM (CURRENT) USE OF ANTICOAGULANTS: ICD-10-CM

## 2017-05-11 DIAGNOSIS — Z47.1 AFTERCARE FOLLOWING JOINT REPLACEMENT: ICD-10-CM

## 2017-05-11 DIAGNOSIS — Z96.642 STATUS POST TOTAL REPLACEMENT OF LEFT HIP: ICD-10-CM

## 2017-05-11 NOTE — MR AVS SNAPSHOT
Lesli Lorenzana   5/11/2017   Anticoagulation Therapy Visit    Description:  76 year old female   Provider:  Dee Awan MD   [facility]           INR as of 5/11/2017     Today's INR       Anticoagulation Summary as of 5/11/2017     INR goal 2.0-2.5   Today's INR    Full instructions No maintenance plan   Next INR check     Indications   Long-term (current) use of anticoagulants [Z79.01] [Z79.01]  Aftercare following joint replacement [Z47.1] [Z47.1]  S/P total hip arthroplasty [Z96.649]         Anticoagulation Episode Summary     Resolved date 5/11/2017    Resolved reason Therapy  Complete

## 2017-05-15 ENCOUNTER — OFFICE VISIT (OUTPATIENT)
Dept: ORTHOPEDICS | Facility: CLINIC | Age: 77
End: 2017-05-15

## 2017-05-15 VITALS — WEIGHT: 126.2 LBS | BODY MASS INDEX: 24.65 KG/M2

## 2017-05-15 DIAGNOSIS — Z96.642 STATUS POST TOTAL REPLACEMENT OF LEFT HIP: Primary | ICD-10-CM

## 2017-05-15 ASSESSMENT — ACTIVITIES OF DAILY LIVING (ADL)
ADL_SUM: 8
ADL_SUBSCALE_SCORE: 88.23
ADL_MEAN: .47

## 2017-05-15 ASSESSMENT — HOOS S4: HOW SEVERE IS YOUR HIP JOINT STIFFNESS AFTER FIRST WAKENING IN THE MORNING?: MILD

## 2017-05-15 NOTE — NURSING NOTE
Reason For Visit:   Chief Complaint   Patient presents with     Surgical Followup     F/u S/p Left Total Hip Replacement DOS: 4/11/17             Wt 57.2 kg (126 lb 3.2 oz)  BMI 24.65 kg/m2                        Pain Assessment  Patient Currently in Pain: No                         Current Outpatient Prescriptions   Medication     acetaminophen (TYLENOL) 325 MG tablet     calcium-vitamin D (CALTRATE) 600-400 MG-UNIT per tablet     metoprolol (LOPRESSOR) 50 MG tablet     lisinopril (PRINIVIL,ZESTRIL) 2.5 MG tablet     isosorbide mononitrate (IMDUR) 60 MG 24 hr tablet     tacrolimus (PROGRAF - GENERIC EQUIVALENT) 0.5 MG capsule     nystatin-triamcinolone (MYCOLOG II) cream     aspirin 81 MG tablet     MULTI-VITAMIN PO     hydrALAZINE (APRESOLINE) 25 MG tablet     No current facility-administered medications for this visit.        Allergies   Allergen Reactions     Amlodipine Besylate Swelling     Edema in legs     Amoxicillin Rash       Orin Gray C.M.A.

## 2017-05-15 NOTE — PROGRESS NOTES
DX:  1. 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 DJD L hip.     TREATMENTS:  1. 2008, Liver transplant  2. 4/11/2011, L NAREN (Nathaniel) [facility]      Postoperative hip replacement follow-up clinic visit    Lesli Lorenzana is doing well after last surgery listed above on 4/11/17.  She has stopped warfarin.  preop pain has resolved.    Not taking any pain meds.  Had U/S that was (-) for DVT couple weeks ago.    EXAM:  Incision healing, clean and dry.  Effusion: none  Periarticular swelling or leg edema:   Peroneal and tibial nerve function: intact  Gait: Ambulating independently or with cane.    Intraoperative cultures:  none     IMAGING:  Radiographs demonstrate the hip implant in satisfactory position without evidence of any complication.    IMPRESSION:  Excellent outcome to date after hip replacement.     PLAN:    RTC at 1 year postop    OK to sleep on her side.    OK to swim.    OK to do treadmill.         Zhen Armstrong M.D.  [employer] Professor  Oncology and Adult Reconstructive Surgery  Dept Orthopaedic Surgery, [facility]  [phone] office  [email or web address]    Total Time = 15 min, 50% of which was spent in counseling and coordination of care as documented above.

## 2017-05-15 NOTE — MR AVS SNAPSHOT
After Visit Summary   5/15/2017    Lesli Lorenzana    MRN: [id number]           Patient Information     Date Of Birth          1940        Visit Information        Provider Department      5/15/2017 10:15 AM Zhen Armstrong MD [facility]        Today's Diagnoses     Status post total replacement of left hip    -  1       Follow-ups after your visit        Your next 10 appointments already scheduled     Jun 06, 2017  8:00 AM CDT   [facility]    [address] [facility] [address] [phone]           Patient must bring picture ID.  Patient should be prepared to give a urine specimen  Please do not eat 10-12 hours before your appointment if you are coming in fasting for labs on lipids, cholesterol, or glucose (sugar).  Pregnant women should follow their Care Team instructions. Water with medications is okay. Do not drink coffee or other fluids.   If you have concerns about taking  your medications, please ask at office or if scheduling via [employer], send a message by clicking on Secure Messaging, Message Your Care Team.            Jul 28, 2017 10:00 AM CDT   (Arrive by 9:45 AM)   Return Liver Transplant with Tucker Muhammad MD   [facility]at[facility] ([facility])    [address] [place] [address] [phone]            Sep 07, 2017 10:30 AM CDT   (Arrive by 10:15 AM)   Return Visit with Nain Lagunas MD   [facility] ([facility])    [address] [place] [id number] [phone]              Who to contact     Please call your clinic at [phone] to:    Ask questions about your health    Make or cancel appointments    Discuss your medicines    Learn about your test results    Speak to your doctor   If you have compliments or concerns about an experience at your clinic, or if you wish to file a  complaint, please contact [facility] Patient Relations at [phone] or email us at [email or web address]         Additional Information About Your Visit        [employer] Information     [employer] gives you secure access to your electronic health record. If you see a primary care provider, you can also send messages to your care team and make appointments. If you have questions, please call your primary care clinic.  If you do not have a primary care provider, please call [phone] and they will assist you.      [employer] is an electronic gateway that provides easy, online access to your medical records. With [employer], you can request a clinic appointment, read your test results, renew a prescription or communicate with your care team.     To access your existing account, please contact your [facility] or call [phone] for assistance.        Care EveryWhere ID     This is your Care EveryWhere ID. This could be used by other organizations to access your [place] medical records  [id number]        Your Vitals Were     BMI (Body Mass Index)                   24.65 kg/m2            Blood Pressure from Last 3 Encounters:   04/25/17 125/63   04/15/17 154/60   04/05/17 184/83    Weight from Last 3 Encounters:   05/15/17 57.2 kg (126 lb 3.2 oz)   04/25/17 60.8 kg (134 lb)   04/24/17 60.2 kg (132 lb 12.8 oz)              Today, you had the following     No orders found for display         Today's Medication Changes          These changes are accurate as of: 5/15/17 10:48 AM.  If you have any questions, ask your nurse or doctor.               These medicines have changed or have updated prescriptions.        Dose/Directions    lisinopril 2.5 MG tablet   Commonly known as:  PRINIVIL/Zestril   This may have changed:  when to take this   Used for:  Coronary artery disease involving native heart without angina pectoris, unspecified vessel or lesion type         Dose:  2.5 mg   Take 1 tablet (2.5 mg) by mouth daily   Quantity:  93 tablet   Refills:  3         Stop taking these medicines if you haven't already. Please contact your care team if you have questions.     furosemide 20 MG tablet   Commonly known as:  LASIX   Stopped by:  Zhen Armstrong MD           oxyCODONE 5 MG IR tablet   Commonly known as:  ROXICODONE   Stopped by:  Zhen Armstrong MD           senna-docusate 8.6-50 MG per tablet   Commonly known as:  SENOKOT-S;PERICOLACE   Stopped by:  Zhen Armstrong MD           warfarin 1 MG tablet   Commonly known as:  COUMADIN   Stopped by:  Zhen Armstrong MD                    Primary Care Provider Office Phone # Fax #    Dee Awan [phone]       [facility] [address] [facility] [address]        Thank you!     Thank you for choosing [facility] ORTHOPA[facility]  for your care. Our goal is always to provide you with excellent care. Hearing back from our patients is one way we can continue to improve our services. Please take a few minutes to complete the written survey that you may receive in the mail after your visit with us. Thank you!             Your Updated Medication List - Protect others around you: Learn how to safely use, store and throw away your medicines at www.disposemymeds.org.          This list is accurate as of: 5/15/17 10:48 AM.  Always use your most recent med list.                   Brand Name Dispense Instructions for use    acetaminophen 325 MG tablet    TYLENOL    100 tablet    Take 2 tablets (650 mg) by mouth every 4 hours as needed for other (surgical pain)       aspirin 81 MG tablet     90 tablet    Take 1 tablet by mouth daily.       calcium-vitamin D 600-400 MG-UNIT per tablet    CALTRATE     Take 1 tablet by mouth daily       hydrALAZINE 25 MG tablet    APRESOLINE    90 tablet    Take 1 tablet (25 mg) by mouth 3 times daily , as needed if systolic blood pressure (top number) is more than  180 mmHg.       isosorbide mononitrate 60 MG 24 hr tablet    IMDUR    180 tablet    Take 1 tablet (60 mg) by mouth 2 times daily       lisinopril 2.5 MG tablet    PRINIVIL/Zestril    93 tablet    Take 1 tablet (2.5 mg) by mouth daily       metoprolol 50 MG tablet    LOPRESSOR    182 tablet    Take 1 tablet (50 mg) by mouth 2 times daily       MULTI-VITAMIN PO      Take 1 tablet by mouth daily       nystatin-triamcinolone cream    MYCOLOG II     Apply topically 4 times daily as needed Reported on 4/5/2017       tacrolimus 0.5 MG capsule    PROGRAF - GENERIC EQUIVALENT    270 capsule    Take 0.5 mg (1 cap) every AM, take 1 mg, (2 caps) every PM.

## 2017-05-15 NOTE — LETTER
5/15/2017       RE: Lesli Lorenzana  [address]  [place] [address]     Dear Colleague,    Thank you for referring your patient, Lesli Lorenzana, to the [facility] at [facility]. Please see a copy of my visit note below.          DX:  1. 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 DJD L hip.     TREATMENTS:  1. 2008, Liver transplant  2. 4/11/2011, L NAREN (Nathaniel) [facility]      Postoperative hip replacement follow-up clinic visit    Lesli Lorenzana is doing well after last surgery listed above on 4/11/17.  She has stopped warfarin.  preop pain has resolved.    Not taking any pain meds.  Had U/S that was (-) for DVT couple weeks ago.    EXAM:  Incision healing, clean and dry.  Effusion: none  Periarticular swelling or leg edema:   Peroneal and tibial nerve function: intact  Gait: Ambulating independently or with cane.    Intraoperative cultures:  none     IMAGING:  Radiographs demonstrate the hip implant in satisfactory position without evidence of any complication.    IMPRESSION:  Excellent outcome to date after hip replacement.     PLAN:    RTC at 1 year postop    OK to sleep on her side.    OK to swim.    OK to do treadmill.         Zhen Armstrong M.D.  [employer] Professor  Oncology and Adult Reconstructive Surgery  Dept Orthopaedic Surgery, [facility]  [phone] office  [email or web address]    Total Time = 15 min, 50% of which was spent in counseling and coordination of care as documented above.

## 2017-06-06 DIAGNOSIS — Z94.4 LIVER REPLACED BY TRANSPLANT (H): ICD-10-CM

## 2017-06-06 LAB
ALBUMIN SERPL-MCNC: 3 G/DL (ref 3.4–5)
ALP SERPL-CCNC: 134 U/L (ref 40–150)
ALT SERPL W P-5'-P-CCNC: 71 U/L (ref 0–50)
ANION GAP SERPL CALCULATED.3IONS-SCNC: 6 MMOL/L (ref 3–14)
AST SERPL W P-5'-P-CCNC: 116 U/L (ref 0–45)
BILIRUB DIRECT SERPL-MCNC: 0.1 MG/DL (ref 0–0.2)
BILIRUB SERPL-MCNC: 0.5 MG/DL (ref 0.2–1.3)
BUN SERPL-MCNC: 26 MG/DL (ref 7–30)
CALCIUM SERPL-MCNC: 8.6 MG/DL (ref 8.5–10.1)
CHLORIDE SERPL-SCNC: 104 MMOL/L (ref 94–109)
CO2 SERPL-SCNC: 24 MMOL/L (ref 20–32)
CREAT SERPL-MCNC: 1.47 MG/DL (ref 0.52–1.04)
ERYTHROCYTE [DISTWIDTH] IN BLOOD BY AUTOMATED COUNT: 13.7 % (ref 10–15)
GFR SERPL CREATININE-BSD FRML MDRD: 35 ML/MIN/1.7M2
GLUCOSE SERPL-MCNC: 84 MG/DL (ref 70–99)
HCT VFR BLD AUTO: 27.3 % (ref 35–47)
HGB BLD-MCNC: 8.9 G/DL (ref 11.7–15.7)
MCH RBC QN AUTO: 28.1 PG (ref 26.5–33)
MCHC RBC AUTO-ENTMCNC: 32.6 G/DL (ref 31.5–36.5)
MCV RBC AUTO: 86 FL (ref 78–100)
PLATELET # BLD AUTO: 234 10E9/L (ref 150–450)
POTASSIUM SERPL-SCNC: 4.3 MMOL/L (ref 3.4–5.3)
PROT SERPL-MCNC: 7.9 G/DL (ref 6.8–8.8)
RBC # BLD AUTO: 3.17 10E12/L (ref 3.8–5.2)
SODIUM SERPL-SCNC: 134 MMOL/L (ref 133–144)
TACROLIMUS BLD-MCNC: 5 UG/L (ref 5–15)
TME LAST DOSE: 2000 H
WBC # BLD AUTO: 3 10E9/L (ref 4–11)

## 2017-06-06 PROCEDURE — 80048 BASIC METABOLIC PNL TOTAL CA: CPT | Performed by: INTERNAL MEDICINE

## 2017-06-06 PROCEDURE — 36415 COLL VENOUS BLD VENIPUNCTURE: CPT | Performed by: INTERNAL MEDICINE

## 2017-06-06 PROCEDURE — 80076 HEPATIC FUNCTION PANEL: CPT | Performed by: INTERNAL MEDICINE

## 2017-06-06 PROCEDURE — 85027 COMPLETE CBC AUTOMATED: CPT | Performed by: INTERNAL MEDICINE

## 2017-06-06 PROCEDURE — 80197 ASSAY OF TACROLIMUS: CPT | Performed by: INTERNAL MEDICINE

## 2017-06-07 ENCOUNTER — TELEPHONE (OUTPATIENT)
Dept: TRANSPLANT | Facility: CLINIC | Age: 77
End: 2017-06-07

## 2017-06-07 DIAGNOSIS — Z94.4 LIVER TRANSPLANTED (H): Primary | ICD-10-CM

## 2017-06-07 NOTE — TELEPHONE ENCOUNTER
"Spoke to Lesli.  She had a hip replacement on 4/11 and reports doing \"really well\".  Denies fever, any kind of sickness.  Was weaned off pred this past January.  Does NOT miss doses of immunosuppression.  Lesli will repeat her lft's on Monday, 6/12.  Orders placed.  "

## 2017-06-14 ENCOUNTER — TELEPHONE (OUTPATIENT)
Dept: TRANSPLANT | Facility: CLINIC | Age: 77
End: 2017-06-14

## 2017-06-14 DIAGNOSIS — Z94.4 LIVER TRANSPLANTED (H): ICD-10-CM

## 2017-06-14 DIAGNOSIS — R79.89 ELEVATED LIVER FUNCTION TESTS: Primary | ICD-10-CM

## 2017-06-14 LAB
ALBUMIN SERPL-MCNC: 3.1 G/DL (ref 3.4–5)
ALP SERPL-CCNC: 270 U/L (ref 40–150)
ALT SERPL W P-5'-P-CCNC: 174 U/L (ref 0–50)
AST SERPL W P-5'-P-CCNC: 278 U/L (ref 0–45)
BILIRUB DIRECT SERPL-MCNC: 0.9 MG/DL (ref 0–0.2)
BILIRUB SERPL-MCNC: 1.6 MG/DL (ref 0.2–1.3)
PROT SERPL-MCNC: 8.3 G/DL (ref 6.8–8.8)
TACROLIMUS BLD-MCNC: 6 UG/L (ref 5–15)
TME LAST DOSE: 2010 H

## 2017-06-14 PROCEDURE — 80197 ASSAY OF TACROLIMUS: CPT | Performed by: INTERNAL MEDICINE

## 2017-06-14 PROCEDURE — 80076 HEPATIC FUNCTION PANEL: CPT | Performed by: INTERNAL MEDICINE

## 2017-06-14 PROCEDURE — 36415 COLL VENOUS BLD VENIPUNCTURE: CPT | Performed by: INTERNAL MEDICINE

## 2017-06-14 NOTE — TELEPHONE ENCOUNTER
Liver tests abnormal last week, worse this week.  Reviewed w/ Dr. Muhammad.  Will set up for liver bx asap.  Lesli aware, knows to hold aspirin.  Order placed.

## 2017-06-15 NOTE — TELEPHONE ENCOUNTER
I received a request to schedule this patient for a Transplanted Liver Biopsy ASAP due to abnormal labs. Pt offered 1st available date of 6/19 @ 1030. Pt declined as she states she will not have a  available until 6/21. Pt scheduled for Transplanted Liver Biopsy on 6/21. Pt knows to check into the Gold Waiting Room @ 1100. IR Nurse to call patient will prep instructions.

## 2017-06-16 ENCOUNTER — TELEPHONE (OUTPATIENT)
Dept: INTERVENTIONAL RADIOLOGY/VASCULAR | Facility: CLINIC | Age: 77
End: 2017-06-16

## 2017-06-16 NOTE — TELEPHONE ENCOUNTER
Pt states she was able to secure a ride on 6/19/17 for Transplanted Liver Biopsy. Interventional Radiology was contacted and patient is scheduled for Transplanted Liver Biopsy on 6/19/17 @ 1030. Pt aware of date, time and location of procedure and states she will attend.

## 2017-06-19 ENCOUNTER — APPOINTMENT (OUTPATIENT)
Dept: MEDSURG UNIT | Facility: CLINIC | Age: 77
End: 2017-06-19
Attending: INTERNAL MEDICINE
Payer: MEDICARE

## 2017-06-19 ENCOUNTER — HOSPITAL ENCOUNTER (OUTPATIENT)
Facility: CLINIC | Age: 77
Discharge: HOME | End: 2017-06-19
Attending: INTERNAL MEDICINE | Admitting: INTERNAL MEDICINE
Payer: MEDICARE

## 2017-06-19 ENCOUNTER — APPOINTMENT (OUTPATIENT)
Dept: INTERVENTIONAL RADIOLOGY/VASCULAR | Facility: CLINIC | Age: 77
End: 2017-06-19
Attending: INTERNAL MEDICINE
Payer: MEDICARE

## 2017-06-19 VITALS
DIASTOLIC BLOOD PRESSURE: 67 MMHG | BODY MASS INDEX: 24.54 KG/M2 | TEMPERATURE: 97.7 F | OXYGEN SATURATION: 97 % | HEIGHT: 60 IN | SYSTOLIC BLOOD PRESSURE: 138 MMHG | HEART RATE: 69 BPM | RESPIRATION RATE: 20 BRPM | WEIGHT: 125 LBS

## 2017-06-19 DIAGNOSIS — R79.89 ELEVATED LIVER FUNCTION TESTS: ICD-10-CM

## 2017-06-19 LAB — INR BLD: 1 (ref 0.86–1.14)

## 2017-06-19 PROCEDURE — 88313 SPECIAL STAINS GROUP 2: CPT | Performed by: INTERNAL MEDICINE

## 2017-06-19 PROCEDURE — 85610 PROTHROMBIN TIME: CPT | Mod: QW

## 2017-06-19 PROCEDURE — 25000128 H RX IP 250 OP 636: Performed by: PHYSICIAN ASSISTANT

## 2017-06-19 PROCEDURE — 88307 TISSUE EXAM BY PATHOLOGIST: CPT | Performed by: INTERNAL MEDICINE

## 2017-06-19 PROCEDURE — 76942 ECHO GUIDE FOR BIOPSY: CPT

## 2017-06-19 PROCEDURE — 40000167 ZZH STATISTIC PP CARE STAGE 2

## 2017-06-19 RX ORDER — NALOXONE HYDROCHLORIDE 0.4 MG/ML
.1-.4 INJECTION, SOLUTION INTRAMUSCULAR; INTRAVENOUS; SUBCUTANEOUS
Status: DISCONTINUED | OUTPATIENT
Start: 2017-06-19 | End: 2017-06-19 | Stop reason: HOSPADM

## 2017-06-19 RX ORDER — LIDOCAINE 40 MG/G
CREAM TOPICAL
Status: DISCONTINUED | OUTPATIENT
Start: 2017-06-19 | End: 2017-06-19 | Stop reason: HOSPADM

## 2017-06-19 RX ORDER — SODIUM CHLORIDE 9 MG/ML
INJECTION, SOLUTION INTRAVENOUS CONTINUOUS
Status: DISCONTINUED | OUTPATIENT
Start: 2017-06-19 | End: 2017-06-19 | Stop reason: HOSPADM

## 2017-06-19 RX ORDER — FENTANYL CITRATE 50 UG/ML
25-50 INJECTION, SOLUTION INTRAMUSCULAR; INTRAVENOUS EVERY 5 MIN PRN
Status: DISCONTINUED | OUTPATIENT
Start: 2017-06-19 | End: 2017-06-19 | Stop reason: HOSPADM

## 2017-06-19 RX ORDER — FLUMAZENIL 0.1 MG/ML
0.2 INJECTION, SOLUTION INTRAVENOUS
Status: DISCONTINUED | OUTPATIENT
Start: 2017-06-19 | End: 2017-06-19 | Stop reason: HOSPADM

## 2017-06-19 RX ADMIN — SODIUM CHLORIDE: 9 INJECTION, SOLUTION INTRAVENOUS at 11:54

## 2017-06-19 NOTE — PROGRESS NOTES
Patient tolerated recovery stage well. VSS, Upper mid abdomen site clean/dry/intact, no hematoma, and denies pain. Patient tolerated PO food and fluids. Teaching was done and discharge instructions were given. Patient ambulated, voided, and PIV was removed. Patient discharged from the hospital via wheel chair to home with daughter.

## 2017-06-19 NOTE — PROGRESS NOTES
Interventional Radiology Pre-Procedure Sedation Assessment   Time of Assessment: 12:57 PM    Expected Level: Moderate Sedation    Indication: Sedation is required for the following type of Procedure: Biopsy    Sedation and procedural consent: Risks, benefits and alternatives were discussed with Patient    PO Intake: Appropriately NPO for procedure    ASA Class: Class 3 - SEVERE SYSTEMIC DISEASE, DEFINITE FUNCTIONAL LIMITATIONS.    Mallampati: Grade 2:  Soft palate, base of uvula, tonsillar pillars, and portion of posterior pharyngeal wall visible    Lungs: Lungs Clear with good breath sounds bilaterally    Heart: Normal heart sounds and rate    Focused history and physical completed prior to procedure. I have reviewed the lab findings, diagnostic data, medications, and the plan for sedation. I have determined this patient to be an appropriate candidate for the planned sedation and procedure and have reassessed the patient IMMEDIATELY PRIOR to sedation and procedure.    Jayesh Thompson MD

## 2017-06-19 NOTE — PLAN OF CARE
Pt arrived via cart from IR with RN. ROYAL. Mid abdominal dressing CDI. Daughter Celina at bedside.

## 2017-06-19 NOTE — BRIEF OP NOTE
Interventional Radiology Brief Post Procedure Note    Procedure: IR LIVER BIOPSY PERCUTANEOUS    Proceduralist: Evert Morrow MD    Assistant: Jayesh Thompson MD and Dieter English [facility], PA-C    Time Out: Prior to the start of the procedure and with procedural staff participation, I verbally confirmed the patient s identity using two indicators, relevant allergies, that the procedure was appropriate and matched the consent or emergent situation, and that the correct equipment/implants were available. Immediately prior to starting the procedure I conducted the Time Out with the procedural staff and re-confirmed the patient s name, procedure, and site/side. (The Joint Commission universal protocol was followed.)  Yes    Sedation: None. Local Anesthestic used    Findings: Subxiphoid approach random liver biopsy, uncomplicated.    Estimated Blood Loss: Minimal    Fluoroscopy Time:  minute(s)    SPECIMENS: Core needle biopsy specimens sent for pathological analysis    Complications: 1. None     Condition: Stable    Plan:   2 hours of bedrest. No strenuous activity for three days, then resume previous activity  Core needle samples to pathology    Comments: See dictated procedure note for full details.    Jayesh Thompson MD

## 2017-06-19 NOTE — DISCHARGE INSTRUCTIONS
[facility]    Interventional Radiology  Patient Instructions Following Liver Biopsy    AFTER YOU GO HOME  ? DO relax and take it easy for 48 hours, no strenuous activity for 24 hours  ? DO drink plenty of fluids  ? DO resume your regular diet, unless otherwise instructed by your Primary Physician  ? Keep the dressing dry and in place for 24 hours.  ? DO NOT do any strenuous exercise or lifting (> 10 lbs) for at least 3 days following your procedure  ? DO NOT take a bath or shower for at least 12 hours following your procedure  ? Remove dressing after shower the next day. Replace with Band aid for 2 days.  Never leave a wet dressing in place.  ? There should be minimum drainage from the biopsy site    CALL THE PHYSICIAN IF:  ? You start bleeding from the procedure site.  If you do start to bleed from that site, lie down flat and hold pressure on the site for a minimum of 10 minutes.  Your physician will tell you if you need to return to the hospital  ? You have excessive swelling, redness, or tenderness at the site  ? You have drainage that looks like it is infected.  ? You experience severe pain      ADDITIONAL INSTRUCTIONS  If you are taking Coumadin, restart tonight.  Follow up with your Coumadin Clinic or Primary Care MD to have your INR rechecked.    [facility] INTERVENTIONAL RADIOLOGY DEPARTMENT  Procedure Physician: GUANAKO Thompson MD/ CHIKA Jenkins/HALI                                     Date of procedure: June 19, 2017  Telephone Numbers: [phone] Monday-Friday 8:00 am to 4:30 pm  [phone] After 4:30 pm Monday-Friday, Weekends & Holidays.   Ask for the Interventional Radiologist on call.  Someone is on call 24 hrs/day  [facility] toll free number: [phone] Monday-Friday 8:00 am to 4:30 pm  [facility] Emergency Dept: [phone]

## 2017-06-19 NOTE — PROGRESS NOTES
Interventional Radiology Intra-procedural Nursing Note    Patient Name: Lesli Lorenzana  Medical Record Number: [id number]  Today's Date: June 19, 2017    Start Time: 1330  Staff: Dr. Odalys Medina PA-C  End of procedure time: 1350  Procedure: Liver biopsy   Report given to: 2A RN     Chandler'd pt from  non accompanied. Consent verified. Pt transported on RA via stretcher to -7. Pt requested to opt out of sedation. Prepped and draped per policy by Aparna RTNU. See VS flowsheet, MAR for further information.   Pt tolerated procedure well. Samples sent to pathology as ordered. Site closed with gel foam by provider. Covered with sterile 2x2 primpore dressing. Hemastasis achieved, no oozing, bleeding, or hematoma noted to site. Transported via stretcher to . SBAR given to KAREEM Chan RN,BSN

## 2017-06-19 NOTE — IP AVS SNAPSHOT
[address] [address]                                       After Visit Summary   6/19/2017    Lesli Lorenzana    MRN: [id number]           After Visit Summary Signature Page     I have received my discharge instructions, and my questions have been answered. I have discussed any challenges I see with this plan with the nurse or doctor.    ..........................................................................................................................................  Patient/Patient Representative Signature      ..........................................................................................................................................  Patient Representative Print Name and Relationship to Patient    ..................................................               ................................................  Date                                            Time    ..........................................................................................................................................  Reviewed by Signature/Title    ...................................................              ..............................................  Date                                                            Time

## 2017-06-19 NOTE — IP AVS SNAPSHOT
MRN:[id number]                      After Visit Summary   6/19/2017    Lesli Lorenzana    MRN: [id number]           Visit Information        Department      6/19/2017 10:56 AM [address] [facility]          Review of your medicines      UNREVIEWED medicines. Ask your doctor about these medicines        Dose / Directions    acetaminophen 325 MG tablet   Commonly known as:  TYLENOL   Used for:  Status post hip surgery        Dose:  650 mg   Take 2 tablets (650 mg) by mouth every 4 hours as needed for other (surgical pain)   Quantity:  100 tablet   Refills:  0       aspirin 81 MG tablet   Used for:  CAD (coronary artery disease)        Dose:  1 tablet   Take 1 tablet by mouth daily.   Quantity:  90 tablet   Refills:  3       calcium-vitamin D 600-400 MG-UNIT per tablet   Commonly known as:  CALTRATE        Dose:  1 tablet   Take 1 tablet by mouth daily   Refills:  0       hydrALAZINE 25 MG tablet   Commonly known as:  APRESOLINE   Used for:  Essential hypertension with goal blood pressure less than 140/90        Dose:  25 mg   Take 1 tablet (25 mg) by mouth 3 times daily , as needed if systolic blood pressure (top number) is more than 180 mmHg.   Quantity:  90 tablet   Refills:  3       isosorbide mononitrate 60 MG 24 hr tablet   Commonly known as:  IMDUR   Used for:  Coronary artery disease due to lipid rich plaque, Secondary hypertension with goal blood pressure less than 140/90        Dose:  60 mg   Take 1 tablet (60 mg) by mouth 2 times daily   Quantity:  180 tablet   Refills:  6       lisinopril 2.5 MG tablet   Commonly known as:  PRINIVIL/Zestril   Used for:  Coronary artery disease involving native heart without angina pectoris, unspecified vessel or lesion type        Dose:  2.5 mg   Take 1 tablet (2.5 mg) by mouth daily   Quantity:  93 tablet   Refills:  3       metoprolol 50 MG tablet   Commonly known as:  LOPRESSOR   Used for:  Essential hypertension with goal blood pressure less than  140/90, Coronary artery disease involving native heart with angina pectoris, unspecified vessel or lesion type (H)        Dose:  50 mg   Take 1 tablet (50 mg) by mouth 2 times daily   Quantity:  182 tablet   Refills:  3       MULTI-VITAMIN PO        Dose:  1 tablet   Take 1 tablet by mouth daily   Refills:  0       nystatin-triamcinolone cream   Commonly known as:  MYCOLOG II        Apply topically 4 times daily as needed Reported on 4/5/2017   Refills:  0       tacrolimus 0.5 MG capsule   Commonly known as:  PROGRAF - GENERIC EQUIVALENT   Used for:  Liver replaced by transplant (H)        Take 0.5 mg (1 cap) every AM, take 1 mg, (2 caps) every PM.   Quantity:  270 capsule   Refills:  3                Protect others around you: Learn how to safely use, store and throw away your medicines at www.disposemymeds.org.         Follow-ups after your visit        Your next 10 appointments already scheduled     Jul 28, 2017 10:00 AM CDT   (Arrive by 9:45 AM)   Return Liver Transplant with Tucker Muhammda MD   [facility] Hepatology ([facility] [place])    [address] [id number] [phone]            Aug 08, 2017  8:15 AM CDT   LAB with AN LAB   [facility] ([facility])    [address] [facility] [address] [phone]           Patient must bring picture ID.  Patient should be prepared to give a urine specimen  Please do not eat 10-12 hours before your appointment if you are coming in fasting for labs on lipids, cholesterol, or glucose (sugar).  Pregnant women should follow their Care Team instructions. Water with medications is okay. Do not drink coffee or other fluids.   If you have concerns about taking  your medications, please ask at office or if scheduling via drchrono, send a message by clicking on Secure Messaging, Message Your Care Team.            Sep 07, 2017 10:30 AM CDT   (Arrive by 10:15 AM)   Return Visit with Nain MACIAS  MD Janneth   [facility] ([facility] [place])    [address] [address] [phone]               Care Instructions        Further instructions from your care team       [facility]    Interventional Radiology  Patient Instructions Following Liver Biopsy    AFTER YOU GO HOME  ? DO relax and take it easy for 48 hours, no strenuous activity for 24 hours  ? DO drink plenty of fluids  ? DO resume your regular diet, unless otherwise instructed by your Primary Physician  ? Keep the dressing dry and in place for 24 hours.  ? DO NOT do any strenuous exercise or lifting (> 10 lbs) for at least 3 days following your procedure  ? DO NOT take a bath or shower for at least 12 hours following your procedure  ? Remove dressing after shower the next day. Replace with Band aid for 2 days.  Never leave a wet dressing in place.  ? There should be minimum drainage from the biopsy site    CALL THE PHYSICIAN IF:  ? You start bleeding from the procedure site.  If you do start to bleed from that site, lie down flat and hold pressure on the site for a minimum of 10 minutes.  Your physician will tell you if you need to return to the hospital  ? You have excessive swelling, redness, or tenderness at the site  ? You have drainage that looks like it is infected.  ? You experience severe pain      ADDITIONAL INSTRUCTIONS  If you are taking Coumadin, restart tonight.  Follow up with your Coumadin Clinic or Primary Care MD to have your INR rechecked.    [facility] INTERVENTIONAL RADIOLOGY DEPARTMENT  Procedure Physician: GUANAKO Thompson MD/ CHIKA Jenkins/HALI                                     Date of procedure: June 19, 2017  Telephone Numbers: [phone] Monday-Friday 8:00 am to 4:30 pm  [phone] After 4:30 pm Monday-Friday, Weekends & Holidays.   Ask for the Interventional Radiologist on call.  Someone is on call 24 hrs/day  [facility] toll free  number: [phone] Monday-Friday 8:00 am to 4:30 pm  [facility] Emergency Dept: [phone]           Additional Information About Your Visit        [employer] Information     [employer] gives you secure access to your electronic health record. If you see a primary care provider, you can also send messages to your care team and make appointments. If you have questions, please call your primary care clinic.  If you do not have a primary care provider, please call [phone] and they will assist you.        Care EveryWhere ID     This is your Care EveryWhere ID. This could be used by other organizations to access your [facility] medical records  [id number]        Your Vitals Were     Blood Pressure Pulse Temperature Respirations Height Weight    144/57 69 97.7  F (36.5  C) (Oral) 16 1.524 m (5') 56.7 kg (125 lb)    Pulse Oximetry BMI (Body Mass Index)                98% 24.41 kg/m2           Primary Care Provider Office Phone # Fax #    Dee Awan [phone]      Thank you!     Thank you for choosing [facility] for your care. Our goal is always to provide you with excellent care. Hearing back from our patients is one way we can continue to improve our services. Please take a few minutes to complete the written survey that you may receive in the mail after you visit with us. Thank you!             Medication List: This is a list of all your medications and when to take them. Check marks below indicate your daily home schedule. Keep this list as a reference.      Medications           Morning Afternoon Evening Bedtime As Needed    acetaminophen 325 MG tablet   Commonly known as:  TYLENOL   Take 2 tablets (650 mg) by mouth every 4 hours as needed for other (surgical pain)                                aspirin 81 MG tablet   Take 1 tablet by mouth daily.                                calcium-vitamin D 600-400 MG-UNIT per tablet   Commonly known as:  CALTRATE   Take 1 tablet by mouth daily                                 hydrALAZINE 25 MG tablet   Commonly known as:  APRESOLINE   Take 1 tablet (25 mg) by mouth 3 times daily , as needed if systolic blood pressure (top number) is more than 180 mmHg.                                isosorbide mononitrate 60 MG 24 hr tablet   Commonly known as:  IMDUR   Take 1 tablet (60 mg) by mouth 2 times daily                                lisinopril 2.5 MG tablet   Commonly known as:  PRINIVIL/Zestril   Take 1 tablet (2.5 mg) by mouth daily                                metoprolol 50 MG tablet   Commonly known as:  LOPRESSOR   Take 1 tablet (50 mg) by mouth 2 times daily                                MULTI-VITAMIN PO   Take 1 tablet by mouth daily                                nystatin-triamcinolone cream   Commonly known as:  MYCOLOG II   Apply topically 4 times daily as needed Reported on 4/5/2017                                tacrolimus 0.5 MG capsule   Commonly known as:  PROGRAF - GENERIC EQUIVALENT   Take 0.5 mg (1 cap) every AM, take 1 mg, (2 caps) every PM.

## 2017-06-21 ENCOUNTER — TELEPHONE (OUTPATIENT)
Dept: TRANSPLANT | Facility: CLINIC | Age: 77
End: 2017-06-21

## 2017-06-21 LAB — COPATH REPORT: NORMAL

## 2017-06-21 NOTE — TELEPHONE ENCOUNTER
Reviewed liver biopsy w/ Dr. Muhammad.  No changes for now.   Lesli will repeat labs in 2 weeks so we have new results for data at path conf.  Will review at pathology conference on 7/7.  Lesli shoemaker.

## 2017-06-26 DIAGNOSIS — Z94.4 LIVER REPLACED BY TRANSPLANT (H): Primary | ICD-10-CM

## 2017-07-03 ENCOUNTER — TELEPHONE (OUTPATIENT)
Dept: TRANSPLANT | Facility: CLINIC | Age: 77
End: 2017-07-03

## 2017-07-03 DIAGNOSIS — Z94.4 LIVER REPLACED BY TRANSPLANT (H): ICD-10-CM

## 2017-07-03 DIAGNOSIS — R74.8 ALKALINE PHOSPHATASE ELEVATION: Primary | ICD-10-CM

## 2017-07-03 DIAGNOSIS — T86.41 LIVER TRANSPLANT REJECTION (H): Primary | ICD-10-CM

## 2017-07-03 LAB
ALBUMIN SERPL-MCNC: 2.8 G/DL (ref 3.4–5)
ALP SERPL-CCNC: 619 U/L (ref 40–150)
ALT SERPL W P-5'-P-CCNC: 325 U/L (ref 0–50)
ANION GAP SERPL CALCULATED.3IONS-SCNC: 9 MMOL/L (ref 3–14)
AST SERPL W P-5'-P-CCNC: 420 U/L (ref 0–45)
BILIRUB DIRECT SERPL-MCNC: 0.9 MG/DL (ref 0–0.2)
BILIRUB SERPL-MCNC: 1.3 MG/DL (ref 0.2–1.3)
BUN SERPL-MCNC: 18 MG/DL (ref 7–30)
CALCIUM SERPL-MCNC: 9 MG/DL (ref 8.5–10.1)
CHLORIDE SERPL-SCNC: 100 MMOL/L (ref 94–109)
CO2 SERPL-SCNC: 24 MMOL/L (ref 20–32)
CREAT SERPL-MCNC: 1.25 MG/DL (ref 0.52–1.04)
ERYTHROCYTE [DISTWIDTH] IN BLOOD BY AUTOMATED COUNT: 16.1 % (ref 10–15)
GFR SERPL CREATININE-BSD FRML MDRD: 42 ML/MIN/1.7M2
GLUCOSE SERPL-MCNC: 90 MG/DL (ref 70–99)
HCT VFR BLD AUTO: 29.5 % (ref 35–47)
HGB BLD-MCNC: 9.7 G/DL (ref 11.7–15.7)
MCH RBC QN AUTO: 27.6 PG (ref 26.5–33)
MCHC RBC AUTO-ENTMCNC: 32.9 G/DL (ref 31.5–36.5)
MCV RBC AUTO: 84 FL (ref 78–100)
PLATELET # BLD AUTO: 295 10E9/L (ref 150–450)
POTASSIUM SERPL-SCNC: 4.4 MMOL/L (ref 3.4–5.3)
PROT SERPL-MCNC: 8.8 G/DL (ref 6.8–8.8)
RBC # BLD AUTO: 3.52 10E12/L (ref 3.8–5.2)
SODIUM SERPL-SCNC: 133 MMOL/L (ref 133–144)
WBC # BLD AUTO: 3.3 10E9/L (ref 4–11)

## 2017-07-03 PROCEDURE — 80048 BASIC METABOLIC PNL TOTAL CA: CPT | Performed by: FAMILY MEDICINE

## 2017-07-03 PROCEDURE — 80197 ASSAY OF TACROLIMUS: CPT | Performed by: FAMILY MEDICINE

## 2017-07-03 PROCEDURE — 36415 COLL VENOUS BLD VENIPUNCTURE: CPT | Performed by: FAMILY MEDICINE

## 2017-07-03 PROCEDURE — 80076 HEPATIC FUNCTION PANEL: CPT | Performed by: FAMILY MEDICINE

## 2017-07-03 PROCEDURE — 85027 COMPLETE CBC AUTOMATED: CPT | Performed by: FAMILY MEDICINE

## 2017-07-03 NOTE — TELEPHONE ENCOUNTER
Liver tests elevated.  Dr. Muhammad would like [facility] and solumedrol 500 mg iv qd x 3.

## 2017-07-04 ENCOUNTER — INFUSION THERAPY VISIT (OUTPATIENT)
Dept: INFUSION THERAPY | Facility: CLINIC | Age: 77
End: 2017-07-04
Attending: TRANSPLANT SURGERY
Payer: MEDICARE

## 2017-07-04 VITALS
RESPIRATION RATE: 12 BRPM | OXYGEN SATURATION: 99 % | SYSTOLIC BLOOD PRESSURE: 175 MMHG | HEART RATE: 75 BPM | TEMPERATURE: 96.8 F | DIASTOLIC BLOOD PRESSURE: 72 MMHG

## 2017-07-04 DIAGNOSIS — T86.41 LIVER TRANSPLANT REJECTION (H): Primary | ICD-10-CM

## 2017-07-04 LAB
TACROLIMUS BLD-MCNC: 6 UG/L (ref 5–15)
TME LAST DOSE: NORMAL H

## 2017-07-04 PROCEDURE — 25000128 H RX IP 250 OP 636: Mod: ZF | Performed by: INTERNAL MEDICINE

## 2017-07-04 PROCEDURE — 96365 THER/PROPH/DIAG IV INF INIT: CPT

## 2017-07-04 RX ADMIN — SODIUM CHLORIDE 500 MG: 9 INJECTION, SOLUTION INTRAVENOUS at 08:24

## 2017-07-04 ASSESSMENT — PAIN SCALES - GENERAL: PAINLEVEL: NO PAIN (0)

## 2017-07-04 NOTE — MR AVS SNAPSHOT
After Visit Summary   7/4/2017    Lesli Lorenzana    MRN: [id number]           Patient Information     Date Of Birth          1940        Visit Information        Provider Department      7/4/2017 8:00 AM  44 ATC; [facility] Specialty and Procedure        Today's Diagnoses     Liver transplant rejection (H)    -  1       Follow-ups after your visit        Your next 10 appointments already scheduled     Jul 05, 2017  9:15 AM CDT   MR ABDOMEN MRCP W/O & W CONTRAST with UUMR1   [facility] [place], MRI ([facility], [facility])    [address] [facility] [address] [phone]           Take your medicines as usual, unless your doctor tells you not to. Bring a list of your current medicines to your exam (including vitamins, minerals and over-the-counter drugs). Also bring the results of similar scans you may have had.    The day before your exam, drink extra fluids at least six 8-ounce glasses (unless your doctor tells you to restrict your fluids).   Have a blood test (creatinine test) within 30 days of your exam. Go to your clinic or Diagnostic Imaging Department for this test.   Do not eat or drink for 6 hours prior to exam.  The MRI machine uses a strong magnet. Please wear clothes without metal (snaps, zippers). A sweatsuit works well, or we may give you a hospital gown.  Please remove any body piercings and hair extensions before you arrive. You will also remove watches, jewelry, hairpins, wallets, dentures, partial dental plates and hearing aids. You may wear contact lenses, and you may be able to wear your rings. We have a safe place to keep your personal items, but it is safer to leave them at home.   **IMPORTANT** THE INSTRUCTIONS BELOW ARE ONLY FOR THOSE PATIENTS WHO HAVE BEEN TOLD THEY WILL RECEIVE SEDATION OR GENERAL ANESTHESIA DURING THEIR MRI PROCEDURE:  IF YOU WILL RECEIVE SEDATION (take  medicine to help you relax during your exam):   You must get the medicine from your doctor before you arrive. Bring the medicine to the exam. Do not take it at home.   Arrive one hour early. Bring someone who can take you home after the test. Your medicine will make you sleepy. After the exam, you may not drive, take a bus or take a taxi by yourself.   No eating 8 hours before your exam. You may have clear liquids up until 4 hours before your exam. (Clear liquids include water, clear tea, black coffee and fruit juice without pulp.)  IF YOU WILL RECEIVE ANESTHESIA (be asleep for your exam):   Arrive 1 1/2 hours early. Bring someone who can take you home after the test. You may not drive, take a bus or take a taxi by yourself.   No eating 8 hours before your exam. You may have clear liquids up until 4 hours before your exam. (Clear liquids include water, clear tea, black coffee and fruit juice without pulp.)  If you have any questions, please contact your Imaging Department exam site.            Jul 05, 2017  2:00 PM CDT   Infusion 360 with UC SPEC INFUSION, UC 42 ATC   [facility] Specialty and Procedure ([facility])    [address] [place] [address]   [phone]            Jul 05, 2017  3:00 PM CDT   (Arrive by 2:45 PM)   Return with Aamir Townsend MD   [facility] Specialty and Procedure ([facility])    [address] [address]   [phone]            Jul 06, 2017  4:00 PM CDT   Infusion 360 with UC SPEC INFUSION, UC 42 ATC   [facility] Specialty and Procedure ([facility])    [address] [address]   [phone]            Jul 06, 2017  5:00 PM CDT   (Arrive by 4:45 PM)   Return with Aamir Townsend MD   [facility] Specialty and  Procedure ([facility])    [address] [facility]  [address] [facility] [address] [phone]            Jul 28, 2017 10:00 AM CDT   (Arrive by 9:45 AM)   Return Liver Transplant with Tucker Muhammad MD   [facility] Hepatology ([facility])    [address] [place] [address]  [address] [facility] [address] [phone]            Aug 08, 2017  8:15 AM CDT   LAB with AN LAB   [facility] ([facility])    [address] [facility] [address] [phone]           Patient must bring picture ID.  Patient should be prepared to give a urine specimen  Please do not eat 10-12 hours before your appointment if you are coming in fasting for labs on lipids, cholesterol, or glucose (sugar).  Pregnant women should follow their Care Team instructions. Water with medications is okay. Do not drink coffee or other fluids.   If you have concerns about taking  your medications, please ask at office or if scheduling via Not[employer], send a message by clicking on Secure Messaging, Message Your Care Team.            Sep 07, 2017 10:30 AM CDT   (Arrive by 10:15 AM)   Return Visit with Nain Lagunas MD   [facility] Heart Care ([facility])    [address] [place] [id number] [phone]              Future tests that were ordered for you today     Open Future Orders        Priority Expected Expires Ordered    MR Abdomen MRCP w/o & w Contrast Routine 7/4/2017 7/7/2017 7/3/2017            Who to contact     If you have questions or need follow up information about today's clinic visit or your schedule please contact [facility]REAT[facility] [place] SPECIALTY AND PROCEDURE directly at [phone].  Normal or non-critical lab and imaging results will be communicated to you by MyChart, letter or phone within 4 business days after the clinic has received the results. If you do not hear from us within 7 days,  please contact the clinic through [employer] or phone. If you have a critical or abnormal lab result, we will notify you by phone as soon as possible.  Submit refill requests through TIM[employer]K or call your pharmacy and they will forward the refill request to us. Please allow 3 business days for your refill to be completed.          Additional Information About Your Visit        [employer] Information     [employer] gives you secure access to your electronic health record. If you see a primary care provider, you can also send messages to your care team and make appointments. If you have questions, please call your primary care clinic.  If you do not have a primary care provider, please call [phone] and they will assist you.        Care EveryWhere ID     This is your Care EveryWhere ID. This could be used by other organizations to access your [facility] medical records  [id number]        Your Vitals Were     Pulse Temperature Respirations Pulse Oximetry          75 96.8  F (36  C) (Oral) 12 99%         Blood Pressure from Last 3 Encounters:   07/04/17 175/72   06/19/17 138/67   04/25/17 125/63    Weight from Last 3 Encounters:   06/19/17 56.7 kg (125 lb)   05/15/17 57.2 kg (126 lb 3.2 oz)   04/25/17 60.8 kg (134 lb)              Today, you had the following     No orders found for display         Today's Medication Changes          These changes are accurate as of: 7/4/17  9:11 AM.  If you have any questions, ask your nurse or doctor.               These medicines have changed or have updated prescriptions.        Dose/Directions    lisinopril 2.5 MG tablet   Commonly known as:  PRINIVIL/Zestril   This may have changed:  when to take this   Used for:  Coronary artery disease involving native heart without angina pectoris, unspecified vessel or lesion type        Dose:  2.5 mg   Take 1 tablet (2.5 mg) by mouth daily   Quantity:  93 tablet   Refills:  3                Primary Care Provider Office Phone # Fax #    Dee  MD Emperatriz [phone]       [facility] [address] [facility] [address]        Equal Access to Services     THEA WASHBURN : Hadii aad ku hadchichihemal Villafuerte, watongda gaetanocarolinaha, qabrentta kabrandoda sadi, aldo abdoulin hayaaandrea lojagerald arita cosme alfaro. So [facility] [phone].    ATENCIÓN: Si habla español, tiene a messina disposición servicios gratuitos de asistencia lingüística. Llame al [phone].    We comply with applicable federal civil rights laws and [place] laws. We do not discriminate on the basis of race, color, national origin, age, disability sex, sexual orientation or gender identity.            Thank you!     Thank you for choosing [facility] SPECIALTY AND PROCEDURE  for your care. Our goal is always to provide you with excellent care. Hearing back from our patients is one way we can continue to improve our services. Please take a few minutes to complete the written survey that you may receive in the mail after your visit with us. Thank you!             Your Updated Medication List - Protect others around you: Learn how to safely use, store and throw away your medicines at www.disposemymeds.org.          This list is accurate as of: 7/4/17  9:11 AM.  Always use your most recent med list.                   Brand Name Dispense Instructions for use Diagnosis    acetaminophen 325 MG tablet    TYLENOL    100 tablet    Take 2 tablets (650 mg) by mouth every 4 hours as needed for other (surgical pain)    Status post hip surgery       aspirin 81 MG tablet     90 tablet    Take 1 tablet by mouth daily.    CAD (coronary artery disease)       calcium-vitamin D 600-400 MG-UNIT per tablet    CALTRATE     Take 1 tablet by mouth daily        hydrALAZINE 25 MG tablet    APRESOLINE    90 tablet    Take 1 tablet (25 mg) by mouth 3 times daily , as needed if systolic blood pressure (top number) is more than 180 mmHg.    Essential hypertension with goal blood pressure less than  140/90       isosorbide mononitrate 60 MG 24 hr tablet    IMDUR    180 tablet    Take 1 tablet (60 mg) by mouth 2 times daily    Coronary artery disease due to lipid rich plaque, Secondary hypertension with goal blood pressure less than 140/90       lisinopril 2.5 MG tablet    PRINIVIL/Zestril    93 tablet    Take 1 tablet (2.5 mg) by mouth daily    Coronary artery disease involving native heart without angina pectoris, unspecified vessel or lesion type       metoprolol 50 MG tablet    LOPRESSOR    182 tablet    Take 1 tablet (50 mg) by mouth 2 times daily    Essential hypertension with goal blood pressure less than 140/90, Coronary artery disease involving native heart with angina pectoris, unspecified vessel or lesion type (H)       MULTI-VITAMIN PO      Take 1 tablet by mouth daily        nystatin-triamcinolone cream    MYCOLOG II     Apply topically 4 times daily as needed Reported on 4/5/2017        tacrolimus 0.5 MG capsule    PROGRAF - GENERIC EQUIVALENT    270 capsule    Take 0.5 mg (1 cap) every AM, take 1 mg, (2 caps) every PM.    Liver replaced by transplant (H)

## 2017-07-04 NOTE — PROGRESS NOTES
Infusion nursing note:    Lesli Lorenzana presents today to [facility] for a solumedrol infusion.  During today's [facility] appointment orders from Dr Muhammad were completed.  Frequency: daily x3  Today is dose #1    Progress note:  ID verified by name and .  Assessment completed.  Vitals were stable throughout time in [facility].  verbal education given to patient/representative regarding infusion and possible side effects.  Patient verbalized understanding.    Note: Pt states she feels fine.    Infusion given over approximately  60min     Administrations This Visit     methylPREDNISolone sodium succinate (solu-MEDROL) 500 mg in NaCl 0.9 % 250 mL intermittent infusion     Admin Date Action Dose Rate Route Administered By          2017 New Bag 500 mg 290 mL/hr Intravenous Radha Jeong RN                         /72  Pulse 75  Temp 96.8  F (36  C) (Oral)  Resp 12  SpO2 99%    PIV locked with heparin and left in for tomorrow's infusion.    Discharge plan:    Discharge instructions were reviewed with patient: Yes  Patient/representative verbalized understanding of discharge instructions and all questions answered: Yes.    Discharged from [facility] at 0940 with self to home.    Radha Jeong

## 2017-07-05 ENCOUNTER — HOSPITAL ENCOUNTER (OUTPATIENT)
Dept: MRI IMAGING | Facility: CLINIC | Age: 77
Discharge: HOME | End: 2017-07-05
Attending: INTERNAL MEDICINE | Admitting: INTERNAL MEDICINE
Payer: MEDICARE

## 2017-07-05 ENCOUNTER — INFUSION THERAPY VISIT (OUTPATIENT)
Dept: INFUSION THERAPY | Facility: CLINIC | Age: 77
End: 2017-07-05
Attending: TRANSPLANT SURGERY
Payer: MEDICARE

## 2017-07-05 ENCOUNTER — TELEPHONE (OUTPATIENT)
Dept: TRANSPLANT | Facility: CLINIC | Age: 77
End: 2017-07-05

## 2017-07-05 VITALS
SYSTOLIC BLOOD PRESSURE: 146 MMHG | OXYGEN SATURATION: 98 % | TEMPERATURE: 96.8 F | DIASTOLIC BLOOD PRESSURE: 64 MMHG | HEART RATE: 77 BPM

## 2017-07-05 DIAGNOSIS — K80.50 CHOLEDOCHOLITHIASIS: ICD-10-CM

## 2017-07-05 DIAGNOSIS — T86.41 LIVER TRANSPLANT REJECTION (H): Primary | ICD-10-CM

## 2017-07-05 DIAGNOSIS — I10 ESSENTIAL HYPERTENSION WITH GOAL BLOOD PRESSURE LESS THAN 140/90: ICD-10-CM

## 2017-07-05 DIAGNOSIS — R74.8 ALKALINE PHOSPHATASE ELEVATION: ICD-10-CM

## 2017-07-05 DIAGNOSIS — T86.41 LIVER TRANSPLANT REJECTION (H): ICD-10-CM

## 2017-07-05 DIAGNOSIS — E78.5 HYPERLIPIDEMIA LDL GOAL <70: ICD-10-CM

## 2017-07-05 DIAGNOSIS — Z94.4 LIVER TRANSPLANTED (H): Primary | ICD-10-CM

## 2017-07-05 LAB
ALBUMIN SERPL-MCNC: 2.7 G/DL (ref 3.4–5)
ALP SERPL-CCNC: 418 U/L (ref 40–150)
ALT SERPL W P-5'-P-CCNC: 204 U/L (ref 0–50)
AST SERPL W P-5'-P-CCNC: 214 U/L (ref 0–45)
BILIRUB DIRECT SERPL-MCNC: 0.6 MG/DL (ref 0–0.2)
BILIRUB SERPL-MCNC: 0.9 MG/DL (ref 0.2–1.3)
GLUCOSE SERPL-MCNC: 129 MG/DL (ref 70–99)
PROT SERPL-MCNC: 7.9 G/DL (ref 6.8–8.8)
RADIOLOGIST FLAGS: ABNORMAL

## 2017-07-05 PROCEDURE — 25000128 H RX IP 250 OP 636: Performed by: INTERNAL MEDICINE

## 2017-07-05 PROCEDURE — A9585 GADOBUTROL INJECTION: HCPCS | Performed by: INTERNAL MEDICINE

## 2017-07-05 PROCEDURE — 25000128 H RX IP 250 OP 636: Mod: ZF | Performed by: INTERNAL MEDICINE

## 2017-07-05 PROCEDURE — 96365 THER/PROPH/DIAG IV INF INIT: CPT

## 2017-07-05 PROCEDURE — 74183 MRI ABD W/O CNTR FLWD CNTR: CPT

## 2017-07-05 PROCEDURE — 36415 COLL VENOUS BLD VENIPUNCTURE: CPT | Performed by: TRANSPLANT SURGERY

## 2017-07-05 PROCEDURE — 82947 ASSAY GLUCOSE BLOOD QUANT: CPT | Performed by: TRANSPLANT SURGERY

## 2017-07-05 PROCEDURE — 80076 HEPATIC FUNCTION PANEL: CPT | Performed by: TRANSPLANT SURGERY

## 2017-07-05 RX ORDER — GADOBUTROL 604.72 MG/ML
7.5 INJECTION INTRAVENOUS ONCE
Status: COMPLETED | OUTPATIENT
Start: 2017-07-05 | End: 2017-07-05

## 2017-07-05 RX ORDER — PREDNISONE 20 MG/1
TABLET ORAL
Qty: 70 TABLET | Refills: 0 | Status: SHIPPED | OUTPATIENT
Start: 2017-07-05 | End: 2017-07-18

## 2017-07-05 RX ADMIN — GADOBUTROL 6 ML: 604.72 INJECTION INTRAVENOUS at 09:24

## 2017-07-05 RX ADMIN — SODIUM CHLORIDE 500 MG: 9 INJECTION, SOLUTION INTRAVENOUS at 13:21

## 2017-07-05 RX ADMIN — SODIUM CHLORIDE 50 ML: 9 INJECTION, SOLUTION INTRAVENOUS at 09:24

## 2017-07-05 NOTE — PROGRESS NOTES
Infusion Nursing Note  Lesli [facility]:   Chief Complaint   Patient presents with     Infusion     Solu-medrol      During [facility]'s [facility] appointment, orders from Dr. Muhammad were completed.  Frequency: daily x 3, today is 2 of 3    Progress note:  Patient identification verified by name and date of birth.  Assessment completed.  Vitals recorded in Doc Flowsheets.  Patient was provided with education regarding infusion and possible side effects.  Patient verbalized understanding.     Premedications: were not ordered.  Infusion Rates: infusion given over approximately 1 hour.  Labs: were drawn per orders.   Vascular access: peripheral IV placed today, hep locked and left in place for tomorrow's appt.  Treatment Conditions: non-applicable.  Patient tolerated infusion: well    Discharge Plan:   Follow up plan of care with: ongoing infusions at [facility].  Discharge instructions were reviewed with patient.  Patient/representative verbalized understanding of discharge instructions and all questions answered.  Patient discharged [facility] in stable condition.    Josefina Dasilva RN       Administrations This Visit     methylPREDNISolone sodium succinate (solu-MEDROL) 500 mg in NaCl 0.9 % 250 mL intermittent infusion     Admin Date Action Dose Rate Route Administered By          07/05/2017 New Bag 500 mg 463 mL/hr Intravenous Josefina Dasilva RN                           /60  Pulse 77  Temp 96.8  F (36  C) (Oral)  SpO2 98%

## 2017-07-05 NOTE — MR AVS SNAPSHOT
After Visit Summary   7/5/2017    Lesli Lorenzana    MRN: [id number]           Patient Information     Date Of Birth          1940        Visit Information        Provider Department      7/5/2017 2:00 PM UC 42 ATC; UC SPEC INFUSION [facility]        Today's Diagnoses     Liver transplant rejection (H)    -  1    Essential hypertension with goal blood pressure less than 140/90        Hyperlipidemia LDL goal <70           Follow-ups after your visit        Your next 10 appointments already scheduled     Jul 06, 2017  4:00 PM CDT   Infusion 120 with UC SPEC INFUSION, UC 42 ATC   [facility] ([facility])    [address]  2nd [address]  [facility] [address]   [phone]            Jul 28, 2017 10:00 AM CDT   (Arrive by 9:45 AM)   Return Liver Transplant with Tucker Muhammad MD   [facility]atology ([facility])    [address]  [address]  [facility] [address]   [phone]            Aug 08, 2017  8:15 AM CDT   [facility]    [id number] [address] [phone]           Patient must bring picture ID.  Patient should be prepared to give a urine specimen  Please do not eat 10-12 hours before your appointment if you are coming in fasting for labs on lipids, cholesterol, or glucose (sugar).  Pregnant women should follow their Care Team instructions. Water with medications is okay. Do not drink coffee or other fluids.   If you have concerns about taking  your medications, please ask at office or if scheduling via Scan, send a message by clicking on Secure Messaging, Message Your Care Team.            Sep 07, 2017 10:30 AM CDT   (Arrive by 10:15 AM)   Return Visit with Nain Lagunas MD   [facility] ([facility])    [address] [id number] [phone]              Future tests that were ordered for you today     Open Future Orders        Priority Expected Expires Ordered    ERCP Routine 7/6/2017 8/19/2017 7/5/2017            Who to contact     If you have questions or need follow up information about today's clinic visit or your schedule please contact [facility] directly at [phone].  Normal or non-critical lab and imaging results will be communicated to you by [employer], letter or phone within 4 business days after the clinic has received the results. If you do not hear from us within 7 days, please contact the clinic through [facility] or phone. If you have a critical or abnormal lab result, we will notify you by phone as soon as possible.  Submit refill requests through [facility] or call your pharmacy and they will forward the refill request to us. Please allow 3 business days for your refill to be completed.          Additional Information About Your Visit        [facility] Information     [facility] gives you secure access to your electronic health record. If you see a primary care provider, you can also send messages to your care team and make appointments. If you have questions, please call your primary care clinic.  If you do not have a primary care provider, please call [phone] and they will assist you.        Care EveryWhere ID     This is your Care EveryWhere ID. This could be used by other organizations to access your [facility] medical records  [id number]        Your Vitals Were     Pulse Temperature Pulse Oximetry             77 96.8  F (36  C) (Oral) 98%          Blood Pressure from Last 3 Encounters:   07/05/17 146/64   07/04/17 175/72   06/19/17 138/67    Weight from Last 3 Encounters:   06/19/17 56.7 kg (125 lb)   05/15/17 57.2 kg (126 lb 3.2 oz)   04/25/17 60.8 kg (134 lb)              We Performed the Following     Glucose      Hepatic panel          Today's Medication Changes          These changes are accurate as of: 7/5/17  5:10 PM.  If you have any questions, ask your nurse or doctor.               Start taking these medicines.        Dose/Directions    predniSONE 20 MG tablet   Commonly known as:  DELTASONE   Used for:  Liver transplant rejection (H)   Started by:  Porsha Sanchez, RN        Take 80 mg daily 7/7-7/13, then 60 mg daily 7/14-7/20, then 40 mg daily 7/21-7/27, then stay on 20 mg daily.   Quantity:  70 tablet   Refills:  0         These medicines have changed or have updated prescriptions.        Dose/Directions    lisinopril 2.5 MG tablet   Commonly known as:  PRINIVIL/Zestril   This may have changed:  when to take this   Used for:  Coronary artery disease involving native heart without angina pectoris, unspecified vessel or lesion type        Dose:  2.5 mg   Take 1 tablet (2.5 mg) by mouth daily   Quantity:  93 tablet   Refills:  3            Where to get your medicines      These medications were sent to [address] [place] [address] [address] [place] [address]    Hours:  TRANSPLANT PHONE NUMBER [phone] Phone:  [phone]     predniSONE 20 MG tablet                Primary Care Provider Office Phone # Fax #    Dee Awan [phone]       [facility] [address] [place] [address]        Equal Access to Services     THEA WASHBURN AH: Hadii kasey ku hadasho Sodanitaali, waaxda luqadaha, qaybta kaalmada adeegyada, aldo alfaro. So [facility] [phone].    ATENCIÓN: Si habla español, tiene a messina disposición servicios gratuitos de asistencia lingüística. Marcela al [phone].    We comply with applicable federal civil rights laws and [place] laws. We do not discriminate on the basis of race, color, national origin, age, disability sex, sexual orientation or gender identity.             Thank you!     Thank you for choosing [facility]  for your care. Our goal is always to provide you with excellent care. Hearing back from our patients is one way we can continue to improve our services. Please take a few minutes to complete the written survey that you may receive in the mail after your visit with us. Thank you!             Your Updated Medication List - Protect others around you: Learn how to safely use, store and throw away your medicines at www.disposemymeds.org.          This list is accurate as of: 7/5/17  5:10 PM.  Always use your most recent med list.                   Brand Name Dispense Instructions for use Diagnosis    acetaminophen 325 MG tablet    TYLENOL    100 tablet    Take 2 tablets (650 mg) by mouth every 4 hours as needed for other (surgical pain)    Status post hip surgery       aspirin 81 MG tablet     90 tablet    Take 1 tablet by mouth daily.    CAD (coronary artery disease)       calcium-vitamin D 600-400 MG-UNIT per tablet    CALTRATE     Take 1 tablet by mouth daily        hydrALAZINE 25 MG tablet    APRESOLINE    90 tablet    Take 1 tablet (25 mg) by mouth 3 times daily , as needed if systolic blood pressure (top number) is more than 180 mmHg.    Essential hypertension with goal blood pressure less than 140/90       isosorbide mononitrate 60 MG 24 hr tablet    IMDUR    180 tablet    Take 1 tablet (60 mg) by mouth 2 times daily    Coronary artery disease due to lipid rich plaque, Secondary hypertension with goal blood pressure less than 140/90       lisinopril 2.5 MG tablet    PRINIVIL/Zestril    93 tablet    Take 1 tablet (2.5 mg) by mouth daily    Coronary artery disease involving native heart without angina pectoris, unspecified vessel or lesion type       metoprolol 50 MG tablet    LOPRESSOR    182 tablet    Take 1 tablet (50 mg) by mouth 2 times daily    Essential hypertension with goal blood pressure less than 140/90,  Coronary artery disease involving native heart with angina pectoris, unspecified vessel or lesion type (H)       MULTI-VITAMIN PO      Take 1 tablet by mouth daily        nystatin-triamcinolone cream    MYCOLOG II     Apply topically 4 times daily as needed Reported on 4/5/2017        predniSONE 20 MG tablet    DELTASONE    70 tablet    Take 80 mg daily 7/7-7/13, then 60 mg daily 7/14-7/20, then 40 mg daily 7/21-7/27, then stay on 20 mg daily.    Liver transplant rejection (H)       tacrolimus 0.5 MG capsule    PROGRAF - GENERIC EQUIVALENT    270 capsule    Take 0.5 mg (1 cap) every AM, take 1 mg, (2 caps) every PM.    Liver replaced by transplant (H)

## 2017-07-05 NOTE — TELEPHONE ENCOUNTER
Liver tests abnormal but getting better w/ solumedrol.  MRCP abnormal, per Dr. Muhammad, needs ERCP.  Lesli aware, order placed.  Dr. Muhammad would also like Lesli to have a steroid taper.  The day after she has her last solumedrol infusion she should start prednisone 80 mg po daily for a week, then 60 mg daily for a week, then 40 mg daily for a week, then stay on 20 mg po qd.  Labs weekly.  She will see Dr. Muhammad on  7/28 at which time we will review further dosing.  Lesli aware, script for pred sent to [facility] where she will  tomorrow to start first dose on Friday, July 7.

## 2017-07-06 ENCOUNTER — CARE COORDINATION (OUTPATIENT)
Dept: GASTROENTEROLOGY | Facility: CLINIC | Age: 77
End: 2017-07-06

## 2017-07-06 ENCOUNTER — INFUSION THERAPY VISIT (OUTPATIENT)
Dept: INFUSION THERAPY | Facility: CLINIC | Age: 77
End: 2017-07-06
Attending: TRANSPLANT SURGERY
Payer: MEDICARE

## 2017-07-06 VITALS — DIASTOLIC BLOOD PRESSURE: 73 MMHG | TEMPERATURE: 97.7 F | SYSTOLIC BLOOD PRESSURE: 163 MMHG | RESPIRATION RATE: 16 BRPM

## 2017-07-06 DIAGNOSIS — Z94.4 LIVER REPLACED BY TRANSPLANT (H): ICD-10-CM

## 2017-07-06 DIAGNOSIS — K83.8 COMMON BILE DUCT DILATATION: Primary | ICD-10-CM

## 2017-07-06 DIAGNOSIS — T86.41 LIVER TRANSPLANT REJECTION (H): Primary | ICD-10-CM

## 2017-07-06 DIAGNOSIS — K80.50 CHOLEDOCHOLITHIASIS: ICD-10-CM

## 2017-07-06 DIAGNOSIS — Z94.4 LIVER TRANSPLANTED (H): ICD-10-CM

## 2017-07-06 LAB
ALBUMIN SERPL-MCNC: 2.6 G/DL (ref 3.4–5)
ALP SERPL-CCNC: 333 U/L (ref 40–150)
ALT SERPL W P-5'-P-CCNC: 159 U/L (ref 0–50)
AST SERPL W P-5'-P-CCNC: 127 U/L (ref 0–45)
AST SERPL W P-5'-P-CCNC: ABNORMAL U/L (ref 0–45)
BILIRUB DIRECT SERPL-MCNC: 0.5 MG/DL (ref 0–0.2)
BILIRUB SERPL-MCNC: 0.9 MG/DL (ref 0.2–1.3)
GLUCOSE SERPL-MCNC: 88 MG/DL (ref 70–99)
PROT SERPL-MCNC: 7.8 G/DL (ref 6.8–8.8)

## 2017-07-06 PROCEDURE — 36415 COLL VENOUS BLD VENIPUNCTURE: CPT | Performed by: TRANSPLANT SURGERY

## 2017-07-06 PROCEDURE — 40000269 ZZH STATISTIC NO CHARGE FACILITY FEE: Mod: ZF

## 2017-07-06 PROCEDURE — 96365 THER/PROPH/DIAG IV INF INIT: CPT

## 2017-07-06 PROCEDURE — 82947 ASSAY GLUCOSE BLOOD QUANT: CPT | Performed by: INTERNAL MEDICINE

## 2017-07-06 PROCEDURE — 84450 TRANSFERASE (AST) (SGOT): CPT | Performed by: TRANSPLANT SURGERY

## 2017-07-06 PROCEDURE — 25000128 H RX IP 250 OP 636: Mod: ZF | Performed by: INTERNAL MEDICINE

## 2017-07-06 PROCEDURE — 80076 HEPATIC FUNCTION PANEL: CPT | Performed by: INTERNAL MEDICINE

## 2017-07-06 RX ADMIN — SODIUM CHLORIDE 500 MG: 9 INJECTION, SOLUTION INTRAVENOUS at 16:12

## 2017-07-06 NOTE — PATIENT INSTRUCTIONS
Patient Education    Methylprednisolone Acetate Suspension for injection    Methylprednisolone Oral tablet    Methylprednisolone Sodium Succinate Solution for injection  Methylprednisolone Acetate Suspension for injection  What is this medicine?  METHYLPREDNISOLONE (meth ill pred NISS oh lone) is a corticosteroid. It is commonly used to treat inflammation of the skin, joints, lungs, and other organs. Common conditions treated include asthma, allergies, and arthritis. It is also used for other conditions, such as blood disorders and diseases of the adrenal glands.  This medicine may be used for other purposes; ask your health care provider or pharmacist if you have questions.  What should I tell my health care provider before I take this medicine?  They need to know if you have any of these conditions:    cataracts or glaucoma    Cushings    heart disease    high blood pressure    infection including tuberculosis    low calcium or potassium levels in the blood    recent surgery    seizures    stomach or intestinal disease, including colitis    threadworms    thyroid problems    an unusual or allergic reaction to methylprednisolone, corticosteroids, benzyl alcohol, other medicines, foods, dyes, or preservatives    pregnant or trying to get pregnant    breast-feeding  How should I use this medicine?  This medicine is for injection into a muscle, joint, or other tissue. It is given by a health care professional in a hospital or clinic setting.  Talk to your pediatrician regarding the use of this medicine in children. While this drug may be prescribed for selected conditions, precautions do apply.  Overdosage: If you think you have taken too much of this medicine contact a poison control center or emergency room at once.  NOTE: This medicine is only for you. Do not share this medicine with others.  What if I miss a dose?  This does not apply.  What may interact with this medicine?  Do not take this medicine with any of  the following medications:    mifepristone  This medicine may also interact with the following medications:    aspirin and aspirin-like medicines    cyclosporin    ketoconazole    phenobarbital    phenytoin    rifampin    tacrolimus    troleandomycin    vaccines    warfarin  This list may not describe all possible interactions. Give your health care provider a list of all the medicines, herbs, non-prescription drugs, or dietary supplements you use. Also tell them if you smoke, drink alcohol, or use illegal drugs. Some items may interact with your medicine.  What should I watch for while using this medicine?  Visit your doctor or health care professional for regular checks on your progress. If you are taking this medicine for a long time, carry an identification card with your name and address, the type and dose of your medicine, and your doctor's name and address.  The medicine may increase your risk of getting an infection. Stay away from people who are sick. Tell your doctor or health care professional if you are around anyone with measles or chickenpox.  You may need to avoid some vaccines. Talk to your health care provider for more information.  If you are going to have surgery, tell your doctor or health care professional that you have taken this medicine within the last twelve months.  Ask your doctor or health care professional about your diet. You may need to lower the amount of salt you eat.  The medicine can increase your blood sugar. If you are a diabetic check with your doctor if you need help adjusting the dose of your diabetic medicine.  What side effects may I notice from receiving this medicine?  Side effects that you should report to your doctor or health care professional as soon as possible:    allergic reactions like skin rash, itching or hives, swelling of the face, lips, or tongue    bloody or tarry stools    changes in vision    eye pain or bulging eyes    fever, sore throat, sneezing, cough,  or other signs of infection, wounds that will not heal    increased thirst    irregular heartbeat    muscle cramps    pain in hips, back, ribs, arms, shoulders, or legs    swelling of the ankles, feet, hands    trouble passing urine or change in the amount of urine    unusual bleeding or bruising    unusually weak or tired    weight gain or weight loss  Side effects that usually do not require medical attention (report to your doctor or health care professional if they continue or are bothersome):    changes in emotions or moods    constipation or diarrhea    headache    irritation at site where injected    nausea, vomiting    skin problems, acne, thin and shiny skin    trouble sleeping    unusual hair growth on the face or body  This list may not describe all possible side effects. Call your doctor for medical advice about side effects. You may report side effects to FDA at 4-328-FDA-6075.  Where should I keep my medicine?  This drug is given in a hospital or clinic and will not be stored at home.  NOTE:This sheet is a summary. It may not cover all possible information. If you have questions about this medicine, talk to your doctor, pharmacist, or health care provider. Copyright  2016 [employer]

## 2017-07-06 NOTE — PROGRESS NOTES
Nursing Note  Lesli Lorenzana [facility]:   Chief Complaint   Patient presents with     Infusion     solu-medrol     During [facility]'s [facility] appointment, orders from Dr. Muhammad were completed.  Frequency: today is dose 3 of 3 total.    Progress note:  Patient identification verified by name and date of birth.  Assessment completed.  Vitals recorded in Doc Flowsheets.  Patient was provided with education regarding infusion and possible side effects.  Patient verbalized understanding.      needed: No  Premedications: were not ordered.  Infusion Rates: infusion given over approximately 1 hour.  Approximate Infusion length:1 hours.   Labs: were drawn per orders.   Vascular access: peripheral IV was placed prior to appointment [facility].  Treatment Conditions: patient denies fever, chills, signs of infection, recent illness, on antibiotics, productive cough or elevated temperature.  Patient tolerated infusion: well.    Discharge Plan:   Follow up plan of care with: ongoing infusions at [facility].  Discharge instructions were reviewed with patient.  Patient/representative verbalized understanding of discharge instructions and all questions answered.  [facility] discharged [facility] in stable condition.    Maura Diggs RN       Administrations This Visit     methylPREDNISolone sodium succinate (solu-MEDROL) 500 mg in NaCl 0.9 % 250 mL intermittent infusion     Admin Date Action Dose Rate Route Administered By          07/06/2017 New Bag 500 mg 283 mL/hr Intravenous Mauar Diggs RN                           /67 (BP Location: Right arm)  Temp 97.7  F (36.5  C) (Oral)  Resp 16

## 2017-07-06 NOTE — MR AVS SNAPSHOT
After Visit Summary   7/6/2017    Lesli Lorenzana    MRN: [id number]           Patient Information     Date Of Birth          1940        Visit Information        Provider Department      7/6/2017 4:00 PM UC [facility] ATC; [facility] SPE[facility] Specialty and Procedure        Today's Diagnoses     Liver transplant rejection (H)    -  1    Liver replaced by transplant (H)        Liver transplanted (H)        Choledocholithiasis          Care Instructions      Patient Education    Methylprednisolone Acetate Suspension for injection    Methylprednisolone Oral tablet    Methylprednisolone Sodium Succinate Solution for injection  Methylprednisolone Acetate Suspension for injection  What is this medicine?  METHYLPREDNISOLONE (meth ill pred NISS oh lone) is a corticosteroid. It is commonly used to treat inflammation of the skin, joints, lungs, and other organs. Common conditions treated include asthma, allergies, and arthritis. It is also used for other conditions, such as blood disorders and diseases of the adrenal glands.  This medicine may be used for other purposes; ask your health care provider or pharmacist if you have questions.  What should I tell my health care provider before I take this medicine?  They need to know if you have any of these conditions:    cataracts or glaucoma    Cushings    heart disease    high blood pressure    infection including tuberculosis    low calcium or potassium levels in the blood    recent surgery    seizures    stomach or intestinal disease, including colitis    threadworms    thyroid problems    an unusual or allergic reaction to methylprednisolone, corticosteroids, benzyl alcohol, other medicines, foods, dyes, or preservatives    pregnant or trying to get pregnant    breast-feeding  How should I use this medicine?  This medicine is for injection into a muscle, joint, or other tissue. It is given by a health care professional in a  hospital or clinic setting.  Talk to your pediatrician regarding the use of this medicine in children. While this drug may be prescribed for selected conditions, precautions do apply.  Overdosage: If you think you have taken too much of this medicine contact a poison control center or emergency room at once.  NOTE: This medicine is only for you. Do not share this medicine with others.  What if I miss a dose?  This does not apply.  What may interact with this medicine?  Do not take this medicine with any of the following medications:    mifepristone  This medicine may also interact with the following medications:    aspirin and aspirin-like medicines    cyclosporin    ketoconazole    phenobarbital    phenytoin    rifampin    tacrolimus    troleandomycin    vaccines    warfarin  This list may not describe all possible interactions. Give your health care provider a list of all the medicines, herbs, non-prescription drugs, or dietary supplements you use. Also tell them if you smoke, drink alcohol, or use illegal drugs. Some items may interact with your medicine.  What should I watch for while using this medicine?  Visit your doctor or health care professional for regular checks on your progress. If you are taking this medicine for a long time, carry an identification card with your name and address, the type and dose of your medicine, and your doctor's name and address.  The medicine may increase your risk of getting an infection. Stay away from people who are sick. Tell your doctor or health care professional if you are around anyone with measles or chickenpox.  You may need to avoid some vaccines. Talk to your health care provider for more information.  If you are going to have surgery, tell your doctor or health care professional that you have taken this medicine within the last twelve months.  Ask your doctor or health care professional about your diet. You may need to lower the amount of salt you eat.  The medicine  can increase your blood sugar. If you are a diabetic check with your doctor if you need help adjusting the dose of your diabetic medicine.  What side effects may I notice from receiving this medicine?  Side effects that you should report to your doctor or health care professional as soon as possible:    allergic reactions like skin rash, itching or hives, swelling of the face, lips, or tongue    bloody or tarry stools    changes in vision    eye pain or bulging eyes    fever, sore throat, sneezing, cough, or other signs of infection, wounds that will not heal    increased thirst    irregular heartbeat    muscle cramps    pain in hips, back, ribs, arms, shoulders, or legs    swelling of the ankles, feet, hands    trouble passing urine or change in the amount of urine    unusual bleeding or bruising    unusually weak or tired    weight gain or weight loss  Side effects that usually do not require medical attention (report to your doctor or health care professional if they continue or are bothersome):    changes in emotions or moods    constipation or diarrhea    headache    irritation at site where injected    nausea, vomiting    skin problems, acne, thin and shiny skin    trouble sleeping    unusual hair growth on the face or body  This list may not describe all possible side effects. Call your doctor for medical advice about side effects. You may report side effects to FDA at 7-355-FDA-4004.  Where should I keep my medicine?  This drug is given in a hospital or clinic and will not be stored at home.  NOTE:This sheet is a summary. It may not cover all possible information. If you have questions about this medicine, talk to your doctor, pharmacist, or health care provider. Copyright  2016 [employer]                Follow-ups after your visit        Your next 10 appointments already scheduled     Jul 10, 2017  8:50 AM CDT   Pre-Op physical with Lauren Whittington MD   [facility] [place]  [facility])    [address] [facility] [address]   [phone]            Jul 28, 2017 10:00 AM CDT   (Arrive by 9:45 AM)   Return Liver Transplant with Tucker Muhammad MD   [facility] ([facility])    [address] [place] [id number] [phone]            Aug 08, 2017  8:15 AM CDT   LAB with AN LAB   [facility]    [address] [facility] [address]   [phone]           Patient must bring picture ID.  Patient should be prepared to give a urine specimen  Please do not eat 10-12 hours before your appointment if you are coming in fasting for labs on lipids, cholesterol, or glucose (sugar).  Pregnant women should follow their Care Team instructions. Water with medications is okay. Do not drink coffee or other fluids.   If you have concerns about taking  your medications, please ask at office or if scheduling via [employer], send a message by clicking on Secure Messaging, Message Your Care Team.            Sep 07, 2017 10:30 AM CDT   (Arrive by 10:15 AM)   Return Visit with Nain Lagunas MD   [facility] ([facility])    [address] [place] [id number] [phone]              Future tests that were ordered for you today     Open Future Orders        Priority Expected Expires Ordered    ERCP Routine  8/20/2017 7/6/2017            Who to contact     If you have questions or need follow up information about today's clinic visit or your schedule please contact [facility] [place] SPECIALTY AND PROCEDURE directly at [phone].  Normal or non-critical lab and imaging results will be communicated to you by MyChart, letter or phone within 4 business days after the clinic has received the results. If you do not hear from us within 7 days, please contact the clinic through MyChart or phone. If you have a critical or  abnormal lab result, we will notify you by phone as soon as possible.  Submit refill requests through [employer] or call your pharmacy and they will forward the refill request to us. Please allow 3 business days for your refill to be completed.          Additional Information About Your Visit        [employer] Information     [employer] gives you secure access to your electronic health record. If you see a primary care provider, you can also send messages to your care team and make appointments. If you have questions, please call your primary care clinic.  If you do not have a primary care provider, please call [phone] and they will assist you.        Care EveryWhere ID     This is your Care EveryWhere ID. This could be used by other organizations to access your [address] medical records  [id number]        Your Vitals Were     Temperature Respirations                97.7  F (36.5  C) (Oral) 16           Blood Pressure from Last 3 Encounters:   07/06/17 156/67   07/05/17 146/64   07/04/17 175/72    Weight from Last 3 Encounters:   06/19/17 56.7 kg (125 lb)   05/15/17 57.2 kg (126 lb 3.2 oz)   04/25/17 60.8 kg (134 lb)              We Performed the Following     ERCP     ERCP     ERCP     Glucose     Hepatic panel          Today's Medication Changes          These changes are accurate as of: 7/6/17  5:19 PM.  If you have any questions, ask your nurse or doctor.               These medicines have changed or have updated prescriptions.        Dose/Directions    lisinopril 2.5 MG tablet   Commonly known as:  PRINIVIL/Zestril   This may have changed:  when to take this   Used for:  Coronary artery disease involving native heart without angina pectoris, unspecified vessel or lesion type        Dose:  2.5 mg   Take 1 tablet (2.5 mg) by mouth daily   Quantity:  93 tablet   Refills:  3                Primary Care Provider Office Phone # Fax #    Dee Awan [phone]       [facility] [address]  [facility] [address]        Equal Access to Services     THEA WASHBURN : Hadii aad ku hadchichihemal Editaali, watongda gaetanocarolinaha, qabrentta godwinbrandoosmany avitia, aldo mcclainveronicaashvin alfaro. So [facility] [phone].    ATENCIÓN: Si habla español, tiene a messina disposición servicios gratuitos de asistencia lingüística. Llame al [phone].    We comply with applicable federal civil rights laws and [place] laws. We do not discriminate on the basis of race, color, national origin, age, disability sex, sexual orientation or gender identity.            Thank you!     Thank you for choosing [facility] SPECIALTY AND PROCEDURE  for your care. Our goal is always to provide you with excellent care. Hearing back from our patients is one way we can continue to improve our services. Please take a few minutes to complete the written survey that you may receive in the mail after your visit with us. Thank you!             Your Updated Medication List - Protect others around you: Learn how to safely use, store and throw away your medicines at www.disposemymeds.org.          This list is accurate as of: 7/6/17  5:19 PM.  Always use your most recent med list.                   Brand Name Dispense Instructions for use Diagnosis    acetaminophen 325 MG tablet    TYLENOL    100 tablet    Take 2 tablets (650 mg) by mouth every 4 hours as needed for other (surgical pain)    Status post hip surgery       aspirin 81 MG tablet     90 tablet    Take 1 tablet by mouth daily.    CAD (coronary artery disease)       calcium-vitamin D 600-400 MG-UNIT per tablet    CALTRATE     Take 1 tablet by mouth daily        hydrALAZINE 25 MG tablet    APRESOLINE    90 tablet    Take 1 tablet (25 mg) by mouth 3 times daily , as needed if systolic blood pressure (top number) is more than 180 mmHg.    Essential hypertension with goal blood pressure less than 140/90       isosorbide mononitrate 60 MG 24 hr tablet    IMDUR    180  tablet    Take 1 tablet (60 mg) by mouth 2 times daily    Coronary artery disease due to lipid rich plaque, Secondary hypertension with goal blood pressure less than 140/90       lisinopril 2.5 MG tablet    PRINIVIL/Zestril    93 tablet    Take 1 tablet (2.5 mg) by mouth daily    Coronary artery disease involving native heart without angina pectoris, unspecified vessel or lesion type       metoprolol 50 MG tablet    LOPRESSOR    182 tablet    Take 1 tablet (50 mg) by mouth 2 times daily    Essential hypertension with goal blood pressure less than 140/90, Coronary artery disease involving native heart with angina pectoris, unspecified vessel or lesion type (H)       MULTI-VITAMIN PO      Take 1 tablet by mouth daily        nystatin-triamcinolone cream    MYCOLOG II     Apply topically 4 times daily as needed Reported on 4/5/2017        predniSONE 20 MG tablet    DELTASONE    70 tablet    Take 80 mg daily 7/7-7/13, then 60 mg daily 7/14-7/20, then 40 mg daily 7/21-7/27, then stay on 20 mg daily.    Liver transplant rejection (H)       tacrolimus 0.5 MG capsule    PROGRAF - GENERIC EQUIVALENT    270 capsule    Take 0.5 mg (1 cap) every AM, take 1 mg, (2 caps) every PM.    Liver replaced by transplant (H)

## 2017-07-06 NOTE — PROGRESS NOTES
Message received to organize ERCP per Dr. Diana:  Could we get Ms Lorenzana on for nonurgent ERCP; stones on MRCP, post liver transplant; referred by Dr Jb Gaston   Narendra     Order placed and sent to scheduling.   Called and spoke to Lesli. She is amenable to plan and this RN verified that she is feeling well with no complaints. Denies N/V/F and pain. She will call to get scheduled for pre-op and will let our  know at the time of the call if she needs to be scheduled with PAC. She is not on any blood thinners, she is on ASA and was advised to stop taking 2 days prior to procedure. She is not diabetic.        Agata DUMONT, RN Coordinator  Dr. Duval, Dr. Diana & Dr. Luu   [facility]  [phone] #[phone]

## 2017-07-10 ENCOUNTER — OFFICE VISIT (OUTPATIENT)
Dept: INTERNAL MEDICINE | Facility: CLINIC | Age: 77
End: 2017-07-10
Payer: COMMERCIAL

## 2017-07-10 ENCOUNTER — TELEPHONE (OUTPATIENT)
Dept: INTERNAL MEDICINE | Facility: CLINIC | Age: 77
End: 2017-07-10

## 2017-07-10 VITALS
OXYGEN SATURATION: 100 % | BODY MASS INDEX: 24.84 KG/M2 | HEART RATE: 77 BPM | WEIGHT: 127.2 LBS | SYSTOLIC BLOOD PRESSURE: 180 MMHG | TEMPERATURE: 97.1 F | DIASTOLIC BLOOD PRESSURE: 82 MMHG

## 2017-07-10 DIAGNOSIS — I25.10 CORONARY ARTERY DISEASE INVOLVING NATIVE CORONARY ARTERY OF NATIVE HEART WITHOUT ANGINA PECTORIS: ICD-10-CM

## 2017-07-10 DIAGNOSIS — N18.30 CKD (CHRONIC KIDNEY DISEASE) STAGE 3, GFR 30-59 ML/MIN (H): ICD-10-CM

## 2017-07-10 DIAGNOSIS — Z94.4 LIVER REPLACED BY TRANSPLANT (H): ICD-10-CM

## 2017-07-10 DIAGNOSIS — I10 ESSENTIAL HYPERTENSION WITH GOAL BLOOD PRESSURE LESS THAN 140/90: ICD-10-CM

## 2017-07-10 DIAGNOSIS — Z94.4 LIVER REPLACED BY TRANSPLANT (H): Primary | ICD-10-CM

## 2017-07-10 DIAGNOSIS — K80.50 CHOLEDOCHOLITHIASIS: ICD-10-CM

## 2017-07-10 DIAGNOSIS — Z01.818 PREOP GENERAL PHYSICAL EXAM: Primary | ICD-10-CM

## 2017-07-10 DIAGNOSIS — Z94.4 HISTORY OF LIVER TRANSPLANT (H): ICD-10-CM

## 2017-07-10 LAB
ALBUMIN SERPL-MCNC: 2.9 G/DL (ref 3.4–5)
ALP SERPL-CCNC: 276 U/L (ref 40–150)
ALT SERPL W P-5'-P-CCNC: 88 U/L (ref 0–50)
ANION GAP SERPL CALCULATED.3IONS-SCNC: 7 MMOL/L (ref 3–14)
AST SERPL W P-5'-P-CCNC: 40 U/L (ref 0–45)
BILIRUB DIRECT SERPL-MCNC: 0.5 MG/DL (ref 0–0.2)
BILIRUB SERPL-MCNC: 0.8 MG/DL (ref 0.2–1.3)
BUN SERPL-MCNC: 27 MG/DL (ref 7–30)
CALCIUM SERPL-MCNC: 9.1 MG/DL (ref 8.5–10.1)
CHLORIDE SERPL-SCNC: 96 MMOL/L (ref 94–109)
CO2 SERPL-SCNC: 26 MMOL/L (ref 20–32)
CREAT SERPL-MCNC: 1.02 MG/DL (ref 0.52–1.04)
ERYTHROCYTE [DISTWIDTH] IN BLOOD BY AUTOMATED COUNT: 15.2 % (ref 10–15)
GFR SERPL CREATININE-BSD FRML MDRD: 53 ML/MIN/1.7M2
GLUCOSE SERPL-MCNC: 88 MG/DL (ref 70–99)
HCT VFR BLD AUTO: 28.3 % (ref 35–47)
HGB BLD-MCNC: 9.4 G/DL (ref 11.7–15.7)
MCH RBC QN AUTO: 27.4 PG (ref 26.5–33)
MCHC RBC AUTO-ENTMCNC: 33.2 G/DL (ref 31.5–36.5)
MCV RBC AUTO: 83 FL (ref 78–100)
PLATELET # BLD AUTO: 340 10E9/L (ref 150–450)
POTASSIUM SERPL-SCNC: 4.1 MMOL/L (ref 3.4–5.3)
PROT SERPL-MCNC: 8 G/DL (ref 6.8–8.8)
RBC # BLD AUTO: 3.43 10E12/L (ref 3.8–5.2)
SODIUM SERPL-SCNC: 129 MMOL/L (ref 133–144)
WBC # BLD AUTO: 6.5 10E9/L (ref 4–11)

## 2017-07-10 PROCEDURE — 36415 COLL VENOUS BLD VENIPUNCTURE: CPT | Performed by: INTERNAL MEDICINE

## 2017-07-10 PROCEDURE — 80197 ASSAY OF TACROLIMUS: CPT | Performed by: INTERNAL MEDICINE

## 2017-07-10 PROCEDURE — 80076 HEPATIC FUNCTION PANEL: CPT | Performed by: INTERNAL MEDICINE

## 2017-07-10 PROCEDURE — 99215 OFFICE O/P EST HI 40 MIN: CPT | Performed by: INTERNAL MEDICINE

## 2017-07-10 PROCEDURE — 80048 BASIC METABOLIC PNL TOTAL CA: CPT | Performed by: INTERNAL MEDICINE

## 2017-07-10 PROCEDURE — 85027 COMPLETE CBC AUTOMATED: CPT | Performed by: INTERNAL MEDICINE

## 2017-07-10 PROCEDURE — 93000 ELECTROCARDIOGRAM COMPLETE: CPT | Performed by: INTERNAL MEDICINE

## 2017-07-10 NOTE — TELEPHONE ENCOUNTER
Dear , can you please provide the phone # to Dr. Muhammad's office? The pre op is not completed.  Then RN can reach out to Dr. Burnette care team.  Thank you, Yelena Gurrola, BSN RN

## 2017-07-10 NOTE — PROGRESS NOTES
[facility]  [address] [facility] [address] [phone]  Dept: [phone]    PRE-OP EVALUATION:  Today's date: 7/10/2017    PMH of CAD, CKD and of liver transplant (d/t PBS) on immunosuppressants (currently on prednisone 20mg/daily).     Lesli Lorenzana (: 1940) presents for pre-operative evaluation assessment as requested by Dr. Muhammad/LILO.  She requires evaluation and anesthesia risk assessment prior to undergoing surgery/procedure for treatment of obstructing choledocholithiasis.  Proposed procedure: Endoscopy remove stone; ERCP.    Date of Surgery/ Procedure: TBD  Time of Surgery/ Procedure: LILO  Hospital/Surgical Facility: [facility]  Fax number for surgical facility:   Physician: NAVI Whittington  Type of Anesthesia Anticipated: General    Patient has a Health Care Directive or Living Will:  YES     Preop Questions 2017   1.  Do you have a history of heart attack, stroke, stent, bypass or surgery on an artery in the head, neck, heart or legs? YES -   S/p asymptomatic CAD, s/p stent placement in .  no recent s/s of CAD   2.  Do you ever have any pain or discomfort in your chest? No   3.  Do you have a history of  Heart Failure? No   4.   Are you troubled by shortness of breath when:  walking on a level surface, or up a slight hill, or at night? No   5.  Do you currently have a cold, bronchitis or other respiratory infection? No   6.  Do you have a cough, shortness of breath, or wheezing? No   7.  Do you sometimes get pains in the calves of your legs when you walk? No   8. Do you or anyone in your family have previous history of blood clots? No   9.  Do you or does anyone in your family have a serious bleeding problem such as prolonged bleeding following surgeries or cuts? No   10. Have you ever had problems with anemia or been told to take iron pills? No   11. Have you had any abnormal blood loss such as black, tarry or bloody stools, or abnormal vaginal bleeding? No    12. Have you ever had a blood transfusion? YES - when patient had her liver transplant in [id number].    13. Have you or any of your relatives ever had problems with anesthesia? No   14. Do you have sleep apnea, excessive snoring or daytime drowsiness? No   15. Do you have any prosthetic heart valves? No   16. Do you have prosthetic joints? YES - s/p left NAREN 2016.   17. Is there any chance that you may be pregnant? No         HPI:                                                      Brief HPI related to upcoming procedure: see above    History of PBC, s/p liver transplant in [id number], currently on Tacrolimus, and on prednisone 20mg daily, as part of a prednisone course which began with 80mg/d. Patient was recently noted to have elevated LFTs in the absence of any hepatic or biliary symptoms and was ultimately diagnosed with an obstructing CBD stone which is planned for an ERCP procedure now.  PLAN: ERCP; as per Patient Instructions in the Recommendations section below.             HYPERTENSION - Patient has longstanding history of moderate HTN , currently denies any symptoms referable to elevated blood pressure. Specifically denies chest pain, palpitations, dyspnea, orthopnea, PND or peripheral edema. Blood pressure readings have not been in normal range.d/t recent prednisone use. Current medication regimen is as listed below. Patient denies any side effects of medication.                                                                                                                                                                        .                                                                                                   .  CAD - Patient has a longstanding history of mod-severe CAD. Patient denies recent chest pain or NTG use, denies exercise induced dyspnea or PND. Last Stress test 3/2014 (with dobutamine) was non-diagnostic d/t hypotension induced during the test. No recent s/s of CAD. PLAN: as  per Patient Instructions in the Recommendations section below.                                                                                                                              .  RENAL INSUFFICIENCY - Patient has a longstanding history of chronic renal insufficiency of moderate severity. Exacerbating triggers in the past have been hypovolemia. Last Cr 1.25. PLAN: as per Patient Instructions in the Recommendations section below.                                                                                                                                                                               .    MEDICAL HISTORY:                                                      Patient Active Problem List    Diagnosis Date Noted     Aftercare following joint replacement [Z47.1] 04/20/2017     Priority: Medium     Long-term (current) use of anticoagulants [Z79.01] 04/17/2017     Priority: Medium     S/P total hip arthroplasty 04/17/2017     Priority: Medium     Status post hip surgery 04/11/2017     Priority: Medium     Osteoarthritis of left hip 07/26/2016     Priority: Medium     Primary osteoarthritis of left hip 07/26/2016     Priority: Medium     Essential hypertension with goal blood pressure less than 140/90 07/24/2016     Priority: Medium     Benign neoplasm of colon, unspecified part of colon 03/14/2016     Priority: Medium     Liver transplant rejection (H) 08/11/2014     Priority: Medium     Mild acute liver transplant rejection - treated as inpatient with IV steroids.       Elevated transaminase level 08/10/2014     Priority: Medium     SNHL (sensory-neural hearing loss), asymmetrical 07/17/2014     Priority: Medium     Chest pain 03/15/2014     Priority: Medium     Left shoulder pain 03/15/2014     Priority: Medium     Fatigue 03/20/2013     Priority: Medium     SOB (shortness of breath) 03/20/2013     Priority: Medium     Onychomycosis of toenail 01/28/2013     Priority: Medium     Right leg pain  07/10/2012     Priority: Medium     Osteopenia 05/16/2012     Priority: Medium     Hyperlipidemia LDL goal <70 05/15/2012     Priority: Medium     CKD (chronic kidney disease) stage 3, GFR 30-59 ml/min 05/15/2012     Priority: Medium     Advanced directives, counseling/discussion 04/14/2011     Priority: Medium     Patient states has an ADand will bring in a copy to clinic         CAD (coronary artery disease) 01/01/2009     Priority: Medium     stent       History of liver transplant (H) 02/01/2008     Priority: Medium     (Problem list name updated by automated process. 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     Osteoarthritis of left hip      Osteopenia      Overweight(278.02)      Palpitations      Spinal stenosis      Past Surgical History:   Procedure Laterality Date     ARTHROPLASTY HIP Left 4/11/2017    Procedure: ARTHROPLASTY HIP;  Surgeon: Zhen Armstrong MD;  Location: [facility] OR     BIOPSY      liver      C STOMACH SURGERY PROCEDURE UNLISTED       ENT SURGERY       GYN SURGERY       SURGICAL HISTORY OF -       liver transplant 2008     SURGICAL HISTORY OF -       CAD with stent placement 2009     TRANSPLANT  02/03/2008    Liver transplant      Current Outpatient Prescriptions   Medication Sig Dispense Refill     predniSONE (DELTASONE) 20 MG tablet Take 80 mg daily 7/7-7/13, then 60 mg daily 7/14-7/20, then 40 mg daily 7/21-7/27, then stay on 20 mg daily. 70 tablet 0     acetaminophen (TYLENOL) 325 MG tablet Take 2 tablets (650 mg) by mouth every 4 hours as needed for other (surgical pain) 100 tablet 0     calcium-vitamin D (CALTRATE) 600-400 MG-UNIT per tablet  Take 1 tablet by mouth daily       hydrALAZINE (APRESOLINE) 25 MG tablet Take 1 tablet (25 mg) by mouth 3 times daily , as needed if systolic blood pressure (top number) is more than 180 mmHg. (Patient not taking: Reported on 4/25/2017) 90 tablet 3     metoprolol (LOPRESSOR) 50 MG tablet Take 1 tablet (50 mg) by mouth 2 times daily 182 tablet 3     lisinopril (PRINIVIL,ZESTRIL) 2.5 MG tablet Take 1 tablet (2.5 mg) by mouth daily (Patient taking differently: Take 2.5 mg by mouth every morning ) 93 tablet 3     isosorbide mononitrate (IMDUR) 60 MG 24 hr tablet Take 1 tablet (60 mg) by mouth 2 times daily 180 tablet 6     tacrolimus (PROGRAF - GENERIC EQUIVALENT) 0.5 MG capsule Take 0.5 mg (1 cap) every AM, take 1 mg, (2 caps) every PM. 270 capsule 3     nystatin-triamcinolone (MYCOLOG II) cream Apply topically 4 times daily as needed Reported on 4/5/2017       aspirin 81 MG tablet Take 1 tablet by mouth daily. 90 tablet 3     MULTI-VITAMIN PO Take 1 tablet by mouth daily        OTC products: None, except as noted above    Allergies   Allergen Reactions     Amlodipine Besylate Swelling     Edema in legs     Amoxicillin Rash      Latex Allergy: NO    Social History   Substance Use Topics     Smoking status: Former Smoker     Packs/day: 0.50     Years: 10.00     Types: Cigarettes     Quit date: 11/23/1975     Smokeless tobacco: Never Used     Alcohol use No      Comment: Glass of wine occassionally     History   Drug Use No     Comment: Never       REVIEW OF SYSTEMS:                                                    Constitutional, neuro, ENT, endocrine, pulmonary, cardiac, gastrointestinal, genitourinary, musculoskeletal, integument and psychiatric systems are negative, except as otherwise noted.    EXAM:                                                    BP (P) 168/74 (BP Location: Right arm, Patient Position: Chair, Cuff Size: Adult Regular)  Pulse 77  Temp 97.1  F (36.2  C) (Oral)  Wt 127 lb 3.2 oz (57.7 kg)   SpO2 100%  BMI 24.84 kg/m2       GENERAL APPEARANCE: healthy, alert and no distress     EYES: EOMI,- PERRL     HENT: ear canals and TM's normal and nose and mouth without ulcers or lesions     NECK: no adenopathy, no asymmetry, masses, or scars and thyroid normal to palpation     RESP: lungs clear to auscultation - no rales, rhonchi or wheezes     CV: regular rates and rhythm, normal S1 S2, no S3 or S4 and no murmur, click or rub -     ABDOMEN:  soft, nontender, no HSM or masses and bowel sounds normal     MS: extremities normal- no gross deformities noted, no evidence of inflammation in joints, FROM in all extremities.     SKIN: no suspicious lesions or rashes     NEURO: Normal strength and tone, sensory exam grossly normal, mentation intact and speech normal     PSYCH: mentation appears normal. and affect normal/bright     LYMPHATICS: No cervical or supraclavicular nodes    DIAGNOSTICS:                                                    EKG: appears normal, NSR, normal axis, normal intervals, no acute ST/T changes c/w ischemia, no LVH by voltage criteria, unchanged from previous tracings    Recent Labs   Lab Test  07/03/17   0803  06/19/17   1208  06/06/17   0758 05/08/17   HGB  9.7*   --   8.9*   --    PLT  295   --   234   --    INR   --   1.0   --   2.0   NA  133   --   134   --    POTASSIUM  4.4   --   4.3   --    CR  1.25*   --   1.47*   --         IMPRESSION:                                                    Reason for surgery/procedure: see HPI  Diagnosis/reason for consult: see HPI    The proposed surgical procedure is considered LOW risk.    REVISED CARDIAC RISK INDEX  The patient has the following serious cardiovascular risks for perioperative complications such as (MI, PE, VFib and 3  AV Block):  Coronary Artery Disease (MI, positive stress test, angina, Qs on EKG)  INTERPRETATION: 1 risks: Class II (low risk - 0.9% complication rate)    The patient has the following additional risks for perioperative  complications:  No identified additional risks      ICD-10-CM    1. Preop general physical exam Z01.818 EKG 12-lead complete w/read - Clinics   2. Choledocholithiasis K80.50    3. History of liver transplant (H) Z94.4    4. Coronary artery disease involving native coronary artery of native heart without angina pectoris I25.10    5. CKD (chronic kidney disease) stage 3, GFR 30-59 ml/min N18.3    6. Essential hypertension with goal blood pressure less than 140/90 I10        RECOMMENDATIONS:                                                      Patient Instructions:  You have indicated that Dr Muhammad's office has advised you to stop the aspirin 2 days prior to the procedure-please follow their recommendations regarding this, and you may ask their office when it will be safe for you to restart your aspirin.     Please do not take lisinopril on the day of the procedure or surgery. You may restart it/them the next day as as long as your surgeon/gastroenterologist approves you restarting this medication(s).      UPDATE: we have contacted Dr Muhammad regarding stress dose steroids and he did not feel that they are required at this point. The patient has been advised of Dr Muhammad's recommendation.     Otherwise, you may take your other medications as usual.     APPROVAL GIVEN to proceed with proposed procedure, without further diagnostic evaluation       Signed Electronically by: Lauren Whittington MD    Copy of this evaluation report is provided to requesting physician.    [facility] Preop Guidelines

## 2017-07-10 NOTE — TELEPHONE ENCOUNTER
Left message for patient to return call to clinic for message below.     Glo Contreras RN   [facility]

## 2017-07-10 NOTE — TELEPHONE ENCOUNTER
Dr. Muhammad, [facility] office number is [phone].  Please select option 3, this brings you to the triage nurse line in order to discuss the patient.   Barb Capone,

## 2017-07-10 NOTE — NURSING NOTE
Chief Complaint   Patient presents with     Pre-Op Exam       Initial /82 (BP Location: Right arm, Patient Position: Chair, Cuff Size: Adult Regular)  Pulse 77  Temp 97.1  F (36.2  C) (Oral)  Wt 127 lb 3.2 oz (57.7 kg)  SpO2 100%  BMI 24.84 kg/m2 Estimated body mass index is 24.84 kg/(m^2) as calculated from the following:    Height as of 6/19/17: 5' (1.524 m).    Weight as of this encounter: 127 lb 3.2 oz (57.7 kg).  Medication Reconciliation: complete    Migdalia Christensen CMA

## 2017-07-10 NOTE — TELEPHONE ENCOUNTER
I have received a Staff Message via [employer] from Dr Muhammad and Porsha regarding preoperative steroid management.  Dr Muhammad does not feel that there is a need for stress dose steroids, and that the patient may continue her usual 20mg daily of the prednisone. Please relate this to the patient.    Thank you,  Lauren Whittington MD

## 2017-07-10 NOTE — MR AVS SNAPSHOT
After Visit Summary   7/10/2017    Lesli Lorenzana    MRN: [id number]           Patient Information     Date Of Birth          1940        Visit Information        Provider Department      7/10/2017 8:50 AM Lauren Whittington MD [facility]        Today's Diagnoses     Preop general physical exam    -  1      Care Instructions    You have indicated that Dr Muhammad's office has advised you to stop the aspirin 2 days prior to the procedure-please follow their recommendations regarding this, and you may ask their office when it will be safe for you to restart your aspirin.     Please do not take lisinopril on the day of the procedure or surgery. You may restart it/them the next day as as long as your surgeon/gastroenterologist approves you restarting this medication(s).    Due to your recent and current high-to-moderate dose of steroid use, the following stress dose regimen is usually used:    For moderate surgical stress (eg, lower extremity revascularization, total joint replacement), take usual morning steroid dose. Give 50 mg hydrocortisone intravenously just before the procedure and 25 mg of hydrocortisone every eight hours for 24 hours. Resume usual dose thereafter.     We will contact Dr Muhammad to confirm the plan regarding stress dose steroids-please contact our clinic on Wednesday if you do not hear from either our office or Dr Muhammad's office with specific instructions regarding stress dose steroid use.    Otherwise, you may take your other medications as usual.     Before Your Surgery      Call your surgeon if there is any change in your health. This includes signs of a cold or flu (such as a sore throat, runny nose, cough, rash or fever).    Do not smoke, drink alcohol or take over the counter medicine (unless your surgeon or primary care doctor tells you to) for the 24 hours before and after surgery.    If you take prescribed drugs: Follow your doctor s orders about  which medicines to take and which to stop until after surgery.    Eating and drinking prior to surgery: follow the instructions from your surgeon    Take a shower or bath the night before surgery. Use the soap your surgeon gave you to gently clean your skin. If you do not have soap from your surgeon, use your regular soap. Do not shave or scrub the surgery site.  Wear clean pajamas and have clean sheets on your bed.           Follow-ups after your visit        Your next 10 appointments already scheduled     Jul 28, 2017 10:00 AM CDT   (Arrive by 9:45 AM)   Return Liver Transplant with Tucker Muhammad MD   [facility] ([facility])    [address] [place] [address] [phone]            Aug 08, 2017  8:15 AM CDT   LAB with [facility]   [facility]    [address] [facility] [address] [phone]           Patient must bring picture ID.  Patient should be prepared to give a urine specimen  Please do not eat 10-12 hours before your appointment if you are coming in fasting for labs on lipids, cholesterol, or glucose (sugar).  Pregnant women should follow their Care Team instructions. Water with medications is okay. Do not drink coffee or other fluids.   If you have concerns about taking  your medications, please ask at office or if scheduling via [employer], send a message by clicking on Secure Messaging, Message Your Care Team.            Sep 07, 2017 10:30 AM CDT   (Arrive by 10:15 AM)   Return Visit with Nain Lagunas MD   [facility] ([facility])    [address] [place] [address] [phone]              Who to contact     If you have questions or need follow up information about today's clinic visit or your schedule please contact [facility] directly at [phone].  Normal or non-critical lab and imaging results will be  communicated to you by [employer], letter or phone within 4 business days after the clinic has received the results. If you do not hear from us within 7 days, please contact the clinic through [employer] or phone. If you have a critical or abnormal lab result, we will notify you by phone as soon as possible.  Submit refill requests through [employer] or call your pharmacy and they will forward the refill request to us. Please allow 3 business days for your refill to be completed.          Additional Information About Your Visit        [employer] Information     [employer] gives you secure access to your electronic health record. If you see a primary care provider, you can also send messages to your care team and make appointments. If you have questions, please call your primary care clinic.  If you do not have a primary care provider, please call [phone] and they will assist you.        Care EveryWhere ID     This is your Care EveryWhere ID. This could be used by other organizations to access your [facility] medical records  [id number]        Your Vitals Were     Pulse Temperature Pulse Oximetry BMI (Body Mass Index)          77 97.1  F (36.2  C) (Oral) 100% 24.84 kg/m2         Blood Pressure from Last 3 Encounters:   07/10/17 (P) 168/74   07/06/17 163/73   07/05/17 146/64    Weight from Last 3 Encounters:   07/10/17 127 lb 3.2 oz (57.7 kg)   06/19/17 125 lb (56.7 kg)   05/15/17 126 lb 3.2 oz (57.2 kg)              We Performed the Following     EKG 12-lead complete w/read - Clinics          Today's Medication Changes          These changes are accurate as of: 7/10/17 10:20 AM.  If you have any questions, ask your nurse or doctor.               These medicines have changed or have updated prescriptions.        Dose/Directions    lisinopril 2.5 MG tablet   Commonly known as:  PRINIVIL/Zestril   This may have changed:  when to take this   Used for:  Coronary artery disease involving native heart without angina pectoris,  unspecified vessel or lesion type        Dose:  2.5 mg   Take 1 tablet (2.5 mg) by mouth daily   Quantity:  93 tablet   Refills:  3                Primary Care Provider Office Phone # Fax #    Dee Awan [phone]       [facility] [address] [facility] [address]        Equal Access to Services     [facility]: Hadii aad ku hadasho Soomaali, waaxda luqadaha, qaybta kaalmada adeegyada, waxay idiin hayaan adeeg kharash lakimn . So [facility] [phone].    ATENCIÓN: Si habla español, tiene a messina disposición servicios gratuitos de asistencia lingüística. Mamtaame al [phone].    We comply with applicable federal civil rights laws and [place] laws. We do not discriminate on the basis of race, color, national origin, age, disability sex, sexual orientation or gender identity.            Thank you!     Thank you for choosing [facility]  for your care. Our goal is always to provide you with excellent care. Hearing back from our patients is one way we can continue to improve our services. Please take a few minutes to complete the written survey that you may receive in the mail after your visit with us. Thank you!             Your Updated Medication List - Protect others around you: Learn how to safely use, store and throw away your medicines at www.disposemymeds.org.          This list is accurate as of: 7/10/17 10:20 AM.  Always use your most recent med list.                   Brand Name Dispense Instructions for use Diagnosis    aspirin 81 MG tablet     90 tablet    Take 1 tablet by mouth daily.    CAD (coronary artery disease)       calcium-vitamin D 600-400 MG-UNIT per tablet    CALTRATE     Take 1 tablet by mouth daily        hydrALAZINE 25 MG tablet    APRESOLINE    90 tablet    Take 1 tablet (25 mg) by mouth 3 times daily , as needed if systolic blood pressure (top number) is more than 180 mmHg.    Essential hypertension with goal blood pressure less than  140/90       isosorbide mononitrate 60 MG 24 hr tablet    IMDUR    180 tablet    Take 1 tablet (60 mg) by mouth 2 times daily    Coronary artery disease due to lipid rich plaque, Secondary hypertension with goal blood pressure less than 140/90       lisinopril 2.5 MG tablet    PRINIVIL/Zestril    93 tablet    Take 1 tablet (2.5 mg) by mouth daily    Coronary artery disease involving native heart without angina pectoris, unspecified vessel or lesion type       metoprolol 50 MG tablet    LOPRESSOR    182 tablet    Take 1 tablet (50 mg) by mouth 2 times daily    Essential hypertension with goal blood pressure less than 140/90, Coronary artery disease involving native heart with angina pectoris, unspecified vessel or lesion type (H)       MULTI-VITAMIN PO      Take 1 tablet by mouth daily        nystatin-triamcinolone cream    MYCOLOG II     Apply topically 4 times daily as needed Reported on 4/5/2017        predniSONE 20 MG tablet    DELTASONE    70 tablet    Take 80 mg daily 7/7-7/13, then 60 mg daily 7/14-7/20, then 40 mg daily 7/21-7/27, then stay on 20 mg daily.    Liver transplant rejection (H)       tacrolimus 0.5 MG capsule    PROGRAF - GENERIC EQUIVALENT    270 capsule    Take 0.5 mg (1 cap) every AM, take 1 mg, (2 caps) every PM.    Liver replaced by transplant (H)

## 2017-07-10 NOTE — TELEPHONE ENCOUNTER
This patient is planned for an endoscopy procedure this week or next week (the date has not been determined yet).  Due to her current steroid use she will likely need intravenous stress dose steroids following the procedure (see below).  However, the patient is currently scheduled for day-stay surgery (ie, will not be admitted).     Given the above, please contact Dr Muhammad's office and ask him to review this message ASAP and reply back to us, as we need to confirm if he agrees with the stress dose steroids and if so, then the patient may need to be in-patient for 24 hours after the procedure.        For moderate surgical stress (eg, lower extremity revascularization, total joint replacement), take usual morning steroid dose. Give 50 mg hydrocortisone intravenously just before the procedure and 25 mg of hydrocortisone every eight hours for 24 hours. Resume usual dose thereafter.     Thank you,  Lauren Whittington MD

## 2017-07-10 NOTE — PATIENT INSTRUCTIONS
You have indicated that Dr Muhammad's office has advised you to stop the aspirin 2 days prior to the procedure-please follow their recommendations regarding this, and you may ask their office when it will be safe for you to restart your aspirin.     Please do not take lisinopril on the day of the procedure or surgery. You may restart it/them the next day as as long as your surgeon/gastroenterologist approves you restarting this medication(s).    Due to your recent and current high-to-moderate dose of steroid use, the following stress dose regimen is usually used:    For moderate surgical stress (eg, lower extremity revascularization, total joint replacement), take usual morning steroid dose. Give 50 mg hydrocortisone intravenously just before the procedure and 25 mg of hydrocortisone every eight hours for 24 hours. Resume usual dose thereafter.     We will contact Dr Muhammad to confirm the plan regarding stress dose steroids-please contact our clinic on Wednesday if you do not hear from either our office or Dr Muhammad's office with specific instructions regarding stress dose steroid use.    Otherwise, you may take your other medications as usual.     Before Your Surgery      Call your surgeon if there is any change in your health. This includes signs of a cold or flu (such as a sore throat, runny nose, cough, rash or fever).    Do not smoke, drink alcohol or take over the counter medicine (unless your surgeon or primary care doctor tells you to) for the 24 hours before and after surgery.    If you take prescribed drugs: Follow your doctor s orders about which medicines to take and which to stop until after surgery.    Eating and drinking prior to surgery: follow the instructions from your surgeon    Take a shower or bath the night before surgery. Use the soap your surgeon gave you to gently clean your skin. If you do not have soap from your surgeon, use your regular soap. Do not shave or scrub the surgery site.  Wear clean  rosalina and have clean sheets on your bed.

## 2017-07-10 NOTE — TELEPHONE ENCOUNTER
Called # below, nurse she transferred writer to Haydee TAYLOR at the [facility] hepatology clinic [phone].   Haydee TAYLOR stated that she helps non transplant pts only.   Haydee transferred call to RN transplant care coordinator Porsha TAYLOR at [phone].   Called Porsha, left the detailed message below on her VM, along with writers direct line.   Awaiting her return call.  Yelena Gurrola, NARINDERN RN

## 2017-07-10 NOTE — TELEPHONE ENCOUNTER
Patient notified of message below from provider.   Stated understanding.     No further questions or concerns at this time.  Glo Contreras RN   [facility]

## 2017-07-11 LAB
TACROLIMUS BLD-MCNC: 4.3 UG/L (ref 5–15)
TME LAST DOSE: ABNORMAL H

## 2017-07-12 ENCOUNTER — TELEPHONE (OUTPATIENT)
Dept: GASTROENTEROLOGY | Facility: CLINIC | Age: 77
End: 2017-07-12

## 2017-07-12 NOTE — TELEPHONE ENCOUNTER
Lesli is informed that she is scheduled on 07/17/2017 at 8:15 AM with an arrival time of 6:15 AM.  She had an H&P done on 7/10/2017.  She knows to arrange for a  and someone to monitor her for 24 hours post procedure.  No procedure packet will be mailed to pt as mail will not get there in time.  I did give pt my direct line in the event that she has any questions.    SR 07/12/2017  213p

## 2017-07-14 RX ORDER — INDOMETHACIN 50 MG/1
100 SUPPOSITORY RECTAL
Status: DISCONTINUED | OUTPATIENT
Start: 2017-07-14 | End: 2017-07-14 | Stop reason: CLARIF

## 2017-07-14 RX ORDER — LIDOCAINE 40 MG/G
CREAM TOPICAL
Status: DISCONTINUED | OUTPATIENT
Start: 2017-07-14 | End: 2017-07-14 | Stop reason: CLARIF

## 2017-07-16 ENCOUNTER — ANESTHESIA EVENT (OUTPATIENT)
Dept: SURGERY | Facility: CLINIC | Age: 77
End: 2017-07-16
Payer: MEDICARE

## 2017-07-17 ENCOUNTER — HOSPITAL ENCOUNTER (OUTPATIENT)
Facility: CLINIC | Age: 77
Discharge: HOME | End: 2017-07-17
Attending: INTERNAL MEDICINE | Admitting: INTERNAL MEDICINE
Payer: MEDICARE

## 2017-07-17 ENCOUNTER — SURGERY (OUTPATIENT)
Age: 77
End: 2017-07-17

## 2017-07-17 ENCOUNTER — APPOINTMENT (OUTPATIENT)
Dept: GENERAL RADIOLOGY | Facility: CLINIC | Age: 77
End: 2017-07-17
Attending: INTERNAL MEDICINE
Payer: MEDICARE

## 2017-07-17 ENCOUNTER — ANESTHESIA (OUTPATIENT)
Dept: SURGERY | Facility: CLINIC | Age: 77
End: 2017-07-17
Payer: MEDICARE

## 2017-07-17 VITALS
RESPIRATION RATE: 16 BRPM | SYSTOLIC BLOOD PRESSURE: 129 MMHG | HEIGHT: 60 IN | TEMPERATURE: 97.4 F | WEIGHT: 127.43 LBS | BODY MASS INDEX: 25.02 KG/M2 | DIASTOLIC BLOOD PRESSURE: 57 MMHG | OXYGEN SATURATION: 100 %

## 2017-07-17 DIAGNOSIS — K80.50 CHOLEDOCHOLITHIASIS: Primary | ICD-10-CM

## 2017-07-17 LAB
ALBUMIN SERPL-MCNC: 3 G/DL (ref 3.4–5)
ALP SERPL-CCNC: 199 U/L (ref 40–150)
ALT SERPL W P-5'-P-CCNC: 63 U/L (ref 0–50)
AMYLASE SERPL-CCNC: 131 U/L (ref 30–110)
AMYLASE SERPL-CCNC: 139 U/L (ref 30–110)
ANION GAP SERPL CALCULATED.3IONS-SCNC: 7 MMOL/L (ref 3–14)
AST SERPL W P-5'-P-CCNC: 32 U/L (ref 0–45)
BILIRUB SERPL-MCNC: 0.9 MG/DL (ref 0.2–1.3)
BUN SERPL-MCNC: 28 MG/DL (ref 7–30)
CALCIUM SERPL-MCNC: 8.7 MG/DL (ref 8.5–10.1)
CHLORIDE SERPL-SCNC: 100 MMOL/L (ref 94–109)
CO2 SERPL-SCNC: 25 MMOL/L (ref 20–32)
CREAT SERPL-MCNC: 1.03 MG/DL (ref 0.52–1.04)
ERCP: NORMAL
ERYTHROCYTE [DISTWIDTH] IN BLOOD BY AUTOMATED COUNT: 16.1 % (ref 10–15)
GFR SERPL CREATININE-BSD FRML MDRD: 52 ML/MIN/1.7M2
GLUCOSE SERPL-MCNC: 85 MG/DL (ref 70–99)
HCT VFR BLD AUTO: 29.1 % (ref 35–47)
HGB BLD-MCNC: 9.6 G/DL (ref 11.7–15.7)
LIPASE SERPL-CCNC: 392 U/L (ref 73–393)
LIPASE SERPL-CCNC: 493 U/L (ref 73–393)
MCH RBC QN AUTO: 26.8 PG (ref 26.5–33)
MCHC RBC AUTO-ENTMCNC: 33 G/DL (ref 31.5–36.5)
MCV RBC AUTO: 81 FL (ref 78–100)
PLATELET # BLD AUTO: 219 10E9/L (ref 150–450)
POTASSIUM SERPL-SCNC: 3.8 MMOL/L (ref 3.4–5.3)
PROT SERPL-MCNC: 7.4 G/DL (ref 6.8–8.8)
RBC # BLD AUTO: 3.58 10E12/L (ref 3.8–5.2)
SODIUM SERPL-SCNC: 132 MMOL/L (ref 133–144)
WBC # BLD AUTO: 8.6 10E9/L (ref 4–11)

## 2017-07-17 PROCEDURE — 82150 ASSAY OF AMYLASE: CPT | Performed by: INTERNAL MEDICINE

## 2017-07-17 PROCEDURE — 37000008 ZZH ANESTHESIA TECHNICAL FEE, 1ST 30 MIN: Performed by: INTERNAL MEDICINE

## 2017-07-17 PROCEDURE — 71000027 ZZH RECOVERY PHASE 2 EACH 15 MINS: Performed by: INTERNAL MEDICINE

## 2017-07-17 PROCEDURE — 25500064 ZZH RX 255 OP 636: Performed by: INTERNAL MEDICINE

## 2017-07-17 PROCEDURE — 83690 ASSAY OF LIPASE: CPT | Mod: 91 | Performed by: INTERNAL MEDICINE

## 2017-07-17 PROCEDURE — 36000059 ZZH SURGERY LEVEL 3 EA 15 ADDTL MIN UMMC: Performed by: INTERNAL MEDICINE

## 2017-07-17 PROCEDURE — 71000017 ZZH RECOVERY PHASE 1 LEVEL 3 EA ADDTL HR: Performed by: INTERNAL MEDICINE

## 2017-07-17 PROCEDURE — 40000280 XR SURGERY CARM FLUORO GREATER THAN 5 MIN: Mod: TC

## 2017-07-17 PROCEDURE — 25000128 H RX IP 250 OP 636: Performed by: NURSE ANESTHETIST, CERTIFIED REGISTERED

## 2017-07-17 PROCEDURE — 40000171 ZZH STATISTIC PRE-PROCEDURE ASSESSMENT III: Performed by: INTERNAL MEDICINE

## 2017-07-17 PROCEDURE — 27210995 ZZH RX 272: Performed by: INTERNAL MEDICINE

## 2017-07-17 PROCEDURE — 36415 COLL VENOUS BLD VENIPUNCTURE: CPT | Performed by: INTERNAL MEDICINE

## 2017-07-17 PROCEDURE — 80053 COMPREHEN METABOLIC PANEL: CPT | Performed by: INTERNAL MEDICINE

## 2017-07-17 PROCEDURE — 36000061 ZZH SURGERY LEVEL 3 W FLUORO 1ST 30 MIN - UMMC: Performed by: INTERNAL MEDICINE

## 2017-07-17 PROCEDURE — 25000566 ZZH SEVOFLURANE, EA 15 MIN: Performed by: INTERNAL MEDICINE

## 2017-07-17 PROCEDURE — 25000128 H RX IP 250 OP 636: Performed by: ANESTHESIOLOGY

## 2017-07-17 PROCEDURE — C1769 GUIDE WIRE: HCPCS | Performed by: INTERNAL MEDICINE

## 2017-07-17 PROCEDURE — C9399 UNCLASSIFIED DRUGS OR BIOLOG: HCPCS | Performed by: NURSE ANESTHETIST, CERTIFIED REGISTERED

## 2017-07-17 PROCEDURE — 25000125 ZZHC RX 250: Performed by: NURSE ANESTHETIST, CERTIFIED REGISTERED

## 2017-07-17 PROCEDURE — 85027 COMPLETE CBC AUTOMATED: CPT | Performed by: INTERNAL MEDICINE

## 2017-07-17 PROCEDURE — 27210794 ZZH OR GENERAL SUPPLY STERILE: Performed by: INTERNAL MEDICINE

## 2017-07-17 PROCEDURE — 71000016 ZZH RECOVERY PHASE 1 LEVEL 3 FIRST HR: Performed by: INTERNAL MEDICINE

## 2017-07-17 PROCEDURE — 37000009 ZZH ANESTHESIA TECHNICAL FEE, EACH ADDTL 15 MIN: Performed by: INTERNAL MEDICINE

## 2017-07-17 RX ORDER — NALOXONE HYDROCHLORIDE 0.4 MG/ML
.1-.4 INJECTION, SOLUTION INTRAMUSCULAR; INTRAVENOUS; SUBCUTANEOUS
Status: DISCONTINUED | OUTPATIENT
Start: 2017-07-17 | End: 2017-07-17 | Stop reason: HOSPADM

## 2017-07-17 RX ORDER — ONDANSETRON 4 MG/1
4 TABLET, ORALLY DISINTEGRATING ORAL EVERY 30 MIN PRN
Status: DISCONTINUED | OUTPATIENT
Start: 2017-07-17 | End: 2017-07-17 | Stop reason: HOSPADM

## 2017-07-17 RX ORDER — FENTANYL CITRATE 50 UG/ML
25-50 INJECTION, SOLUTION INTRAMUSCULAR; INTRAVENOUS EVERY 5 MIN PRN
Status: DISCONTINUED | OUTPATIENT
Start: 2017-07-17 | End: 2017-07-17 | Stop reason: HOSPADM

## 2017-07-17 RX ORDER — FLUMAZENIL 0.1 MG/ML
0.2 INJECTION, SOLUTION INTRAVENOUS
Status: DISCONTINUED | OUTPATIENT
Start: 2017-07-17 | End: 2017-07-17 | Stop reason: HOSPADM

## 2017-07-17 RX ORDER — ONDANSETRON 2 MG/ML
4 INJECTION INTRAMUSCULAR; INTRAVENOUS EVERY 30 MIN PRN
Status: DISCONTINUED | OUTPATIENT
Start: 2017-07-17 | End: 2017-07-17 | Stop reason: HOSPADM

## 2017-07-17 RX ORDER — ONDANSETRON 2 MG/ML
INJECTION INTRAMUSCULAR; INTRAVENOUS PRN
Status: DISCONTINUED | OUTPATIENT
Start: 2017-07-17 | End: 2017-07-17

## 2017-07-17 RX ORDER — FENTANYL CITRATE 50 UG/ML
INJECTION, SOLUTION INTRAMUSCULAR; INTRAVENOUS PRN
Status: DISCONTINUED | OUTPATIENT
Start: 2017-07-17 | End: 2017-07-17

## 2017-07-17 RX ORDER — SODIUM CHLORIDE, SODIUM LACTATE, POTASSIUM CHLORIDE, CALCIUM CHLORIDE 600; 310; 30; 20 MG/100ML; MG/100ML; MG/100ML; MG/100ML
INJECTION, SOLUTION INTRAVENOUS CONTINUOUS
Status: DISCONTINUED | OUTPATIENT
Start: 2017-07-17 | End: 2017-07-17 | Stop reason: HOSPADM

## 2017-07-17 RX ORDER — INDOMETHACIN 50 MG/1
100 SUPPOSITORY RECTAL
Status: DISCONTINUED | OUTPATIENT
Start: 2017-07-17 | End: 2017-07-17 | Stop reason: HOSPADM

## 2017-07-17 RX ORDER — PROPOFOL 10 MG/ML
INJECTION, EMULSION INTRAVENOUS PRN
Status: DISCONTINUED | OUTPATIENT
Start: 2017-07-17 | End: 2017-07-17

## 2017-07-17 RX ORDER — HYDROMORPHONE HYDROCHLORIDE 1 MG/ML
.3-.5 INJECTION, SOLUTION INTRAMUSCULAR; INTRAVENOUS; SUBCUTANEOUS
Status: DISCONTINUED | OUTPATIENT
Start: 2017-07-17 | End: 2017-07-17 | Stop reason: HOSPADM

## 2017-07-17 RX ORDER — SODIUM CHLORIDE, SODIUM LACTATE, POTASSIUM CHLORIDE, CALCIUM CHLORIDE 600; 310; 30; 20 MG/100ML; MG/100ML; MG/100ML; MG/100ML
INJECTION, SOLUTION INTRAVENOUS CONTINUOUS PRN
Status: DISCONTINUED | OUTPATIENT
Start: 2017-07-17 | End: 2017-07-17

## 2017-07-17 RX ORDER — CIPROFLOXACIN 500 MG/1
500 TABLET, FILM COATED ORAL 2 TIMES DAILY
Qty: 10 TABLET | Refills: 0 | Status: SHIPPED | OUTPATIENT
Start: 2017-07-17 | End: 2017-07-22

## 2017-07-17 RX ORDER — LIDOCAINE 40 MG/G
CREAM TOPICAL
Status: DISCONTINUED | OUTPATIENT
Start: 2017-07-17 | End: 2017-07-17 | Stop reason: HOSPADM

## 2017-07-17 RX ORDER — CIPROFLOXACIN 2 MG/ML
INJECTION, SOLUTION INTRAVENOUS PRN
Status: DISCONTINUED | OUTPATIENT
Start: 2017-07-17 | End: 2017-07-17

## 2017-07-17 RX ORDER — EPHEDRINE SULFATE 50 MG/ML
INJECTION, SOLUTION INTRAMUSCULAR; INTRAVENOUS; SUBCUTANEOUS PRN
Status: DISCONTINUED | OUTPATIENT
Start: 2017-07-17 | End: 2017-07-17

## 2017-07-17 RX ORDER — LABETALOL HYDROCHLORIDE 5 MG/ML
5 INJECTION, SOLUTION INTRAVENOUS
Status: DISCONTINUED | OUTPATIENT
Start: 2017-07-17 | End: 2017-07-17 | Stop reason: HOSPADM

## 2017-07-17 RX ORDER — IOPAMIDOL 510 MG/ML
INJECTION, SOLUTION INTRAVASCULAR PRN
Status: DISCONTINUED | OUTPATIENT
Start: 2017-07-17 | End: 2017-07-17 | Stop reason: HOSPADM

## 2017-07-17 RX ORDER — DEXAMETHASONE SODIUM PHOSPHATE 4 MG/ML
INJECTION, SOLUTION INTRA-ARTICULAR; INTRALESIONAL; INTRAMUSCULAR; INTRAVENOUS; SOFT TISSUE PRN
Status: DISCONTINUED | OUTPATIENT
Start: 2017-07-17 | End: 2017-07-17

## 2017-07-17 RX ORDER — HYDRALAZINE HYDROCHLORIDE 20 MG/ML
2.5-5 INJECTION INTRAMUSCULAR; INTRAVENOUS EVERY 10 MIN PRN
Status: DISCONTINUED | OUTPATIENT
Start: 2017-07-17 | End: 2017-07-17 | Stop reason: HOSPADM

## 2017-07-17 RX ORDER — LIDOCAINE HYDROCHLORIDE 20 MG/ML
INJECTION, SOLUTION INFILTRATION; PERINEURAL PRN
Status: DISCONTINUED | OUTPATIENT
Start: 2017-07-17 | End: 2017-07-17

## 2017-07-17 RX ADMIN — SODIUM CHLORIDE, POTASSIUM CHLORIDE, SODIUM LACTATE AND CALCIUM CHLORIDE: 600; 310; 30; 20 INJECTION, SOLUTION INTRAVENOUS at 08:09

## 2017-07-17 RX ADMIN — ROCURONIUM BROMIDE 25 MG: 10 INJECTION INTRAVENOUS at 08:17

## 2017-07-17 RX ADMIN — FENTANYL CITRATE 100 MCG: 50 INJECTION, SOLUTION INTRAMUSCULAR; INTRAVENOUS at 08:17

## 2017-07-17 RX ADMIN — SUGAMMADEX 120 MG: 100 INJECTION, SOLUTION INTRAVENOUS at 09:25

## 2017-07-17 RX ADMIN — WATER 100 ML: 100 IRRIGANT IRRIGATION at 09:25

## 2017-07-17 RX ADMIN — DEXAMETHASONE SODIUM PHOSPHATE 6 MG: 4 INJECTION, SOLUTION INTRA-ARTICULAR; INTRALESIONAL; INTRAMUSCULAR; INTRAVENOUS; SOFT TISSUE at 08:49

## 2017-07-17 RX ADMIN — CIPROFLOXACIN 400 MG: 2 INJECTION INTRAVENOUS at 08:39

## 2017-07-17 RX ADMIN — HYDRALAZINE HYDROCHLORIDE 5 MG: 20 INJECTION INTRAMUSCULAR; INTRAVENOUS at 10:25

## 2017-07-17 RX ADMIN — ONDANSETRON 4 MG: 2 INJECTION INTRAMUSCULAR; INTRAVENOUS at 08:49

## 2017-07-17 RX ADMIN — LIDOCAINE HYDROCHLORIDE 90 MG: 20 INJECTION, SOLUTION INFILTRATION; PERINEURAL at 08:17

## 2017-07-17 RX ADMIN — PROPOFOL 100 MG: 10 INJECTION, EMULSION INTRAVENOUS at 08:17

## 2017-07-17 RX ADMIN — Medication 10 MG: at 08:43

## 2017-07-17 RX ADMIN — Medication 10 MG: at 08:41

## 2017-07-17 RX ADMIN — IOPAMIDOL 40 ML: 510 INJECTION, SOLUTION INTRAVASCULAR at 09:18

## 2017-07-17 RX ADMIN — HYDRALAZINE HYDROCHLORIDE 5 MG: 20 INJECTION INTRAMUSCULAR; INTRAVENOUS at 10:05

## 2017-07-17 NOTE — OR NURSING
Dr. Diana and Dr. Roche present in phase 2, Dr. Diana stated pt could be discharged after lab results and he only has to be paged if results are greater than 1,000

## 2017-07-17 NOTE — IP AVS SNAPSHOT
Post Anesthesia Care Unit [address] [address]    Phone:  [phone]                                       After Visit Summary   7/17/2017    Lesli Lorenzana    MRN: [id number]           After Visit Summary Signature Page     I have received my discharge instructions, and my questions have been answered. I have discussed any challenges I see with this plan with the nurse or doctor.    ..........................................................................................................................................  Patient/Patient Representative Signature      ..........................................................................................................................................  Patient Representative Print Name and Relationship to Patient    ..................................................               ................................................  Date                                            Time    ..........................................................................................................................................  Reviewed by Signature/Title    ...................................................              ..............................................  Date                                                            Time

## 2017-07-17 NOTE — BRIEF OP NOTE
ERCP 07/17/2017  7:27 AM [facility], [address] [place] [address] [phone]     Endoscopy Department   _______________________________________________________________________________   Patient Name: Lesli Lorenzana        Procedure Date: 7/17/2017 7:27 AM   MRN: [id number]                       Account Number: [id number]   YOB: 1940               Admit Type: Outpatient   Age: 76                               Room: OR   Gender: Female                        Note Status: Finalized   Attending MD: Narendra Diana MD   Total Sedation Time:   _______________________________________________________________________________       Procedure:           ERCP   Indications:         Abnormal MRCP, Post liver transplant assessment, For                        therapy of bile duct stone(s)   Providers:           Narendra Diana MD, Ramon Roche MD   Patient Profile:     Ms Lorenazna is a 75yo woman with 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   Referring MD:        Tucker Muhammad MD   Medicines:           General Anesthesia, Cipro 400 mg IV, Indomethacin not                        given due to contraindication   Complications:       No immediate complications.   _______________________________________________________________________________   Procedure:           Pre-Anesthesia Assessment:                        - Prior to the procedure, a History and Physical was                        performed, and patient medications and allergies were                        reviewed. The patient is competent. The risks and                        benefits of the procedure and the sedation options and                        risks were discussed with the patient. All questions                        were  answered and informed consent was obtained. Patient                        identification and proposed procedure were verified by                        the nurse in the pre-procedure area. Mental Status                        Examination: alert and oriented. Airway Examination:                        Mallampati Class II (the uvula but not tonsillar pillars                        visualized). Respiratory Examination: clear to                        auscultation. CV Examination: normal. ASA Grade                        Assessment: II - A patient with mild systemic disease.                        After reviewing the risks and benefits, the patient was                        deemed in satisfactory condition to undergo the                        procedure. The anesthesia plan was to use general                        anesthesia. Immediately prior to administration of                        medications, the patient was re-assessed for adequacy to                        receive sedatives. The heart rate, respiratory rate,                        oxygen saturations, blood pressure, adequacy of                        pulmonary ventilation, and response to care were                        monitored throughout the procedure. The physical status                        of the patient was re-assessed after the procedure.                        After obtaining informed consent, the scope was passed                        under direct vision. Throughout the procedure, the                        patient's blood pressure, pulse, and oxygen saturations                        were monitored continuously. The duodenoscope was                        introduced through the mouth, and used to inject                        contrast into and used to inject contrast into the bile                        duct. The ERCP was accomplished without difficulty. The                        patient tolerated the procedure well.                            "                                                          Findings:        A  film of the right upper quadrant demonstrated numerou surgical        clips consistent with liver transplant. Limited white light views of the        foregut was notable for a single large gastric varix (IGV1) without        suggestion of recent or impending bleed. The ampulla was partially        involved with a diverticulum. The bile duct was deeply cannulated with        the short-nosed traction sphincterotome in concert with an 0.025\"        Visiglide wire. Contrast was injected and I personally interpreted the        bile duct images. Ductal flow of contrast was adequate, image quality        was excellent and contrast extended throughout the transplanted        intrahepatic ducts. The intrahepatics were grossly healthy appeaing and        without appreciable dilation. There was no evidence of intrahepatic        filling defect. The bifurcation was unremarkable as was the donor        common. There was a slight duct mismatch though no stricture. The        recipient common was dilated and contained a single large stone        measuring approximately 14mm. An 8 mm biliary sphincterotomy was made        with a monofilament traction (standard) sphincterotome using ERBE        electrocautery. There was no post-sphincterotomy bleeding. The biliary        tree was then swept with a 12 mm balloon starting at the donor common;        this led to fracture of the stone and thesse components were then        removed on serial sweep. None appeared to remian and drainage was        excellent. The ventral pancreatic duct and orifice were selectively not        interrogated.                                                                                     Impression:          - Single IGV1 (fundal) without suggesting of recent or                        impending bleed                        - Normal ampulla involved with a diverticulum       "                  - Healthy intrahepatics with duct to duct mismatch                        without anastomotic stenosis                        - Single large biliary stone, fractured and removed                        following biliary sphincterotomy   Recommendation:      - Standard outpatient general anesthesia recovery with                        two hour observation and laboratories; disposition based                        on course and results                        - Follow up with Dr Muhammad as scheduled                        - Avoid antithrombotics for at least three days                        - Complete a short course of antibiotic as prescirbed                        - No interval ERCP or advanced GI clinic visit to be                        organized at this juncture                        - The findings and recommendations were discussed with                        the patient and their family                                                                                       electronically signed by KRISTA Diana

## 2017-07-17 NOTE — DISCHARGE INSTRUCTIONS
[facility]  Same-Day Surgery   Adult Discharge Orders & Instructions     For 24 hours after surgery    1. Get plenty of rest.  A responsible adult must stay with you for at least 24 hours after you leave the hospital.   2. Do not drive or use heavy equipment.  If you have weakness or tingling, don't drive or use heavy equipment until this feeling goes away.  3. Do not drink alcohol.  4. Avoid strenuous or risky activities.  Ask for help when climbing stairs.   5. You may feel lightheaded.  IF so, sit for a few minutes before standing.  Have someone help you get up.   6. If you have nausea (feel sick to your stomach): Drink only clear liquids such as apple juice, ginger ale, broth or 7-Up.  Rest may also help.  Be sure to drink enough fluids.  Move to a regular diet as you feel able.  7. You may have a slight fever. Call the doctor if your fever is over 100.5 F (37.7 C) (taken under the tongue) or lasts longer than 24 hours.  8. You may have a dry mouth, a sore throat, muscle aches or trouble sleeping.  These should go away after 24 hours.  9. Do not make important or legal decisions.   Call your doctor for any of the followin.  Signs of infection (fever, growing tenderness at the surgery site, a large amount of drainage or bleeding, severe pain, foul-smelling drainage, redness, swelling).    2. It has been over 8 to 10 hours since surgery and you are still not able to urinate (pass water).    3.  Headache for over 24 hours.      To contact a doctor, call Dr Diana's office at [phone] (clinic)    or:        [phone] and ask for the resident on call for Gastroenterology  (answered 24 hours a day)      Emergency Department:    [facility]: [phone]       (TTY for hearing impaired: [phone])

## 2017-07-17 NOTE — ANESTHESIA CARE TRANSFER NOTE
Patient: Lesli Lorenzana    Procedure(s):  Endoscopic Retrograde Cholangiopancreatogram with bile duct spinchterotomy, ballon sweep of bile duct for stones, dilatation of bile duct - Wound Class: II-Clean Contaminated    Diagnosis: Bile Duct Stones   Diagnosis Additional Information: No value filed.    Anesthesia Type:   General     Note:  Airway :Face Mask  Patient transferred to:PACU  Comments: Stable, Rn at bedside      Vitals: (Last set prior to Anesthesia Care Transfer)    CRNA VITALS  7/17/2017 0907 - 7/17/2017 0947      7/17/2017             Pulse: 74    SpO2: 100 %    Resp Rate (observed): (!)  5    Resp Rate (set): 10                Electronically Signed By: RUSH Aguilar CRNA  July 17, 2017  9:47 AM

## 2017-07-17 NOTE — ANESTHESIA POSTPROCEDURE EVALUATION
Patient: Lesli Lorenzana    Procedure(s):  Endoscopic Retrograde Cholangiopancreatogram with bile duct spinchterotomy, ballon sweep of bile duct for stones, dilatation of bile duct - Wound Class: II-Clean Contaminated    Diagnosis:Bile Duct Stones   Diagnosis Additional Information: No value filed.    Anesthesia Type:  General    Note:  Anesthesia Post Evaluation    Patient location during evaluation: PACU  Patient participation: Able to fully participate in evaluation  Level of consciousness: awake and alert  Pain management: satisfactory to patient  Airway patency: patent  Cardiovascular status: blood pressure returned to baseline, acceptable and hemodynamically stable  Respiratory status: acceptable, spontaneous ventilation and room air  Hydration status: euvolemic  PONV: none     Anesthetic complications: None          Last vitals:  Vitals:    07/17/17 1020 07/17/17 1030 07/17/17 1040   BP: 173/83 166/85 156/80   Resp: 16 16 16   Temp:   35.8  C (96.5  F)   SpO2: 100% 100% 100%         Electronically Signed By: Waldo Thompson MD  July 17, 2017  10:45 AM

## 2017-07-17 NOTE — IP AVS SNAPSHOT
MRN:[id number]                      After Visit Summary   7/17/2017    Lesli Lorenzana    MRN: [id number]           Thank you!     Thank you for choosing [facility] for your care. Our goal is always to provide you with excellent care. Hearing back from our patients is one way we can continue to improve our services. Please take a few minutes to complete the written survey that you may receive in the mail after you visit with us. Thank you!        Patient Information     Date Of Birth          1940        About your hospital stay     You were admitted on:  July 17, 2017 You last received care in the:  Post Anesthesia Care Unit [facility]    You were discharged on:  July 17, 2017       Who to Call     For medical emergencies, please call 911.  For non-urgent questions about your medical care, please call your primary care provider or clinic, [phone]  For questions related to your surgery, please call your surgery clinic        Attending Provider     Provider Narendra Flores MD Gastroenterology       Primary Care Provider Office Phone # Fax #    Dee Awan [phone]      After Care Instructions     Discharge Instructions       Resume pre procedure diet            Discharge Instructions       Restart home medications.                  Your next 10 appointments already scheduled     Jul 24, 2017  9:30 AM CDT   LAB with AN LAB   [facility] ([facility]    [address] [address] [phone]           Patient must bring picture ID.  Patient should be prepared to give a urine specimen  Please do not eat 10-12 hours before your appointment if you are coming in fasting for labs on lipids, cholesterol, or glucose (sugar).  Pregnant women should follow their Care Team instructions. Water with medications is okay. Do not drink coffee or other fluids.   If you have concerns about taking  your medications,  please ask at office or if scheduling via CLARED, send a message by clicking on Secure Messaging, Message Your Care Team.            Jul 28, 2017 10:00 AM CDT   (Arrive by 9:45 AM)   Return Liver Transplant with Tucker Muhammad MD   [facility] ([facility])    [address] [address]   [phone]            Jul 31, 2017  9:30 AM CDT   LAB with AN LAB   [facility]    [address] [facility] [address] [phone]           Patient must bring picture ID.  Patient should be prepared to give a urine specimen  Please do not eat 10-12 hours before your appointment if you are coming in fasting for labs on lipids, cholesterol, or glucose (sugar).  Pregnant women should follow their Care Team instructions. Water with medications is okay. Do not drink coffee or other fluids.   If you have concerns about taking  your medications, please ask at office or if scheduling via CLARED, send a message by clicking on Secure Messaging, Message Your Care Team.            Aug 08, 2017  8:15 AM CDT   LAB with AN LAB   [facility] ([facility])    [address] [facility] [id number] [phone]           Patient must bring picture ID.  Patient should be prepared to give a urine specimen  Please do not eat 10-12 hours before your appointment if you are coming in fasting for labs on lipids, cholesterol, or glucose (sugar).  Pregnant women should follow their Care Team instructions. Water with medications is okay. Do not drink coffee or other fluids.   If you have concerns about taking  your medications, please ask at office or if scheduling via CLARED, send a message by clicking on Secure Messaging, Message Your Care Team.            Sep 07, 2017 10:30 AM CDT   (Arrive by 10:15 AM)   Return Visit with Nain Lagunas MD   [facility] ([facility])     [address] [id number] [phone]              Further instructions from your care team       [facility] [place]  Same-Day Surgery   Adult Discharge Orders & Instructions     For 24 hours after surgery    1. Get plenty of rest.  A responsible adult must stay with you for at least 24 hours after you leave the hospital.   2. Do not drive or use heavy equipment.  If you have weakness or tingling, don't drive or use heavy equipment until this feeling goes away.  3. Do not drink alcohol.  4. Avoid strenuous or risky activities.  Ask for help when climbing stairs.   5. You may feel lightheaded.  IF so, sit for a few minutes before standing.  Have someone help you get up.   6. If you have nausea (feel sick to your stomach): Drink only clear liquids such as apple juice, ginger ale, broth or 7-Up.  Rest may also help.  Be sure to drink enough fluids.  Move to a regular diet as you feel able.  7. You may have a slight fever. Call the doctor if your fever is over 100.5 F (37.7 C) (taken under the tongue) or lasts longer than 24 hours.  8. You may have a dry mouth, a sore throat, muscle aches or trouble sleeping.  These should go away after 24 hours.  9. Do not make important or legal decisions.   Call your doctor for any of the followin.  Signs of infection (fever, growing tenderness at the surgery site, a large amount of drainage or bleeding, severe pain, foul-smelling drainage, redness, swelling).    2. It has been over 8 to 10 hours since surgery and you are still not able to urinate (pass water).    3.  Headache for over 24 hours.      To contact a doctor, call Dr Diana's office at [phone] (clinic)    or:        [phone] and ask for the resident on call for Gastroenterology  (answered 24 hours a day)      Emergency Department:    [facility]: [phone]       (TTY for hearing impaired: [phone])            Pending Results      No orders found from 7/15/2017 to 7/18/2017.            Admission Information     Date & Time Provider Department Dept. Phone    7/17/2017 Narendra Diana MD Post Anesthesia Care Unit [facility] [phone]      Your Vitals Were     Blood Pressure Temperature Respirations Height Weight Pulse Oximetry    156/80 96.5  F (35.8  C) (Temporal) 16 1.524 m (5') 57.8 kg (127 lb 6.8 oz) 100%    BMI (Body Mass Index)                   24.89 kg/m2           MyChart Information     [email or web address] gives you secure access to your electronic health record. If you see a primary care provider, you can also send messages to your care team and make appointments. If you have questions, please call your primary care clinic.  If you do not have a primary care provider, please call [phone] and they will assist you.        Care EveryWhere ID     This is your Care EveryWhere ID. This could be used by other organizations to access your [facility] medical records  [id number]        Equal Access to Services     THEA WASHBURN AH: Hadii aad ku hadasho Soomaali, waaxda luqadaha, qaybta kaalmada adeegyaosmany, aldo alfaro. So [facility] [phone].    ATENCIÓN: Si habla español, tiene a messina disposición servicios gratuitos de asistencia lingüística. Llame al [phone].    We comply with applicable federal civil rights laws and [place] laws. We do not discriminate on the basis of race, color, national origin, age, disability sex, sexual orientation or gender identity.               Review of your medicines      START taking        Dose / Directions    ciprofloxacin 500 MG tablet   Commonly known as:  CIPRO   Used for:  Choledocholithiasis        Dose:  500 mg   Take 1 tablet (500 mg) by mouth 2 times daily for 5 days   Quantity:  10 tablet   Refills:  0         CONTINUE these medicines which may have CHANGED, or have new prescriptions. If we are uncertain of the size of tablets/capsules you have at home, strength  may be listed as something that might have changed.        Dose / Directions    lisinopril 2.5 MG tablet   Commonly known as:  PRINIVIL/Zestril   This may have changed:  when to take this   Used for:  Coronary artery disease involving native heart without angina pectoris, unspecified vessel or lesion type        Dose:  2.5 mg   Take 1 tablet (2.5 mg) by mouth daily   Quantity:  93 tablet   Refills:  3         CONTINUE these medicines which have NOT CHANGED        Dose / Directions    aspirin 81 MG tablet   Used for:  CAD (coronary artery disease)        Dose:  1 tablet   Take 1 tablet by mouth daily.   Quantity:  90 tablet   Refills:  3       calcium-vitamin D 600-400 MG-UNIT per tablet   Commonly known as:  CALTRATE        Dose:  1 tablet   Take 1 tablet by mouth daily   Refills:  0       hydrALAZINE 25 MG tablet   Commonly known as:  APRESOLINE   Used for:  Essential hypertension with goal blood pressure less than 140/90        Dose:  25 mg   Take 1 tablet (25 mg) by mouth 3 times daily , as needed if systolic blood pressure (top number) is more than 180 mmHg.   Quantity:  90 tablet   Refills:  3       isosorbide mononitrate 60 MG 24 hr tablet   Commonly known as:  IMDUR   Used for:  Coronary artery disease due to lipid rich plaque, Secondary hypertension with goal blood pressure less than 140/90        Dose:  60 mg   Take 1 tablet (60 mg) by mouth 2 times daily   Quantity:  180 tablet   Refills:  6       metoprolol 50 MG tablet   Commonly known as:  LOPRESSOR   Used for:  Essential hypertension with goal blood pressure less than 140/90, Coronary artery disease involving native heart with angina pectoris, unspecified vessel or lesion type (H)        Dose:  50 mg   Take 1 tablet (50 mg) by mouth 2 times daily   Quantity:  182 tablet   Refills:  3       MULTI-VITAMIN PO        Dose:  1 tablet   Take 1 tablet by mouth daily   Refills:  0       nystatin-triamcinolone cream   Commonly known as:  MYCOLOG II        Apply  topically 4 times daily as needed Reported on 4/5/2017   Refills:  0       predniSONE 20 MG tablet   Commonly known as:  DELTASONE   Used for:  Liver transplant rejection (H)        Take 80 mg daily 7/7-7/13, then 60 mg daily 7/14-7/20, then 40 mg daily 7/21-7/27, then stay on 20 mg daily.   Quantity:  70 tablet   Refills:  0       tacrolimus 0.5 MG capsule   Commonly known as:  PROGRAF - GENERIC EQUIVALENT   Used for:  Liver replaced by transplant (H)        Take 0.5 mg (1 cap) every AM, take 1 mg, (2 caps) every PM.   Quantity:  270 capsule   Refills:  3            Where to get your medicines      These medications were sent to [facility] [place], [place] - [address]  [address] [address]     Phone:  [phone]     ciprofloxacin 500 MG tablet                Protect others around you: Learn how to safely use, store and throw away your medicines at www.disposemymeds.org.             Medication List: This is a list of all your medications and when to take them. Check marks below indicate your daily home schedule. Keep this list as a reference.      Medications           Morning Afternoon Evening Bedtime As Needed    aspirin 81 MG tablet   Take 1 tablet by mouth daily.                                calcium-vitamin D 600-400 MG-UNIT per tablet   Commonly known as:  CALTRATE   Take 1 tablet by mouth daily                                ciprofloxacin 500 MG tablet   Commonly known as:  CIPRO   Take 1 tablet (500 mg) by mouth 2 times daily for 5 days                                hydrALAZINE 25 MG tablet   Commonly known as:  APRESOLINE   Take 1 tablet (25 mg) by mouth 3 times daily , as needed if systolic blood pressure (top number) is more than 180 mmHg.                                isosorbide mononitrate 60 MG 24 hr tablet   Commonly known as:  IMDUR   Take 1 tablet (60 mg) by mouth 2 times daily                                lisinopril 2.5 MG tablet   Commonly  known as:  PRINIVIL/Zestril   Take 1 tablet (2.5 mg) by mouth daily                                metoprolol 50 MG tablet   Commonly known as:  LOPRESSOR   Take 1 tablet (50 mg) by mouth 2 times daily                                MULTI-VITAMIN PO   Take 1 tablet by mouth daily                                nystatin-triamcinolone cream   Commonly known as:  MYCOLOG II   Apply topically 4 times daily as needed Reported on 4/5/2017                                predniSONE 20 MG tablet   Commonly known as:  DELTASONE   Take 80 mg daily 7/7-7/13, then 60 mg daily 7/14-7/20, then 40 mg daily 7/21-7/27, then stay on 20 mg daily.                                tacrolimus 0.5 MG capsule   Commonly known as:  PROGRAF - GENERIC EQUIVALENT   Take 0.5 mg (1 cap) every AM, take 1 mg, (2 caps) every PM.

## 2017-07-18 ENCOUNTER — TELEPHONE (OUTPATIENT)
Dept: FAMILY MEDICINE | Facility: CLINIC | Age: 77
End: 2017-07-18

## 2017-07-18 DIAGNOSIS — T86.41 LIVER TRANSPLANT REJECTION (H): ICD-10-CM

## 2017-07-18 DIAGNOSIS — Z94.4 LIVER TRANSPLANTED (H): Primary | ICD-10-CM

## 2017-07-18 RX ORDER — PREDNISONE 5 MG/1
5 TABLET ORAL DAILY
Qty: 30 TABLET | Refills: 0 | Status: SHIPPED | OUTPATIENT
Start: 2017-07-18 | End: 2017-07-28

## 2017-07-18 RX ORDER — PREDNISONE 20 MG/1
TABLET ORAL
Qty: 12 TABLET | Refills: 0 | Status: SHIPPED | OUTPATIENT
Start: 2017-07-18 | End: 2019-10-17

## 2017-07-18 NOTE — TELEPHONE ENCOUNTER
Sig for prednisone 20mg needs to be corrected - I see the notes , but there is no rx for continuing with 5mg  Nae F/Tech  [facility]

## 2017-07-18 NOTE — TELEPHONE ENCOUNTER
"ERCP yesterday, labs improving. \"Feeling great\".    Reviewed w/ Dr. Muhammad.  Will decrease prednisone to 40 mg po daily for 4 days, then 20 mg po qd for 4 days, then stay on 5 mg daily until she sees Dr. Muhammad in late July. Continue weekly labs. Lesli aware.  "

## 2017-07-20 ENCOUNTER — CARE COORDINATION (OUTPATIENT)
Dept: GASTROENTEROLOGY | Facility: CLINIC | Age: 77
End: 2017-07-20

## 2017-07-20 NOTE — PROGRESS NOTES
Post ERCP  (7/20/2017) with Dr. Diana: Follow-up    Post procedure recommendations: No interval ERCP or advanced GI clinic visit to be organized at this juncture     Patient states she is feeling wonderful, but she has been having some side-effects from the antibiotics. Light headedness and itchiness on her hands. The lightheadedness has been getting better. Her itchiness comes and goes. She denies nausea, vomiting and fevers. She is able to take in PO with no issues but last night she felt like she was having a little trouble swallowing and her voice is still raspy. Advised her to watch this over the weekend and to call me Monday if she is not improved. Verbalized understanding.     Orders placed: None at this time.     Contact information verified for future questions/concerns.    Agata DUMONT RN Coordinator  Dr. Duval, Dr. Diana & Dr. Luu  Advanced Endoscopy  [phone]

## 2017-07-24 ENCOUNTER — DOCUMENTATION ONLY (OUTPATIENT)
Dept: OTHER | Facility: CLINIC | Age: 77
End: 2017-07-24

## 2017-07-24 DIAGNOSIS — Z71.89 ADVANCED DIRECTIVES, COUNSELING/DISCUSSION: Chronic | ICD-10-CM

## 2017-07-24 DIAGNOSIS — Z94.4 LIVER REPLACED BY TRANSPLANT (H): ICD-10-CM

## 2017-07-24 LAB
ALBUMIN SERPL-MCNC: 2.6 G/DL (ref 3.4–5)
ALP SERPL-CCNC: 164 U/L (ref 40–150)
ALT SERPL W P-5'-P-CCNC: 70 U/L (ref 0–50)
ANION GAP SERPL CALCULATED.3IONS-SCNC: 9 MMOL/L (ref 3–14)
AST SERPL W P-5'-P-CCNC: 44 U/L (ref 0–45)
BILIRUB DIRECT SERPL-MCNC: 0.3 MG/DL (ref 0–0.2)
BILIRUB SERPL-MCNC: 0.5 MG/DL (ref 0.2–1.3)
BUN SERPL-MCNC: 24 MG/DL (ref 7–30)
CALCIUM SERPL-MCNC: 8.9 MG/DL (ref 8.5–10.1)
CHLORIDE SERPL-SCNC: 96 MMOL/L (ref 94–109)
CO2 SERPL-SCNC: 24 MMOL/L (ref 20–32)
CREAT SERPL-MCNC: 1.13 MG/DL (ref 0.52–1.04)
ERYTHROCYTE [DISTWIDTH] IN BLOOD BY AUTOMATED COUNT: 16 % (ref 10–15)
GFR SERPL CREATININE-BSD FRML MDRD: 47 ML/MIN/1.7M2
GLUCOSE SERPL-MCNC: 82 MG/DL (ref 70–99)
HCT VFR BLD AUTO: 30.4 % (ref 35–47)
HGB BLD-MCNC: 10.1 G/DL (ref 11.7–15.7)
MCH RBC QN AUTO: 27.2 PG (ref 26.5–33)
MCHC RBC AUTO-ENTMCNC: 33.2 G/DL (ref 31.5–36.5)
MCV RBC AUTO: 82 FL (ref 78–100)
PLATELET # BLD AUTO: 222 10E9/L (ref 150–450)
POTASSIUM SERPL-SCNC: 4.5 MMOL/L (ref 3.4–5.3)
PROT SERPL-MCNC: 6.6 G/DL (ref 6.8–8.8)
RBC # BLD AUTO: 3.71 10E12/L (ref 3.8–5.2)
SODIUM SERPL-SCNC: 129 MMOL/L (ref 133–144)
WBC # BLD AUTO: 7.9 10E9/L (ref 4–11)

## 2017-07-24 PROCEDURE — 85027 COMPLETE CBC AUTOMATED: CPT | Performed by: INTERNAL MEDICINE

## 2017-07-24 PROCEDURE — 80076 HEPATIC FUNCTION PANEL: CPT | Performed by: INTERNAL MEDICINE

## 2017-07-24 PROCEDURE — 80048 BASIC METABOLIC PNL TOTAL CA: CPT | Performed by: INTERNAL MEDICINE

## 2017-07-24 PROCEDURE — 36415 COLL VENOUS BLD VENIPUNCTURE: CPT | Performed by: INTERNAL MEDICINE

## 2017-07-28 ENCOUNTER — OFFICE VISIT (OUTPATIENT)
Dept: GASTROENTEROLOGY | Facility: CLINIC | Age: 77
End: 2017-07-28
Attending: INTERNAL MEDICINE
Payer: MEDICARE

## 2017-07-28 ENCOUNTER — TELEPHONE (OUTPATIENT)
Dept: GASTROENTEROLOGY | Facility: CLINIC | Age: 77
End: 2017-07-28

## 2017-07-28 VITALS
HEART RATE: 68 BPM | HEIGHT: 60 IN | TEMPERATURE: 98 F | OXYGEN SATURATION: 98 % | SYSTOLIC BLOOD PRESSURE: 194 MMHG | BODY MASS INDEX: 25.13 KG/M2 | DIASTOLIC BLOOD PRESSURE: 80 MMHG | WEIGHT: 128 LBS

## 2017-07-28 DIAGNOSIS — D50.9 IRON DEFICIENCY ANEMIA, UNSPECIFIED IRON DEFICIENCY ANEMIA TYPE: Primary | ICD-10-CM

## 2017-07-28 DIAGNOSIS — Z94.4 LIVER TRANSPLANTED (H): ICD-10-CM

## 2017-07-28 DIAGNOSIS — Z94.4 LIVER REPLACED BY TRANSPLANT (H): ICD-10-CM

## 2017-07-28 DIAGNOSIS — T86.41 LIVER TRANSPLANT REJECTION (H): ICD-10-CM

## 2017-07-28 DIAGNOSIS — Z86.0100 HISTORY OF COLONIC POLYPS: ICD-10-CM

## 2017-07-28 PROCEDURE — 99212 OFFICE O/P EST SF 10 MIN: CPT | Mod: ZF

## 2017-07-28 RX ORDER — PREDNISONE 5 MG/1
5 TABLET ORAL DAILY
Qty: 90 TABLET | Refills: 3 | Status: SHIPPED | OUTPATIENT
Start: 2017-07-28 | End: 2018-07-31

## 2017-07-28 RX ORDER — TACROLIMUS 0.5 MG/1
CAPSULE ORAL
Qty: 270 CAPSULE | Refills: 3 | Status: SHIPPED | OUTPATIENT
Start: 2017-07-28 | End: 2017-08-09

## 2017-07-28 RX ORDER — FERROUS GLUCONATE 324(38)MG
1 TABLET ORAL
Qty: 100 TABLET | Refills: 1 | Status: SHIPPED | OUTPATIENT
Start: 2017-07-28 | End: 2019-10-17

## 2017-07-28 ASSESSMENT — PAIN SCALES - GENERAL: PAINLEVEL: NO PAIN (0)

## 2017-07-28 NOTE — PROGRESS NOTES
I had the pleasure of seeing Lesli Lorenzana for follow-up in the liver transplant clinic at the [facility] on July 28, 2017. Ms. Lorenzana returns for follow-up now almost 9-1/2 years status post liver transplantation for primary biliary cirrhosis.    She recently had an episode recently where her liver tests went up to dramatically. She had a liver biopsy which showed cirrhosis with what was described as relatively little inflammation. The pathology said there was no evidence of rejection either acute or chronic. There also was little evidence for recurrent PBC. She was treated with corticosteroids and had a very good response. She also had imaging of her biliary system which showed a large common duct stone which was extracted by ERCP. It does not appear that that accounted for her elevated liver tests either.    At present, she is feeling well. She denies any abdominal pain, itching or skin rash, or fatigue. In fact she says her energy level is as good as it spent quite some time. She denies any increased abdominal girth or lower extremity edema. She denies any fevers or chills, cough or shortness of breath. She denies any nausea or vomiting, diarrhea or constipation. Her appetite has been good and her weight is up a bit. She does report that her blood pressure is under good control at home.    Current Outpatient Prescriptions   Medication     ferrous gluconate (FERGON) 324 (38 FE) MG tablet     predniSONE (DELTASONE) 5 MG tablet     tacrolimus (PROGRAF - GENERIC EQUIVALENT) 0.5 MG capsule     predniSONE (DELTASONE) 20 MG tablet     calcium-vitamin D (CALTRATE) 600-400 MG-UNIT per tablet     hydrALAZINE (APRESOLINE) 25 MG tablet     metoprolol (LOPRESSOR) 50 MG tablet     lisinopril (PRINIVIL,ZESTRIL) 2.5 MG tablet     isosorbide mononitrate (IMDUR) 60 MG 24 hr tablet     nystatin-triamcinolone (MYCOLOG II) cream     aspirin 81 MG tablet     MULTI-VITAMIN PO     [DISCONTINUED] tacrolimus  (PROGRAF - GENERIC EQUIVALENT) 0.5 MG capsule     No current facility-administered medications for this visit.      B/P: 194/80, T: 98, P: 68, R: Data Unavailable    HEENT exam shows some cushingoid features. There is no scleral icterus or temporal muscle wasting. Her chest is clear. Her abdominal exam shows no increase in girth. Her liver is 10 cm in span and just palpable at right costal margin. No spleen tip is palpable. Extremity exam shows no edema. Skin exam shows no stigmata of chronic liver disease.    Recent Results (from the past 168 hour(s))   CBC with platelets    Collection Time: 07/24/17  9:30 AM   Result Value Ref Range    WBC 7.9 4.0 - 11.0 10e9/L    RBC Count 3.71 (L) 3.8 - 5.2 10e12/L    Hemoglobin 10.1 (L) 11.7 - 15.7 g/dL    Hematocrit 30.4 (L) 35.0 - 47.0 %    MCV 82 78 - 100 fl    MCH 27.2 26.5 - 33.0 pg    MCHC 33.2 31.5 - 36.5 g/dL    RDW 16.0 (H) 10.0 - 15.0 %    Platelet Count 222 150 - 450 10e9/L   Basic metabolic panel    Collection Time: 07/24/17  9:30 AM   Result Value Ref Range    Sodium 129 (L) 133 - 144 mmol/L    Potassium 4.5 3.4 - 5.3 mmol/L    Chloride 96 94 - 109 mmol/L    Carbon Dioxide 24 20 - 32 mmol/L    Anion Gap 9 3 - 14 mmol/L    Glucose 82 70 - 99 mg/dL    Urea Nitrogen 24 7 - 30 mg/dL    Creatinine 1.13 (H) 0.52 - 1.04 mg/dL    GFR Estimate 47 (L) >60 mL/min/1.7m2    GFR Estimate If Black 57 (L) >60 mL/min/1.7m2    Calcium 8.9 8.5 - 10.1 mg/dL   Hepatic panel    Collection Time: 07/24/17  9:30 AM   Result Value Ref Range    Bilirubin Direct 0.3 (H) 0.0 - 0.2 mg/dL    Bilirubin Total 0.5 0.2 - 1.3 mg/dL    Albumin 2.6 (L) 3.4 - 5.0 g/dL    Protein Total 6.6 (L) 6.8 - 8.8 g/dL    Alkaline Phosphatase 164 (H) 40 - 150 U/L    ALT 70 (H) 0 - 50 U/L    AST 44 0 - 45 U/L      My impression is that Ms. Lorenzana is 9-1/2 years status post liver transplantation. Whatever this recent rise in her liver tests was, it did respond very well to steroids and her liver tests are now  nearly back to baseline. I will maintain her on 5 milligrams of prednisone for the time being along with tacrolimus. The bile duct stone was also a surprise and she is adequately treated for that.    She does have an anemia which appears to be iron deficiency and I will treated with iron for 1 month. She is also due for follow-up colonoscopy which I'll schedule today.    My plan will be to see her back in the clinic in 6 months.      Thank you very much for allowing me to participate in the care of this patient.  If you have any questions regarding my recommendations, please do not hesitate to contact me.         Tucker Muhammad MD     Professor of Medicine  [employer]     Executive Medical Director, Solid Organ Transplant Program  [facility]

## 2017-07-28 NOTE — MR AVS SNAPSHOT
After Visit Summary   7/28/2017    Lesli Lorenzana    MRN: [id number]           Patient Information     Date Of Birth          1940        Visit Information        Provider Department      7/28/2017 10:00 AM Tucker Muhammad MD [facility]        Today's Diagnoses     Iron deficiency anemia, unspecified iron deficiency anemia type    -  1    Liver transplant rejection (H)        Liver transplanted (H)        Liver replaced by transplant (H)        History of colonic polyps           Follow-ups after your visit        Additional Services     GASTROENTEROLOGY ADULT REF PROCEDURE ONLY       Last Lab Result: Creatinine (mg/dL)       Date                     Value                 07/24/2017               1.13 (H)         ----------  Body mass index is 25 kg/(m^2).     Needed:  No  Language:  English    Patient will be contacted to schedule procedure.     Please be aware that coverage of these services is subject to the terms and limitations of your health insurance plan.  Call member services at your health plan with any benefit or coverage questions.  Any procedures must be performed at a [facility] facility OR coordinated by your clinic's referral office.    Please bring the following with you to your appointment:    (1) Any X-Rays, CTs or MRIs which have been performed.  Contact the facility where they were done to arrange for  prior to your scheduled appointment.    (2) List of current medications   (3) This referral request   (4) Any documents/labs given to you for this referral                  Follow-up notes from your care team     Return in about 6 months (around 1/28/2018).      Your next 10 appointments already scheduled     Jul 31, 2017  9:30 AM CDT   LAB with AN LAB   [facility]    [address] [address] [phone]           Patient must bring picture ID. Patient should be prepared to give a urine  specimen  Please do not eat 10-12 hours before your appointment if you are coming in fasting for labs on lipids, cholesterol, or glucose (sugar). Pregnant women should follow their Care Team instructions. Water with medications is okay. Do not drink coffee or other fluids. If you have concerns about taking  your medications, please ask at office or if scheduling via [facility], send a message by clicking on Secure Messaging, Message Your Care Team.            Aug 08, 2017  8:15 AM CDT   LAB with AN LAB   [facility] ([facility])    [address] [address] [phone]           Patient must bring picture ID. Patient should be prepared to give a urine specimen  Please do not eat 10-12 hours before your appointment if you are coming in fasting for labs on lipids, cholesterol, or glucose (sugar). Pregnant women should follow their Care Team instructions. Water with medications is okay. Do not drink coffee or other fluids. If you have concerns about taking  your medications, please ask at office or if scheduling via [facility], send a message by clicking on Secure Messaging, Message Your Care Team.            Sep 07, 2017 10:30 AM CDT   (Arrive by 10:15 AM)   Return Visit with Nain Lagunas MD   [facility] ([facility])    [address]  [address] [place] [facility] [address] [phone]              Who to contact     If you have questions or need follow up information about today's clinic visit or your schedule please contact [facility]PAT[facility] directly at [phone].  Normal or non-critical lab and imaging results will be communicated to you by MyChart, letter or phone within 4 business days after the clinic has received the results. If you do not hear from us within 7 days, please contact the clinic through MyChart or phone. If you have a critical or abnormal lab result, we will notify you by phone as soon as possible.  Submit  refill requests through [facility] or call your pharmacy and they will forward the refill request to us. Please allow 3 business days for your refill to be completed.          Additional Information About Your Visit        [employer] Information     [facility] gives you secure access to your electronic health record. If you see a primary care provider, you can also send messages to your care team and make appointments. If you have questions, please call your primary care clinic.  If you do not have a primary care provider, please call [phone] and they will assist you.        Care EveryWhere ID     This is your Care EveryWhere ID. This could be used by other organizations to access your [facility] medical records  [id number]        Your Vitals Were     Pulse Temperature Height Pulse Oximetry BMI (Body Mass Index)       68 98  F (36.7  C) (Oral) 1.524 m (5') 98% 25 kg/m2        Blood Pressure from Last 3 Encounters:   07/28/17 194/80   07/17/17 129/57   07/10/17 (P) 168/74    Weight from Last 3 Encounters:   07/28/17 58.1 kg (128 lb)   07/17/17 57.8 kg (127 lb 6.8 oz)   07/10/17 57.7 kg (127 lb 3.2 oz)              We Performed the Following     GASTROENTEROLOGY ADULT REF PROCEDURE ONLY          Today's Medication Changes          These changes are accurate as of: 7/28/17 10:42 AM.  If you have any questions, ask your nurse or doctor.               Start taking these medicines.        Dose/Directions    ferrous gluconate 324 (38 FE) MG tablet   Commonly known as:  FERGON   Used for:  Iron deficiency anemia, unspecified iron deficiency anemia type   Started by:  Tucker Muhammad MD        Dose:  1 tablet   Take 1 tablet (324 mg) by mouth daily (with breakfast)   Quantity:  100 tablet   Refills:  1         These medicines have changed or have updated prescriptions.        Dose/Directions    lisinopril 2.5 MG tablet   Commonly known as:  PRINIVIL/Zestril   This may have changed:  when to take this   Used for:  Coronary artery  disease involving native heart without angina pectoris, unspecified vessel or lesion type        Dose:  2.5 mg   Take 1 tablet (2.5 mg) by mouth daily   Quantity:  93 tablet   Refills:  3            Where to get your medicines      These medications were sent to [place], [place] - [address]  [address], [facility] [address]     Phone:  [phone]     ferrous gluconate 324 (38 FE) MG tablet    predniSONE 5 MG tablet    tacrolimus 0.5 MG capsule                Primary Care Provider Office Phone # Fax #    Dee Awan [phone]       [facility] [address] [address]        Equal Access to Services     THEA WASHBURN : Hadii aad ku hadasho Sodanitaali, waaxda luqadaha, qaybta kaalmada adeegyada, aldo alfaro. So [facility] [place] [facility] [phone].    ATENCIÓN: Si habla español, tiene a messina disposición servicios gratuitos de asistencia lingüística. [facility] [phone].    We comply with applicable federal civil rights laws and [place] laws. We do not discriminate on the basis of race, color, national origin, age, disability sex, sexual orientation or gender identity.            Thank you!     Thank you for choosing [facility]PAT[facility]  for your care. Our goal is always to provide you with excellent care. Hearing back from our patients is one way we can continue to improve our services. Please take a few minutes to complete the written survey that you may receive in the mail after your visit with us. Thank you!             Your Updated Medication List - Protect others around you: Learn how to safely use, store and throw away your medicines at www.disposemymeds.org.          This list is accurate as of: 7/28/17 10:42 AM.  Always use your most recent med list.                   Brand Name Dispense Instructions for use Diagnosis    aspirin 81 MG tablet     90 tablet    Take 1 tablet by mouth daily.    CAD  (coronary artery disease)       calcium-vitamin D 600-400 MG-UNIT per tablet    CALTRATE     Take 1 tablet by mouth daily        ferrous gluconate 324 (38 FE) MG tablet    FERGON    100 tablet    Take 1 tablet (324 mg) by mouth daily (with breakfast)    Iron deficiency anemia, unspecified iron deficiency anemia type       hydrALAZINE 25 MG tablet    APRESOLINE    90 tablet    Take 1 tablet (25 mg) by mouth 3 times daily , as needed if systolic blood pressure (top number) is more than 180 mmHg.    Essential hypertension with goal blood pressure less than 140/90       isosorbide mononitrate 60 MG 24 hr tablet    IMDUR    180 tablet    Take 1 tablet (60 mg) by mouth 2 times daily    Coronary artery disease due to lipid rich plaque, Secondary hypertension with goal blood pressure less than 140/90       lisinopril 2.5 MG tablet    PRINIVIL/Zestril    93 tablet    Take 1 tablet (2.5 mg) by mouth daily    Coronary artery disease involving native heart without angina pectoris, unspecified vessel or lesion type       metoprolol 50 MG tablet    LOPRESSOR    182 tablet    Take 1 tablet (50 mg) by mouth 2 times daily    Essential hypertension with goal blood pressure less than 140/90, Coronary artery disease involving native heart with angina pectoris, unspecified vessel or lesion type (H)       MULTI-VITAMIN PO      Take 1 tablet by mouth daily        nystatin-triamcinolone cream    MYCOLOG II     Apply topically 4 times daily as needed Reported on 4/5/2017        * predniSONE 20 MG tablet    DELTASONE    12 tablet    Take 80 mg daily 7/7-7/13, then 60 mg daily 7/14-7/20, then 40 mg daily 7/21-7/27, then stay on 20 mg daily.    Liver transplant rejection (H)       * predniSONE 5 MG tablet    DELTASONE    90 tablet    Take 1 tablet (5 mg) by mouth daily starting 7/27    Liver transplant rejection (H), Liver transplanted (H)       tacrolimus 0.5 MG capsule    PROGRAF - GENERIC EQUIVALENT    270 capsule    Take 0.5 mg (1 cap)  every AM, take 1 mg, (2 caps) every PM.    Liver replaced by transplant (H)       * Notice:  This list has 2 medication(s) that are the same as other medications prescribed for you. Read the directions carefully, and ask your doctor or other care provider to review them with you.

## 2017-07-28 NOTE — LETTER
7/28/2017       RE: Lesli Lorenzana  [address] [facility] [address]     Dear Colleague,    Thank you for referring your patient, Lesli Lorenzana, to the [facility] HEPATOLOG[facility] at [facility]. Please see a copy of my visit note below.    I had the pleasure of seeing Lesli Lorenzana for follow-up in the liver transplant clinic at the [facility] on July 28, 2017. Ms. Lorenzana returns for follow-up now almost 9-1/2 years status post liver transplantation for primary biliary cirrhosis.    She recently had an episode recently where her liver tests went up to dramatically. She had a liver biopsy which showed cirrhosis with what was described as relatively little inflammation. The pathology said there was no evidence of rejection either acute or chronic. There also was little evidence for recurrent PBC. She was treated with corticosteroids and had a very good response. She also had imaging of her biliary system which showed a large common duct stone which was extracted by ERCP. It does not appear that that accounted for her elevated liver tests either.    At present, she is feeling well. She denies any abdominal pain, itching or skin rash, or fatigue. In fact she says her energy level is as good as it spent quite some time. She denies any increased abdominal girth or lower extremity edema. She denies any fevers or chills, cough or shortness of breath. She denies any nausea or vomiting, diarrhea or constipation. Her appetite has been good and her weight is up a bit. She does report that her blood pressure is under good control at home.    Current Outpatient Prescriptions   Medication     ferrous gluconate (FERGON) 324 (38 FE) MG tablet     predniSONE (DELTASONE) 5 MG tablet     tacrolimus (PROGRAF - GENERIC EQUIVALENT) 0.5 MG capsule     predniSONE (DELTASONE) 20 MG tablet     calcium-vitamin D (CALTRATE) 600-400 MG-UNIT per tablet      hydrALAZINE (APRESOLINE) 25 MG tablet     metoprolol (LOPRESSOR) 50 MG tablet     lisinopril (PRINIVIL,ZESTRIL) 2.5 MG tablet     isosorbide mononitrate (IMDUR) 60 MG 24 hr tablet     nystatin-triamcinolone (MYCOLOG II) cream     aspirin 81 MG tablet     MULTI-VITAMIN PO     [DISCONTINUED] tacrolimus (PROGRAF - GENERIC EQUIVALENT) 0.5 MG capsule     No current facility-administered medications for this visit.      B/P: 194/80, T: 98, P: 68, R: Data Unavailable    HEENT exam shows some cushingoid features. There is no scleral icterus or temporal muscle wasting. Her chest is clear. Her abdominal exam shows no increase in girth. Her liver is 10 cm in span and just palpable at right costal margin. No spleen tip is palpable. Extremity exam shows no edema. Skin exam shows no stigmata of chronic liver disease.    Recent Results (from the past 168 hour(s))   CBC with platelets    Collection Time: 07/24/17  9:30 AM   Result Value Ref Range    WBC 7.9 4.0 - 11.0 10e9/L    RBC Count 3.71 (L) 3.8 - 5.2 10e12/L    Hemoglobin 10.1 (L) 11.7 - 15.7 g/dL    Hematocrit 30.4 (L) 35.0 - 47.0 %    MCV 82 78 - 100 fl    MCH 27.2 26.5 - 33.0 pg    MCHC 33.2 31.5 - 36.5 g/dL    RDW 16.0 (H) 10.0 - 15.0 %    Platelet Count 222 150 - 450 10e9/L   Basic metabolic panel    Collection Time: 07/24/17  9:30 AM   Result Value Ref Range    Sodium 129 (L) 133 - 144 mmol/L    Potassium 4.5 3.4 - 5.3 mmol/L    Chloride 96 94 - 109 mmol/L    Carbon Dioxide 24 20 - 32 mmol/L    Anion Gap 9 3 - 14 mmol/L    Glucose 82 70 - 99 mg/dL    Urea Nitrogen 24 7 - 30 mg/dL    Creatinine 1.13 (H) 0.52 - 1.04 mg/dL    GFR Estimate 47 (L) >60 mL/min/1.7m2    GFR Estimate If Black 57 (L) >60 mL/min/1.7m2    Calcium 8.9 8.5 - 10.1 mg/dL   Hepatic panel    Collection Time: 07/24/17  9:30 AM   Result Value Ref Range    Bilirubin Direct 0.3 (H) 0.0 - 0.2 mg/dL    Bilirubin Total 0.5 0.2 - 1.3 mg/dL    Albumin 2.6 (L) 3.4 - 5.0 g/dL    Protein Total 6.6 (L) 6.8 - 8.8 g/dL     Alkaline Phosphatase 164 (H) 40 - 150 U/L    ALT 70 (H) 0 - 50 U/L    AST 44 0 - 45 U/L      My impression is that Ms. Lorenzana is 9-1/2 years status post liver transplantation. Whatever this recent rise in her liver tests was, it did respond very well to steroids and her liver tests are now nearly back to baseline. I will maintain her on 5 milligrams of prednisone for the time being along with tacrolimus. The bile duct stone was also a surprise and she is adequately treated for that.    She does have an anemia which appears to be iron deficiency and I will treated with iron for 1 month. She is also due for follow-up colonoscopy which I'll schedule today.    My plan will be to see her back in the clinic in 6 months.      Thank you very much for allowing me to participate in the care of this patient.  If you have any questions regarding my recommendations, please do not hesitate to contact me.         Tucker Muhammad MD     Professor of Medicine  [employer]     Executive Medical Director, Solid Organ Transplant Program  [facility]

## 2017-07-31 ENCOUNTER — TELEPHONE (OUTPATIENT)
Dept: GASTROENTEROLOGY | Facility: CLINIC | Age: 77
End: 2017-07-31

## 2017-08-01 ENCOUNTER — TELEPHONE (OUTPATIENT)
Dept: GASTROENTEROLOGY | Facility: CLINIC | Age: 77
End: 2017-08-01

## 2017-08-08 DIAGNOSIS — Z94.4 LIVER REPLACED BY TRANSPLANT (H): ICD-10-CM

## 2017-08-08 LAB
ALBUMIN SERPL-MCNC: 2.8 G/DL (ref 3.4–5)
ALP SERPL-CCNC: 110 U/L (ref 40–150)
ALT SERPL W P-5'-P-CCNC: 36 U/L (ref 0–50)
ANION GAP SERPL CALCULATED.3IONS-SCNC: 8 MMOL/L (ref 3–14)
AST SERPL W P-5'-P-CCNC: 32 U/L (ref 0–45)
BILIRUB DIRECT SERPL-MCNC: 0.2 MG/DL (ref 0–0.2)
BILIRUB SERPL-MCNC: 0.6 MG/DL (ref 0.2–1.3)
BUN SERPL-MCNC: 22 MG/DL (ref 7–30)
CALCIUM SERPL-MCNC: 8.3 MG/DL (ref 8.5–10.1)
CHLORIDE SERPL-SCNC: 97 MMOL/L (ref 94–109)
CO2 SERPL-SCNC: 26 MMOL/L (ref 20–32)
CREAT SERPL-MCNC: 1.18 MG/DL (ref 0.52–1.04)
ERYTHROCYTE [DISTWIDTH] IN BLOOD BY AUTOMATED COUNT: 16.7 % (ref 10–15)
GFR SERPL CREATININE-BSD FRML MDRD: 45 ML/MIN/1.7M2
GLUCOSE SERPL-MCNC: 67 MG/DL (ref 70–99)
HCT VFR BLD AUTO: 28.9 % (ref 35–47)
HGB BLD-MCNC: 9.5 G/DL (ref 11.7–15.7)
MCH RBC QN AUTO: 27.5 PG (ref 26.5–33)
MCHC RBC AUTO-ENTMCNC: 32.9 G/DL (ref 31.5–36.5)
MCV RBC AUTO: 84 FL (ref 78–100)
PLATELET # BLD AUTO: 227 10E9/L (ref 150–450)
POTASSIUM SERPL-SCNC: 4.4 MMOL/L (ref 3.4–5.3)
PROT SERPL-MCNC: 6.9 G/DL (ref 6.8–8.8)
RBC # BLD AUTO: 3.46 10E12/L (ref 3.8–5.2)
SODIUM SERPL-SCNC: 131 MMOL/L (ref 133–144)
TACROLIMUS BLD-MCNC: 3.3 UG/L (ref 5–15)
TME LAST DOSE: ABNORMAL H
WBC # BLD AUTO: 4.9 10E9/L (ref 4–11)

## 2017-08-08 PROCEDURE — 36415 COLL VENOUS BLD VENIPUNCTURE: CPT | Performed by: INTERNAL MEDICINE

## 2017-08-08 PROCEDURE — 80076 HEPATIC FUNCTION PANEL: CPT | Performed by: INTERNAL MEDICINE

## 2017-08-08 PROCEDURE — 80048 BASIC METABOLIC PNL TOTAL CA: CPT | Performed by: INTERNAL MEDICINE

## 2017-08-08 PROCEDURE — 85027 COMPLETE CBC AUTOMATED: CPT | Performed by: INTERNAL MEDICINE

## 2017-08-08 PROCEDURE — 80197 ASSAY OF TACROLIMUS: CPT | Performed by: INTERNAL MEDICINE

## 2017-08-09 DIAGNOSIS — Z94.4 LIVER REPLACED BY TRANSPLANT (H): ICD-10-CM

## 2017-08-09 RX ORDER — TACROLIMUS 0.5 MG/1
1 CAPSULE ORAL EVERY 12 HOURS
Qty: 360 CAPSULE | Refills: 3 | Status: SHIPPED | OUTPATIENT
Start: 2017-08-09 | End: 2017-08-14

## 2017-08-09 NOTE — TELEPHONE ENCOUNTER
Pt returned call to confirm tacrolimus dose change. She verbalized understanding and has no questions at this time.

## 2017-08-09 NOTE — TELEPHONE ENCOUNTER
Left VM instructing pt to increase tacrolimus to 1 mg Q 12 hours. Requested return phone call to confirm understanding.

## 2017-08-09 NOTE — TELEPHONE ENCOUNTER
Please send new corrected rx for the prograf - sig needing correction  Dated 8-9-17  [employer]  [facility]

## 2017-08-09 NOTE — TELEPHONE ENCOUNTER
Tacrolimus level 3.3.    Please instruct patient to increase tacrolimus dose to 1 mg in the morning and 1 mg in the evening.

## 2017-08-14 RX ORDER — TACROLIMUS 0.5 MG/1
1 CAPSULE ORAL EVERY 12 HOURS
Qty: 360 CAPSULE | Refills: 3 | Status: SHIPPED | OUTPATIENT
Start: 2017-08-14 | End: 2018-08-27

## 2017-08-15 ENCOUNTER — TELEPHONE (OUTPATIENT)
Dept: NEPHROLOGY | Facility: CLINIC | Age: 77
End: 2017-08-15

## 2017-08-15 NOTE — TELEPHONE ENCOUNTER
Call from [facility]  - Dr. Muhammad put her on daily iron at the end of July.  She says she has constant gas, diarrhea and stomach pain.  Would like to stop taking.  Message to dr. Muhammad.

## 2017-08-17 ENCOUNTER — RADIANT APPOINTMENT (OUTPATIENT)
Dept: MAMMOGRAPHY | Facility: CLINIC | Age: 77
End: 2017-08-17
Attending: FAMILY MEDICINE
Payer: COMMERCIAL

## 2017-08-17 DIAGNOSIS — Z12.31 VISIT FOR SCREENING MAMMOGRAM: ICD-10-CM

## 2017-08-17 PROCEDURE — G0202 SCR MAMMO BI INCL CAD: HCPCS | Mod: TC

## 2017-08-21 NOTE — TELEPHONE ENCOUNTER
"Told Dot Dr. Muhammad's response of \"try continue iron for a month\".  She stopped it on her own last week, absolutely could not tolerate it.  Message to dr. Muhammad.  "

## 2017-09-05 DIAGNOSIS — Z94.4 LIVER REPLACED BY TRANSPLANT (H): ICD-10-CM

## 2017-09-05 DIAGNOSIS — I25.119 CORONARY ARTERY DISEASE INVOLVING NATIVE HEART WITH ANGINA PECTORIS, UNSPECIFIED VESSEL OR LESION TYPE (H): ICD-10-CM

## 2017-09-05 DIAGNOSIS — Z94.4 LIVER REPLACED BY TRANSPLANT (H): Primary | ICD-10-CM

## 2017-09-05 DIAGNOSIS — I10 ESSENTIAL HYPERTENSION WITH GOAL BLOOD PRESSURE LESS THAN 140/90: ICD-10-CM

## 2017-09-05 LAB
ALBUMIN SERPL-MCNC: 3.1 G/DL (ref 3.4–5)
ALP SERPL-CCNC: 76 U/L (ref 40–150)
ALT SERPL W P-5'-P-CCNC: 27 U/L (ref 0–50)
ANION GAP SERPL CALCULATED.3IONS-SCNC: 8 MMOL/L (ref 3–14)
AST SERPL W P-5'-P-CCNC: 28 U/L (ref 0–45)
BASOPHILS # BLD AUTO: 0.1 10E9/L (ref 0–0.2)
BASOPHILS NFR BLD AUTO: 1 %
BILIRUB DIRECT SERPL-MCNC: 0.1 MG/DL (ref 0–0.2)
BILIRUB SERPL-MCNC: 0.5 MG/DL (ref 0.2–1.3)
BUN SERPL-MCNC: 22 MG/DL (ref 7–30)
CALCIUM SERPL-MCNC: 9.2 MG/DL (ref 8.5–10.1)
CHLORIDE SERPL-SCNC: 100 MMOL/L (ref 94–109)
CHOLEST SERPL-MCNC: 244 MG/DL
CO2 SERPL-SCNC: 25 MMOL/L (ref 20–32)
CREAT SERPL-MCNC: 1.19 MG/DL (ref 0.52–1.04)
DIFFERENTIAL METHOD BLD: ABNORMAL
EOSINOPHIL # BLD AUTO: 0.2 10E9/L (ref 0–0.7)
EOSINOPHIL NFR BLD AUTO: 3 %
ERYTHROCYTE [DISTWIDTH] IN BLOOD BY AUTOMATED COUNT: 16.1 % (ref 10–15)
GFR SERPL CREATININE-BSD FRML MDRD: 44 ML/MIN/1.7M2
GLUCOSE SERPL-MCNC: 80 MG/DL (ref 70–99)
HCT VFR BLD AUTO: 30.9 % (ref 35–47)
HDLC SERPL-MCNC: 93 MG/DL
HGB BLD-MCNC: 10.2 G/DL (ref 11.7–15.7)
LDLC SERPL CALC-MCNC: 135 MG/DL
LYMPHOCYTES # BLD AUTO: 2.5 10E9/L (ref 0.8–5.3)
LYMPHOCYTES NFR BLD AUTO: 53 %
MCH RBC QN AUTO: 27.1 PG (ref 26.5–33)
MCHC RBC AUTO-ENTMCNC: 33 G/DL (ref 31.5–36.5)
MCV RBC AUTO: 82 FL (ref 78–100)
MONOCYTES # BLD AUTO: 0.5 10E9/L (ref 0–1.3)
MONOCYTES NFR BLD AUTO: 10 %
NEUTROPHILS # BLD AUTO: 1.7 10E9/L (ref 1.6–8.3)
NEUTROPHILS NFR BLD AUTO: 33 %
NONHDLC SERPL-MCNC: 151 MG/DL
OVALOCYTES BLD QL SMEAR: SLIGHT
PLATELET # BLD AUTO: 273 10E9/L (ref 150–450)
POTASSIUM SERPL-SCNC: 4.4 MMOL/L (ref 3.4–5.3)
PROT SERPL-MCNC: 7.4 G/DL (ref 6.8–8.8)
RBC # BLD AUTO: 3.77 10E12/L (ref 3.8–5.2)
SODIUM SERPL-SCNC: 133 MMOL/L (ref 133–144)
TACROLIMUS BLD-MCNC: 4.6 UG/L (ref 5–15)
TME LAST DOSE: ABNORMAL H
TRIGL SERPL-MCNC: 82 MG/DL
WBC # BLD AUTO: 5 10E9/L (ref 4–11)

## 2017-09-05 PROCEDURE — 85025 COMPLETE CBC W/AUTO DIFF WBC: CPT | Performed by: INTERNAL MEDICINE

## 2017-09-05 PROCEDURE — 36415 COLL VENOUS BLD VENIPUNCTURE: CPT | Performed by: INTERNAL MEDICINE

## 2017-09-05 PROCEDURE — 80076 HEPATIC FUNCTION PANEL: CPT | Performed by: INTERNAL MEDICINE

## 2017-09-05 PROCEDURE — 80061 LIPID PANEL: CPT | Performed by: INTERNAL MEDICINE

## 2017-09-05 PROCEDURE — 80197 ASSAY OF TACROLIMUS: CPT | Performed by: INTERNAL MEDICINE

## 2017-09-05 PROCEDURE — 80048 BASIC METABOLIC PNL TOTAL CA: CPT | Performed by: INTERNAL MEDICINE

## 2017-09-12 NOTE — PROGRESS NOTES
Patient states she enjoyed the summer food too much this year.  [place] draw and indulged in poor food choices.  She states that she did not gain any weight back, and is back on track with her diet.  She will return to clinic in 3 months with labs.  I have scheduled her to see Tonya Thapa NP.

## 2017-09-15 ENCOUNTER — SURGERY (OUTPATIENT)
Age: 77
End: 2017-09-15

## 2017-09-15 ENCOUNTER — HOSPITAL ENCOUNTER (OUTPATIENT)
Facility: AMBULATORY SURGERY CENTER | Age: 77
Discharge: HOME | End: 2017-09-15
Attending: FAMILY MEDICINE | Admitting: FAMILY MEDICINE
Payer: COMMERCIAL

## 2017-09-15 VITALS
OXYGEN SATURATION: 96 % | TEMPERATURE: 97.4 F | RESPIRATION RATE: 16 BRPM | SYSTOLIC BLOOD PRESSURE: 116 MMHG | DIASTOLIC BLOOD PRESSURE: 59 MMHG

## 2017-09-15 LAB — COLONOSCOPY: NORMAL

## 2017-09-15 PROCEDURE — G0105 COLORECTAL SCRN; HI RISK IND: HCPCS

## 2017-09-15 PROCEDURE — 99153 MOD SED SAME PHYS/QHP EA: CPT | Performed by: FAMILY MEDICINE

## 2017-09-15 PROCEDURE — G8918 PT W/O PREOP ORDER IV AB PRO: HCPCS

## 2017-09-15 PROCEDURE — G0121 COLON CA SCRN NOT HI RSK IND: HCPCS | Performed by: FAMILY MEDICINE

## 2017-09-15 PROCEDURE — G0500 MOD SEDAT ENDO SERVICE >5YRS: HCPCS | Performed by: FAMILY MEDICINE

## 2017-09-15 PROCEDURE — G8907 PT DOC NO EVENTS ON DISCHARG: HCPCS

## 2017-09-15 RX ORDER — LIDOCAINE 40 MG/G
CREAM TOPICAL
Status: DISCONTINUED | OUTPATIENT
Start: 2017-09-15 | End: 2017-09-16 | Stop reason: HOSPADM

## 2017-09-15 RX ORDER — FENTANYL CITRATE 50 UG/ML
INJECTION, SOLUTION INTRAMUSCULAR; INTRAVENOUS PRN
Status: DISCONTINUED | OUTPATIENT
Start: 2017-09-15 | End: 2017-09-15 | Stop reason: HOSPADM

## 2017-09-15 RX ORDER — ONDANSETRON 2 MG/ML
4 INJECTION INTRAMUSCULAR; INTRAVENOUS
Status: DISCONTINUED | OUTPATIENT
Start: 2017-09-15 | End: 2017-09-16 | Stop reason: HOSPADM

## 2017-09-15 RX ADMIN — FENTANYL CITRATE 50 MCG: 50 INJECTION, SOLUTION INTRAMUSCULAR; INTRAVENOUS at 07:50

## 2017-09-15 RX ADMIN — FENTANYL CITRATE 100 MCG: 50 INJECTION, SOLUTION INTRAMUSCULAR; INTRAVENOUS at 07:41

## 2017-10-03 ENCOUNTER — MYC REFILL (OUTPATIENT)
Dept: GASTROENTEROLOGY | Facility: CLINIC | Age: 77
End: 2017-10-03

## 2017-10-03 DIAGNOSIS — I25.10 CORONARY ARTERY DISEASE DUE TO LIPID RICH PLAQUE: ICD-10-CM

## 2017-10-03 DIAGNOSIS — I15.9 SECONDARY HYPERTENSION WITH GOAL BLOOD PRESSURE LESS THAN 140/90: ICD-10-CM

## 2017-10-03 DIAGNOSIS — Z94.4 LIVER TRANSPLANTED (H): ICD-10-CM

## 2017-10-03 DIAGNOSIS — I25.83 CORONARY ARTERY DISEASE DUE TO LIPID RICH PLAQUE: ICD-10-CM

## 2017-10-03 DIAGNOSIS — T86.41 LIVER TRANSPLANT REJECTION (H): ICD-10-CM

## 2017-10-04 RX ORDER — ISOSORBIDE MONONITRATE 60 MG/1
TABLET, EXTENDED RELEASE ORAL
Qty: 180 TABLET | Refills: 0 | Status: SHIPPED | OUTPATIENT
Start: 2017-10-04 | End: 2018-01-10

## 2017-10-06 RX ORDER — PREDNISONE 5 MG/1
5 TABLET ORAL DAILY
Qty: 90 TABLET | Refills: 3 | Status: CANCELLED | OUTPATIENT
Start: 2017-10-06

## 2017-10-06 NOTE — TELEPHONE ENCOUNTER
Message from [facility]:  Haydee Blancas LPN Tue Oct 3, 2017 11:41 AM        ----- Message -----   From: Lesli Lorenzana   Sent: 10/3/2017 11:37 AM   To: Hepatology Nurses-  Subject: Medication Renewal Request     Original authorizing provider: MD Lesli Sloan would like a refill of the following medications:  predniSONE (DELTASONE) 5 MG tablet [Tucker Muhammad MD]    [facility] [address]    Comment:

## 2017-10-24 ENCOUNTER — TELEPHONE (OUTPATIENT)
Dept: FAMILY MEDICINE | Facility: CLINIC | Age: 77
End: 2017-10-24

## 2017-10-24 NOTE — TELEPHONE ENCOUNTER
Panel Management Review      Patient has the following on her problem list:     Hypertension   Last three blood pressure readings:  BP Readings from Last 3 Encounters:   09/15/17 116/59   07/28/17 194/80   07/17/17 129/57     Blood pressure: Passed    HTN Guidelines:  Age 18-59 BP range:  Less than 140/90  Age 60-85 with Diabetes:  Less than 140/90  Age 60-85 without Diabetes:  less than 150/90        Composite cancer screening  Chart review shows that this patient is due/due soon for the following None  Summary:    Patient is due/failing the following:   None     Action needed:   None     Type of outreach:    none    Questions for provider review:    None                                                                                                                                    Miracle Simental MA       Chart routed to Care Team .

## 2017-10-25 ENCOUNTER — TELEPHONE (OUTPATIENT)
Dept: FAMILY MEDICINE | Facility: CLINIC | Age: 77
End: 2017-10-25

## 2017-10-25 NOTE — TELEPHONE ENCOUNTER
Panel Management Review      Patient has the following on her problem list: None      Composite cancer screening  Chart review shows that this patient is due/due soon for the following None  Summary:    Patient is due/failing the following:   BP CHECK    Action needed:   bp check     Type of outreach:    Sent [employer] message.    Questions for provider review:    None                                                                                                                                    Miracle Simental MA       Chart routed to Care Team .

## 2017-10-30 ENCOUNTER — MYC MEDICAL ADVICE (OUTPATIENT)
Dept: CARDIOLOGY | Facility: CLINIC | Age: 77
End: 2017-10-30

## 2017-10-30 DIAGNOSIS — I10 ESSENTIAL HYPERTENSION WITH GOAL BLOOD PRESSURE LESS THAN 140/90: ICD-10-CM

## 2017-10-30 DIAGNOSIS — I25.119 CORONARY ARTERY DISEASE INVOLVING NATIVE HEART WITH ANGINA PECTORIS, UNSPECIFIED VESSEL OR LESION TYPE (H): ICD-10-CM

## 2017-10-30 RX ORDER — METOPROLOL TARTRATE 50 MG
50 TABLET ORAL 2 TIMES DAILY
Qty: 180 TABLET | Refills: 0 | Status: SHIPPED | OUTPATIENT
Start: 2017-10-30 | End: 2018-01-30

## 2017-11-07 DIAGNOSIS — Z94.4 LIVER REPLACED BY TRANSPLANT (H): ICD-10-CM

## 2017-11-07 LAB
ALBUMIN SERPL-MCNC: 3.3 G/DL (ref 3.4–5)
ALP SERPL-CCNC: 81 U/L (ref 40–150)
ALT SERPL W P-5'-P-CCNC: 32 U/L (ref 0–50)
ANION GAP SERPL CALCULATED.3IONS-SCNC: 8 MMOL/L (ref 3–14)
AST SERPL W P-5'-P-CCNC: 31 U/L (ref 0–45)
BILIRUB DIRECT SERPL-MCNC: <0.1 MG/DL (ref 0–0.2)
BILIRUB SERPL-MCNC: 0.3 MG/DL (ref 0.2–1.3)
BUN SERPL-MCNC: 38 MG/DL (ref 7–30)
CALCIUM SERPL-MCNC: 8.9 MG/DL (ref 8.5–10.1)
CHLORIDE SERPL-SCNC: 100 MMOL/L (ref 94–109)
CO2 SERPL-SCNC: 24 MMOL/L (ref 20–32)
CREAT SERPL-MCNC: 1.2 MG/DL (ref 0.52–1.04)
ERYTHROCYTE [DISTWIDTH] IN BLOOD BY AUTOMATED COUNT: 16.1 % (ref 10–15)
GFR SERPL CREATININE-BSD FRML MDRD: 44 ML/MIN/1.7M2
GLUCOSE SERPL-MCNC: 77 MG/DL (ref 70–99)
HCT VFR BLD AUTO: 34.1 % (ref 35–47)
HGB BLD-MCNC: 11.3 G/DL (ref 11.7–15.7)
MCH RBC QN AUTO: 27.8 PG (ref 26.5–33)
MCHC RBC AUTO-ENTMCNC: 33.1 G/DL (ref 31.5–36.5)
MCV RBC AUTO: 84 FL (ref 78–100)
PLATELET # BLD AUTO: 225 10E9/L (ref 150–450)
POTASSIUM SERPL-SCNC: 4.8 MMOL/L (ref 3.4–5.3)
PROT SERPL-MCNC: 7.5 G/DL (ref 6.8–8.8)
RBC # BLD AUTO: 4.07 10E12/L (ref 3.8–5.2)
SODIUM SERPL-SCNC: 132 MMOL/L (ref 133–144)
TACROLIMUS BLD-MCNC: 5.2 UG/L (ref 5–15)
TME LAST DOSE: NORMAL H
WBC # BLD AUTO: 3.9 10E9/L (ref 4–11)

## 2017-11-07 PROCEDURE — 80076 HEPATIC FUNCTION PANEL: CPT | Performed by: FAMILY MEDICINE

## 2017-11-07 PROCEDURE — 80048 BASIC METABOLIC PNL TOTAL CA: CPT | Performed by: FAMILY MEDICINE

## 2017-11-07 PROCEDURE — 80197 ASSAY OF TACROLIMUS: CPT | Performed by: FAMILY MEDICINE

## 2017-11-07 PROCEDURE — 36415 COLL VENOUS BLD VENIPUNCTURE: CPT | Performed by: FAMILY MEDICINE

## 2017-11-07 PROCEDURE — 85027 COMPLETE CBC AUTOMATED: CPT | Performed by: FAMILY MEDICINE

## 2017-11-27 NOTE — TELEPHONE ENCOUNTER
Home care calling, patient has an INR scheduled today, but they can't do it today, they would like to change the start of care to 4/17, and do the INR then. States patient is doing well.        Writer contacted patient and scheduled him for 12/19 at 1:30 with Dr. Hester.

## 2017-11-29 DIAGNOSIS — I25.10 CORONARY ARTERY DISEASE INVOLVING NATIVE HEART WITHOUT ANGINA PECTORIS, UNSPECIFIED VESSEL OR LESION TYPE: ICD-10-CM

## 2017-11-29 RX ORDER — LISINOPRIL 2.5 MG/1
TABLET ORAL
Qty: 93 TABLET | Refills: 3 | Status: SHIPPED | OUTPATIENT
Start: 2017-11-29 | End: 2018-03-09

## 2017-12-12 ENCOUNTER — PRE VISIT (OUTPATIENT)
Dept: CARDIOLOGY | Facility: CLINIC | Age: 77
End: 2017-12-12

## 2017-12-12 NOTE — TELEPHONE ENCOUNTER
Component      Latest Ref Rng & Units 9/5/2017 11/7/2017   Sodium      133 - 144 mmol/L  132 (L)   Potassium      3.4 - 5.3 mmol/L  4.8   Chloride      94 - 109 mmol/L  100   Carbon Dioxide      20 - 32 mmol/L  24   Anion Gap      3 - 14 mmol/L  8   Glucose      70 - 99 mg/dL  77   Urea Nitrogen      7 - 30 mg/dL  38 (H)   Creatinine      0.52 - 1.04 mg/dL  1.20 (H)   GFR Estimate      >60 mL/min/1.7m2  44 (L)   GFR Estimate If Black      >60 mL/min/1.7m2  53 (L)   Calcium      8.5 - 10.1 mg/dL  8.9   Cholesterol      <200 mg/dL 244 (H)    Triglycerides      <150 mg/dL 82    HDL Cholesterol      >49 mg/dL 93    LDL Cholesterol Calculated      <100 mg/dL 135 (H)    Non HDL Cholesterol      <130 mg/dL 151 (H)

## 2018-01-09 ENCOUNTER — MYC MEDICAL ADVICE (OUTPATIENT)
Dept: CARDIOLOGY | Facility: CLINIC | Age: 78
End: 2018-01-09

## 2018-01-09 DIAGNOSIS — I15.9 SECONDARY HYPERTENSION WITH GOAL BLOOD PRESSURE LESS THAN 140/90: ICD-10-CM

## 2018-01-09 DIAGNOSIS — I25.83 CORONARY ARTERY DISEASE DUE TO LIPID RICH PLAQUE: ICD-10-CM

## 2018-01-09 DIAGNOSIS — I25.10 CORONARY ARTERY DISEASE DUE TO LIPID RICH PLAQUE: ICD-10-CM

## 2018-01-09 DIAGNOSIS — Z94.4 LIVER REPLACED BY TRANSPLANT (H): ICD-10-CM

## 2018-01-09 LAB
ALBUMIN SERPL-MCNC: 3.5 G/DL (ref 3.4–5)
ALP SERPL-CCNC: 77 U/L (ref 40–150)
ALT SERPL W P-5'-P-CCNC: 25 U/L (ref 0–50)
ANION GAP SERPL CALCULATED.3IONS-SCNC: 9 MMOL/L (ref 3–14)
AST SERPL W P-5'-P-CCNC: 28 U/L (ref 0–45)
BILIRUB DIRECT SERPL-MCNC: 0.1 MG/DL (ref 0–0.2)
BILIRUB SERPL-MCNC: 0.3 MG/DL (ref 0.2–1.3)
BUN SERPL-MCNC: 25 MG/DL (ref 7–30)
CALCIUM SERPL-MCNC: 8.4 MG/DL (ref 8.5–10.1)
CHLORIDE SERPL-SCNC: 98 MMOL/L (ref 94–109)
CO2 SERPL-SCNC: 23 MMOL/L (ref 20–32)
CREAT SERPL-MCNC: 1.2 MG/DL (ref 0.52–1.04)
ERYTHROCYTE [DISTWIDTH] IN BLOOD BY AUTOMATED COUNT: 14.8 % (ref 10–15)
GFR SERPL CREATININE-BSD FRML MDRD: 44 ML/MIN/1.7M2
GLUCOSE SERPL-MCNC: 71 MG/DL (ref 70–99)
HCT VFR BLD AUTO: 35.2 % (ref 35–47)
HGB BLD-MCNC: 11.8 G/DL (ref 11.7–15.7)
MCH RBC QN AUTO: 28 PG (ref 26.5–33)
MCHC RBC AUTO-ENTMCNC: 33.5 G/DL (ref 31.5–36.5)
MCV RBC AUTO: 83 FL (ref 78–100)
PLATELET # BLD AUTO: 219 10E9/L (ref 150–450)
POTASSIUM SERPL-SCNC: 4.5 MMOL/L (ref 3.4–5.3)
PROT SERPL-MCNC: 7.8 G/DL (ref 6.8–8.8)
RBC # BLD AUTO: 4.22 10E12/L (ref 3.8–5.2)
SODIUM SERPL-SCNC: 130 MMOL/L (ref 133–144)
TACROLIMUS BLD-MCNC: 4.8 UG/L (ref 5–15)
TME LAST DOSE: ABNORMAL H
WBC # BLD AUTO: 4.3 10E9/L (ref 4–11)

## 2018-01-09 PROCEDURE — 85027 COMPLETE CBC AUTOMATED: CPT | Performed by: INTERNAL MEDICINE

## 2018-01-09 PROCEDURE — 80076 HEPATIC FUNCTION PANEL: CPT | Performed by: INTERNAL MEDICINE

## 2018-01-09 PROCEDURE — 80197 ASSAY OF TACROLIMUS: CPT | Performed by: INTERNAL MEDICINE

## 2018-01-09 PROCEDURE — 80048 BASIC METABOLIC PNL TOTAL CA: CPT | Performed by: INTERNAL MEDICINE

## 2018-01-09 PROCEDURE — 36415 COLL VENOUS BLD VENIPUNCTURE: CPT | Performed by: INTERNAL MEDICINE

## 2018-01-10 RX ORDER — ISOSORBIDE MONONITRATE 60 MG/1
60 TABLET, EXTENDED RELEASE ORAL 2 TIMES DAILY
Qty: 180 TABLET | Refills: 0 | Status: SHIPPED | OUTPATIENT
Start: 2018-01-10 | End: 2018-03-09

## 2018-01-30 ENCOUNTER — MYC MEDICAL ADVICE (OUTPATIENT)
Dept: CARDIOLOGY | Facility: CLINIC | Age: 78
End: 2018-01-30

## 2018-01-30 DIAGNOSIS — I25.119 CORONARY ARTERY DISEASE INVOLVING NATIVE HEART WITH ANGINA PECTORIS, UNSPECIFIED VESSEL OR LESION TYPE (H): ICD-10-CM

## 2018-01-30 DIAGNOSIS — I10 ESSENTIAL HYPERTENSION WITH GOAL BLOOD PRESSURE LESS THAN 140/90: ICD-10-CM

## 2018-01-30 RX ORDER — METOPROLOL TARTRATE 50 MG
50 TABLET ORAL 2 TIMES DAILY
Qty: 180 TABLET | Refills: 0 | Status: SHIPPED | OUTPATIENT
Start: 2018-01-30 | End: 2018-03-09

## 2018-03-08 DIAGNOSIS — Z94.4 LIVER REPLACED BY TRANSPLANT (H): ICD-10-CM

## 2018-03-08 LAB
ALBUMIN SERPL-MCNC: 3.4 G/DL (ref 3.4–5)
ALP SERPL-CCNC: 105 U/L (ref 40–150)
ALT SERPL W P-5'-P-CCNC: 38 U/L (ref 0–50)
ANION GAP SERPL CALCULATED.3IONS-SCNC: 6 MMOL/L (ref 3–14)
AST SERPL W P-5'-P-CCNC: 36 U/L (ref 0–45)
BILIRUB DIRECT SERPL-MCNC: 0.1 MG/DL (ref 0–0.2)
BILIRUB SERPL-MCNC: 0.3 MG/DL (ref 0.2–1.3)
BUN SERPL-MCNC: 33 MG/DL (ref 7–30)
CALCIUM SERPL-MCNC: 9.2 MG/DL (ref 8.5–10.1)
CHLORIDE SERPL-SCNC: 102 MMOL/L (ref 94–109)
CO2 SERPL-SCNC: 27 MMOL/L (ref 20–32)
CREAT SERPL-MCNC: 1.29 MG/DL (ref 0.52–1.04)
ERYTHROCYTE [DISTWIDTH] IN BLOOD BY AUTOMATED COUNT: 15.1 % (ref 10–15)
GFR SERPL CREATININE-BSD FRML MDRD: 40 ML/MIN/1.7M2
GLUCOSE SERPL-MCNC: 83 MG/DL (ref 70–99)
HCT VFR BLD AUTO: 37.7 % (ref 35–47)
HGB BLD-MCNC: 12.6 G/DL (ref 11.7–15.7)
MCH RBC QN AUTO: 29.5 PG (ref 26.5–33)
MCHC RBC AUTO-ENTMCNC: 33.4 G/DL (ref 31.5–36.5)
MCV RBC AUTO: 88 FL (ref 78–100)
PLATELET # BLD AUTO: 200 10E9/L (ref 150–450)
POTASSIUM SERPL-SCNC: 4.3 MMOL/L (ref 3.4–5.3)
PROT SERPL-MCNC: 8 G/DL (ref 6.8–8.8)
RBC # BLD AUTO: 4.27 10E12/L (ref 3.8–5.2)
SODIUM SERPL-SCNC: 135 MMOL/L (ref 133–144)
TACROLIMUS BLD-MCNC: 4.9 UG/L (ref 5–15)
TME LAST DOSE: ABNORMAL H
WBC # BLD AUTO: 4.3 10E9/L (ref 4–11)

## 2018-03-08 PROCEDURE — 80048 BASIC METABOLIC PNL TOTAL CA: CPT | Performed by: INTERNAL MEDICINE

## 2018-03-08 PROCEDURE — 80076 HEPATIC FUNCTION PANEL: CPT | Performed by: INTERNAL MEDICINE

## 2018-03-08 PROCEDURE — 85027 COMPLETE CBC AUTOMATED: CPT | Performed by: INTERNAL MEDICINE

## 2018-03-08 PROCEDURE — 36415 COLL VENOUS BLD VENIPUNCTURE: CPT | Performed by: INTERNAL MEDICINE

## 2018-03-08 PROCEDURE — 80197 ASSAY OF TACROLIMUS: CPT | Performed by: INTERNAL MEDICINE

## 2018-03-09 ENCOUNTER — OFFICE VISIT (OUTPATIENT)
Dept: CARDIOLOGY | Facility: CLINIC | Age: 78
End: 2018-03-09
Attending: NURSE PRACTITIONER
Payer: COMMERCIAL

## 2018-03-09 VITALS
BODY MASS INDEX: 27.51 KG/M2 | OXYGEN SATURATION: 98 % | SYSTOLIC BLOOD PRESSURE: 187 MMHG | DIASTOLIC BLOOD PRESSURE: 89 MMHG | HEIGHT: 60 IN | HEART RATE: 89 BPM | WEIGHT: 140.1 LBS

## 2018-03-09 DIAGNOSIS — I10 ESSENTIAL HYPERTENSION WITH GOAL BLOOD PRESSURE LESS THAN 140/90: ICD-10-CM

## 2018-03-09 DIAGNOSIS — I25.119 CORONARY ARTERY DISEASE INVOLVING NATIVE HEART WITH ANGINA PECTORIS, UNSPECIFIED VESSEL OR LESION TYPE (H): ICD-10-CM

## 2018-03-09 DIAGNOSIS — E78.5 HYPERLIPIDEMIA LDL GOAL <70: ICD-10-CM

## 2018-03-09 DIAGNOSIS — I25.10 CORONARY ARTERY DISEASE DUE TO LIPID RICH PLAQUE: ICD-10-CM

## 2018-03-09 DIAGNOSIS — I25.83 CORONARY ARTERY DISEASE DUE TO LIPID RICH PLAQUE: ICD-10-CM

## 2018-03-09 DIAGNOSIS — I15.9 SECONDARY HYPERTENSION WITH GOAL BLOOD PRESSURE LESS THAN 140/90: ICD-10-CM

## 2018-03-09 DIAGNOSIS — I25.10 CORONARY ARTERY DISEASE INVOLVING NATIVE HEART WITHOUT ANGINA PECTORIS, UNSPECIFIED VESSEL OR LESION TYPE: Primary | ICD-10-CM

## 2018-03-09 PROCEDURE — 99214 OFFICE O/P EST MOD 30 MIN: CPT | Mod: ZP | Performed by: NURSE PRACTITIONER

## 2018-03-09 PROCEDURE — G0463 HOSPITAL OUTPT CLINIC VISIT: HCPCS | Mod: ZF

## 2018-03-09 RX ORDER — ISOSORBIDE MONONITRATE 60 MG/1
60 TABLET, EXTENDED RELEASE ORAL 2 TIMES DAILY
Qty: 180 TABLET | Refills: 3 | Status: SHIPPED | OUTPATIENT
Start: 2018-03-09 | End: 2019-03-14

## 2018-03-09 RX ORDER — LISINOPRIL 2.5 MG/1
2.5 TABLET ORAL DAILY
Qty: 90 TABLET | Refills: 3 | Status: SHIPPED | OUTPATIENT
Start: 2018-03-09 | End: 2019-03-14

## 2018-03-09 RX ORDER — METOPROLOL TARTRATE 50 MG
50 TABLET ORAL 2 TIMES DAILY
Qty: 180 TABLET | Refills: 3 | Status: SHIPPED | OUTPATIENT
Start: 2018-03-09 | End: 2019-03-14

## 2018-03-09 ASSESSMENT — PAIN SCALES - GENERAL: PAINLEVEL: NO PAIN (0)

## 2018-03-09 NOTE — LETTER
3/9/2018      RE: Lesli Lorenzana  [id number] [facility] [id number]       Dear Colleague,    Thank you for the opportunity to participate in the care of your patient, Lesli Lorenzana, at the [facility] at [facility]. Please see a copy of my visit note below.    Cardiology Clinic Note  March 9, 2018      HPI:  Lesli Lorenzana is a 77 year old with a past medical history significant for liver transplant in 2008, hypertension, hyperlipidemia, CAD with NANCY to OM1 in 2009 and CKD who presents for routine cardiology follow-up. She follows with Dr. Nain Lagunas and was last seen in February 2017 at which time she was stable from a cardiovascular standpoint.   Since her last visit Ms. Lorenzana has been feeling well. She underwent left total hip arthroplasty in July 2017. Her left hip pain has improved but she continues to have pain related to spinal stenosis and sciatic nerve pain. Her exercise tolerance is still approximately two flights of stairs and at least 1-2 blocks on ground level. She denies recent chest pain, dyspnea, orthopnea, PND, palpitations, lightheadedness or syncope.   She continues to monitor home blood pressure which typically range 120's-130's/60's. She has not needed to take any PRN antihypertensives recently. She is hypertensive in clinic today, though she acknowledges she is quite anxious. She continues to follows a heart healthy diet. Current cardiac medications include ASA 81 mg, lisinopril 2.5 mg daily, metoprolol 50 mg BID, isosorbide mononitrate 60 mg BID and PRN Lasix SBP > 160 and PRN hydralazine SBP > 180.   Past medical history:  Past Medical History:   Diagnosis Date     Anemia      Arthritis      CAD (coronary artery disease) 1/2009    stent     Cholelithiasis     gallbladder removed with liver in 2008     Hallux valgus      History of blood transfusion      HTN (hypertension)      Hyperlipidemia LDL goal < 100       Liver transplant 2/2008    due to Primary biliary cirrhosis     Osteoarthritis of left hip      Osteopenia      Overweight(278.02)      Palpitations      Spinal stenosis      Medications:    Current Outpatient Prescriptions on File Prior to Visit:  metoprolol tartrate (LOPRESSOR) 50 MG tablet Take 1 tablet (50 mg) by mouth 2 times daily   isosorbide mononitrate (IMDUR) 60 MG 24 hr tablet Take 1 tablet (60 mg) by mouth 2 times daily   lisinopril (PRINIVIL/ZESTRIL) 2.5 MG tablet TAKE ONE TABLET BY MOUTH EVERY DAY   tacrolimus (GENERIC EQUIVALENT) 0.5 MG capsule Take 2 capsules (1 mg) by mouth every 12 hours   predniSONE (DELTASONE) 5 MG tablet Take 1 tablet (5 mg) by mouth daily starting 7/27   predniSONE (DELTASONE) 20 MG tablet Take 80 mg daily 7/7-7/13, then 60 mg daily 7/14-7/20, then 40 mg daily 7/21-7/27, then stay on 20 mg daily.   calcium-vitamin D (CALTRATE) 600-400 MG-UNIT per tablet Take 1 tablet by mouth daily   aspirin 81 MG tablet Take 1 tablet by mouth daily.   MULTI-VITAMIN PO Take 1 tablet by mouth daily    ferrous gluconate (FERGON) 324 (38 FE) MG tablet Take 1 tablet (324 mg) by mouth daily (with breakfast) (Patient not taking: Reported on 3/9/2018)   hydrALAZINE (APRESOLINE) 25 MG tablet Take 1 tablet (25 mg) by mouth 3 times daily , as needed if systolic blood pressure (top number) is more than 180 mmHg. (Patient not taking: Reported on 3/9/2018)   nystatin-triamcinolone (MYCOLOG II) cream Apply topically 4 times daily as needed Reported on 4/5/2017     No current facility-administered medications on file prior to visit.     Allergies:      Allergies   Allergen Reactions     Amlodipine Besylate Swelling     Edema in legs     Amoxicillin Rash       Family and social history:    Family History   Problem Relation Age of Onset     CANCER Father      unknown     C.A.D. Father      Thyroid Disease Father      CANCER Mother      lung--smoker     Other Cancer Mother      lung     Thyroid Disease  Mother      CEREBROVASCULAR DISEASE Maternal Grandmother      ,     Unknown/Adopted Paternal Grandfather        Social History     Social History     Marital status:      Spouse name: N/A     Number of children: 3     Years of education: N/A     Occupational History      Retired     Social History Main Topics     Smoking status: Former Smoker     Packs/day: 0.50     Years: 10.00     Types: Cigarettes     Quit date: 11/23/1975     Smokeless tobacco: Never Used     Alcohol use No      Comment: Glass of wine occassionally     Drug use: No      Comment: Never     Sexual activity: Not Currently     Other Topics Concern     Blood Transfusions Yes     2007     Exercise No     Social History Narrative    Lives alone with her dog. . Three grown children, very supportive and all live close by.  6 grandchildren. Feels safe in her environment.     Review of Systems:  Skin: No skin rash or ulcers.  Eyes: No red eye.  Ears/Nose/Throat: No ear discharge, nasal congestion, sore throat or dysphagia.  Respiratory: No cough or hemoptysis.  Cardiovascular: See HPI.    Gastrointestinal: No abdominal pain, nausea, vomiting, hematemesis or melena.  Genitourinary: No increased frequency or urgency of urine. No dysuria or hematuria.  Musculoskeletal: No polyarthralgia or myalgias.  Neurologic: No headaches, seizure or focal weakness.  Psychiatric: No hallucinations.  Hematologic/Lymphatic/Immunologic: No bleeding tendency.  Endocrine: No heat or cold intolerance, abnormal facial hair or alopecia.    Vital signs:  /89 (BP Location: Left arm)  Pulse 89  Ht 1.524 m (5')  Wt 63.5 kg (140 lb 1.6 oz)  SpO2 98%  BMI 27.36 kg/m2   Wt Readings from Last 2 Encounters:   03/09/18 63.5 kg (140 lb 1.6 oz)   07/28/17 58.1 kg (128 lb)     Physical Exam:  Gen: NAD.    HEENT: No conjunctival pallor or scleral icterus, MMM. Clear oropharynx.    Neck: No JVD. No thyroid enlargement or cervical adenopathy.    Chest: Clear to  auscultation bilaterally.    CV: Normal first and second heart sounds. No murmurs or gallop appreciated.    Abdomen: Soft, non-tender, non-distended, BS+.  Ext: No edema. Warm and well perfused with normal capillary refill.    Skin: No skin rash or ulcers.  Neuro: alert, oriented and appropriately conversant.    Psych: Normal affect and speech.    Labs:  Last Basic Metabolic Panel:  Lab Results   Component Value Date     03/08/2018      Lab Results   Component Value Date    POTASSIUM 4.3 03/08/2018     Lab Results   Component Value Date    CHLORIDE 102 03/08/2018     Lab Results   Component Value Date    HAYES 9.2 03/08/2018     Lab Results   Component Value Date    CO2 27 03/08/2018     Lab Results   Component Value Date    BUN 33 03/08/2018     Lab Results   Component Value Date    CR 1.29 03/08/2018     Lab Results   Component Value Date    GLC 83 03/08/2018     Recent Labs   Lab Test  09/05/17   0735  02/01/17   0804   08/11/15   0816  09/17/14   0858   CHOL  244*  143   < >  194  155   HDL  93  73   < >  80  79   LDL  135*  51   < >  94  58   TRIG  82  93   < >  98  89   CHOLHDLRATIO   --    --    --   2.4  2.0    < > = values in this interval not displayed.     Diagnostics:    ECHO 3/2017:    Interpretation Summary  Left ventricular function, chamber size, wall motion, and wall thickness are  normal.The EF is 55-60%. Biplane LVEF traced at 56%.  The right ventricle is normal size. Global right ventricular function is  normal.  The inferior vena cava was normal in size with preserved respiratory  variability. Estimated mean right atrial pressure is <3 mmHg.  No pericardial effusion is present.  This study was compared with the study from 3/20/13. There has been no change.  _____________________________________________________________________________  __        Left Ventricle  Left ventricular function, chamber size, wall motion, and wall thickness are  normal.The EF is 55-60%. Biplane LVEF traced at 56%.  Left ventricular  diastolic function is indeterminate.     Right Ventricle  The right ventricle is normal size. Global right ventricular function is  normal.     Atria  The right atria appears normal. Mild left atrial enlargement is present.     Mitral Valve  The mitral valve is normal.        Aortic Valve  Mild aortic valve sclerosis is present.     Tricuspid Valve  The tricuspid valve is normal. Pulmonary artery systolic pressure cannot be  assessed.     Pulmonic Valve  The pulmonic valve is normal.     Vessels  The aorta root is normal. The inferior vena cava was normal in size with  preserved respiratory variability. The pulmonary artery is normal. Estimated  mean right atrial pressure is <3 mmHg.    Assessment and Plan:  Mrs. Lesli Lorenzana is a 76-year old female with a past medical history of a liver transplantation for primary biliary cirrhosis, HTN, and CAD who presents for routine cardiology follow-up.     CAD s/p NANCY to OM1 in 2009: Stable without anginal symptoms. Continue medical therapy with ASA 81, metoprolol 50 mg BID and imdur 60 BID. Last . Repeat fasting lipid panel with next LFTs. If LDL remains elevated I will contact hepatology to see if we can start a low dose statin.   Hypertension, goal < 130/80: Likely has an element of white coat, given marked hypertension in clinic and well controlled BP home. She has had her home blood pressure calibrated in the past and ambulatory blood pressure monitoring in 2014 which revealed average BP of 138/73. Continue current regimen with lisinopril 2.5 mg daily. Continue PRN lasix 20 for SBP >160 and PRN hydralazine for SBP >180.    Follow-up: RTC in 1 year.        Sincerely,     Tonya Thapa NP

## 2018-03-09 NOTE — NURSING NOTE
Chief Complaint   Patient presents with     Follow Up For     76 y/o female, patient of Janneth, for f/u of hypertension.     Vitals were taken and medications were reconciled.     Donna Elizabeth RMA  9:43 AM

## 2018-03-09 NOTE — PROGRESS NOTES
Cardiology Clinic Note  March 9, 2018      HPI:  Lesli Lorenzana is a 77 year old with a past medical history significant for liver transplant in 2008, hypertension, hyperlipidemia, CAD with NANCY to OM1 in 2009 and CKD who presents for routine cardiology follow-up. She follows with Dr. Nain Lagunas and was last seen in February 2017 at which time she was stable from a cardiovascular standpoint.   Since her last visit Ms. Lorenzana has been feeling well. She underwent left total hip arthroplasty in July 2017. Her left hip pain has improved but she continues to have pain related to spinal stenosis and sciatic nerve pain. Her exercise tolerance is still approximately two flights of stairs and at least 1-2 blocks on ground level. She denies recent chest pain, dyspnea, orthopnea, PND, palpitations, lightheadedness or syncope.   She continues to monitor home blood pressure which typically range 120's-130's/60's. She has not needed to take any PRN antihypertensives recently. She is hypertensive in clinic today, though she acknowledges she is quite anxious. She continues to follows a heart healthy diet. Current cardiac medications include ASA 81 mg, lisinopril 2.5 mg daily, metoprolol 50 mg BID, isosorbide mononitrate 60 mg BID and PRN Lasix SBP > 160 and PRN hydralazine SBP > 180.   Past medical history:  Past Medical History:   Diagnosis Date     Anemia      Arthritis      CAD (coronary artery disease) 1/2009    stent     Cholelithiasis     gallbladder removed with liver in 2008     Hallux valgus      History of blood transfusion      HTN (hypertension)      Hyperlipidemia LDL goal < 100      Liver transplant 2/2008    due to Primary biliary cirrhosis     Osteoarthritis of left hip      Osteopenia      Overweight(278.02)      Palpitations      Spinal stenosis      Medications:    Current Outpatient Prescriptions on File Prior to Visit:  metoprolol tartrate (LOPRESSOR) 50 MG tablet Take 1 tablet (50 mg) by mouth 2 times  daily   isosorbide mononitrate (IMDUR) 60 MG 24 hr tablet Take 1 tablet (60 mg) by mouth 2 times daily   lisinopril (PRINIVIL/ZESTRIL) 2.5 MG tablet TAKE ONE TABLET BY MOUTH EVERY DAY   tacrolimus (GENERIC EQUIVALENT) 0.5 MG capsule Take 2 capsules (1 mg) by mouth every 12 hours   predniSONE (DELTASONE) 5 MG tablet Take 1 tablet (5 mg) by mouth daily starting 7/27   predniSONE (DELTASONE) 20 MG tablet Take 80 mg daily 7/7-7/13, then 60 mg daily 7/14-7/20, then 40 mg daily 7/21-7/27, then stay on 20 mg daily.   calcium-vitamin D (CALTRATE) 600-400 MG-UNIT per tablet Take 1 tablet by mouth daily   aspirin 81 MG tablet Take 1 tablet by mouth daily.   MULTI-VITAMIN PO Take 1 tablet by mouth daily    ferrous gluconate (FERGON) 324 (38 FE) MG tablet Take 1 tablet (324 mg) by mouth daily (with breakfast) (Patient not taking: Reported on 3/9/2018)   hydrALAZINE (APRESOLINE) 25 MG tablet Take 1 tablet (25 mg) by mouth 3 times daily , as needed if systolic blood pressure (top number) is more than 180 mmHg. (Patient not taking: Reported on 3/9/2018)   nystatin-triamcinolone (MYCOLOG II) cream Apply topically 4 times daily as needed Reported on 4/5/2017     No current facility-administered medications on file prior to visit.     Allergies:      Allergies   Allergen Reactions     Amlodipine Besylate Swelling     Edema in legs     Amoxicillin Rash       Family and social history:    Family History   Problem Relation Age of Onset     CANCER Father      unknown     C.A.D. Father      Thyroid Disease Father      CANCER Mother      lung--smoker     Other Cancer Mother      lung     Thyroid Disease Mother      CEREBROVASCULAR DISEASE Maternal Grandmother      ,     Unknown/Adopted Paternal Grandfather        Social History     Social History     Marital status:      Spouse name: N/A     Number of children: 3     Years of education: N/A     Occupational History      Retired     Social History Main Topics     Smoking status:  Former Smoker     Packs/day: 0.50     Years: 10.00     Types: Cigarettes     Quit date: 11/23/1975     Smokeless tobacco: Never Used     Alcohol use No      Comment: Glass of wine occassionally     Drug use: No      Comment: Never     Sexual activity: Not Currently     Other Topics Concern     Blood Transfusions Yes     2007     Exercise No     Social History Narrative    Lives alone with her dog. . Three grown children, very supportive and all live close by.  6 grandchildren. Feels safe in her environment.     Review of Systems:  Skin: No skin rash or ulcers.  Eyes: No red eye.  Ears/Nose/Throat: No ear discharge, nasal congestion, sore throat or dysphagia.  Respiratory: No cough or hemoptysis.  Cardiovascular: See HPI.    Gastrointestinal: No abdominal pain, nausea, vomiting, hematemesis or melena.  Genitourinary: No increased frequency or urgency of urine. No dysuria or hematuria.  Musculoskeletal: No polyarthralgia or myalgias.  Neurologic: No headaches, seizure or focal weakness.  Psychiatric: No hallucinations.  Hematologic/Lymphatic/Immunologic: No bleeding tendency.  Endocrine: No heat or cold intolerance, abnormal facial hair or alopecia.    Vital signs:  /89 (BP Location: Left arm)  Pulse 89  Ht 1.524 m (5')  Wt 63.5 kg (140 lb 1.6 oz)  SpO2 98%  BMI 27.36 kg/m2   Wt Readings from Last 2 Encounters:   03/09/18 63.5 kg (140 lb 1.6 oz)   07/28/17 58.1 kg (128 lb)     Physical Exam:  Gen: NAD.    HEENT: No conjunctival pallor or scleral icterus, MMM. Clear oropharynx.    Neck: No JVD. No thyroid enlargement or cervical adenopathy.    Chest: Clear to auscultation bilaterally.    CV: Normal first and second heart sounds. No murmurs or gallop appreciated.    Abdomen: Soft, non-tender, non-distended, BS+.  Ext: No edema. Warm and well perfused with normal capillary refill.    Skin: No skin rash or ulcers.  Neuro: alert, oriented and appropriately conversant.    Psych: Normal affect and  speech.    Labs:  Last Basic Metabolic Panel:  Lab Results   Component Value Date     03/08/2018      Lab Results   Component Value Date    POTASSIUM 4.3 03/08/2018     Lab Results   Component Value Date    CHLORIDE 102 03/08/2018     Lab Results   Component Value Date    HAYES 9.2 03/08/2018     Lab Results   Component Value Date    CO2 27 03/08/2018     Lab Results   Component Value Date    BUN 33 03/08/2018     Lab Results   Component Value Date    CR 1.29 03/08/2018     Lab Results   Component Value Date    GLC 83 03/08/2018     Recent Labs   Lab Test  09/05/17   0735  02/01/17   0804   08/11/15   0816  09/17/14   0858   CHOL  244*  143   < >  194  155   HDL  93  73   < >  80  79   LDL  135*  51   < >  94  58   TRIG  82  93   < >  98  89   CHOLHDLRATIO   --    --    --   2.4  2.0    < > = values in this interval not displayed.     Diagnostics:    ECHO 3/2017:    Interpretation Summary  Left ventricular function, chamber size, wall motion, and wall thickness are  normal.The EF is 55-60%. Biplane LVEF traced at 56%.  The right ventricle is normal size. Global right ventricular function is  normal.  The inferior vena cava was normal in size with preserved respiratory  variability. Estimated mean right atrial pressure is <3 mmHg.  No pericardial effusion is present.  This study was compared with the study from 3/20/13. There has been no change.  _____________________________________________________________________________  __        Left Ventricle  Left ventricular function, chamber size, wall motion, and wall thickness are  normal.The EF is 55-60%. Biplane LVEF traced at 56%. Left ventricular  diastolic function is indeterminate.     Right Ventricle  The right ventricle is normal size. Global right ventricular function is  normal.     Atria  The right atria appears normal. Mild left atrial enlargement is present.     Mitral Valve  The mitral valve is normal.        Aortic Valve  Mild aortic valve sclerosis is  present.     Tricuspid Valve  The tricuspid valve is normal. Pulmonary artery systolic pressure cannot be  assessed.     Pulmonic Valve  The pulmonic valve is normal.     Vessels  The aorta root is normal. The inferior vena cava was normal in size with  preserved respiratory variability. The pulmonary artery is normal. Estimated  mean right atrial pressure is <3 mmHg.    Assessment and Plan:  Mrs. Lesli Lorenzana is a 76-year old female with a past medical history of a liver transplantation for primary biliary cirrhosis, HTN, and CAD who presents for routine cardiology follow-up.     CAD s/p NANCY to OM1 in 2009: Stable without anginal symptoms. Continue medical therapy with ASA 81, metoprolol 50 mg BID and imdur 60 BID. Last . Repeat fasting lipid panel with next LFTs. If LDL remains elevated I will contact hepatology to see if we can start a low dose statin.   Hypertension, goal < 130/80: Likely has an element of white coat, given marked hypertension in clinic and well controlled BP home. She has had her home blood pressure calibrated in the past and ambulatory blood pressure monitoring in 2014 which revealed average BP of 138/73. Continue current regimen with lisinopril 2.5 mg daily. Continue PRN lasix 20 for SBP >160 and PRN hydralazine for SBP >180.    Follow-up: RTC in 1 year.

## 2018-03-09 NOTE — PATIENT INSTRUCTIONS
You were seen today in the Cardiovascular Clinic at the [facility].    Cardiology provider you saw during your visit Tonya Thapa NP.     1. Your home blood pressures are at goal, continue current medications.  2. Repeat fasting lipid panel in 2 months.  3. Please make a follow-up appointment with Dr. Lagunas in 1 year.    Questions and [phone].   First press #1 for the University and then press #3 for Medical Questions to reach the Cardiology triage nurse.     On Call Cardiologist for after hours or on weekends: [phone], press option #4 and ask to speak to the on-call Cardiologist.

## 2018-03-23 ENCOUNTER — OFFICE VISIT (OUTPATIENT)
Dept: GASTROENTEROLOGY | Facility: CLINIC | Age: 78
End: 2018-03-23
Attending: INTERNAL MEDICINE
Payer: MEDICARE

## 2018-03-23 VITALS
BODY MASS INDEX: 27.96 KG/M2 | WEIGHT: 142.4 LBS | HEIGHT: 60 IN | TEMPERATURE: 97.7 F | OXYGEN SATURATION: 98 % | DIASTOLIC BLOOD PRESSURE: 82 MMHG | SYSTOLIC BLOOD PRESSURE: 209 MMHG | HEART RATE: 72 BPM

## 2018-03-23 DIAGNOSIS — Z94.4 LIVER REPLACED BY TRANSPLANT (H): Primary | ICD-10-CM

## 2018-03-23 DIAGNOSIS — Z79.52 LONG TERM CURRENT USE OF SYSTEMIC STEROIDS: ICD-10-CM

## 2018-03-23 PROCEDURE — G0463 HOSPITAL OUTPT CLINIC VISIT: HCPCS | Mod: ZF

## 2018-03-23 ASSESSMENT — PAIN SCALES - GENERAL: PAINLEVEL: NO PAIN (0)

## 2018-03-23 NOTE — NURSING NOTE
Here for post liver transplant follow-up. Looks great, feeling well.  Underwent hip replacement April of 2017, walking well, says she has no pain.  Had hospitalization last summer due to large bile duct stone, labs now normal. No other complaints.  Reviewed recent labs and assisted with interpretation.   BP high here today, says she takes it at home and it's normal.  Follows regularly w/ PCP.    Current immunosuppression:    Pred / prograf    Med changes: .  Updated patient's med card.    Lab frequency:  q 2-3 mos    Follow-up:  Hepatology, derm and PCP annually. Reviewed need for annual follow-up here with hepatology and dermatology.

## 2018-03-23 NOTE — LETTER
3/23/2018      RE: Lesli Lorenzana  [address] [facility] [address]         GASTROENTEROLOGY OUTPATIENT FOLLOW-UP      Lesli Lorenzana is a 77 year old female with a history of PBC s/p liver transplant 2008.  She had some elevations in liver labs last year (biopsy negative for rejection or inflammation, did not appear to be PBC) which resolved with prednisone.  She also had ERCP at that time and a stone was removed.  Last visit, she was noted to have WALESKA and had a colonoscopy which only revealed sigmoid diverticulosis.  LFTs within normal range, tacro level 3/8 wnl (3-5, level 4.9).  Vaccines up to date  She is feeling very well, good energy, looking forward to warmer weather. No abdominal pain, appetite change (says she has been eating a bit more over the winter and gained 10# but is well otherwise), no rash/itching, no fevers, no shortness of breath.     Plan:  Continue tacro at usual dose  Bone mineral density ordered  Follow up in 1 year    Patient care plan discussed with Dr. Muhammad, GI staff physician.     YAMEL Simpson  Internal Medicine PGY-1  [phone]     The patient was seen and examined.  The above assessment and plan was developed jointly with the resident.      Tucker Muhammad MD      Professor of Medicine  [employer]      Executive Medical Director, Solid Organ Transplant Program  [facility]     -------------------------------------------------------------------------------------------------------------------   History is obtained from the patient and the medical record.          History of Present Illness:   Lesli Lorenzana is a 77 year old female with a history of PBC s/p liver transplant 2/3/2008.            Past Medical History:   Reviewed and edited as appropriate  Past Medical History:   Diagnosis Date     Anemia      Arthritis      CAD (coronary artery disease) 1/2009    stent     Cholelithiasis      gallbladder removed with liver in 2008     Hallux valgus      History of blood transfusion      HTN (hypertension)      Hyperlipidemia LDL goal < 100      Liver transplant 2/2008    due to Primary biliary cirrhosis     Osteoarthritis of left hip      Osteopenia      Overweight(278.02)      Palpitations      Spinal stenosis             Past Surgical History:   Reviewed and edited as appropriate   Past Surgical History:   Procedure Laterality Date     ARTHROPLASTY HIP Left 4/11/2017    Procedure: ARTHROPLASTY HIP;  Surgeon: Zhen Armstrong MD;  Location: [facility] OR     BIOPSY      liver      C STOMACH SURGERY PROCEDURE UNLISTED       COLONOSCOPY WITH CO2 INSUFFLATION N/A 9/15/2017    Procedure: COLONOSCOPY WITH CO2 INSUFFLATION;  Colonoscopy, History of colonic polyps, Ref by Muhammad, BMI 25, [place] [phone];  Surgeon: Dolly Hearn MD;  Location: [facility] OR     ENDOSCOPIC RETROGRADE CHOLANGIOPANCREATOGRAM N/A 7/17/2017    Procedure: COMBINED ENDOSCOPIC RETROGRADE CHOLANGIOPANCREATOGRAPHY, PLACE TUBE/STENT;  Endoscopic Retrograde Cholangiopancreatogram with bile duct spinchterotomy, ballon sweep of bile duct for stones, dilatation of bile duct;  Surgeon: Narendra Diana MD;  [facility] OR     ENT SURGERY       GYN SURGERY       SURGICAL HISTORY OF -       liver transplant 2008     SURGICAL HISTORY OF -       CAD with stent placement 2009     TRANSPLANT  02/03/2008    Liver transplant             Social History:   Reviewed and edited as appropriate  Social History     Social History     Marital status:      Spouse name: N/A     Number of children: 3     Years of education: N/A     Occupational History      Retired     Social History Main Topics     Smoking status: Former Smoker     Packs/day: 0.50     Years: 10.00     Types: Cigarettes     Quit date: 11/23/1975     Smokeless tobacco: Never Used     Alcohol use No      Comment: Glass of wine occassionally     Drug use: No      Comment: Never     Sexual  activity: Not Currently     Other Topics Concern     Blood Transfusions Yes     2007     Exercise No     Social History Narrative    Lives alone with her dog. . Three grown children, very supportive and all live close by.  6 grandchildren. Feels safe in her environment.            Family History:   Reviewed and edited as appropriate  Family History   Problem Relation Age of Onset     CANCER Father      unknown     C.A.D. Father      Thyroid Disease Father      CANCER Mother      lung--smoker     Other Cancer Mother      lung     Thyroid Disease Mother      CEREBROVASCULAR DISEASE Maternal Grandmother      ,     Unknown/Adopted Paternal Grandfather            Allergies:   Reviewed and edited as appropriate     Allergies   Allergen Reactions     Amlodipine Besylate Swelling     Edema in legs     Amoxicillin Rash            Medications:     Current Outpatient Prescriptions:      isosorbide mononitrate (IMDUR) 60 MG 24 hr tablet, Take 1 tablet (60 mg) by mouth 2 times daily, Disp: 180 tablet, Rfl: 3     lisinopril (PRINIVIL/ZESTRIL) 2.5 MG tablet, Take 1 tablet (2.5 mg) by mouth daily, Disp: 90 tablet, Rfl: 3     metoprolol tartrate (LOPRESSOR) 50 MG tablet, Take 1 tablet (50 mg) by mouth 2 times daily, Disp: 180 tablet, Rfl: 3     tacrolimus (GENERIC EQUIVALENT) 0.5 MG capsule, Take 2 capsules (1 mg) by mouth every 12 hours, Disp: 360 capsule, Rfl: 3     ferrous gluconate (FERGON) 324 (38 FE) MG tablet, Take 1 tablet (324 mg) by mouth daily (with breakfast), Disp: 100 tablet, Rfl: 1     predniSONE (DELTASONE) 5 MG tablet, Take 1 tablet (5 mg) by mouth daily starting 7/27, Disp: 90 tablet, Rfl: 3     predniSONE (DELTASONE) 20 MG tablet, Take 80 mg daily 7/7-7/13, then 60 mg daily 7/14-7/20, then 40 mg daily 7/21-7/27, then stay on 20 mg daily., Disp: 12 tablet, Rfl: 0     calcium-vitamin D (CALTRATE) 600-400 MG-UNIT per tablet, Take 1 tablet by mouth daily, Disp: , Rfl:      hydrALAZINE (APRESOLINE) 25 MG tablet,  Take 1 tablet (25 mg) by mouth 3 times daily , as needed if systolic blood pressure (top number) is more than 180 mmHg., Disp: 90 tablet, Rfl: 3     nystatin-triamcinolone (MYCOLOG II) cream, Apply topically 4 times daily as needed Reported on 4/5/2017, Disp: , Rfl:      aspirin 81 MG tablet, Take 1 tablet by mouth daily., Disp: 90 tablet, Rfl: 3     MULTI-VITAMIN PO, Take 1 tablet by mouth daily , Disp: , Rfl:          Review of Systems:   A complete review of systems was performed and is negative except as noted in the HPI           Physical Exam:   BP (!) 209/82  Pulse 72  Temp 97.7  F (36.5  C) (Oral)  Ht 1.524 m (5')  Wt 64.6 kg (142 lb 6.4 oz)  SpO2 98%  BMI 27.81 kg/m2  Wt:   Wt Readings from Last 2 Encounters:   03/23/18 64.6 kg (142 lb 6.4 oz)   03/09/18 63.5 kg (140 lb 1.6 oz)      Constitutional: cooperative, pleasant, not dyspneic/diaphoretic, no acute distress  Eyes: Sclera anicteric/injected  Ears/nose/mouth/throat: Normal oropharynx without ulcers or exudate, mucus membranes moist, hearing intact  Neck: supple, thyroid normal size  CV: No edema  Respiratory: Unlabored breathing, CTAB, no fingernail clubbing  Abd: soft, nondistended, +bs, no hepatosplenomegaly, nontender, no peritoneal signs  Skin: warm, perfused, no jaundice. Small round discrete non tender non itching erythematous lesion posterior neck by hairline  Neuro: AAO x 3, No asterixis  Psych: Normal affect           Data:   Labs and imaging below were independently reviewed and interpreted    Last Basic Metabolic Panel:  Lab Results   Component Value Date     03/08/2018      Lab Results   Component Value Date    POTASSIUM 4.3 03/08/2018     Lab Results   Component Value Date    CHLORIDE 102 03/08/2018     Lab Results   Component Value Date    HAYES 9.2 03/08/2018     Lab Results   Component Value Date    CO2 27 03/08/2018     Lab Results   Component Value Date    BUN 33 03/08/2018     Lab Results   Component Value Date    CR 1.29  03/08/2018     Lab Results   Component Value Date    GLC 83 03/08/2018     Lab Results   Component Value Date    WBC 4.3 03/08/2018     Lab Results   Component Value Date    RBC 4.27 03/08/2018     Lab Results   Component Value Date    HGB 12.6 03/08/2018     Lab Results   Component Value Date    HCT 37.7 03/08/2018     Lab Results   Component Value Date    MCV 88 03/08/2018     Lab Results   Component Value Date    MCH 29.5 03/08/2018     Lab Results   Component Value Date    MCHC 33.4 03/08/2018     Lab Results   Component Value Date    RDW 15.1 03/08/2018     Lab Results   Component Value Date     03/08/2018       Imaging  reviewed    Tucker Muhammad MD

## 2018-03-23 NOTE — MR AVS SNAPSHOT
After Visit Summary   3/23/2018    Lesli Lorenzana    MRN: [id number]           Patient Information     Date Of Birth          1940        Visit Information        Provider Department      3/23/2018 9:45 AM Tucker Muhammad MD [facility]        Today's Diagnoses     Liver replaced by transplant (H)    -  1    Long term current use of systemic steroids            Follow-ups after your visit        Follow-up notes from your care team     Return in about 1 year (around 3/23/2019).      Your next 10 appointments already scheduled     May 08, 2018  8:00 AM CDT   [facility] with [facility]   [facility]    [address] [id number] [phone]           Please do not eat 10-12 hours before your appointment if you are coming in fasting for labs on lipids, cholesterol, or glucose (sugar). This does not apply to pregnant women. Water, hot tea and black coffee (with nothing added) are okay. Do not drink other fluids, diet soda or chew gum.            Mar 22, 2019 10:00 AM CDT   (Arrive by 9:45 AM)   Return Liver Transplant with Tucker Muhammad MD   [facility] ([facility])    [address]  [address]  [place] [address] [phone]              Who to contact     If you have questions or need follow up information about today's clinic visit or your schedule please contact [facility]PAT[facility] directly at [phone].  Normal or non-critical lab and imaging results will be communicated to you by MyChart, letter or phone within 4 business days after the clinic has received the results. If you do not hear from us within 7 days, please contact the clinic through MyChart or phone. If you have a critical or abnormal lab result, we will notify you by phone as soon as possible.  Submit refill requests through Smeet or call your pharmacy and they will forward the refill request to us. Please allow 3 business  days for your refill to be completed.          Additional Information About Your Visit        MyChart Information     [facility] gives you secure access to your electronic health record. If you see a primary care provider, you can also send messages to your care team and make appointments. If you have questions, please call your primary care clinic.  If you do not have a primary care provider, please call [phone] and they will assist you.        Care EveryWhere ID     This is your Care EveryWhere ID. This could be used by other organizations to access your [place] medical records  [id number]        Your Vitals Were     Pulse Temperature Height Pulse Oximetry BMI (Body Mass Index)       72 97.7  F (36.5  C) (Oral) 1.524 m (5') 98% 27.81 kg/m2        Blood Pressure from Last 3 Encounters:   03/23/18 (!) 209/82   03/09/18 187/89   09/15/17 116/59    Weight from Last 3 Encounters:   03/23/18 64.6 kg (142 lb 6.4 oz)   03/09/18 63.5 kg (140 lb 1.6 oz)   07/28/17 58.1 kg (128 lb)               Primary Care Provider Office Phone # Fax #    Dee Awan [phone] [address] [address] [address]        Equal Access to Services     THEA WASHBURN AH: Hadii aad ku hadasho Soomaali, waaxda luqadaha, qaybta kaalmada adeegyada, waxay idiin hayaan adegerald kharaashvin aguiar . So [facility] [place] [phone].    ATENCIÓN: Si habla español, tiene a messina disposición servicios gratuitos de asistencia lingüística. Llame al [phone].    We comply with applicable federal civil rights laws and [place] laws. We do not discriminate on the basis of race, color, national origin, age, disability, sex, sexual orientation, or gender identity.            Thank you!     Thank you for choosing [facility]  for your care. Our goal is always to provide you with excellent care. Hearing back from our patients is one way we can continue to improve our services. Please take a few minutes to complete the written survey that  you may receive in the mail after your visit with us. Thank you!             Your Updated Medication List - Protect others around you: Learn how to safely use, store and throw away your medicines at www.disposemymeds.org.          This list is accurate as of 3/23/18 11:59 PM.  Always use your most recent med list.                   Brand Name Dispense Instructions for use Diagnosis    aspirin 81 MG tablet     90 tablet    Take 1 tablet by mouth daily.    CAD (coronary artery disease)       calcium-vitamin D 600-400 MG-UNIT per tablet    CALTRATE     Take 1 tablet by mouth daily        ferrous gluconate 324 (38 FE) MG tablet    FERGON    100 tablet    Take 1 tablet (324 mg) by mouth daily (with breakfast)    Iron deficiency anemia, unspecified iron deficiency anemia type       hydrALAZINE 25 MG tablet    APRESOLINE    90 tablet    Take 1 tablet (25 mg) by mouth 3 times daily , as needed if systolic blood pressure (top number) is more than 180 mmHg.    Essential hypertension with goal blood pressure less than 140/90       isosorbide mononitrate 60 MG 24 hr tablet    IMDUR    180 tablet    Take 1 tablet (60 mg) by mouth 2 times daily    Coronary artery disease due to lipid rich plaque, Secondary hypertension with goal blood pressure less than 140/90       lisinopril 2.5 MG tablet    PRINIVIL/Zestril    90 tablet    Take 1 tablet (2.5 mg) by mouth daily    Coronary artery disease involving native heart without angina pectoris, unspecified vessel or lesion type       metoprolol tartrate 50 MG tablet    LOPRESSOR    180 tablet    Take 1 tablet (50 mg) by mouth 2 times daily    Essential hypertension with goal blood pressure less than 140/90, Coronary artery disease involving native heart with angina pectoris, unspecified vessel or lesion type (H)       MULTI-VITAMIN PO      Take 1 tablet by mouth daily        nystatin-triamcinolone cream    MYCOLOG II     Apply topically 4 times daily as needed Reported on 4/5/2017         * predniSONE 20 MG tablet    DELTASONE    12 tablet    Take 80 mg daily 7/7-7/13, then 60 mg daily 7/14-7/20, then 40 mg daily 7/21-7/27, then stay on 20 mg daily.    Liver transplant rejection (H)       * predniSONE 5 MG tablet    DELTASONE    90 tablet    Take 1 tablet (5 mg) by mouth daily starting 7/27    Liver transplant rejection (H), Liver transplanted (H)       tacrolimus 0.5 MG capsule    GENERIC EQUIVALENT    360 capsule    Take 2 capsules (1 mg) by mouth every 12 hours    Liver replaced by transplant (H)       * Notice:  This list has 2 medication(s) that are the same as other medications prescribed for you. Read the directions carefully, and ask your doctor or other care provider to review them with you.

## 2018-03-23 NOTE — PROGRESS NOTES
GASTROENTEROLOGY OUTPATIENT FOLLOW-UP      Lesli Lorenzana is a 77 year old female with a history of PBC s/p liver transplant 2008.  She had some elevations in liver labs last year (biopsy negative for rejection or inflammation, did not appear to be PBC) which resolved with prednisone.  She also had ERCP at that time and a stone was removed.  Last visit, she was noted to have WALESKA and had a colonoscopy which only revealed sigmoid diverticulosis.  LFTs within normal range, tacro level 3/8 wnl (3-5, level 4.9).  Vaccines up to date  She is feeling very well, good energy, looking forward to warmer weather. No abdominal pain, appetite change (says she has been eating a bit more over the winter and gained 10# but is well otherwise), no rash/itching, no fevers, no shortness of breath.     Plan:  Continue tacro at usual dose  Bone mineral density ordered  Follow up in 1 year    Patient care plan discussed with Dr. Muhammad, GI staff physician.     YAMEL Simpson  Internal Medicine PGY-1  [phone]     The patient was seen and examined.  The above assessment and plan was developed jointly with the resident.      Tucker Muhammad MD      Professor of Medicine  [employer]      Executive Medical Director, Solid Organ Transplant Program  [facility]     -------------------------------------------------------------------------------------------------------------------   History is obtained from the patient and the medical record.          History of Present Illness:   Lesli Lorenzana is a 77 year old female with a history of PBC s/p liver transplant 2/3/2008.            Past Medical History:   Reviewed and edited as appropriate  Past Medical History:   Diagnosis Date     Anemia      Arthritis      CAD (coronary artery disease) 1/2009    stent     Cholelithiasis     gallbladder removed with liver in 2008     Hallux valgus      History of blood transfusion      HTN  (hypertension)      Hyperlipidemia LDL goal < 100      Liver transplant 2/2008    due to Primary biliary cirrhosis     Osteoarthritis of left hip      Osteopenia      Overweight(278.02)      Palpitations      Spinal stenosis             Past Surgical History:   Reviewed and edited as appropriate   Past Surgical History:   Procedure Laterality Date     ARTHROPLASTY HIP Left 4/11/2017    Procedure: ARTHROPLASTY HIP;  Surgeon: Zhen Armstrong MD;  Location: [facility] OR     BIOPSY      liver      C STOMACH SURGERY PROCEDURE UNLISTED       COLONOSCOPY WITH CO2 INSUFFLATION N/A 9/15/2017    Procedure: COLONOSCOPY WITH CO2 INSUFFLATION;  Colonoscopy, History of colonic polyps, Ref by Jb, BMI 25, [place] [phone];  Surgeon: Dolly Hearn MD;  Location[facility] OR     ENDOSCOPIC RETROGRADE CHOLANGIOPANCREATOGRAM N/A 7/17/2017    Procedure: COMBINED ENDOSCOPIC RETROGRADE CHOLANGIOPANCREATOGRAPHY, PLACE TUBE/STENT;  Endoscopic Retrograde Cholangiopancreatogram with bile duct spinchterotomy, ballon sweep of bile duct for stones, dilatation of bile duct;  Surgeon: Narendra Diana MD;  [facility] OR     ENT SURGERY       GYN SURGERY       SURGICAL HISTORY OF -       liver transplant 2008     SURGICAL HISTORY OF -       CAD with stent placement 2009     TRANSPLANT  02/03/2008    Liver transplant             Social History:   Reviewed and edited as appropriate  Social History     Social History     Marital status:      Spouse name: N/A     Number of children: 3     Years of education: N/A     Occupational History      Retired     Social History Main Topics     Smoking status: Former Smoker     Packs/day: 0.50     Years: 10.00     Types: Cigarettes     Quit date: 11/23/1975     Smokeless tobacco: Never Used     Alcohol use No      Comment: Glass of wine occassionally     Drug use: No      Comment: Never     Sexual activity: Not Currently     Other Topics Concern     Blood Transfusions Yes     2007     Exercise No      Social History Narrative    Lives alone with her dog. . Three grown children, very supportive and all live close by.  6 grandchildren. Feels safe in her environment.            Family History:   Reviewed and edited as appropriate  Family History   Problem Relation Age of Onset     CANCER Father      unknown     C.A.D. Father      Thyroid Disease Father      CANCER Mother      lung--smoker     Other Cancer Mother      lung     Thyroid Disease Mother      CEREBROVASCULAR DISEASE Maternal Grandmother      ,     Unknown/Adopted Paternal Grandfather            Allergies:   Reviewed and edited as appropriate     Allergies   Allergen Reactions     Amlodipine Besylate Swelling     Edema in legs     Amoxicillin Rash            Medications:     Current Outpatient Prescriptions:      isosorbide mononitrate (IMDUR) 60 MG 24 hr tablet, Take 1 tablet (60 mg) by mouth 2 times daily, Disp: 180 tablet, Rfl: 3     lisinopril (PRINIVIL/ZESTRIL) 2.5 MG tablet, Take 1 tablet (2.5 mg) by mouth daily, Disp: 90 tablet, Rfl: 3     metoprolol tartrate (LOPRESSOR) 50 MG tablet, Take 1 tablet (50 mg) by mouth 2 times daily, Disp: 180 tablet, Rfl: 3     tacrolimus (GENERIC EQUIVALENT) 0.5 MG capsule, Take 2 capsules (1 mg) by mouth every 12 hours, Disp: 360 capsule, Rfl: 3     ferrous gluconate (FERGON) 324 (38 FE) MG tablet, Take 1 tablet (324 mg) by mouth daily (with breakfast), Disp: 100 tablet, Rfl: 1     predniSONE (DELTASONE) 5 MG tablet, Take 1 tablet (5 mg) by mouth daily starting 7/27, Disp: 90 tablet, Rfl: 3     predniSONE (DELTASONE) 20 MG tablet, Take 80 mg daily 7/7-7/13, then 60 mg daily 7/14-7/20, then 40 mg daily 7/21-7/27, then stay on 20 mg daily., Disp: 12 tablet, Rfl: 0     calcium-vitamin D (CALTRATE) 600-400 MG-UNIT per tablet, Take 1 tablet by mouth daily, Disp: , Rfl:      hydrALAZINE (APRESOLINE) 25 MG tablet, Take 1 tablet (25 mg) by mouth 3 times daily , as needed if systolic blood pressure (top number) is  more than 180 mmHg., Disp: 90 tablet, Rfl: 3     nystatin-triamcinolone (MYCOLOG II) cream, Apply topically 4 times daily as needed Reported on 4/5/2017, Disp: , Rfl:      aspirin 81 MG tablet, Take 1 tablet by mouth daily., Disp: 90 tablet, Rfl: 3     MULTI-VITAMIN PO, Take 1 tablet by mouth daily , Disp: , Rfl:          Review of Systems:   A complete review of systems was performed and is negative except as noted in the HPI           Physical Exam:   BP (!) 209/82  Pulse 72  Temp 97.7  F (36.5  C) (Oral)  Ht 1.524 m (5')  Wt 64.6 kg (142 lb 6.4 oz)  SpO2 98%  BMI 27.81 kg/m2  Wt:   Wt Readings from Last 2 Encounters:   03/23/18 64.6 kg (142 lb 6.4 oz)   03/09/18 63.5 kg (140 lb 1.6 oz)      Constitutional: cooperative, pleasant, not dyspneic/diaphoretic, no acute distress  Eyes: Sclera anicteric/injected  Ears/nose/mouth/throat: Normal oropharynx without ulcers or exudate, mucus membranes moist, hearing intact  Neck: supple, thyroid normal size  CV: No edema  Respiratory: Unlabored breathing, CTAB, no fingernail clubbing  Abd: soft, nondistended, +bs, no hepatosplenomegaly, nontender, no peritoneal signs  Skin: warm, perfused, no jaundice. Small round discrete non tender non itching erythematous lesion posterior neck by hairline  Neuro: AAO x 3, No asterixis  Psych: Normal affect           Data:   Labs and imaging below were independently reviewed and interpreted    Last Basic Metabolic Panel:  Lab Results   Component Value Date     03/08/2018      Lab Results   Component Value Date    POTASSIUM 4.3 03/08/2018     Lab Results   Component Value Date    CHLORIDE 102 03/08/2018     Lab Results   Component Value Date    HAYES 9.2 03/08/2018     Lab Results   Component Value Date    CO2 27 03/08/2018     Lab Results   Component Value Date    BUN 33 03/08/2018     Lab Results   Component Value Date    CR 1.29 03/08/2018     Lab Results   Component Value Date    GLC 83 03/08/2018     Lab Results   Component  Value Date    WBC 4.3 03/08/2018     Lab Results   Component Value Date    RBC 4.27 03/08/2018     Lab Results   Component Value Date    HGB 12.6 03/08/2018     Lab Results   Component Value Date    HCT 37.7 03/08/2018     Lab Results   Component Value Date    MCV 88 03/08/2018     Lab Results   Component Value Date    MCH 29.5 03/08/2018     Lab Results   Component Value Date    MCHC 33.4 03/08/2018     Lab Results   Component Value Date    RDW 15.1 03/08/2018     Lab Results   Component Value Date     03/08/2018       Imaging  reviewed

## 2018-04-23 DIAGNOSIS — Z96.60 S/P JOINT REPLACEMENT: Primary | ICD-10-CM

## 2018-04-23 RX ORDER — CLINDAMYCIN HCL 300 MG
CAPSULE ORAL
Qty: 2 CAPSULE | Refills: 3 | Status: SHIPPED | OUTPATIENT
Start: 2018-04-23

## 2018-05-08 DIAGNOSIS — I25.10 CORONARY ARTERY DISEASE INVOLVING NATIVE HEART WITHOUT ANGINA PECTORIS, UNSPECIFIED VESSEL OR LESION TYPE: ICD-10-CM

## 2018-05-08 DIAGNOSIS — Z94.4 LIVER REPLACED BY TRANSPLANT (H): ICD-10-CM

## 2018-05-08 DIAGNOSIS — I10 ESSENTIAL HYPERTENSION WITH GOAL BLOOD PRESSURE LESS THAN 140/90: ICD-10-CM

## 2018-05-08 LAB
ALBUMIN SERPL-MCNC: 3.3 G/DL (ref 3.4–5)
ALP SERPL-CCNC: 115 U/L (ref 40–150)
ALT SERPL W P-5'-P-CCNC: 42 U/L (ref 0–50)
ANION GAP SERPL CALCULATED.3IONS-SCNC: 9 MMOL/L (ref 3–14)
AST SERPL W P-5'-P-CCNC: 48 U/L (ref 0–45)
BILIRUB DIRECT SERPL-MCNC: 0.1 MG/DL (ref 0–0.2)
BILIRUB SERPL-MCNC: 0.6 MG/DL (ref 0.2–1.3)
BUN SERPL-MCNC: 32 MG/DL (ref 7–30)
CALCIUM SERPL-MCNC: 8.8 MG/DL (ref 8.5–10.1)
CHLORIDE SERPL-SCNC: 101 MMOL/L (ref 94–109)
CHOLEST SERPL-MCNC: 172 MG/DL
CO2 SERPL-SCNC: 23 MMOL/L (ref 20–32)
CREAT SERPL-MCNC: 1.4 MG/DL (ref 0.52–1.04)
ERYTHROCYTE [DISTWIDTH] IN BLOOD BY AUTOMATED COUNT: 13.5 % (ref 10–15)
GFR SERPL CREATININE-BSD FRML MDRD: 37 ML/MIN/1.7M2
GLUCOSE SERPL-MCNC: 76 MG/DL (ref 70–99)
HCT VFR BLD AUTO: 34.6 % (ref 35–47)
HDLC SERPL-MCNC: 87 MG/DL
HGB BLD-MCNC: 12.1 G/DL (ref 11.7–15.7)
LDLC SERPL CALC-MCNC: 67 MG/DL
MCH RBC QN AUTO: 30.8 PG (ref 26.5–33)
MCHC RBC AUTO-ENTMCNC: 35 G/DL (ref 31.5–36.5)
MCV RBC AUTO: 88 FL (ref 78–100)
NONHDLC SERPL-MCNC: 85 MG/DL
PLATELET # BLD AUTO: 204 10E9/L (ref 150–450)
POTASSIUM SERPL-SCNC: 4.6 MMOL/L (ref 3.4–5.3)
PROT SERPL-MCNC: 7.8 G/DL (ref 6.8–8.8)
RBC # BLD AUTO: 3.93 10E12/L (ref 3.8–5.2)
SODIUM SERPL-SCNC: 133 MMOL/L (ref 133–144)
TACROLIMUS BLD-MCNC: 4.1 UG/L (ref 5–15)
TME LAST DOSE: ABNORMAL H
TRIGL SERPL-MCNC: 90 MG/DL
WBC # BLD AUTO: 3.8 10E9/L (ref 4–11)

## 2018-05-08 PROCEDURE — 80197 ASSAY OF TACROLIMUS: CPT | Performed by: INTERNAL MEDICINE

## 2018-05-08 PROCEDURE — 80048 BASIC METABOLIC PNL TOTAL CA: CPT | Performed by: INTERNAL MEDICINE

## 2018-05-08 PROCEDURE — 85027 COMPLETE CBC AUTOMATED: CPT | Performed by: INTERNAL MEDICINE

## 2018-05-08 PROCEDURE — 36415 COLL VENOUS BLD VENIPUNCTURE: CPT | Performed by: INTERNAL MEDICINE

## 2018-05-08 PROCEDURE — 80061 LIPID PANEL: CPT | Performed by: NURSE PRACTITIONER

## 2018-05-08 PROCEDURE — 80076 HEPATIC FUNCTION PANEL: CPT | Performed by: INTERNAL MEDICINE

## 2018-05-10 NOTE — PROGRESS NOTES
Lesli,    Your cholesterol levels have come down and are now at goal. No need to start a statin at this time. Follow-up as discussed.    Tonya Thapa NP

## 2018-05-18 ENCOUNTER — TELEPHONE (OUTPATIENT)
Dept: INTERNAL MEDICINE | Facility: CLINIC | Age: 78
End: 2018-05-18

## 2018-05-18 NOTE — TELEPHONE ENCOUNTER
I believe I saw this patient for a one-time visit in 7/2017 at a preop.  I will forward this message to the PCP to decide appropriate follow-up.    Thank you,  Lauren Whittington MD

## 2018-05-18 NOTE — TELEPHONE ENCOUNTER
Panel Management Review      Patient has the following on her problem list:     Hypertension   Last three blood pressure readings:  BP Readings from Last 3 Encounters:   03/23/18 (!) 209/82   03/09/18 187/89   09/15/17 116/59     Blood pressure: FAILED    HTN Guidelines:  Age 18-59 BP range:  Less than 140/90  Age 60-85 with Diabetes:  Less than 140/90  Age 60-85 without Diabetes:  less than 150/90      Composite cancer screening  Chart review shows that this patient is due/due soon for the following None  Summary:    Patient is due/failing the following:   Dexa scan, MAC and BP CHECK    Action needed:   Routed to provider for review.    Type of outreach:    None, routed to provider for review.    Questions for provider review:    Patient BP was elevated at last visit with Jb Whittington and Elier.  Per note by Elier on 3/9/2018 -Hypertension, goal < 130/80: Likely has an element of white coat, given marked hypertension in clinic and well controlled BP home. She has had her home blood pressure calibrated in the past and ambulatory blood pressure monitoring in 2014 which revealed average BP of 138/73. Continue current regimen with lisinopril 2.5 mg daily. Continue PRN lasix 20 for SBP >160 and PRN hydralazine for SBP >180. Follow-up: RTC in 1 year.  Should this patient follow up with Primary care provider for her BP?                                                                                                                                    Migdalia Christensen CMA       Chart routed to Provider .

## 2018-05-22 NOTE — TELEPHONE ENCOUNTER
Called patient, informed pt of providers note as written below, pt verbalized good understanding.      She has a home blood pressure cuff. She will make blood pressure diary. Then after 5-6 reading with pulse in about 2 weeks she will send a ScoreFeeder message to us. We will then make a follow up plan based on that data.     FLOYD Marie RN

## 2018-05-22 NOTE — TELEPHONE ENCOUNTER
Have her start checking home BPs and call with 5-6 results over the next 2 weeks.    Dee Mercado

## 2018-06-05 ENCOUNTER — MYC MEDICAL ADVICE (OUTPATIENT)
Dept: FAMILY MEDICINE | Facility: CLINIC | Age: 78
End: 2018-06-05

## 2018-06-29 DIAGNOSIS — T86.41 LIVER TRANSPLANT REJECTION (H): Primary | ICD-10-CM

## 2018-07-05 ENCOUNTER — TELEPHONE (OUTPATIENT)
Dept: FAMILY MEDICINE | Facility: CLINIC | Age: 78
End: 2018-07-05

## 2018-07-05 DIAGNOSIS — H90.3 SNHL (SENSORY-NEURAL HEARING LOSS), ASYMMETRICAL: Primary | ICD-10-CM

## 2018-07-05 NOTE — TELEPHONE ENCOUNTER
----- Message from Lorie Vanegas sent at 7/5/2018 12:36 PM CDT -----  Regarding: Audiology order needed  Dear Dr. Awan,     To be in compliance with some payers, we must have a physician's order to perform audiology services. Your patient, Lesli, has an appointment to be seen by one of our audiologists. Please complete an audiology referral.    We thank you for your cooperation. If you have any questions, please feel free to contact me.    Lesli Vanegas, CCC-A  Licensed Audiologist #[id number] [phone]

## 2018-07-10 DIAGNOSIS — Z94.4 LIVER REPLACED BY TRANSPLANT (H): Primary | ICD-10-CM

## 2018-07-18 ENCOUNTER — OFFICE VISIT (OUTPATIENT)
Dept: AUDIOLOGY | Facility: CLINIC | Age: 78
End: 2018-07-18
Payer: COMMERCIAL

## 2018-07-18 DIAGNOSIS — H90.3 SENSORINEURAL HEARING LOSS, BILATERAL: Primary | ICD-10-CM

## 2018-07-18 PROCEDURE — 92567 TYMPANOMETRY: CPT | Performed by: AUDIOLOGIST

## 2018-07-18 PROCEDURE — 99207 ZZC NO CHARGE LOS: CPT | Performed by: AUDIOLOGIST

## 2018-07-18 PROCEDURE — 92557 COMPREHENSIVE HEARING TEST: CPT | Performed by: AUDIOLOGIST

## 2018-07-18 NOTE — MR AVS SNAPSHOT
After Visit Summary   7/18/2018    Lesli Lorenzana    MRN: [id number]           Patient Information     Date Of Birth          1940        Visit Information        Provider Department      7/18/2018 11:00 AM Akiko Grullon AuD [facility]        Today's Diagnoses     Sensorineural hearing loss, bilateral    -  1       Follow-ups after your visit        Your next 10 appointments already scheduled     Jul 30, 2018 10:00 AM CDT   Hearing Aid Consult with Lorie Azul   [facility]    [address] [place] [address]   [phone]            Aug 07, 2018  8:00 AM CDT   LAB with AN LAB   [facility] ([facility])    [address] [facility] [address] [phone]           Please do not eat 10-12 hours before your appointment if you are coming in fasting for labs on lipids, cholesterol, or glucose (sugar). This does not apply to pregnant women. Water, hot tea and black coffee (with nothing added) are okay. Do not drink other fluids, diet soda or chew gum.            Mar 22, 2019 10:00 AM CDT   (Arrive by 9:45 AM)   Return Liver Transplant with Tucker Muhammad MD   [facility] ([facility])    [address] [place] [address]  [address]  [facility] [address] [phone]              Who to contact     If you have questions or need follow up information about today's clinic visit or your schedule please contact [facility] directly at [phone].  Normal or non-critical lab and imaging results will be communicated to you by MyChart, letter or phone within 4 business days after the clinic has received the results. If you do not hear from us within 7 days, please contact the clinic through MyChart or phone. If you have a critical or abnormal lab result, we will notify you by phone as soon as possible.  Submit refill requests through GLIIFhart or  call your pharmacy and they will forward the refill request to us. Please allow 3 business days for your refill to be completed.          Additional Information About Your Visit        ViewRayhart Information     [email or web address] gives you secure access to your electronic health record. If you see a primary care provider, you can also send messages to your care team and make appointments. If you have questions, please call your primary care clinic.  If you do not have a primary care provider, please call [phone] and they will assist you.        Care EveryWhere ID     This is your Care EveryWhere ID. This could be used by other organizations to access your [facility] medical records  [id number]         Blood Pressure from Last 3 Encounters:   03/23/18 (!) 209/82   03/09/18 187/89   09/15/17 116/59    Weight from Last 3 Encounters:   03/23/18 142 lb 6.4 oz (64.6 kg)   03/09/18 140 lb 1.6 oz (63.5 kg)   07/28/17 128 lb (58.1 kg)              We Performed the Following     AUDIOGRAM/TYMPANOGRAM - INTERFACE     COMPREHENSIVE HEARING TEST     TYMPANOMETRY        Primary Care Provider Office Phone # Fax #    Dee Awan [phone] [address] [id number]        Equal Access to Services     THEA [place] : Hadii aad ku hadasho Soomaali, waaxda luqadaha, qaybta kaalmada adeegyada, waxay abdoulin haysivan sonali arita laflaco . So [facility] [phone].    ATENCIÓN: Si habla español, tiene a messina disposición servicios gratuitos de asistencia lingüística. Llame al [phone].    We comply with applicable federal civil rights laws and [place] laws. We do not discriminate on the basis of race, color, national origin, age, disability, sex, sexual orientation, or gender identity.            Thank you!     Thank you for choosing [facility]  for your care. Our goal is always to provide you with excellent care. Hearing back from our patients is one way we can continue to improve our services.  Please take a few minutes to complete the written survey that you may receive in the mail after your visit with us. Thank you!             Your Updated Medication List - Protect others around you: Learn how to safely use, store and throw away your medicines at www.disposemymeds.org.          This list is accurate as of 7/18/18 12:25 PM.  Always use your most recent med list.                   Brand Name Dispense Instructions for use Diagnosis    aspirin 81 MG tablet     90 tablet    Take 1 tablet by mouth daily.    CAD (coronary artery disease)       calcium-vitamin D 600-400 MG-UNIT per tablet    CALTRATE     Take 1 tablet by mouth daily        clindamycin 300 MG capsule    CLEOCIN    2 capsule    Take 2 capsules (600 mg) 1 hour before dental work.    S/P joint replacement       ferrous gluconate 324 (38 Fe) MG tablet    FERGON    100 tablet    Take 1 tablet (324 mg) by mouth daily (with breakfast)    Iron deficiency anemia, unspecified iron deficiency anemia type       hydrALAZINE 25 MG tablet    APRESOLINE    90 tablet    Take 1 tablet (25 mg) by mouth 3 times daily , as needed if systolic blood pressure (top number) is more than 180 mmHg.    Essential hypertension with goal blood pressure less than 140/90       isosorbide mononitrate 60 MG 24 hr tablet    IMDUR    180 tablet    Take 1 tablet (60 mg) by mouth 2 times daily    Coronary artery disease due to lipid rich plaque, Secondary hypertension with goal blood pressure less than 140/90       lisinopril 2.5 MG tablet    PRINIVIL/Zestril    90 tablet    Take 1 tablet (2.5 mg) by mouth daily    Coronary artery disease involving native heart without angina pectoris, unspecified vessel or lesion type       metoprolol tartrate 50 MG tablet    LOPRESSOR    180 tablet    Take 1 tablet (50 mg) by mouth 2 times daily    Essential hypertension with goal blood pressure less than 140/90, Coronary artery disease involving native heart with angina pectoris, unspecified  vessel or lesion type (H)       MULTI-VITAMIN PO      Take 1 tablet by mouth daily        nystatin-triamcinolone cream    MYCOLOG II     Apply topically 4 times daily as needed Reported on 4/5/2017        * predniSONE 20 MG tablet    DELTASONE    12 tablet    Take 80 mg daily 7/7-7/13, then 60 mg daily 7/14-7/20, then 40 mg daily 7/21-7/27, then stay on 20 mg daily.    Liver transplant rejection (H)       * predniSONE 5 MG tablet    DELTASONE    90 tablet    Take 1 tablet (5 mg) by mouth daily starting 7/27    Liver transplant rejection (H), Liver transplanted (H)       tacrolimus 0.5 MG capsule    GENERIC EQUIVALENT    360 capsule    Take 2 capsules (1 mg) by mouth every 12 hours    Liver replaced by transplant (H)       * Notice:  This list has 2 medication(s) that are the same as other medications prescribed for you. Read the directions carefully, and ask your doctor or other care provider to review them with you.

## 2018-07-18 NOTE — PROGRESS NOTES
AUDIOLOGY REPORT    SUBJECTIVE:  Lesli Lorenzana, a 77 year old female, was seen in the Audiology Clinic at [facility] today for audiologic evaluation, referred by Dee Awan MD. The patient has been seen previously in the [facility] on 7/17/2014 for assessment and results indicated mild-moderately severe sensorineural hearing loss, worse in the left ear. Patient was cleared for hearing aids at that time by Dr. Jimmy Haq, but never pursued amplification. The patient reports a suspected decline in hearing, especially in her left ear. She also reports intermittent bilateral tinnitus and occasional plugged feeling in the left ear. The patient denies  bilateral otalgia, bilateral drainage, bilateral aural fullness, dizziness/vertigo, family history of hearing loss, history of noise exposure and history of ear surgery or ototoxic medication use.  The patient notes difficulty with communication in a variety of listening situations.     OBJECTIVE:    Otoscopic exam indicates ears are clear of cerumen bilaterally     Pure Tone Thresholds assessed using conventional audiometry with good  reliability from 250-8000 Hz bilaterally using circumaural headphones     RIGHT:  mild and moderate-severe sensorineural hearing loss    LEFT:    mild and moderate-severe sensorineural hearing loss    Tympanogram:    RIGHT: restricted eardrum mobility     LEFT:   normal eardrum mobility  NOTE: today's tympanograms are comparable to those obtained in 2014.    Speech Reception Threshold:    RIGHT: 40 dB HL    LEFT:   45 dB HL  These results are in agreement with patient's pure tone average.    Word Recognition Score:     RIGHT: 96% at 80 dB HL using NU-6 recorded word list.    LEFT:   88% at 85 dB HL using NU-6 recorded word list.      ASSESSMENT:   Bilateral sensorineural hearing loss     Compared to patient's previous audiogram dated 7/17/2014, hearing has declined by 10 dB HL in the right ear at 2-3 kHz and 6-8  kHz, as well as by 10-20 dB HL in the left ear at 3-6 kHz. Today s results were discussed with the patient in detail.     PLAN:  Patient was counseled regarding hearing loss and impact on communication. Patient is a good candidate for amplification at this time. Handout on good communication strategies, and hearing aid use was given to patient. It is recommended that the patient follow up with ENT regarding change in hearing. Patient can then schedule appointment for hearing aid consultation. Please call this clinic with questions regarding these results or recommendations.        Lesli Graham, CCC-A  Licensed Audiologist #[id number]  7/18/2018

## 2018-07-31 DIAGNOSIS — Z94.4 LIVER TRANSPLANTED (H): ICD-10-CM

## 2018-07-31 DIAGNOSIS — T86.41 LIVER TRANSPLANT REJECTION (H): ICD-10-CM

## 2018-07-31 RX ORDER — PREDNISONE 5 MG/1
5 TABLET ORAL DAILY
Qty: 90 TABLET | Refills: 3 | Status: SHIPPED | OUTPATIENT
Start: 2018-07-31 | End: 2019-08-06

## 2018-08-07 ENCOUNTER — OFFICE VISIT (OUTPATIENT)
Dept: AUDIOLOGY | Facility: CLINIC | Age: 78
End: 2018-08-07
Payer: COMMERCIAL

## 2018-08-07 DIAGNOSIS — Z94.4 LIVER REPLACED BY TRANSPLANT (H): ICD-10-CM

## 2018-08-07 DIAGNOSIS — H90.3 SENSORINEURAL HEARING LOSS, BILATERAL: Primary | ICD-10-CM

## 2018-08-07 LAB
ALBUMIN SERPL-MCNC: 3.4 G/DL (ref 3.4–5)
ALBUMIN UR-MCNC: ABNORMAL MG/DL
ALP SERPL-CCNC: 147 U/L (ref 40–150)
ALT SERPL W P-5'-P-CCNC: 56 U/L (ref 0–50)
ANION GAP SERPL CALCULATED.3IONS-SCNC: 9 MMOL/L (ref 3–14)
APPEARANCE UR: CLEAR
AST SERPL W P-5'-P-CCNC: 51 U/L (ref 0–45)
BILIRUB DIRECT SERPL-MCNC: 0.1 MG/DL (ref 0–0.2)
BILIRUB SERPL-MCNC: 0.5 MG/DL (ref 0.2–1.3)
BILIRUB UR QL STRIP: NEGATIVE
BUN SERPL-MCNC: 25 MG/DL (ref 7–30)
CALCIUM SERPL-MCNC: 9 MG/DL (ref 8.5–10.1)
CHLORIDE SERPL-SCNC: 98 MMOL/L (ref 94–109)
CHOLEST SERPL-MCNC: 176 MG/DL
CO2 SERPL-SCNC: 24 MMOL/L (ref 20–32)
COLOR UR AUTO: YELLOW
CREAT SERPL-MCNC: 1.39 MG/DL (ref 0.52–1.04)
CREAT UR-MCNC: 75 MG/DL
ERYTHROCYTE [DISTWIDTH] IN BLOOD BY AUTOMATED COUNT: 13 % (ref 10–15)
GFR SERPL CREATININE-BSD FRML MDRD: 37 ML/MIN/1.7M2
GLUCOSE SERPL-MCNC: 76 MG/DL (ref 70–99)
GLUCOSE UR STRIP-MCNC: NEGATIVE MG/DL
HCT VFR BLD AUTO: 34.9 % (ref 35–47)
HDLC SERPL-MCNC: 81 MG/DL
HGB BLD-MCNC: 12.2 G/DL (ref 11.7–15.7)
HGB UR QL STRIP: NEGATIVE
KETONES UR STRIP-MCNC: NEGATIVE MG/DL
LDLC SERPL CALC-MCNC: 72 MG/DL
LEUKOCYTE ESTERASE UR QL STRIP: ABNORMAL
MCH RBC QN AUTO: 31.1 PG (ref 26.5–33)
MCHC RBC AUTO-ENTMCNC: 35 G/DL (ref 31.5–36.5)
MCV RBC AUTO: 89 FL (ref 78–100)
NITRATE UR QL: NEGATIVE
NON-SQ EPI CELLS #/AREA URNS LPF: ABNORMAL /LPF
NONHDLC SERPL-MCNC: 95 MG/DL
PH UR STRIP: 6.5 PH (ref 5–7)
PLATELET # BLD AUTO: 208 10E9/L (ref 150–450)
POTASSIUM SERPL-SCNC: 4.9 MMOL/L (ref 3.4–5.3)
PROT SERPL-MCNC: 7.8 G/DL (ref 6.8–8.8)
PROT UR-MCNC: 0.21 G/L
PROT/CREAT 24H UR: 0.28 G/G CR (ref 0–0.2)
RBC # BLD AUTO: 3.92 10E12/L (ref 3.8–5.2)
RBC #/AREA URNS AUTO: ABNORMAL /HPF
SODIUM SERPL-SCNC: 131 MMOL/L (ref 133–144)
SOURCE: ABNORMAL
SP GR UR STRIP: <=1.005 (ref 1–1.03)
TRIGL SERPL-MCNC: 115 MG/DL
UROBILINOGEN UR STRIP-ACNC: 0.2 EU/DL (ref 0.2–1)
WBC # BLD AUTO: 4.2 10E9/L (ref 4–11)
WBC #/AREA URNS AUTO: ABNORMAL /HPF

## 2018-08-07 PROCEDURE — V5275 EAR IMPRESSION: HCPCS | Mod: LT | Performed by: AUDIOLOGIST

## 2018-08-07 PROCEDURE — 81001 URINALYSIS AUTO W/SCOPE: CPT | Performed by: INTERNAL MEDICINE

## 2018-08-07 PROCEDURE — 80076 HEPATIC FUNCTION PANEL: CPT | Performed by: INTERNAL MEDICINE

## 2018-08-07 PROCEDURE — 36415 COLL VENOUS BLD VENIPUNCTURE: CPT | Performed by: INTERNAL MEDICINE

## 2018-08-07 PROCEDURE — 99207 ZZC NO CHARGE LOS: CPT | Performed by: AUDIOLOGIST

## 2018-08-07 PROCEDURE — 80048 BASIC METABOLIC PNL TOTAL CA: CPT | Performed by: INTERNAL MEDICINE

## 2018-08-07 PROCEDURE — 85027 COMPLETE CBC AUTOMATED: CPT | Performed by: INTERNAL MEDICINE

## 2018-08-07 PROCEDURE — 80061 LIPID PANEL: CPT | Performed by: INTERNAL MEDICINE

## 2018-08-07 PROCEDURE — 80197 ASSAY OF TACROLIMUS: CPT | Performed by: INTERNAL MEDICINE

## 2018-08-07 PROCEDURE — 84156 ASSAY OF PROTEIN URINE: CPT | Performed by: INTERNAL MEDICINE

## 2018-08-07 PROCEDURE — 92590 HC HEARING AID EXAM MONAURAL: CPT | Performed by: AUDIOLOGIST

## 2018-08-07 NOTE — MR AVS SNAPSHOT
After Visit Summary   8/7/2018    Lesli Lorenzana    MRN: [id number]           Patient Information     Date Of Birth          1940        Visit Information        Provider Department      8/7/2018 1:30 PM Dieter Cole AuD [facility]        Today's Diagnoses     Sensorineural hearing loss, bilateral    -  1       Follow-ups after your visit        Your next 10 appointments already scheduled     Aug 27, 2018 11:00 AM CDT   Hearing Aid Fitting with Lorie Azul   [facility] ([facility])    [address] [place] [id number] [phone]            Mar 22, 2019 10:00 AM CDT   (Arrive by 9:45 AM)   Return Liver Transplant with Tucker Muhammad MD   [facility]atology ([facility])    [address] [place] [address] [phone]              Who to contact     If you have questions or need follow up information about today's clinic visit or your schedule please contact [facility] directly at [phone].  Normal or non-critical lab and imaging results will be communicated to you by MyChart, letter or phone within 4 business days after the clinic has received the results. If you do not hear from us within 7 days, please contact the clinic through Shopeandohart or phone. If you have a critical or abnormal lab result, we will notify you by phone as soon as possible.  Submit refill requests through RapidMiner or call your pharmacy and they will forward the refill request to us. Please allow 3 business days for your refill to be completed.          Additional Information About Your Visit        MyChart Information     R[employer] gives you secure access to your electronic health record. If you see a primary care provider, you can also send messages to your care team and make appointments. If you have questions, please call your primary care clinic.  If you do not have a primary  care provider, please call [phone] and they will assist you.        Care EveryWhere ID     This is your Care EveryWhere ID. This could be used by other organizations to access your [facility] medical records  [id number]         Blood Pressure from Last 3 Encounters:   03/23/18 (!) 209/82   03/09/18 187/89   09/15/17 116/59    Weight from Last 3 Encounters:   03/23/18 142 lb 6.4 oz (64.6 kg)   03/09/18 140 lb 1.6 oz (63.5 kg)   07/28/17 128 lb (58.1 kg)              We Performed the Following     EAR IMPRESSION, EACH     HEARING AID EXAM MONAURAL        Primary Care Provider Office Phone # Fax #    Dee Awan [phone] [address] [facility] [address]        Equal Access to Services     [facility]: Hadii aad ku hadasho Soomaali, waaxda luqadaha, qaybta kaalmada adeegyada, waxay abdoulin hayaan sonali aguiar . So [facility] [phone].    ATENCIÓN: Si habla español, tiene a messina disposición servicios gratuitos de asistencia lingüística. Llame al [phone].    We comply with applicable federal civil rights laws and [place] laws. We do not discriminate on the basis of race, color, national origin, age, disability, sex, sexual orientation, or gender identity.            Thank you!     Thank you for choosing [facility]  for your care. Our goal is always to provide you with excellent care. Hearing back from our patients is one way we can continue to improve our services. Please take a few minutes to complete the written survey that you may receive in the mail after your visit with us. Thank you!             Your Updated Medication List - Protect others around you: Learn how to safely use, store and throw away your medicines at www.disposemymeds.org.          This list is accurate as of 8/7/18  2:31 PM.  Always use your most recent med list.                   Brand Name Dispense Instructions for use Diagnosis    aspirin 81 MG tablet     90 tablet    Take 1 tablet  by mouth daily.    CAD (coronary artery disease)       calcium-vitamin D 600-400 MG-UNIT per tablet    CALTRATE     Take 1 tablet by mouth daily        clindamycin 300 MG capsule    CLEOCIN    2 capsule    Take 2 capsules (600 mg) 1 hour before dental work.    S/P joint replacement       ferrous gluconate 324 (38 Fe) MG tablet    FERGON    100 tablet    Take 1 tablet (324 mg) by mouth daily (with breakfast)    Iron deficiency anemia, unspecified iron deficiency anemia type       hydrALAZINE 25 MG tablet    APRESOLINE    90 tablet    Take 1 tablet (25 mg) by mouth 3 times daily , as needed if systolic blood pressure (top number) is more than 180 mmHg.    Essential hypertension with goal blood pressure less than 140/90       isosorbide mononitrate 60 MG 24 hr tablet    IMDUR    180 tablet    Take 1 tablet (60 mg) by mouth 2 times daily    Coronary artery disease due to lipid rich plaque, Secondary hypertension with goal blood pressure less than 140/90       lisinopril 2.5 MG tablet    PRINIVIL/Zestril    90 tablet    Take 1 tablet (2.5 mg) by mouth daily    Coronary artery disease involving native heart without angina pectoris, unspecified vessel or lesion type       metoprolol tartrate 50 MG tablet    LOPRESSOR    180 tablet    Take 1 tablet (50 mg) by mouth 2 times daily    Essential hypertension with goal blood pressure less than 140/90, Coronary artery disease involving native heart with angina pectoris, unspecified vessel or lesion type (H)       MULTI-VITAMIN PO      Take 1 tablet by mouth daily        nystatin-triamcinolone cream    MYCOLOG II     Apply topically 4 times daily as needed Reported on 4/5/2017        * predniSONE 20 MG tablet    DELTASONE    12 tablet    Take 80 mg daily 7/7-7/13, then 60 mg daily 7/14-7/20, then 40 mg daily 7/21-7/27, then stay on 20 mg daily.    Liver transplant rejection (H)       * predniSONE 5 MG tablet    DELTASONE    90 tablet    Take 1 tablet (5 mg) by mouth daily    Liver  transplant rejection (H), Liver transplanted (H)       tacrolimus 0.5 MG capsule    GENERIC EQUIVALENT    360 capsule    Take 2 capsules (1 mg) by mouth every 12 hours    Liver replaced by transplant (H)       * Notice:  This list has 2 medication(s) that are the same as other medications prescribed for you. Read the directions carefully, and ask your doctor or other care provider to review them with you.

## 2018-08-07 NOTE — PROGRESS NOTES
AUDIOLOGY REPORT    SUBJECTIVE: Lesli Lorenzana is a 78 year old female was seen in the Audiology Clinic at  [facility] on 8/07/18 to discuss concerns with hearing and functional communication difficulties. Lesli has been seen previously on 7/18/2018, and results revealed an asymmetric sensorineural hearing loss.  The patient was medically evaluated and determined to be cleared for binaural hearing aids by Dr. Haq. Lesli notes difficulty with communication in a variety of listening situations. Patient's hearing has remained similarly asymmetric since 2012.    OBJECTIVE:  Patient is a hearing aid candidate. Patient would like to move forward with a hearing aid evaluation today. Therefore, the patient was presented with different options for amplification to help aid in communication. Discussed styles, levels of technology and monaural vs. binaural fitting.     The hearing aid(s) mutually chosen were:  Left: Phonak Audeo B50-R  COLOR: Champagne    BATTERY SIZE: N/A  EARMOLD/TIPS: micromold with canal lock  CANAL/ LENGTH: 1 xS     Otoscopy revealed ears are clear of cerumen bilaterally. A left earmold was taken without incident.    ASSESSMENT:   Asymmetric sensorineural hearing loss      Reviewed purchase information and warranty information with patient. The 45 day trial period was explained to patient. The patient was given a copy of the [facility] consumer brochure on purchasing hearing instruments. Patient risk factors have been provided to the patient in writing prior to the sale of the hearing aid per FDA regulation. The risk factors are also available in the User Instructional Booklet to be presented on the day of the hearing aid fitting. Hearing aid(s) ordered. Hearing aid evaluation completed. Patient was advised to check with their insurance to ascertain of they are eligible for any hearing aid benefits.     PLAN: Lesli is scheduled to return in 2-3  weeks for a hearing aid fitting and programming. Purchase agreement will be completed on that date. Please contact this clinic with any questions or concerns.      Dieter Ambriz CCC-A  Licensed Audiologist #[id number]  8/7/2018

## 2018-08-08 DIAGNOSIS — Z79.52 LONG TERM CURRENT USE OF SYSTEMIC STEROIDS: ICD-10-CM

## 2018-08-08 DIAGNOSIS — Z94.4 LIVER REPLACED BY TRANSPLANT (H): Primary | ICD-10-CM

## 2018-08-08 LAB
TACROLIMUS BLD-MCNC: 5.4 UG/L (ref 5–15)
TME LAST DOSE: 2000 H

## 2018-08-09 ENCOUNTER — TELEPHONE (OUTPATIENT)
Dept: ORTHOPEDICS | Facility: CLINIC | Age: 78
End: 2018-08-09

## 2018-08-09 NOTE — TELEPHONE ENCOUNTER
[facility] Call Center    Phone Message    May a detailed message be left on voicemail: yes    Reason for Call: Other: Pt requesting call back to verify if there was metal used to do her hip replacement last year, pt is trying to get a dexa scan scheduled and needs to know     Action Taken: Message routed to:  [facility] ortho clinic

## 2018-08-10 ENCOUNTER — TELEPHONE (OUTPATIENT)
Dept: FAMILY MEDICINE | Facility: CLINIC | Age: 78
End: 2018-08-10

## 2018-08-10 NOTE — TELEPHONE ENCOUNTER
Patient calling. She wants to schedule a Dexa Scan, but had a hip replacement with implant. Does she need a different exam? Please call to advise.

## 2018-08-13 ENCOUNTER — RADIANT APPOINTMENT (OUTPATIENT)
Dept: BONE DENSITY | Facility: CLINIC | Age: 78
End: 2018-08-13
Attending: INTERNAL MEDICINE
Payer: COMMERCIAL

## 2018-08-13 DIAGNOSIS — Z94.4 LIVER REPLACED BY TRANSPLANT (H): ICD-10-CM

## 2018-08-13 DIAGNOSIS — Z78.0 ASYMPTOMATIC POSTMENOPAUSAL STATUS: ICD-10-CM

## 2018-08-13 DIAGNOSIS — Z79.52 LONG TERM CURRENT USE OF SYSTEMIC STEROIDS: ICD-10-CM

## 2018-08-13 PROCEDURE — 77081 DXA BONE DENSITY APPENDICULR: CPT | Performed by: INTERNAL MEDICINE

## 2018-08-13 PROCEDURE — 77080 DXA BONE DENSITY AXIAL: CPT | Mod: 59 | Performed by: INTERNAL MEDICINE

## 2018-08-14 PROBLEM — Z98.890 STATUS POST HIP SURGERY: Status: RESOLVED | Noted: 2017-04-11 | Resolved: 2018-08-14

## 2018-08-14 PROBLEM — Z96.649 S/P TOTAL HIP ARTHROPLASTY: Status: RESOLVED | Noted: 2017-04-17 | Resolved: 2018-08-14

## 2018-08-14 PROBLEM — Z47.1 AFTERCARE FOLLOWING JOINT REPLACEMENT: Status: RESOLVED | Noted: 2017-04-20 | Resolved: 2018-08-14

## 2018-08-24 ENCOUNTER — RADIANT APPOINTMENT (OUTPATIENT)
Dept: MAMMOGRAPHY | Facility: CLINIC | Age: 78
End: 2018-08-24
Payer: COMMERCIAL

## 2018-08-24 DIAGNOSIS — Z12.31 VISIT FOR SCREENING MAMMOGRAM: ICD-10-CM

## 2018-08-24 PROCEDURE — 77067 SCR MAMMO BI INCL CAD: CPT | Mod: TC

## 2018-08-27 ENCOUNTER — OFFICE VISIT (OUTPATIENT)
Dept: AUDIOLOGY | Facility: CLINIC | Age: 78
End: 2018-08-27
Payer: COMMERCIAL

## 2018-08-27 DIAGNOSIS — H90.3 SENSORINEURAL HEARING LOSS, BILATERAL: Primary | ICD-10-CM

## 2018-08-27 DIAGNOSIS — Z94.4 LIVER REPLACED BY TRANSPLANT (H): ICD-10-CM

## 2018-08-27 PROCEDURE — 92592 HC HEARING AID CHECK, MONAURAL: CPT | Performed by: AUDIOLOGIST

## 2018-08-27 PROCEDURE — V5257 HEARING AID, DIGIT, MON, BTE: HCPCS | Mod: LT | Performed by: AUDIOLOGIST

## 2018-08-27 PROCEDURE — V5267 HEARING AID SUP/ACCESS/DEV: HCPCS | Performed by: AUDIOLOGIST

## 2018-08-27 PROCEDURE — V5241 DISPENSING FEE, MONAURAL: HCPCS | Performed by: AUDIOLOGIST

## 2018-08-27 PROCEDURE — 99207 ZZC NO CHARGE LOS: CPT | Performed by: AUDIOLOGIST

## 2018-08-27 PROCEDURE — V5011 HEARING AID FITTING/CHECKING: HCPCS | Performed by: AUDIOLOGIST

## 2018-08-27 PROCEDURE — V5264 EAR MOLD/INSERT: HCPCS | Mod: LT | Performed by: AUDIOLOGIST

## 2018-08-27 PROCEDURE — V5020 CONFORMITY EVALUATION: HCPCS | Performed by: AUDIOLOGIST

## 2018-08-27 RX ORDER — TACROLIMUS 0.5 MG/1
1 CAPSULE ORAL EVERY 12 HOURS
Qty: 360 CAPSULE | Refills: 3 | Status: SHIPPED | OUTPATIENT
Start: 2018-08-27 | End: 2019-08-06

## 2018-08-27 NOTE — MR AVS SNAPSHOT
After Visit Summary   8/27/2018    Lesli Lorenzana    MRN: [id number]           Patient Information     Date Of Birth          1940        Visit Information        Provider Department      8/27/2018 11:00 AM Dieter Cole AuD [facility]        Today's Diagnoses     Sensorineural hearing loss, bilateral    -  1       Follow-ups after your visit        Your next 10 appointments already scheduled     Sep 10, 2018 10:00 AM CDT   Return Visit with Lorie Azul   [facility] ([facility])    [address] [place] [address]   [phone]            Nov 06, 2018  8:00 AM CST   LAB with AN LAB   [facility]    [address] [facility] [address] [phone]           Please do not eat 10-12 hours before your appointment if you are coming in fasting for labs on lipids, cholesterol, or glucose (sugar). This does not apply to pregnant women. Water, hot tea and black coffee (with nothing added) are okay. Do not drink other fluids, diet soda or chew gum.            Mar 22, 2019 10:00 AM CDT   (Arrive by 9:45 AM)   Return Liver Transplant with Tucker Muhammad MD   [facility] ([facility])    [address]  [address]  [facility] [address] [phone]              Who to contact     If you have questions or need follow up information about today's clinic visit or your schedule please contact [facility] directly at [phone].  Normal or non-critical lab and imaging results will be communicated to you by MyChart, letter or phone within 4 business days after the clinic has received the results. If you do not hear from us within 7 days, please contact the clinic through MyChart or phone. If you have a critical or abnormal lab result, we will notify you by phone as soon as possible.  Submit refill requests through [employer] or call  your pharmacy and they will forward the refill request to us. Please allow 3 business days for your refill to be completed.          Additional Information About Your Visit        [employer] Information     [facility] gives you secure access to your electronic health record. If you see a primary care provider, you can also send messages to your care team and make appointments. If you have questions, please call your primary care clinic.  If you do not have a primary care provider, please call [phone] and they will assist you.        Care EveryWhere ID     This is your Care EveryWhere ID. This could be used by other organizations to access your [facility] medical records  [id number]         Blood Pressure from Last 3 Encounters:   03/23/18 (!) 209/82   03/09/18 187/89   09/15/17 116/59    Weight from Last 3 Encounters:   03/23/18 142 lb 6.4 oz (64.6 kg)   03/09/18 140 lb 1.6 oz (63.5 kg)   07/28/17 128 lb (58.1 kg)              We Performed the Following     DISPENSING FEE, MONAURAL HEARING AID     EAR MOLD/INSERT, NONDISPOSABLE, ANY TYPE     HEARING AID BTE DIGITAL, MONAURAL     HEARING AID CHECK, MONAURAL     HEARING AID CONFORMITY EVALUATION     HEARING AID FIT/ORIENTATION/CHECK     HEARING AID SUPPLY          Where to get your medicines      These medications were sent to [facility] [address], [address]  [id number] [place], [place] [id number]     Phone:  [phone]     tacrolimus 0.5 MG capsule          Primary Care Provider Office Phone # Fax #    Dee Awan [phone] [address] [place], [place] [address]        Equal Access to Services     THEA WASHBURN : Hadii kasey fairchild hadashhemal Sodanitaali, waaxda luqadaha, qaybta kaalmada aldo avitia. So [facility] [phone].    ATENCIÓN: Si habla español, tiene a messina disposición servicios gratuitos de asistencia lingüística. Llame al [phone].    We comply with  applicable federal civil rights laws and [place] laws. We do not discriminate on the basis of race, color, national origin, age, disability, sex, sexual orientation, or gender identity.            Thank you!     Thank you for choosing [facility]  for your care. Our goal is always to provide you with excellent care. Hearing back from our patients is one way we can continue to improve our services. Please take a few minutes to complete the written survey that you may receive in the mail after your visit with us. Thank you!             Your Updated Medication List - Protect others around you: Learn how to safely use, store and throw away your medicines at www.disposemymeds.org.          This list is accurate as of 8/27/18 11:56 AM.  Always use your most recent med list.                   Brand Name Dispense Instructions for use Diagnosis    aspirin 81 MG tablet     90 tablet    Take 1 tablet by mouth daily.    CAD (coronary artery disease)       calcium carbonate 600 mg-vitamin D 400 units 600-400 MG-UNIT per tablet    CALTRATE     Take 1 tablet by mouth daily        clindamycin 300 MG capsule    CLEOCIN    2 capsule    Take 2 capsules (600 mg) 1 hour before dental work.    S/P joint replacement       ferrous gluconate 324 (38 Fe) MG tablet    FERGON    100 tablet    Take 1 tablet (324 mg) by mouth daily (with breakfast)    Iron deficiency anemia, unspecified iron deficiency anemia type       hydrALAZINE 25 MG tablet    APRESOLINE    90 tablet    Take 1 tablet (25 mg) by mouth 3 times daily , as needed if systolic blood pressure (top number) is more than 180 mmHg.    Essential hypertension with goal blood pressure less than 140/90       isosorbide mononitrate 60 MG 24 hr tablet    IMDUR    180 tablet    Take 1 tablet (60 mg) by mouth 2 times daily    Coronary artery disease due to lipid rich plaque, Secondary hypertension with goal blood pressure less than 140/90       lisinopril 2.5 MG tablet     PRINIVIL/Zestril    90 tablet    Take 1 tablet (2.5 mg) by mouth daily    Coronary artery disease involving native heart without angina pectoris, unspecified vessel or lesion type       metoprolol tartrate 50 MG tablet    LOPRESSOR    180 tablet    Take 1 tablet (50 mg) by mouth 2 times daily    Essential hypertension with goal blood pressure less than 140/90, Coronary artery disease involving native heart with angina pectoris, unspecified vessel or lesion type (H)       MULTI-VITAMIN PO      Take 1 tablet by mouth daily        nystatin-triamcinolone cream    MYCOLOG II     Apply topically 4 times daily as needed Reported on 4/5/2017        * predniSONE 20 MG tablet    DELTASONE    12 tablet    Take 80 mg daily 7/7-7/13, then 60 mg daily 7/14-7/20, then 40 mg daily 7/21-7/27, then stay on 20 mg daily.    Liver transplant rejection (H)       * predniSONE 5 MG tablet    DELTASONE    90 tablet    Take 1 tablet (5 mg) by mouth daily    Liver transplant rejection (H), Liver transplanted (H)       tacrolimus 0.5 MG capsule    GENERIC EQUIVALENT    360 capsule    Take 2 capsules (1 mg) by mouth every 12 hours    Liver replaced by transplant (H)       * Notice:  This list has 2 medication(s) that are the same as other medications prescribed for you. Read the directions carefully, and ask your doctor or other care provider to review them with you.

## 2018-08-27 NOTE — PROGRESS NOTES
AUDIOLOGY REPORT    SUBJECTIVE: Lesli Lorenzana is a 78 year old female who was seen in the Audiology Clinic at the [facility] for a fitting of a left ear hearing aid. Previous results have revealed a bilateral sensorineural hearing loss.  The patient was accompanied by their self.    OBJECTIVE: Prior to fitting, a hearing aid check was performed to ensure device functionality.The hearing aid conformity evaluation was completed.The hearing aids were placed and they provided a good fit. Real-ear-probe-microphone measurements were completed on the Kuratur system and were an acceptable match to NAL-NL2 target with soft sounds audible, moderate sounds comfortable, and loud sounds below discomfort. UCLs are verified through maximum power output measures and demonstrate appropriate limiting of loud inputs. Lesli was oriented to proper hearing aid use, care, cleaning (no water, dry brush), batteries (size Rechargeable, insertion/removal, toxicity, low-battery signal), aid insertion/removal, user booklet, warranty information, storage cases, and other hearing aid details. The patient confirmed understanding of hearing aid use and care, and showed proper insertion of hearing aid and batteries while in the office today.Lesli reported good volume and sound quality today.   Hearing aids were programmed as follows:  Program 1: All Around    ASSESSMENT: Left hearing aid fitting completed today. Verification measures were performed. The 45 day trial period was explained to patient, and they expressed understanding. Ms. Lorenzana signed the Hearing Aid Purchase Agreement and was given a copy, as well as details on her hearing aids.Patient was counseled that exact out of pocket amounts cannot be determined for hearing aid claims being sent to insurance. Any insurance coverage information presented to the patient is an estimate only, and is not a guarantee of payment. Patient has been advised to check with their  own insurance  EAR(S) FIT: Left  HEARING AID MODEL NAME:  Jono Smith [id number]  HEARING AID STYLE: RITE  EARMOLDS/TIP/ LINK: Micromold with canal lock ([id number]) size 1 xS   SERIAL NUMBERS: Right: N/A; Left: [id number]  WARRANTY END DATE: 11/10/2021    PLAN:Lesli will return for follow-up in 2-3 weeks for a hearing aid review appointment. Please call this clinic with questions regarding today s appointment.  CHARGES:   Earmold(s): , Qty 1, $80.00, NU (New)  Hearing Aid Check: Monaural, [address]1935, $49.00  Dispensing Fee: Monaural, , $400.00,  LT (Left)  Fit/Orientation: Monaural, , $161.00  Hearing Aid Conformity Evaluation: , Qty:1 $87  Hearing Aid Digital: Monaural, BTE, . $1103  :  $200 (Bill to patient directly)    Total: $2080 bill to patient's insurance balance to the patient.       Dieter Ambriz CCC-A  Licensed Audiologist #[id number]  8/27/2018

## 2018-09-10 ENCOUNTER — OFFICE VISIT (OUTPATIENT)
Dept: AUDIOLOGY | Facility: CLINIC | Age: 78
End: 2018-09-10
Payer: COMMERCIAL

## 2018-09-10 DIAGNOSIS — H90.3 SENSORINEURAL HEARING LOSS, BILATERAL: Primary | ICD-10-CM

## 2018-09-10 PROCEDURE — V5299 HEARING SERVICE: HCPCS | Performed by: AUDIOLOGIST

## 2018-09-10 PROCEDURE — 99207 ZZC NO CHARGE LOS: CPT | Performed by: AUDIOLOGIST

## 2018-09-10 NOTE — MR AVS SNAPSHOT
After Visit Summary   9/10/2018    Lesli Lorenzana    MRN: [id number]           Patient Information     Date Of Birth          1940        Visit Information        Provider Department      9/10/2018 10:00 AM Dieter Cole AuD [facility]        Today's Diagnoses     Sensorineural hearing loss, bilateral    -  1       Follow-ups after your visit        Your next 10 appointments already scheduled     Nov 06, 2018  8:00 AM CST   [facility] LAB   [facility]    [address] [facility] [address] [phone]           Please do not eat 10-12 hours before your appointment if you are coming in fasting for labs on lipids, cholesterol, or glucose (sugar). This does not apply to pregnant women. Water, hot tea and black coffee (with nothing added) are okay. Do not drink other fluids, diet soda or chew gum.            Mar 22, 2019 10:00 AM CDT   (Arrive by 9:45 AM)   Return Liver Transplant with Tucker Muhammad MD   [facility] ([facility])    [address] [place] [address]  [address] [place] [address] [phone]              Who to contact     If you have questions or need follow up information about today's clinic visit or your schedule please contact [facility] directly at [phone].  Normal or non-critical lab and imaging results will be communicated to you by MyChart, letter or phone within 4 business days after the clinic has received the results. If you do not hear from us within 7 days, please contact the clinic through MyChart or phone. If you have a critical or abnormal lab result, we will notify you by phone as soon as possible.  Submit refill requests through [employer] or call your pharmacy and they will forward the refill request to us. Please allow 3 business days for your refill to be completed.          Additional Information About Your Visit        MyChart Information      [employer] gives you secure access to your electronic health record. If you see a primary care provider, you can also send messages to your care team and make appointments. If you have questions, please call your primary care clinic.  If you do not have a primary care provider, please call [phone] and they will assist you.        Care EveryWhere ID     This is your Care EveryWhere ID. This could be used by other organizations to access your [facility] medical records  [id number]         Blood Pressure from Last 3 Encounters:   03/23/18 (!) 209/82   03/09/18 187/89   09/15/17 116/59    Weight from Last 3 Encounters:   03/23/18 142 lb 6.4 oz (64.6 kg)   03/09/18 140 lb 1.6 oz (63.5 kg)   07/28/17 128 lb (58.1 kg)              We Performed the Following     HEARING AID CHECK/NO CHARGE        Primary Care Provider Office Phone # Fax #    Dee Awan [phone] [address] [facility] [address]        Equal Access to Services     [facility]: Hadii aad ku hadasho Soomaali, waaxda luqadaha, qaybta kaalmada adeegyada, aldo aguiar . So [facility] [phone].    ATENCIÓN: Si habla español, tiene a messina disposición servicios gratuitos de asistencia lingüística. [facility] [phone].    We comply with applicable federal civil rights laws and [place] laws. We do not discriminate on the basis of race, color, national origin, age, disability, sex, sexual orientation, or gender identity.            Thank you!     Thank you for choosing [facility]  for your care. Our goal is always to provide you with excellent care. Hearing back from our patients is one way we can continue to improve our services. Please take a few minutes to complete the written survey that you may receive in the mail after your visit with us. Thank you!             Your Updated Medication List - Protect others around you: Learn how to safely use, store and throw away your medicines at  www.disposemymeds.org.          This list is accurate as of 9/10/18 10:29 AM.  Always use your most recent med list.                   Brand Name Dispense Instructions for use Diagnosis    aspirin 81 MG tablet     90 tablet    Take 1 tablet by mouth daily.    CAD (coronary artery disease)       calcium carbonate 600 mg-vitamin D 400 units 600-400 MG-UNIT per tablet    CALTRATE     Take 1 tablet by mouth daily        clindamycin 300 MG capsule    CLEOCIN    2 capsule    Take 2 capsules (600 mg) 1 hour before dental work.    S/P joint replacement       ferrous gluconate 324 (38 Fe) MG tablet    FERGON    100 tablet    Take 1 tablet (324 mg) by mouth daily (with breakfast)    Iron deficiency anemia, unspecified iron deficiency anemia type       hydrALAZINE 25 MG tablet    APRESOLINE    90 tablet    Take 1 tablet (25 mg) by mouth 3 times daily , as needed if systolic blood pressure (top number) is more than 180 mmHg.    Essential hypertension with goal blood pressure less than 140/90       isosorbide mononitrate 60 MG 24 hr tablet    IMDUR    180 tablet    Take 1 tablet (60 mg) by mouth 2 times daily    Coronary artery disease due to lipid rich plaque, Secondary hypertension with goal blood pressure less than 140/90       lisinopril 2.5 MG tablet    PRINIVIL/Zestril    90 tablet    Take 1 tablet (2.5 mg) by mouth daily    Coronary artery disease involving native heart without angina pectoris, unspecified vessel or lesion type       metoprolol tartrate 50 MG tablet    LOPRESSOR    180 tablet    Take 1 tablet (50 mg) by mouth 2 times daily    Essential hypertension with goal blood pressure less than 140/90, Coronary artery disease involving native heart with angina pectoris, unspecified vessel or lesion type (H)       MULTI-VITAMIN PO      Take 1 tablet by mouth daily        nystatin-triamcinolone cream    MYCOLOG II     Apply topically 4 times daily as needed Reported on 4/5/2017        * predniSONE 20 MG tablet     DELTASONE    12 tablet    Take 80 mg daily 7/7-7/13, then 60 mg daily 7/14-7/20, then 40 mg daily 7/21-7/27, then stay on 20 mg daily.    Liver transplant rejection (H)       * predniSONE 5 MG tablet    DELTASONE    90 tablet    Take 1 tablet (5 mg) by mouth daily    Liver transplant rejection (H), Liver transplanted (H)       tacrolimus 0.5 MG capsule    GENERIC EQUIVALENT    360 capsule    Take 2 capsules (1 mg) by mouth every 12 hours    Liver replaced by transplant (H)       * Notice:  This list has 2 medication(s) that are the same as other medications prescribed for you. Read the directions carefully, and ask your doctor or other care provider to review them with you.

## 2018-09-10 NOTE — PROGRESS NOTES
AUDIOLOGY REPORT    SUBJECTIVE:Lesli Lorenzana is a 78 year old female who was seen in the Audiology Clinic at the [facility] on 9/10/2018  for a follow-up check regarding the fitting of new hearing aids. Previous results have revealed bilateral hearing loss.  The patient has been seen previously in this clinic and was fit with a left ear hearing aid on 8/27/2018.  Lesli reports good sound quality with the hearing aid and increased wear time with the heaing aid.  They were accompanied today by their self.    OBJECTIVE:   The International Outcome Inventory-Hearing Aids (IOI-HA) was administered today.The patient s responses to the 7 questions can be compared to normative data relative to how others are performing with their hearing aids, as well as focusing audiologic care and counseling.This patient s Quality of Life score (Question 7) was 5, which is above normative average.     Based on patient report, the following changes were made; none.    Reviewed 45 day trial period, care, cleaning (no water, dry brush), batteries (size None) insertion/removal, toxicity, low-battery signal), aid insertion/removal, volume adjustment (if applicable), user booklet, warranty information, storage cases, and other hearing aid details.        ASSESSMENT: A follow-up appointment for hearing aid fitting was completed today. IOI-HA administered today. Changes to hearing aid was completed as outlined above.     PLAN:Lesli will return for follow-up as needed, or at least every 9-12 months for cleaning and assessment of hearing aid.   Please call this clinic with any questions regarding today s appointment.    Dieter Ambriz CCC-A  Licensed Audiologist #[id number]  9/10/2018

## 2018-11-06 DIAGNOSIS — Z94.4 LIVER REPLACED BY TRANSPLANT (H): ICD-10-CM

## 2018-11-06 LAB
ALBUMIN SERPL-MCNC: 3.4 G/DL (ref 3.4–5)
ALP SERPL-CCNC: 115 U/L (ref 40–150)
ALT SERPL W P-5'-P-CCNC: 38 U/L (ref 0–50)
ANION GAP SERPL CALCULATED.3IONS-SCNC: 7 MMOL/L (ref 3–14)
AST SERPL W P-5'-P-CCNC: 35 U/L (ref 0–45)
BILIRUB DIRECT SERPL-MCNC: 0.1 MG/DL (ref 0–0.2)
BILIRUB SERPL-MCNC: 0.4 MG/DL (ref 0.2–1.3)
BUN SERPL-MCNC: 26 MG/DL (ref 7–30)
CALCIUM SERPL-MCNC: 8.6 MG/DL (ref 8.5–10.1)
CHLORIDE SERPL-SCNC: 104 MMOL/L (ref 94–109)
CO2 SERPL-SCNC: 27 MMOL/L (ref 20–32)
CREAT SERPL-MCNC: 1.4 MG/DL (ref 0.52–1.04)
ERYTHROCYTE [DISTWIDTH] IN BLOOD BY AUTOMATED COUNT: 13.1 % (ref 10–15)
GFR SERPL CREATININE-BSD FRML MDRD: 37 ML/MIN/1.7M2
GLUCOSE SERPL-MCNC: 78 MG/DL (ref 70–99)
HCT VFR BLD AUTO: 36.7 % (ref 35–47)
HGB BLD-MCNC: 12.7 G/DL (ref 11.7–15.7)
MCH RBC QN AUTO: 31.8 PG (ref 26.5–33)
MCHC RBC AUTO-ENTMCNC: 34.6 G/DL (ref 31.5–36.5)
MCV RBC AUTO: 92 FL (ref 78–100)
PLATELET # BLD AUTO: 207 10E9/L (ref 150–450)
POTASSIUM SERPL-SCNC: 4.3 MMOL/L (ref 3.4–5.3)
PROT SERPL-MCNC: 7.7 G/DL (ref 6.8–8.8)
RBC # BLD AUTO: 3.99 10E12/L (ref 3.8–5.2)
SODIUM SERPL-SCNC: 138 MMOL/L (ref 133–144)
TACROLIMUS BLD-MCNC: 4.9 UG/L (ref 5–15)
TME LAST DOSE: ABNORMAL H
WBC # BLD AUTO: 4.6 10E9/L (ref 4–11)

## 2018-11-06 PROCEDURE — 80076 HEPATIC FUNCTION PANEL: CPT | Performed by: INTERNAL MEDICINE

## 2018-11-06 PROCEDURE — 80048 BASIC METABOLIC PNL TOTAL CA: CPT | Performed by: INTERNAL MEDICINE

## 2018-11-06 PROCEDURE — 85027 COMPLETE CBC AUTOMATED: CPT | Performed by: INTERNAL MEDICINE

## 2018-11-06 PROCEDURE — 80197 ASSAY OF TACROLIMUS: CPT | Performed by: INTERNAL MEDICINE

## 2018-11-06 PROCEDURE — 36415 COLL VENOUS BLD VENIPUNCTURE: CPT | Performed by: INTERNAL MEDICINE

## 2019-01-07 ENCOUNTER — TELEPHONE (OUTPATIENT)
Dept: TRANSPLANT | Facility: CLINIC | Age: 79
End: 2019-01-07

## 2019-01-07 NOTE — TELEPHONE ENCOUNTER
Prior Authorization Specialty Medication Request    Medication/Dose: Tacrolimus 0.5 mg  caps, Prednisone 5 mg caps  I

## 2019-01-07 NOTE — TELEPHONE ENCOUNTER
Patient Call: General    Reason for call: patient is needing Prior Authorization on her Prednisone 0.5 mg and Tacrolimus 0.5 mg     Call back needed? If needed

## 2019-01-09 NOTE — TELEPHONE ENCOUNTER
Prior Authorization Not Needed per Insurance    Medication: Tacrolimus 0.5 mg  caps, Prednisone 5 mg caps  Insurance Company: [employer] - Phone [phone] Fax [phone]  Expected CoPay:      Pharmacy Filling the Rx:    Pharmacy Notified: No  Patient Notified: No        I also got a paid claim on prednisone, no pa needed.     [facility] Prior Authorization Team   Phone: [phone]  Fax: [phone]

## 2019-03-05 DIAGNOSIS — Z94.4 LIVER REPLACED BY TRANSPLANT (H): ICD-10-CM

## 2019-03-05 DIAGNOSIS — I25.10 CORONARY ARTERY DISEASE INVOLVING NATIVE CORONARY ARTERY OF NATIVE HEART WITHOUT ANGINA PECTORIS: ICD-10-CM

## 2019-03-05 DIAGNOSIS — E78.5 HYPERLIPIDEMIA LDL GOAL <70: ICD-10-CM

## 2019-03-05 DIAGNOSIS — I10 ESSENTIAL HYPERTENSION WITH GOAL BLOOD PRESSURE LESS THAN 140/90: ICD-10-CM

## 2019-03-05 LAB
ALBUMIN SERPL-MCNC: 3.4 G/DL (ref 3.4–5)
ALP SERPL-CCNC: 221 U/L (ref 40–150)
ALT SERPL W P-5'-P-CCNC: 44 U/L (ref 0–50)
ANION GAP SERPL CALCULATED.3IONS-SCNC: 7 MMOL/L (ref 3–14)
AST SERPL W P-5'-P-CCNC: 43 U/L (ref 0–45)
BILIRUB DIRECT SERPL-MCNC: 0.1 MG/DL (ref 0–0.2)
BILIRUB SERPL-MCNC: 0.4 MG/DL (ref 0.2–1.3)
BUN SERPL-MCNC: 25 MG/DL (ref 7–30)
CALCIUM SERPL-MCNC: 8.7 MG/DL (ref 8.5–10.1)
CHLORIDE SERPL-SCNC: 99 MMOL/L (ref 94–109)
CHOLEST SERPL-MCNC: 157 MG/DL
CO2 SERPL-SCNC: 25 MMOL/L (ref 20–32)
CREAT SERPL-MCNC: 1.26 MG/DL (ref 0.52–1.04)
ERYTHROCYTE [DISTWIDTH] IN BLOOD BY AUTOMATED COUNT: 13 % (ref 10–15)
GFR SERPL CREATININE-BSD FRML MDRD: 41 ML/MIN/{1.73_M2}
GLUCOSE SERPL-MCNC: 74 MG/DL (ref 70–99)
HCT VFR BLD AUTO: 36.8 % (ref 35–47)
HDLC SERPL-MCNC: 76 MG/DL
HGB BLD-MCNC: 12.6 G/DL (ref 11.7–15.7)
LDLC SERPL CALC-MCNC: 61 MG/DL
MCH RBC QN AUTO: 30.9 PG (ref 26.5–33)
MCHC RBC AUTO-ENTMCNC: 34.2 G/DL (ref 31.5–36.5)
MCV RBC AUTO: 90 FL (ref 78–100)
NONHDLC SERPL-MCNC: 81 MG/DL
PLATELET # BLD AUTO: 243 10E9/L (ref 150–450)
POTASSIUM SERPL-SCNC: 4.5 MMOL/L (ref 3.4–5.3)
PROT SERPL-MCNC: 7.7 G/DL (ref 6.8–8.8)
RBC # BLD AUTO: 4.08 10E12/L (ref 3.8–5.2)
SODIUM SERPL-SCNC: 131 MMOL/L (ref 133–144)
TACROLIMUS BLD-MCNC: 5.3 UG/L (ref 5–15)
TME LAST DOSE: NORMAL H
TRIGL SERPL-MCNC: 99 MG/DL
WBC # BLD AUTO: 4.7 10E9/L (ref 4–11)

## 2019-03-05 PROCEDURE — 80197 ASSAY OF TACROLIMUS: CPT | Performed by: INTERNAL MEDICINE

## 2019-03-05 PROCEDURE — 80061 LIPID PANEL: CPT | Performed by: INTERNAL MEDICINE

## 2019-03-05 PROCEDURE — 85027 COMPLETE CBC AUTOMATED: CPT | Performed by: INTERNAL MEDICINE

## 2019-03-05 PROCEDURE — 36415 COLL VENOUS BLD VENIPUNCTURE: CPT | Performed by: INTERNAL MEDICINE

## 2019-03-05 PROCEDURE — 80053 COMPREHEN METABOLIC PANEL: CPT | Performed by: INTERNAL MEDICINE

## 2019-03-05 PROCEDURE — 82248 BILIRUBIN DIRECT: CPT | Performed by: INTERNAL MEDICINE

## 2019-03-06 NOTE — PROGRESS NOTES
Kindra from home care called w/ update since pt DC from hospital 4/15 - her weight is up 10#, she states she was discharged w/ extra fluid on board b/c her Lasix was DCd in the hospital.  She states she has increased LE edema, especially in the leg that she had hip surgery on.  Pt has not had Lasix since she was in the hospital, & her notes state she is to only go back on the Lasix if Dr. Lagunas says ok.  Will forward to Rajwinder to review & call N back at [phone].   Pre-chart scrubbing done.

## 2019-03-14 ENCOUNTER — OFFICE VISIT (OUTPATIENT)
Dept: CARDIOLOGY | Facility: CLINIC | Age: 79
End: 2019-03-14
Attending: INTERNAL MEDICINE
Payer: COMMERCIAL

## 2019-03-14 VITALS
HEART RATE: 81 BPM | SYSTOLIC BLOOD PRESSURE: 191 MMHG | DIASTOLIC BLOOD PRESSURE: 102 MMHG | BODY MASS INDEX: 28.11 KG/M2 | HEIGHT: 60 IN | OXYGEN SATURATION: 99 % | WEIGHT: 143.2 LBS

## 2019-03-14 DIAGNOSIS — N18.30 CKD (CHRONIC KIDNEY DISEASE) STAGE 3, GFR 30-59 ML/MIN (H): ICD-10-CM

## 2019-03-14 DIAGNOSIS — I25.83 CORONARY ARTERY DISEASE DUE TO LIPID RICH PLAQUE: ICD-10-CM

## 2019-03-14 DIAGNOSIS — I25.119 CORONARY ARTERY DISEASE INVOLVING NATIVE HEART WITH ANGINA PECTORIS, UNSPECIFIED VESSEL OR LESION TYPE (H): ICD-10-CM

## 2019-03-14 DIAGNOSIS — I25.10 CORONARY ARTERY DISEASE INVOLVING NATIVE HEART WITHOUT ANGINA PECTORIS, UNSPECIFIED VESSEL OR LESION TYPE: ICD-10-CM

## 2019-03-14 DIAGNOSIS — I25.10 CORONARY ARTERY DISEASE INVOLVING NATIVE CORONARY ARTERY OF NATIVE HEART WITHOUT ANGINA PECTORIS: Primary | ICD-10-CM

## 2019-03-14 DIAGNOSIS — I25.10 CORONARY ARTERY DISEASE DUE TO LIPID RICH PLAQUE: ICD-10-CM

## 2019-03-14 DIAGNOSIS — E78.5 HYPERLIPIDEMIA LDL GOAL <70: ICD-10-CM

## 2019-03-14 DIAGNOSIS — I15.9 SECONDARY HYPERTENSION WITH GOAL BLOOD PRESSURE LESS THAN 140/90: ICD-10-CM

## 2019-03-14 DIAGNOSIS — I10 ESSENTIAL HYPERTENSION WITH GOAL BLOOD PRESSURE LESS THAN 140/90: ICD-10-CM

## 2019-03-14 PROCEDURE — 99214 OFFICE O/P EST MOD 30 MIN: CPT | Mod: GC | Performed by: INTERNAL MEDICINE

## 2019-03-14 PROCEDURE — G0463 HOSPITAL OUTPT CLINIC VISIT: HCPCS | Mod: ZF

## 2019-03-14 RX ORDER — LISINOPRIL 2.5 MG/1
2.5 TABLET ORAL DAILY
Qty: 90 TABLET | Refills: 3 | Status: SHIPPED | OUTPATIENT
Start: 2019-03-14 | End: 2020-04-27

## 2019-03-14 RX ORDER — ANTIOX #8/OM3/DHA/EPA/LUT/ZEAX 250-2.5 MG
CAPSULE ORAL
COMMUNITY

## 2019-03-14 RX ORDER — ISOSORBIDE MONONITRATE 60 MG/1
60 TABLET, EXTENDED RELEASE ORAL 2 TIMES DAILY
Qty: 180 TABLET | Refills: 3 | Status: SHIPPED | OUTPATIENT
Start: 2019-03-14 | End: 2020-03-16

## 2019-03-14 RX ORDER — METOPROLOL TARTRATE 50 MG
50 TABLET ORAL 2 TIMES DAILY
Qty: 180 TABLET | Refills: 3 | Status: SHIPPED | OUTPATIENT
Start: 2019-03-14 | End: 2020-04-16

## 2019-03-14 ASSESSMENT — PAIN SCALES - GENERAL: PAINLEVEL: NO PAIN (0)

## 2019-03-14 ASSESSMENT — MIFFLIN-ST. JEOR: SCORE: 1061.05

## 2019-03-14 NOTE — NURSING NOTE
Labs: Patient was given results of the laboratory testing obtained today. Patient was instructed to return for the next laboratory testing in one year. Patient demonstrated understanding of this information and agreed to call with further questions or concerns.   Med Reconcile: Reviewed and verified all current medications with the patient. The updated medication list was printed and given to the patient.  Return Appointment: Patient given instructions regarding scheduling next clinic visit. Patient demonstrated understanding of this information and agreed to call with further questions or concerns.  Patient stated she understood all health information given and agreed to call with further questions or concerns.    Carol Powell LPN

## 2019-03-14 NOTE — PROGRESS NOTES
Cardiology Clinic Note  March 9, 2018      HPI:  Lesli Lorenzana is a 78 year old with a past medical history significant for liver transplant in 2008, total hip replacement in 2017, hypertension, hyperlipidemia, CAD with NANCY to OM1 in 2009 and CKD who presents for routine cardiology follow-up.   She was last seen in cardiology clinic by Ms. Thapa. No medication changes were made at that time.  Since her last visit she has been doing quite well. Her biggest complaint is back pain and left calf pain with prolonged standing from her spinal stenosis. She denies CP, SOB, palpitations, LE edema, orthopnea, PND.     Her blood pressure today is again elevated as it routinely is in the clinic. Her home SBPs remain in the 130s.   Past medical history:  Past Medical History:   Diagnosis Date     Anemia      Arthritis      CAD (coronary artery disease) 1/2009    stent     Cholelithiasis     gallbladder removed with liver in 2008     Hallux valgus      History of blood transfusion      HTN (hypertension)      Hyperlipidemia LDL goal < 100      Liver transplant 2/2008    due to Primary biliary cirrhosis     Osteoarthritis of left hip      Osteopenia      Overweight(278.02)      Palpitations      Spinal stenosis      Medications:    Current Outpatient Medications on File Prior to Visit:  aspirin 81 MG tablet Take 1 tablet by mouth daily.   calcium-vitamin D (CALTRATE) 600-400 MG-UNIT per tablet Take 1 tablet by mouth daily   clindamycin (CLEOCIN) 300 MG capsule Take 2 capsules (600 mg) 1 hour before dental work.   isosorbide mononitrate (IMDUR) 60 MG 24 hr tablet Take 1 tablet (60 mg) by mouth 2 times daily   lisinopril (PRINIVIL/ZESTRIL) 2.5 MG tablet Take 1 tablet (2.5 mg) by mouth daily   metoprolol tartrate (LOPRESSOR) 50 MG tablet Take 1 tablet (50 mg) by mouth 2 times daily   MULTI-VITAMIN PO Take 1 tablet by mouth daily    Multiple Vitamins-Minerals (PRESERVISION AREDS 2) CAPS    predniSONE (DELTASONE) 20 MG tablet Take  80 mg daily 7/7-7/13, then 60 mg daily 7/14-7/20, then 40 mg daily 7/21-7/27, then stay on 20 mg daily.   predniSONE (DELTASONE) 5 MG tablet Take 1 tablet (5 mg) by mouth daily   tacrolimus (GENERIC EQUIVALENT) 0.5 MG capsule Take 2 capsules (1 mg) by mouth every 12 hours   ferrous gluconate (FERGON) 324 (38 FE) MG tablet Take 1 tablet (324 mg) by mouth daily (with breakfast) (Patient not taking: Reported on 3/14/2019)   hydrALAZINE (APRESOLINE) 25 MG tablet Take 1 tablet (25 mg) by mouth 3 times daily , as needed if systolic blood pressure (top number) is more than 180 mmHg. (Patient not taking: Reported on 3/14/2019)   nystatin-triamcinolone (MYCOLOG II) cream Apply topically 4 times daily as needed Reported on 4/5/2017     No current facility-administered medications on file prior to visit.     Allergies:      Allergies   Allergen Reactions     Amlodipine Besylate Swelling     Edema in legs     Amoxicillin Rash       Family and social history:    Family History   Problem Relation Age of Onset     Cancer Father         unknown     C.A.D. Father      Thyroid Disease Father      Cancer Mother         lung--smoker     Other Cancer Mother         lung     Thyroid Disease Mother      Cerebrovascular Disease Maternal Grandmother         ,     Unknown/Adopted Paternal Grandfather        Social History     Socioeconomic History     Marital status:      Spouse name: Not on file     Number of children: 3     Years of education: Not on file     Highest education level: Not on file   Occupational History     Employer: RETIRED   Social Needs     Financial resource strain: Not on file     Food insecurity:     Worry: Not on file     Inability: Not on file     Transportation needs:     Medical: Not on file     Non-medical: Not on file   Tobacco Use     Smoking status: Former Smoker     Packs/day: 0.50     Years: 10.00     Pack years: 5.00     Types: Cigarettes     Last attempt to quit: 11/23/1975     Years since quitting:  43.3     Smokeless tobacco: Never Used   Substance and Sexual Activity     Alcohol use: No     Comment: Glass of wine occassionally     Drug use: No     Comment: Never     Sexual activity: Not Currently   Lifestyle     Physical activity:     Days per week: Not on file     Minutes per session: Not on file     Stress: Not on file   Relationships     Social connections:     Talks on phone: Not on file     Gets together: Not on file     Attends Scientologist service: Not on file     Active member of club or organization: Not on file     Attends meetings of clubs or organizations: Not on file     Relationship status: Not on file     Intimate partner violence:     Fear of current or ex partner: Not on file     Emotionally abused: Not on file     Physically abused: Not on file     Forced sexual activity: Not on file   Other Topics Concern     Parent/sibling w/ CABG, MI or angioplasty before 65F 55M? Not Asked      Service Not Asked     Blood Transfusions Yes     Comment: 2007     Caffeine Concern Not Asked     Occupational Exposure Not Asked     Hobby Hazards Not Asked     Sleep Concern Not Asked     Stress Concern Not Asked     Weight Concern Not Asked     Special Diet Not Asked     Back Care Not Asked     Exercise No     Bike Helmet Not Asked     Seat Belt Not Asked     Self-Exams Not Asked   Social History Narrative    Lives alone with her dog. . Three grown children, very supportive and all live close by.  6 grandchildren. Feels safe in her environment.     Review of Systems:  Skin: No skin rash or ulcers.  Eyes: No red eye.  Ears/Nose/Throat: No ear discharge, nasal congestion, sore throat or dysphagia.  Respiratory: No cough or hemoptysis.  Cardiovascular: See HPI.    Gastrointestinal: No abdominal pain, nausea, vomiting, hematemesis or melena.  Genitourinary: No increased frequency or urgency of urine. No dysuria or hematuria.  Musculoskeletal: No polyarthralgia or myalgias.  Neurologic: No headaches,  seizure or focal weakness.  Psychiatric: No hallucinations.  Hematologic/Lymphatic/Immunologic: No bleeding tendency.  Endocrine: No heat or cold intolerance, abnormal facial hair or alopecia.    Vital signs:  There were no vitals taken for this visit.   Wt Readings from Last 2 Encounters:   03/23/18 64.6 kg (142 lb 6.4 oz)   03/09/18 63.5 kg (140 lb 1.6 oz)     Physical Exam:  Gen: NAD.    HEENT: No conjunctival pallor or scleral icterus, MMM. Clear oropharynx.    Neck: No JVD. No thyroid enlargement or cervical adenopathy.    Chest: Clear to auscultation bilaterally.    CV: Normal first and second heart sounds. No murmurs or gallop appreciated.    Abdomen: Soft, non-tender, non-distended, BS+.  Ext: No edema. Warm and well perfused with normal capillary refill.    Skin: No skin rash or ulcers.  Neuro: alert, oriented and appropriately conversant.    Vasc: 2+ radial, brachial, DP and PT pulses bilaterally  Psych: Normal affect and speech.    Labs:  Reviewed in Epic    Cholesterol   Date Value Ref Range Status   03/05/2019 157 <200 mg/dL Final   08/07/2018 176 <200 mg/dL Final     HDL Cholesterol   Date Value Ref Range Status   03/05/2019 76 >49 mg/dL Final   08/07/2018 81 >49 mg/dL Final     LDL Cholesterol Calculated   Date Value Ref Range Status   03/05/2019 61 <100 mg/dL Final     Comment:     Desirable:       <100 mg/dl   08/07/2018 72 <100 mg/dL Final     Comment:     Desirable:       <100 mg/dl     Triglycerides   Date Value Ref Range Status   03/05/2019 99 <150 mg/dL Final     Comment:     Fasting specimen   08/07/2018 115 <150 mg/dL Final     Comment:     Fasting specimen     Cholesterol/HDL Ratio   Date Value Ref Range Status   08/11/2015 2.4 0.0 - 5.0 Final   09/17/2014 2.0 0.0 - 5.0 Final         Diagnostics:    ECHO 3/2017:    Interpretation Summary  Left ventricular function, chamber size, wall motion, and wall thickness are  normal.The EF is 55-60%. Biplane LVEF traced at 56%.  The right ventricle is  normal size. Global right ventricular function is  normal.  The inferior vena cava was normal in size with preserved respiratory  variability. Estimated mean right atrial pressure is <3 mmHg.  No pericardial effusion is present.  This study was compared with the study from 3/20/13. There has been no change.  _____________________________________________________________________________  __        Left Ventricle  Left ventricular function, chamber size, wall motion, and wall thickness are  normal.The EF is 55-60%. Biplane LVEF traced at 56%. Left ventricular  diastolic function is indeterminate.     Right Ventricle  The right ventricle is normal size. Global right ventricular function is  normal.     Atria  The right atria appears normal. Mild left atrial enlargement is present.     Mitral Valve  The mitral valve is normal.        Aortic Valve  Mild aortic valve sclerosis is present.     Tricuspid Valve  The tricuspid valve is normal. Pulmonary artery systolic pressure cannot be  assessed.     Pulmonic Valve  The pulmonic valve is normal.     Vessels  The aorta root is normal. The inferior vena cava was normal in size with  preserved respiratory variability. The pulmonary artery is normal. Estimated  mean right atrial pressure is <3 mmHg.    Assessment and Plan:  Mrs. Lesli Lorenzana is a 78 year old female with a past medical history of a liver transplantation for primary biliary cirrhosis, total hip replacement, HTN, and CAD who presents for routine cardiology follow-up.     CAD s/p NANCY to OM1 in 2009: Stable without anginal symptoms. Continue medical therapy with ASA 81, metoprolol 50 mg BID and imdur 60 BID. Last LDL 61. Low dose statin could be considered for secondary prevention. Pravastatin would be the choice given her liver transplant, but will defer to Dr. Muhammad.   Hypertension, goal < 130/80: Significant white coat hypertension. Home blood pressure remain in the target range and ambulatory blood pressure  monitoring in 2014 revealed average BP of 138/73. Continue current regimen with lisinopril 2.5 mg daily, Imdur 60mg BID. Continue PRN hydralazine for SBP >180. We will provide a prescription for an Omron BP cuff.    Follow-up: RTC in 1 year.    It was a pleasure to see Ms. Lorenzana in clinic today with Dr. Lagunas.          Jeferson Garg MD  Cardiovascular Medicine Fellow, PGY-7  [phone]    I evaluated and examined patient with CV fellow.  I reviewed lab results, EKG and other diagnostic tests and discussed these results with patient and CV fellow.  I discussed my assessment plan and therapy with patient and CV fellow.  I agree with the CV fellow s note and edited this note.  35 min were spent directly with patient    Nain Lagunas MD, PhD  Professor of Medicine  Division of Cardiology  .

## 2019-03-14 NOTE — PATIENT INSTRUCTIONS
Patient Instructions:  It was a pleasure to see you in the cardiology clinic today.      If you have any questions, you can reach my nurse, Carol Powell LPN, at [phone].  Press Option #1 for the [facility], and then press Option #3 for nursing.    We are encouraging the use of [employer] to communicate with your HealthCare Provider    Medication Changes: None.    Recommendations:  - Get a new Omron blood pressure cuff  - Monitor and record your daily home blood pressures for 2 weeks.  Contact clinic with these readings.    Studies Ordered: None.    The results from today include: Labs.    Please follow up: With Dr. Lagunas in one year with fasting labs prior.    Sincerely,    Nain Lagunas MD     If you have an urgent need after hours (8:00 am to 4:30 pm) please call [phone] and ask for the cardiology fellow on call.

## 2019-03-14 NOTE — LETTER
3/14/2019      RE: Lesli Lorenzana  [id number] [facility] [id number]       Dear Colleague,    Thank you for the opportunity to participate in the care of your patient, Lesli Lorenzana, at the [facility]. Please see a copy of my visit note below.    Cardiology Clinic Note  March 9, 2018      HPI:  Lesli Lorenzana is a 78 year old with a past medical history significant for liver transplant in 2008, total hip replacement in 2017, hypertension, hyperlipidemia, CAD with NANCY to OM1 in 2009 and CKD who presents for routine cardiology follow-up.   She was last seen in cardiology clinic by Ms. Thapa. No medication changes were made at that time.  Since her last visit she has been doing quite well. Her biggest complaint is back pain and left calf pain with prolonged standing from her spinal stenosis. She denies CP, SOB, palpitations, LE edema, orthopnea, PND.     Her blood pressure today is again elevated as it routinely is in the clinic. Her home SBPs remain in the 130s.   Past medical history:  Past Medical History:   Diagnosis Date     Anemia      Arthritis      CAD (coronary artery disease) 1/2009    stent     Cholelithiasis     gallbladder removed with liver in 2008     Hallux valgus      History of blood transfusion      HTN (hypertension)      Hyperlipidemia LDL goal < 100      Liver transplant 2/2008    due to Primary biliary cirrhosis     Osteoarthritis of left hip      Osteopenia      Overweight(278.02)      Palpitations      Spinal stenosis      Medications:    Current Outpatient Medications on File Prior to Visit:  aspirin 81 MG tablet Take 1 tablet by mouth daily.   calcium-vitamin D (CALTRATE) 600-400 MG-UNIT per tablet Take 1 tablet by mouth daily   clindamycin (CLEOCIN) 300 MG capsule Take 2 capsules (600 mg) 1 hour before dental work.   isosorbide mononitrate (IMDUR) 60 MG 24 hr tablet Take 1 tablet (60 mg) by mouth 2  times daily   lisinopril (PRINIVIL/ZESTRIL) 2.5 MG tablet Take 1 tablet (2.5 mg) by mouth daily   metoprolol tartrate (LOPRESSOR) 50 MG tablet Take 1 tablet (50 mg) by mouth 2 times daily   MULTI-VITAMIN PO Take 1 tablet by mouth daily    Multiple Vitamins-Minerals (PRESERVISION AREDS 2) CAPS    predniSONE (DELTASONE) 20 MG tablet Take 80 mg daily 7/7-7/13, then 60 mg daily 7/14-7/20, then 40 mg daily 7/21-7/27, then stay on 20 mg daily.   predniSONE (DELTASONE) 5 MG tablet Take 1 tablet (5 mg) by mouth daily   tacrolimus (GENERIC EQUIVALENT) 0.5 MG capsule Take 2 capsules (1 mg) by mouth every 12 hours   ferrous gluconate (FERGON) 324 (38 FE) MG tablet Take 1 tablet (324 mg) by mouth daily (with breakfast) (Patient not taking: Reported on 3/14/2019)   hydrALAZINE (APRESOLINE) 25 MG tablet Take 1 tablet (25 mg) by mouth 3 times daily , as needed if systolic blood pressure (top number) is more than 180 mmHg. (Patient not taking: Reported on 3/14/2019)   nystatin-triamcinolone (MYCOLOG II) cream Apply topically 4 times daily as needed Reported on 4/5/2017     No current facility-administered medications on file prior to visit.     Allergies:      Allergies   Allergen Reactions     Amlodipine Besylate Swelling     Edema in legs     Amoxicillin Rash       Family and social history:    Family History   Problem Relation Age of Onset     Cancer Father         unknown     C.A.D. Father      Thyroid Disease Father      Cancer Mother         lung--smoker     Other Cancer Mother         lung     Thyroid Disease Mother      Cerebrovascular Disease Maternal Grandmother         ,     Unknown/Adopted Paternal Grandfather        Social History     Socioeconomic History     Marital status:      Spouse name: Not on file     Number of children: 3     Years of education: Not on file     Highest education level: Not on file   Occupational History     Employer: RETIRED   Social Needs     Financial resource strain: Not on file      Food insecurity:     Worry: Not on file     Inability: Not on file     Transportation needs:     Medical: Not on file     Non-medical: Not on file   Tobacco Use     Smoking status: Former Smoker     Packs/day: 0.50     Years: 10.00     Pack years: 5.00     Types: Cigarettes     Last attempt to quit: 1975     Years since quittin.3     Smokeless tobacco: Never Used   Substance and Sexual Activity     Alcohol use: No     Comment: Glass of wine occassionally     Drug use: No     Comment: Never     Sexual activity: Not Currently   Lifestyle     Physical activity:     Days per week: Not on file     Minutes per session: Not on file     Stress: Not on file   Relationships     Social connections:     Talks on phone: Not on file     Gets together: Not on file     Attends Cheondoism service: Not on file     Active member of club or organization: Not on file     Attends meetings of clubs or organizations: Not on file     Relationship status: Not on file     Intimate partner violence:     Fear of current or ex partner: Not on file     Emotionally abused: Not on file     Physically abused: Not on file     Forced sexual activity: Not on file   Other Topics Concern     Parent/sibling w/ CABG, MI or angioplasty before 65F 55M? Not Asked      Service Not Asked     Blood Transfusions Yes     Comment:      Caffeine Concern Not Asked     Occupational Exposure Not Asked     Hobby Hazards Not Asked     Sleep Concern Not Asked     Stress Concern Not Asked     Weight Concern Not Asked     Special Diet Not Asked     Back Care Not Asked     Exercise No     Bike Helmet Not Asked     Seat Belt Not Asked     Self-Exams Not Asked   Social History Narrative    Lives alone with her dog. . Three grown children, very supportive and all live close by.  6 grandchildren. Feels safe in her environment.     Review of Systems:  Skin: No skin rash or ulcers.  Eyes: No red eye.  Ears/Nose/Throat: No ear discharge, nasal  congestion, sore throat or dysphagia.  Respiratory: No cough or hemoptysis.  Cardiovascular: See HPI.    Gastrointestinal: No abdominal pain, nausea, vomiting, hematemesis or melena.  Genitourinary: No increased frequency or urgency of urine. No dysuria or hematuria.  Musculoskeletal: No polyarthralgia or myalgias.  Neurologic: No headaches, seizure or focal weakness.  Psychiatric: No hallucinations.  Hematologic/Lymphatic/Immunologic: No bleeding tendency.  Endocrine: No heat or cold intolerance, abnormal facial hair or alopecia.    Vital signs:  There were no vitals taken for this visit.   Wt Readings from Last 2 Encounters:   03/23/18 64.6 kg (142 lb 6.4 oz)   03/09/18 63.5 kg (140 lb 1.6 oz)     Physical Exam:  Gen: NAD.    HEENT: No conjunctival pallor or scleral icterus, MMM. Clear oropharynx.    Neck: No JVD. No thyroid enlargement or cervical adenopathy.    Chest: Clear to auscultation bilaterally.    CV: Normal first and second heart sounds. No murmurs or gallop appreciated.    Abdomen: Soft, non-tender, non-distended, BS+.  Ext: No edema. Warm and well perfused with normal capillary refill.    Skin: No skin rash or ulcers.  Neuro: alert, oriented and appropriately conversant.    Vasc: 2+ radial, brachial, DP and PT pulses bilaterally  Psych: Normal affect and speech.    Labs:  Reviewed in Epic    Cholesterol   Date Value Ref Range Status   03/05/2019 157 <200 mg/dL Final   08/07/2018 176 <200 mg/dL Final     HDL Cholesterol   Date Value Ref Range Status   03/05/2019 76 >49 mg/dL Final   08/07/2018 81 >49 mg/dL Final     LDL Cholesterol Calculated   Date Value Ref Range Status   03/05/2019 61 <100 mg/dL Final     Comment:     Desirable:       <100 mg/dl   08/07/2018 72 <100 mg/dL Final     Comment:     Desirable:       <100 mg/dl     Triglycerides   Date Value Ref Range Status   03/05/2019 99 <150 mg/dL Final     Comment:     Fasting specimen   08/07/2018 115 <150 mg/dL Final     Comment:     Fasting  specimen     Cholesterol/HDL Ratio   Date Value Ref Range Status   08/11/2015 2.4 0.0 - 5.0 Final   09/17/2014 2.0 0.0 - 5.0 Final     Diagnostics:    ECHO 3/2017:    Interpretation Summary  Left ventricular function, chamber size, wall motion, and wall thickness are  normal.The EF is 55-60%. Biplane LVEF traced at 56%.  The right ventricle is normal size. Global right ventricular function is  normal.  The inferior vena cava was normal in size with preserved respiratory  variability. Estimated mean right atrial pressure is <3 mmHg.  No pericardial effusion is present.  This study was compared with the study from 3/20/13. There has been no change.  _____________________________________________________________________________  __        Left Ventricle  Left ventricular function, chamber size, wall motion, and wall thickness are  normal.The EF is 55-60%. Biplane LVEF traced at 56%. Left ventricular  diastolic function is indeterminate.     Right Ventricle  The right ventricle is normal size. Global right ventricular function is  normal.     Atria  The right atria appears normal. Mild left atrial enlargement is present.     Mitral Valve  The mitral valve is normal.        Aortic Valve  Mild aortic valve sclerosis is present.     Tricuspid Valve  The tricuspid valve is normal. Pulmonary artery systolic pressure cannot be  assessed.     Pulmonic Valve  The pulmonic valve is normal.     Vessels  The aorta root is normal. The inferior vena cava was normal in size with  preserved respiratory variability. The pulmonary artery is normal. Estimated  mean right atrial pressure is <3 mmHg.    Assessment and Plan:  Mrs. Lesli Lorenzana is a 78 year old female with a past medical history of a liver transplantation for primary biliary cirrhosis, total hip replacement, HTN, and CAD who presents for routine cardiology follow-up.     CAD s/p NANCY to OM1 in 2009: Stable without anginal symptoms. Continue medical therapy with ASA 81,  metoprolol 50 mg BID and imdur 60 BID. Last LDL 61. Low dose statin could be considered for secondary prevention. Pravastatin would be the choice given her liver transplant, but will defer to Dr. Muhammad.   Hypertension, goal < 130/80: Significant white coat hypertension. Home blood pressure remain in the target range and ambulatory blood pressure monitoring in 2014 revealed average BP of 138/73. Continue current regimen with lisinopril 2.5 mg daily, Imdur 60mg BID. Continue PRN hydralazine for SBP >180. We will provide a prescription for an Omron BP cuff.    Follow-up: RTC in 1 year.    It was a pleasure to see Ms. Lorenzana in clinic today with Dr. Lagunas.    Jeferson Garg MD  Cardiovascular Medicine Fellow, PGY-7  [phone]    I evaluated and examined patient with CV fellow.  I reviewed lab results, EKG and other diagnostic tests and discussed these results with patient and CV fellow.  I discussed my assessment plan and therapy with patient and CV fellow.  I agree with the CV fellow s note and edited this note.  35 min were spent directly with patient    Nain Lagunas MD, PhD  Professor of Medicine  Division of Cardiology  .

## 2019-03-29 ENCOUNTER — OFFICE VISIT (OUTPATIENT)
Dept: GASTROENTEROLOGY | Facility: CLINIC | Age: 79
End: 2019-03-29
Attending: INTERNAL MEDICINE
Payer: COMMERCIAL

## 2019-03-29 VITALS
DIASTOLIC BLOOD PRESSURE: 85 MMHG | TEMPERATURE: 97.6 F | HEART RATE: 78 BPM | HEIGHT: 60 IN | SYSTOLIC BLOOD PRESSURE: 206 MMHG | BODY MASS INDEX: 28.43 KG/M2 | OXYGEN SATURATION: 99 % | WEIGHT: 144.8 LBS

## 2019-03-29 DIAGNOSIS — F41.9 ANXIETY: Primary | ICD-10-CM

## 2019-03-29 PROCEDURE — G0463 HOSPITAL OUTPT CLINIC VISIT: HCPCS | Mod: ZF

## 2019-03-29 ASSESSMENT — PAIN SCALES - GENERAL: PAINLEVEL: NO PAIN (0)

## 2019-03-29 ASSESSMENT — MIFFLIN-ST. JEOR: SCORE: 1068.31

## 2019-03-29 NOTE — LETTER
3/29/2019      RE: Lesli Lorenzana  [address] [facility] [address]       I had the pleasure of seeing Lesli Lorenzana for followup in the Liver Transplant Clinic at the [facility] on 03/29/2019.  Ms. Lorenzana returns for followup now more than 1 year status post liver transplantation.      For the most part she is doing well.  She denies any abdominal pain, itching or skin rash or fatigue.  She denies any increased abdominal girth or lower extremity edema.  She denies any fevers or chills, cough or shortness of breath.  She denies any nausea or vomiting, diarrhea or constipation.  Her appetite has been good, and her weight has remained the same.  There have been no other new events since she was last seen.       Current Outpatient Medications   Medication     aspirin 81 MG tablet     Blood Pressure Monitor KIT     calcium-vitamin D (CALTRATE) 600-400 MG-UNIT per tablet     clindamycin (CLEOCIN) 300 MG capsule     isosorbide mononitrate (IMDUR) 60 MG 24 hr tablet     lisinopril (PRINIVIL/ZESTRIL) 2.5 MG tablet     metoprolol tartrate (LOPRESSOR) 50 MG tablet     MULTI-VITAMIN PO     Multiple Vitamins-Minerals (PRESERVISION AREDS 2) CAPS     nystatin-triamcinolone (MYCOLOG II) cream     predniSONE (DELTASONE) 20 MG tablet     predniSONE (DELTASONE) 5 MG tablet     tacrolimus (GENERIC EQUIVALENT) 0.5 MG capsule     ferrous gluconate (FERGON) 324 (38 FE) MG tablet     hydrALAZINE (APRESOLINE) 25 MG tablet     No current facility-administered medications for this visit.      Vital signs:                   Height: 152.4 cm (5') Weight: 65.7 kg (144 lb 12.8 oz)    PHYSICAL EXAMINATION:  In general, she looks quite well.  HEENT exam shows no scleral icterus or temporal muscle wasting.  Her chest is clear.  His abdominal exam shows no increase in girth.  No masses or tenderness to palpation are present.  Her liver is 10 cm in span without left lobe enlargement.  No  spleen tip is palpable.  Extremity exam shows no edema.  Skin exam shows no suspicious lesions.  Neurologic exam shows no asterixis.      Her most recent laboratory tests show her white count is 4.7, hemoglobin 12.6, platelets are 242,000.  Sodium 131, potassium 4.5, BUN is 25, creatinine 1.26, AST is 43, ALT is 44, alkaline phosphatase is 221, albumin is 3.7, protein of 7.7, total bilirubin is 0.4.      IMPRESSION:  Ms. Lorenzana is now more than 11 years status post liver transplantation.  She was a bit concerned about the fact that her alkaline phosphatase had increased quite dramatically.  I will just simply wait until she gets her next blood draw, which should be in about 2 weeks.  If it is still elevated, we will begin evaluation with an ultrasound and possibly MRCP or liver biopsy.  I otherwise will not be making any other change to her medical regimen.  I will see him back in the clinic in 6 months.      Thank you very much for allowing me to participate in the care of this patient.  If you have any questions regarding my recommendations, please do not hesitate to contact me.       Tucker Muhammad MD      Professor of Medicine  [employer]      Executive Medical Director, Solid Organ Transplant Program  [facility]

## 2019-03-29 NOTE — LETTER
3/29/2019       RE: Lesli Lorenzana  [address] [facility] [address]     Dear Colleague,    Thank you for referring your patient, Lesli Lorenzana, to the [facility] HEPATOLOGY at [facility]. Please see a copy of my visit note below.    I had the pleasure of seeing Lesli Lorenzana for followup in the Liver Transplant Clinic at the [facility] on 03/29/2019.  Ms. Lorenzana returns for followup now more than 1 year status post liver transplantation.      For the most part she is doing well.  She denies any abdominal pain, itching or skin rash or fatigue.  She denies any increased abdominal girth or lower extremity edema.  She denies any fevers or chills, cough or shortness of breath.  She denies any nausea or vomiting, diarrhea or constipation.  Her appetite has been good, and her weight has remained the same.  There have been no other new events since she was last seen.       Current Outpatient Medications   Medication     aspirin 81 MG tablet     Blood Pressure Monitor KIT     calcium-vitamin D (CALTRATE) 600-400 MG-UNIT per tablet     clindamycin (CLEOCIN) 300 MG capsule     isosorbide mononitrate (IMDUR) 60 MG 24 hr tablet     lisinopril (PRINIVIL/ZESTRIL) 2.5 MG tablet     metoprolol tartrate (LOPRESSOR) 50 MG tablet     MULTI-VITAMIN PO     Multiple Vitamins-Minerals (PRESERVISION AREDS 2) CAPS     nystatin-triamcinolone (MYCOLOG II) cream     predniSONE (DELTASONE) 20 MG tablet     predniSONE (DELTASONE) 5 MG tablet     tacrolimus (GENERIC EQUIVALENT) 0.5 MG capsule     ferrous gluconate (FERGON) 324 (38 FE) MG tablet     hydrALAZINE (APRESOLINE) 25 MG tablet     No current facility-administered medications for this visit.      Vital signs:                   Height: 152.4 cm (5') Weight: 65.7 kg (144 lb 12.8 oz)    PHYSICAL EXAMINATION:  In general, she looks quite well.  HEENT exam shows no scleral icterus or temporal  muscle wasting.  Her chest is clear.  His abdominal exam shows no increase in girth.  No masses or tenderness to palpation are present.  Her liver is 10 cm in span without left lobe enlargement.  No spleen tip is palpable.  Extremity exam shows no edema.  Skin exam shows no suspicious lesions.  Neurologic exam shows no asterixis.      Her most recent laboratory tests show her white count is 4.7, hemoglobin 12.6, platelets are 242,000.  Sodium 131, potassium 4.5, BUN is 25, creatinine 1.26, AST is 43, ALT is 44, alkaline phosphatase is 221, albumin is 3.7, protein of 7.7, total bilirubin is 0.4.      IMPRESSION:  Ms. Lorenzana is now more than 11 years status post liver transplantation.  She was a bit concerned about the fact that her alkaline phosphatase had increased quite dramatically.  I will just simply wait until she gets her next blood draw, which should be in about 2 weeks.  If it is still elevated, we will begin evaluation with an ultrasound and possibly MRCP or liver biopsy.  I otherwise will not be making any other change to her medical regimen.  I will see him back in the clinic in 6 months.      Thank you very much for allowing me to participate in the care of this patient.  If you have any questions regarding my recommendations, please do not hesitate to contact me.       Tucker Muhammad MD      Professor of Medicine  [employer]      Executive Medical Director, Solid Organ Transplant Program  [facility]

## 2019-03-29 NOTE — NURSING NOTE
Chief Complaint   Patient presents with     RECHECK     liver tx     BP (!) 206/85   Pulse 78   Temp 97.6  F (36.4  C) (Oral)   Ht 1.524 m (5')   Wt 65.7 kg (144 lb 12.8 oz)   SpO2 99%   BMI 28.28 kg/m    Rachel Engel MA

## 2019-05-16 ENCOUNTER — TELEPHONE (OUTPATIENT)
Dept: TRANSPLANT | Facility: CLINIC | Age: 79
End: 2019-05-16

## 2019-05-16 ENCOUNTER — PATIENT OUTREACH (OUTPATIENT)
Dept: GASTROENTEROLOGY | Facility: CLINIC | Age: 79
End: 2019-05-16

## 2019-05-16 DIAGNOSIS — R79.89 ELEVATED LFTS: Primary | ICD-10-CM

## 2019-05-16 DIAGNOSIS — Z94.4 LIVER REPLACED BY TRANSPLANT (H): ICD-10-CM

## 2019-05-16 LAB
ALBUMIN SERPL-MCNC: 3.3 G/DL (ref 3.4–5)
ALP SERPL-CCNC: 368 U/L (ref 40–150)
ALT SERPL W P-5'-P-CCNC: 120 U/L (ref 0–50)
ANION GAP SERPL CALCULATED.3IONS-SCNC: 9 MMOL/L (ref 3–14)
AST SERPL W P-5'-P-CCNC: 97 U/L (ref 0–45)
BILIRUB DIRECT SERPL-MCNC: 0.4 MG/DL (ref 0–0.2)
BILIRUB SERPL-MCNC: 0.9 MG/DL (ref 0.2–1.3)
BUN SERPL-MCNC: 27 MG/DL (ref 7–30)
CALCIUM SERPL-MCNC: 8.7 MG/DL (ref 8.5–10.1)
CHLORIDE SERPL-SCNC: 102 MMOL/L (ref 94–109)
CO2 SERPL-SCNC: 23 MMOL/L (ref 20–32)
CREAT SERPL-MCNC: 1.33 MG/DL (ref 0.52–1.04)
ERYTHROCYTE [DISTWIDTH] IN BLOOD BY AUTOMATED COUNT: 13.6 % (ref 10–15)
GFR SERPL CREATININE-BSD FRML MDRD: 39 ML/MIN/{1.73_M2}
GLUCOSE SERPL-MCNC: 79 MG/DL (ref 70–99)
HCT VFR BLD AUTO: 35.2 % (ref 35–47)
HGB BLD-MCNC: 11.9 G/DL (ref 11.7–15.7)
MCH RBC QN AUTO: 30.9 PG (ref 26.5–33)
MCHC RBC AUTO-ENTMCNC: 33.8 G/DL (ref 31.5–36.5)
MCV RBC AUTO: 91 FL (ref 78–100)
PLATELET # BLD AUTO: 242 10E9/L (ref 150–450)
POTASSIUM SERPL-SCNC: 4.2 MMOL/L (ref 3.4–5.3)
PROT SERPL-MCNC: 7.5 G/DL (ref 6.8–8.8)
RBC # BLD AUTO: 3.85 10E12/L (ref 3.8–5.2)
SODIUM SERPL-SCNC: 134 MMOL/L (ref 133–144)
TACROLIMUS BLD-MCNC: 6.1 UG/L (ref 5–15)
TME LAST DOSE: NORMAL H
WBC # BLD AUTO: 5.4 10E9/L (ref 4–11)

## 2019-05-16 PROCEDURE — 80076 HEPATIC FUNCTION PANEL: CPT | Performed by: INTERNAL MEDICINE

## 2019-05-16 PROCEDURE — 80197 ASSAY OF TACROLIMUS: CPT | Performed by: INTERNAL MEDICINE

## 2019-05-16 PROCEDURE — 36415 COLL VENOUS BLD VENIPUNCTURE: CPT | Performed by: INTERNAL MEDICINE

## 2019-05-16 PROCEDURE — 80048 BASIC METABOLIC PNL TOTAL CA: CPT | Performed by: INTERNAL MEDICINE

## 2019-05-16 PROCEDURE — 85027 COMPLETE CBC AUTOMATED: CPT | Performed by: INTERNAL MEDICINE

## 2019-05-16 NOTE — TELEPHONE ENCOUNTER
"Liver tests abnormal. , ast 97, alk phos 368, b bili 0.9, d bili 0.4.  Has been taking prograf and pred 5 without missing doses.  She is busy w/ her family, says she feels like a \"taxi service\" - has felt noticeably more tired and not eating as well as she should.  She start to feel itchy on her back yesterday, had a restless night last night, more itchy.  Had a dose of clindamycin about 2 weeks ago.  Note to Dr. Muhammad asking to advise.  "

## 2019-05-16 NOTE — TELEPHONE ENCOUNTER
Dr. Muhammad asked me to speak to Dr. Diana to see if he could do ERCP.  Dr. Diana agreed, he will get her scheduled.  Lesli wants to be called on her cell phone w/date and time of procedure.

## 2019-05-20 ENCOUNTER — TELEPHONE (OUTPATIENT)
Dept: GASTROENTEROLOGY | Facility: CLINIC | Age: 79
End: 2019-05-20

## 2019-05-20 DIAGNOSIS — R79.89 ELEVATED LFTS: Primary | ICD-10-CM

## 2019-05-20 NOTE — TELEPHONE ENCOUNTER
Spoke to patient in regards to scheduled procedure. Informed patient she is scheduled with Dr. Mae on 5/29/19.  Informed patient she will need a  and someone to monitor her for 24 hours after the procedure. Confirmed location of the procedure with the patient. Informed patient all scheduling details will be sent to her MyChart per her request.     5/20/19 1022am

## 2019-05-29 ENCOUNTER — ANESTHESIA EVENT (OUTPATIENT)
Dept: SURGERY | Facility: CLINIC | Age: 79
End: 2019-05-29
Payer: COMMERCIAL

## 2019-05-29 ENCOUNTER — ANESTHESIA (OUTPATIENT)
Dept: SURGERY | Facility: CLINIC | Age: 79
End: 2019-05-29
Payer: COMMERCIAL

## 2019-05-29 ENCOUNTER — APPOINTMENT (OUTPATIENT)
Dept: GENERAL RADIOLOGY | Facility: CLINIC | Age: 79
End: 2019-05-29
Attending: INTERNAL MEDICINE
Payer: COMMERCIAL

## 2019-05-29 ENCOUNTER — HOSPITAL ENCOUNTER (OUTPATIENT)
Facility: CLINIC | Age: 79
Discharge: HOME | End: 2019-05-29
Attending: INTERNAL MEDICINE | Admitting: INTERNAL MEDICINE
Payer: COMMERCIAL

## 2019-05-29 VITALS
BODY MASS INDEX: 28.13 KG/M2 | WEIGHT: 143.3 LBS | DIASTOLIC BLOOD PRESSURE: 93 MMHG | TEMPERATURE: 97.7 F | HEART RATE: 72 BPM | HEIGHT: 60 IN | OXYGEN SATURATION: 96 % | SYSTOLIC BLOOD PRESSURE: 186 MMHG | RESPIRATION RATE: 16 BRPM

## 2019-05-29 LAB
ALBUMIN SERPL-MCNC: 3.4 G/DL (ref 3.4–5)
ALP SERPL-CCNC: 320 U/L (ref 40–150)
ALT SERPL W P-5'-P-CCNC: 57 U/L (ref 0–50)
AMYLASE SERPL-CCNC: 129 U/L (ref 30–110)
ANION GAP SERPL CALCULATED.3IONS-SCNC: 10 MMOL/L (ref 3–14)
AST SERPL W P-5'-P-CCNC: 43 U/L (ref 0–45)
BILIRUB SERPL-MCNC: 0.5 MG/DL (ref 0.2–1.3)
BUN SERPL-MCNC: 28 MG/DL (ref 7–30)
CALCIUM SERPL-MCNC: 8.6 MG/DL (ref 8.5–10.1)
CHLORIDE SERPL-SCNC: 101 MMOL/L (ref 94–109)
CO2 SERPL-SCNC: 23 MMOL/L (ref 20–32)
CREAT SERPL-MCNC: 1.16 MG/DL (ref 0.52–1.04)
ERCP: NORMAL
ERYTHROCYTE [DISTWIDTH] IN BLOOD BY AUTOMATED COUNT: 13.7 % (ref 10–15)
GFR SERPL CREATININE-BSD FRML MDRD: 45 ML/MIN/{1.73_M2}
GLUCOSE BLDC GLUCOMTR-MCNC: 70 MG/DL (ref 70–99)
GLUCOSE SERPL-MCNC: 76 MG/DL (ref 70–99)
HCT VFR BLD AUTO: 36.1 % (ref 35–47)
HGB BLD-MCNC: 11.6 G/DL (ref 11.7–15.7)
LIPASE SERPL-CCNC: 387 U/L (ref 73–393)
MCH RBC QN AUTO: 30.1 PG (ref 26.5–33)
MCHC RBC AUTO-ENTMCNC: 32.1 G/DL (ref 31.5–36.5)
MCV RBC AUTO: 94 FL (ref 78–100)
PLATELET # BLD AUTO: 236 10E9/L (ref 150–450)
POTASSIUM SERPL-SCNC: 3.9 MMOL/L (ref 3.4–5.3)
PROT SERPL-MCNC: 7.8 G/DL (ref 6.8–8.8)
RBC # BLD AUTO: 3.85 10E12/L (ref 3.8–5.2)
SODIUM SERPL-SCNC: 134 MMOL/L (ref 133–144)
WBC # BLD AUTO: 4.5 10E9/L (ref 4–11)

## 2019-05-29 PROCEDURE — 71000015 ZZH RECOVERY PHASE 1 LEVEL 2 EA ADDTL HR: Performed by: INTERNAL MEDICINE

## 2019-05-29 PROCEDURE — 71000014 ZZH RECOVERY PHASE 1 LEVEL 2 FIRST HR: Performed by: INTERNAL MEDICINE

## 2019-05-29 PROCEDURE — C1877 STENT, NON-COAT/COV W/O DEL: HCPCS | Performed by: INTERNAL MEDICINE

## 2019-05-29 PROCEDURE — 25000125 ZZHC RX 250: Performed by: INTERNAL MEDICINE

## 2019-05-29 PROCEDURE — 40000277 XR SURGERY CARM FLUORO LESS THAN 5 MIN W STILLS: Mod: TC

## 2019-05-29 PROCEDURE — 27210794 ZZH OR GENERAL SUPPLY STERILE: Performed by: INTERNAL MEDICINE

## 2019-05-29 PROCEDURE — 25000125 ZZHC RX 250: Performed by: NURSE ANESTHETIST, CERTIFIED REGISTERED

## 2019-05-29 PROCEDURE — 85027 COMPLETE CBC AUTOMATED: CPT | Performed by: INTERNAL MEDICINE

## 2019-05-29 PROCEDURE — 25000128 H RX IP 250 OP 636: Performed by: NURSE ANESTHETIST, CERTIFIED REGISTERED

## 2019-05-29 PROCEDURE — 25800030 ZZH RX IP 258 OP 636: Performed by: ANESTHESIOLOGY

## 2019-05-29 PROCEDURE — 40000170 ZZH STATISTIC PRE-PROCEDURE ASSESSMENT II: Performed by: INTERNAL MEDICINE

## 2019-05-29 PROCEDURE — 82962 GLUCOSE BLOOD TEST: CPT

## 2019-05-29 PROCEDURE — 93010 ELECTROCARDIOGRAM REPORT: CPT | Performed by: INTERNAL MEDICINE

## 2019-05-29 PROCEDURE — 83690 ASSAY OF LIPASE: CPT | Performed by: INTERNAL MEDICINE

## 2019-05-29 PROCEDURE — 71000027 ZZH RECOVERY PHASE 2 EACH 15 MINS: Performed by: INTERNAL MEDICINE

## 2019-05-29 PROCEDURE — 36000059 ZZH SURGERY LEVEL 3 EA 15 ADDTL MIN UMMC: Performed by: INTERNAL MEDICINE

## 2019-05-29 PROCEDURE — 25000128 H RX IP 250 OP 636: Performed by: INTERNAL MEDICINE

## 2019-05-29 PROCEDURE — 40000065 ZZH STATISTIC EKG NON-CHARGEABLE

## 2019-05-29 PROCEDURE — 82150 ASSAY OF AMYLASE: CPT | Performed by: INTERNAL MEDICINE

## 2019-05-29 PROCEDURE — 25000565 ZZH ISOFLURANE, EA 15 MIN: Performed by: INTERNAL MEDICINE

## 2019-05-29 PROCEDURE — 36415 COLL VENOUS BLD VENIPUNCTURE: CPT | Performed by: INTERNAL MEDICINE

## 2019-05-29 PROCEDURE — 37000008 ZZH ANESTHESIA TECHNICAL FEE, 1ST 30 MIN: Performed by: INTERNAL MEDICINE

## 2019-05-29 PROCEDURE — 25000128 H RX IP 250 OP 636: Performed by: ANESTHESIOLOGY

## 2019-05-29 PROCEDURE — 36000061 ZZH SURGERY LEVEL 3 W FLUORO 1ST 30 MIN - UMMC: Performed by: INTERNAL MEDICINE

## 2019-05-29 PROCEDURE — C1726 CATH, BAL DIL, NON-VASCULAR: HCPCS | Performed by: INTERNAL MEDICINE

## 2019-05-29 PROCEDURE — 80053 COMPREHEN METABOLIC PANEL: CPT | Performed by: INTERNAL MEDICINE

## 2019-05-29 PROCEDURE — 37000009 ZZH ANESTHESIA TECHNICAL FEE, EACH ADDTL 15 MIN: Performed by: INTERNAL MEDICINE

## 2019-05-29 DEVICE — IMPLANTABLE DEVICE: Type: IMPLANTABLE DEVICE | Site: BILE DUCT | Status: FUNCTIONAL

## 2019-05-29 RX ORDER — ONDANSETRON 4 MG/1
4 TABLET, ORALLY DISINTEGRATING ORAL EVERY 30 MIN PRN
Status: DISCONTINUED | OUTPATIENT
Start: 2019-05-29 | End: 2019-05-29 | Stop reason: HOSPADM

## 2019-05-29 RX ORDER — FENTANYL CITRATE 50 UG/ML
25-50 INJECTION, SOLUTION INTRAMUSCULAR; INTRAVENOUS
Status: DISCONTINUED | OUTPATIENT
Start: 2019-05-29 | End: 2019-05-29 | Stop reason: HOSPADM

## 2019-05-29 RX ORDER — LIDOCAINE 40 MG/G
CREAM TOPICAL
Status: DISCONTINUED | OUTPATIENT
Start: 2019-05-29 | End: 2019-05-29 | Stop reason: HOSPADM

## 2019-05-29 RX ORDER — LABETALOL 20 MG/4 ML (5 MG/ML) INTRAVENOUS SYRINGE
5 ONCE
Status: COMPLETED | OUTPATIENT
Start: 2019-05-29 | End: 2019-05-29

## 2019-05-29 RX ORDER — PROPOFOL 10 MG/ML
INJECTION, EMULSION INTRAVENOUS PRN
Status: DISCONTINUED | OUTPATIENT
Start: 2019-05-29 | End: 2019-05-29

## 2019-05-29 RX ORDER — LABETALOL 20 MG/4 ML (5 MG/ML) INTRAVENOUS SYRINGE
10 ONCE
Status: COMPLETED | OUTPATIENT
Start: 2019-05-29 | End: 2019-05-29

## 2019-05-29 RX ORDER — ONDANSETRON 2 MG/ML
INJECTION INTRAMUSCULAR; INTRAVENOUS PRN
Status: DISCONTINUED | OUTPATIENT
Start: 2019-05-29 | End: 2019-05-29

## 2019-05-29 RX ORDER — LABETALOL 20 MG/4 ML (5 MG/ML) INTRAVENOUS SYRINGE
10 ONCE
Status: DISCONTINUED | OUTPATIENT
Start: 2019-05-29 | End: 2019-05-29 | Stop reason: HOSPADM

## 2019-05-29 RX ORDER — SODIUM CHLORIDE, SODIUM LACTATE, POTASSIUM CHLORIDE, CALCIUM CHLORIDE 600; 310; 30; 20 MG/100ML; MG/100ML; MG/100ML; MG/100ML
INJECTION, SOLUTION INTRAVENOUS CONTINUOUS
Status: DISCONTINUED | OUTPATIENT
Start: 2019-05-29 | End: 2019-05-29 | Stop reason: HOSPADM

## 2019-05-29 RX ORDER — LEVOFLOXACIN 5 MG/ML
INJECTION, SOLUTION INTRAVENOUS PRN
Status: DISCONTINUED | OUTPATIENT
Start: 2019-05-29 | End: 2019-05-29

## 2019-05-29 RX ORDER — INDOMETHACIN 50 MG/1
100 SUPPOSITORY RECTAL
Status: COMPLETED | OUTPATIENT
Start: 2019-05-29 | End: 2019-05-29

## 2019-05-29 RX ORDER — HYDROMORPHONE HCL/0.9% NACL/PF 0.2MG/0.2
0.2 SYRINGE (ML) INTRAVENOUS EVERY 5 MIN PRN
Status: DISCONTINUED | OUTPATIENT
Start: 2019-05-29 | End: 2019-05-29 | Stop reason: HOSPADM

## 2019-05-29 RX ORDER — NALOXONE HYDROCHLORIDE 0.4 MG/ML
.1-.4 INJECTION, SOLUTION INTRAMUSCULAR; INTRAVENOUS; SUBCUTANEOUS
Status: DISCONTINUED | OUTPATIENT
Start: 2019-05-29 | End: 2019-05-29 | Stop reason: HOSPADM

## 2019-05-29 RX ORDER — FENTANYL CITRATE 50 UG/ML
INJECTION, SOLUTION INTRAMUSCULAR; INTRAVENOUS PRN
Status: DISCONTINUED | OUTPATIENT
Start: 2019-05-29 | End: 2019-05-29

## 2019-05-29 RX ORDER — ONDANSETRON 2 MG/ML
4 INJECTION INTRAMUSCULAR; INTRAVENOUS EVERY 30 MIN PRN
Status: DISCONTINUED | OUTPATIENT
Start: 2019-05-29 | End: 2019-05-29 | Stop reason: HOSPADM

## 2019-05-29 RX ADMIN — SODIUM CHLORIDE, POTASSIUM CHLORIDE, SODIUM LACTATE AND CALCIUM CHLORIDE: 600; 310; 30; 20 INJECTION, SOLUTION INTRAVENOUS at 09:29

## 2019-05-29 RX ADMIN — ROCURONIUM BROMIDE 40 MG: 10 INJECTION INTRAVENOUS at 09:55

## 2019-05-29 RX ADMIN — ONDANSETRON 4 MG: 2 INJECTION INTRAMUSCULAR; INTRAVENOUS at 09:55

## 2019-05-29 RX ADMIN — SUGAMMADEX 200 MG: 100 INJECTION, SOLUTION INTRAVENOUS at 10:38

## 2019-05-29 RX ADMIN — LABETALOL 20 MG/4 ML (5 MG/ML) INTRAVENOUS SYRINGE 5 MG: at 11:33

## 2019-05-29 RX ADMIN — PROPOFOL 100 MG: 10 INJECTION, EMULSION INTRAVENOUS at 09:48

## 2019-05-29 RX ADMIN — LABETALOL 20 MG/4 ML (5 MG/ML) INTRAVENOUS SYRINGE 10 MG: at 12:01

## 2019-05-29 RX ADMIN — LEVOFLOXACIN 500 MG: 5 INJECTION, SOLUTION INTRAVENOUS at 09:55

## 2019-05-29 RX ADMIN — FENTANYL CITRATE 50 MCG: 50 INJECTION, SOLUTION INTRAMUSCULAR; INTRAVENOUS at 09:48

## 2019-05-29 ASSESSMENT — MIFFLIN-ST. JEOR: SCORE: 1061.5

## 2019-05-29 NOTE — OR NURSING
BP (!) 196/94   Pulse 70   Temp 97.5  F (36.4  C) (Oral)   Resp 18   Ht 1.524 m (5')   Wt 65 kg (143 lb 4.8 oz)   SpO2 97%   BMI 27.99 kg/m      Time of notification: 12:37 PM  Provider notified: Dr. Zavala  Patient status: BP continues to be elevated, 205/93.  Orders received: Nothing new at this time.  Dr. Zavala okay with patient relaxing longer in phase 2. Okay with sending her home with elevated BP as she has been seen in clinic with these BP issues.

## 2019-05-29 NOTE — OR NURSING
BP (!) 191/94   Pulse 70   Temp 97.5  F (36.4  C) (Oral)   Resp 22   Ht 1.524 m (5')   Wt 65 kg (143 lb 4.8 oz)   SpO2 95%   BMI 27.99 kg/m      Time of notification: 11:58 AM  Provider notified: Dr. Zavala  Patient status: BP continues to be elevated.  Orders received: 10mg labetalol x1.  Dr. Zavala looking for sBP [id number] in order for patient to go home.  Still okay with patient transfering to phase 2.

## 2019-05-29 NOTE — ANESTHESIA CARE TRANSFER NOTE
Patient: Lesli Lorenzana    Procedure(s):  Endoscopic Retrograde Cholangiopancreatogram with bile duct spinchterotomy, balloon sweep of bile ducts for stone, balloon dilatation ofbileduct and bile duct stent placement    Diagnosis: Elevated Liver Function Test  Diagnosis Additional Information: No value filed.    Anesthesia Type:   No value filed.     Note:  Airway :Nasal Cannula  Patient transferred to:PACU  Comments: Waiting for RN.  Report to be given at their arrival, Spont resp, VSSHandoff Report: Identifed the Patient, Identified the Reponsible Provider, Reviewed the pertinent medical history, Discussed the surgical course, Reviewed Intra-OP anesthesia mangement and issues during anesthesia, Set expectations for post-procedure period and Allowed opportunity for questions and acknowledgement of understanding      Vitals: (Last set prior to Anesthesia Care Transfer)    CRNA VITALS  5/29/2019 1015 - 5/29/2019 1053      5/29/2019             NIBP:  178/91  (Abnormal)     Pulse:  67    Temp:  36.6  C (97.8  F)    SpO2:  99 %    Resp Rate (observed):  15                Electronically Signed By: RUSH Buck CRNA  May 29, 2019  10:53 AM

## 2019-05-29 NOTE — DISCHARGE INSTRUCTIONS
[facility]  Same-Day Surgery   Adult Discharge Orders & Instructions     For 24 hours after surgery    1. Get plenty of rest.  A responsible adult must stay with you for at least 24 hours after you leave the hospital.   2. Do not drive or use heavy equipment.  If you have weakness or tingling, don't drive or use heavy equipment until this feeling goes away.  3. Do not drink alcohol.  4. Avoid strenuous or risky activities.  Ask for help when climbing stairs.   5. You may feel lightheaded.  IF so, sit for a few minutes before standing.  Have someone help you get up.   6. If you have nausea (feel sick to your stomach): Drink only clear liquids such as apple juice, ginger ale, broth or 7-Up.  Rest may also help.  Be sure to drink enough fluids.  Move to a regular diet as you feel able.  7. You may have a slight fever. Call the doctor if your fever is over 100 F (37.7 C) (taken under the tongue) or lasts longer than 24 hours.  8. You may have a dry mouth, a sore throat, muscle aches or trouble sleeping.  These should go away after 24 hours.  9. Do not make important or legal decisions.   Call your doctor for any of the followin.  Signs of infection (fever, growing tenderness at the surgery site, a large amount of drainage or bleeding, severe pain, foul-smelling drainage, redness, swelling).    2. It has been over 8 to 10 hours since surgery and you are still not able to urinate (pass water).    3.  Headache for over 24 hours.    4.  Numbness, tingling or weakness the day after surgery (if you had spinal anesthesia).  To contact a doctor, call Dr. Guru Luu @ [phone] or:        [phone] and ask for the resident on call for          GI Medicine (answered 24 hours a day)      Emergency Department:    [facility]: [phone]       (TTY for hearing impaired: [phone])

## 2019-05-29 NOTE — ANESTHESIA POSTPROCEDURE EVALUATION
Anesthesia POST Procedure Evaluation    Patient: Lesli Lorenzana   MRN:     [id number] Gender:   female   Age:    78 year old :      1940        Preoperative Diagnosis: Elevated Liver Function Test   Procedure(s):  Endoscopic Retrograde Cholangiopancreatogram with bile duct spinchterotomy, balloon sweep of bile ducts for stone, balloon dilatation ofbileduct and bile duct stent placement   Postop Comments: No value filed.       Anesthesia Type:  General  No value filed.    Reportable Event: NO     PAIN: Uncomplicated   Sign Out status: Comfortable, Well controlled pain     PONV: No PONV   Sign Out status:  No Nausea or Vomiting     Neuro/Psych: Uneventful perioperative course   Sign Out Status: Preoperative baseline; Age appropriate mentation     Airway/Resp.: Uneventful perioperative course   Sign Out Status: Non labored breathing, age appropriate RR; Resp. Status within EXPECTED Parameters     CV: Uneventful perioperative course   Sign Out status: OTHER     Blood pressure:  HypERtension (mild/mod.) (white coat HTN pt will monitor BP at home and will return to ED if BP remains elevated or if she has sympotms from HTN)     Disposition:   Sign Out in:  PACU  Disposition:  Phase II; Home  Recovery Course: Uneventful  Follow-Up: Not required           Last Anesthesia Record Vitals:  CRNA VITALS  2019 1015 - 2019 1115      2019             NIBP:  164/76    Pulse:  65    SpO2:  98 %    Resp Rate (observed):  15    EKG:  Sinus rhythm          Last PACU Vitals:  Vitals Value Taken Time   /92 2019  1:00 PM   Temp 36.4  C (97.6  F) 2019 12:30 PM   Pulse 69 2019  1:00 PM   Resp 43 2019  1:04 PM   SpO2 97 % 2019  1:05 PM   Temp src     NIBP     Pulse     SpO2     Resp     Temp     Ht Rate     Temp 2     Vitals shown include unvalidated device data.      Electronically Signed By: Queenie Zavala MD, May 29, 2019, 2:16 PM

## 2019-05-29 NOTE — OR NURSING
Dr. Zavala at bedside to eval patient. Order for 5mg labatelol as BP is elevated to 187/98. Otherwise okay with transfer to phase 2.

## 2019-05-30 ENCOUNTER — TELEPHONE (OUTPATIENT)
Dept: TRANSPLANT | Facility: CLINIC | Age: 79
End: 2019-05-30

## 2019-05-30 DIAGNOSIS — Z94.4 LIVER REPLACED BY TRANSPLANT (H): Primary | ICD-10-CM

## 2019-05-30 NOTE — TELEPHONE ENCOUNTER
Pt had an ERCP 5/29/19 with stone removal and stent placed, called patient doing well, will go into clinic week of June 24 for x-ray and labs, x-ray to confirm stent passage. Orders placed.

## 2019-05-31 LAB — INTERPRETATION ECG - MUSE: NORMAL

## 2019-06-26 ENCOUNTER — ANCILLARY PROCEDURE (OUTPATIENT)
Dept: GENERAL RADIOLOGY | Facility: CLINIC | Age: 79
End: 2019-06-26
Attending: INTERNAL MEDICINE
Payer: COMMERCIAL

## 2019-06-26 DIAGNOSIS — Z94.4 LIVER REPLACED BY TRANSPLANT (H): ICD-10-CM

## 2019-06-26 DIAGNOSIS — T86.41 LIVER TRANSPLANT REJECTION (H): Primary | ICD-10-CM

## 2019-06-26 LAB
ALBUMIN SERPL-MCNC: 3.5 G/DL (ref 3.4–5)
ALP SERPL-CCNC: 126 U/L (ref 40–150)
ALT SERPL W P-5'-P-CCNC: 34 U/L (ref 0–50)
ANION GAP SERPL CALCULATED.3IONS-SCNC: 7 MMOL/L (ref 3–14)
AST SERPL W P-5'-P-CCNC: 32 U/L (ref 0–45)
BILIRUB DIRECT SERPL-MCNC: 0.1 MG/DL (ref 0–0.2)
BILIRUB SERPL-MCNC: 0.4 MG/DL (ref 0.2–1.3)
BUN SERPL-MCNC: 27 MG/DL (ref 7–30)
CALCIUM SERPL-MCNC: 8.5 MG/DL (ref 8.5–10.1)
CHLORIDE SERPL-SCNC: 98 MMOL/L (ref 94–109)
CO2 SERPL-SCNC: 25 MMOL/L (ref 20–32)
CREAT SERPL-MCNC: 1.34 MG/DL (ref 0.52–1.04)
ERYTHROCYTE [DISTWIDTH] IN BLOOD BY AUTOMATED COUNT: 13.4 % (ref 10–15)
GFR SERPL CREATININE-BSD FRML MDRD: 38 ML/MIN/{1.73_M2}
GLUCOSE SERPL-MCNC: 79 MG/DL (ref 70–99)
HCT VFR BLD AUTO: 36.6 % (ref 35–47)
HGB BLD-MCNC: 12.6 G/DL (ref 11.7–15.7)
MCH RBC QN AUTO: 30.7 PG (ref 26.5–33)
MCHC RBC AUTO-ENTMCNC: 34.4 G/DL (ref 31.5–36.5)
MCV RBC AUTO: 89 FL (ref 78–100)
PLATELET # BLD AUTO: 214 10E9/L (ref 150–450)
POTASSIUM SERPL-SCNC: 4.9 MMOL/L (ref 3.4–5.3)
PROT SERPL-MCNC: 7.3 G/DL (ref 6.8–8.8)
RBC # BLD AUTO: 4.1 10E12/L (ref 3.8–5.2)
SODIUM SERPL-SCNC: 130 MMOL/L (ref 133–144)
TACROLIMUS BLD-MCNC: 5 UG/L (ref 5–15)
TME LAST DOSE: NORMAL H
WBC # BLD AUTO: 4.8 10E9/L (ref 4–11)

## 2019-06-26 PROCEDURE — 36415 COLL VENOUS BLD VENIPUNCTURE: CPT | Performed by: INTERNAL MEDICINE

## 2019-06-26 PROCEDURE — 80197 ASSAY OF TACROLIMUS: CPT | Performed by: INTERNAL MEDICINE

## 2019-06-26 PROCEDURE — 74018 RADEX ABDOMEN 1 VIEW: CPT

## 2019-06-26 PROCEDURE — 85027 COMPLETE CBC AUTOMATED: CPT | Performed by: INTERNAL MEDICINE

## 2019-06-26 PROCEDURE — 80076 HEPATIC FUNCTION PANEL: CPT | Performed by: INTERNAL MEDICINE

## 2019-06-26 PROCEDURE — 80048 BASIC METABOLIC PNL TOTAL CA: CPT | Performed by: INTERNAL MEDICINE

## 2019-06-27 ENCOUNTER — TELEPHONE (OUTPATIENT)
Dept: GASTROENTEROLOGY | Facility: CLINIC | Age: 79
End: 2019-06-27

## 2019-06-27 ENCOUNTER — CARE COORDINATION (OUTPATIENT)
Dept: GASTROENTEROLOGY | Facility: CLINIC | Age: 79
End: 2019-06-27

## 2019-06-27 DIAGNOSIS — Z46.89 ENCOUNTER FOR REMOVAL OF PANCREATIC STENT: Primary | ICD-10-CM

## 2019-06-27 NOTE — TELEPHONE ENCOUNTER
Patient Name: Lesli Lorenzana   : 1940  MRN: [id number]       : [] N/A   [] Yes:  Language  /  ID:      Shanta Rausch RN  [facility] Endoscopy    Additional Information regarding appointment:      Patient scheduled for:  [x] EGD  [] Colonoscopy  [] EUS  [] Flex Sig       Indication for procedure. [x]  Other:  Stent removal    Sedation Type: [x] Conscious Sedation   [] MAC   [] None    Procedure Provider:   Dr. Luu      Referring Provider Dr. Luu    Arrival time verified:  11:30 am    Facility location verified:   [x][facility] - [address], 1st Floor, [address]    Pt meets medical necessity for outpatient procedure in hospital Endoscopy Unit:  N/a for insurer  [] [facility], 5th floor     [][facility] - [address], 3rd Floor Surgery check-in      Prep Type:   []Golytely eRx:  ;  [] MoviPrep:  , [] MiraLax:  , [] Other:    [x]NPO /p MN, No solid food /p 2200 the night before    Anticoagulants or blood thinners: []None [x] ASA 81mg  - may continue           [] Warfarin   [] Warfarin + Lovenox bridge [] Plavix [] Effient [] Eliquis         [] Xarelto  [] Brilinta [] NSAIDS  [] Other     LAST anticoagulant dose: Date/Time:    INR:      Electronic implanted devices: [x] No  [] IPG  []  ICD  []  LVAD  []      H&P / Pre op physical completed: [x] N/A, [] Complete, Date  , [] Scheduled, Date  , [] No,      Additional Information:  May need x ray prior to exam. Notified MD of this  and requested to confirm need.    _______________________________________________      Instructions given: [] Rec'd & Read   [x] Reviewed       [] Resent via POPAPPhart -       [] Resent via eMail -         Pre procedure teaching completed: [x] Yes - Reviewed, [] No,      [x] No questions regarding Sedation as ordered, []      Transportation from procedure & responsible adult to be with patient following procedure for a  minimum of 6 hrs (Conscious Sedation) 24 hrs (MAC): [] Yes   - confirmed will have post-procedure companionship as required, [x] Pending  , [] No      Shanta Rausch RN  [facility] Endoscopy

## 2019-06-27 NOTE — PROGRESS NOTES
Stent follow-up for ERCP with Dr. Luu    Images reviewed by provider: Biliary stent is present. Please schedule EGD in Unit [address] under moderate conscious sedation next tuesday     Called patient and she is aware of the recommendations. Advised her that endoscopy schedulers will reach out to go over prep, time/date.     CHARO El[address] Dr[address] Dr. Diana, & Dr. Luu  Advanced Endoscopy  [phone]

## 2019-07-02 ENCOUNTER — HOSPITAL ENCOUNTER (OUTPATIENT)
Facility: CLINIC | Age: 79
Discharge: HOME | End: 2019-07-02
Attending: INTERNAL MEDICINE | Admitting: INTERNAL MEDICINE
Payer: COMMERCIAL

## 2019-07-02 VITALS
DIASTOLIC BLOOD PRESSURE: 82 MMHG | RESPIRATION RATE: 14 BRPM | HEART RATE: 73 BPM | SYSTOLIC BLOOD PRESSURE: 134 MMHG | OXYGEN SATURATION: 96 %

## 2019-07-02 LAB — UPPER GI ENDOSCOPY: NORMAL

## 2019-07-02 PROCEDURE — 40000104 ZZH STATISTIC MODERATE SEDATION < 10 MIN: Performed by: INTERNAL MEDICINE

## 2019-07-02 PROCEDURE — 25000128 H RX IP 250 OP 636: Performed by: INTERNAL MEDICINE

## 2019-07-02 PROCEDURE — 25000132 ZZH RX MED GY IP 250 OP 250 PS 637: Performed by: INTERNAL MEDICINE

## 2019-07-02 PROCEDURE — 43247 EGD REMOVE FOREIGN BODY: CPT | Performed by: INTERNAL MEDICINE

## 2019-07-02 RX ORDER — SIMETHICONE
LIQUID (ML) MISCELLANEOUS PRN
Status: DISCONTINUED | OUTPATIENT
Start: 2019-07-02 | End: 2019-07-02 | Stop reason: HOSPADM

## 2019-07-02 RX ORDER — FENTANYL CITRATE 50 UG/ML
INJECTION, SOLUTION INTRAMUSCULAR; INTRAVENOUS PRN
Status: DISCONTINUED | OUTPATIENT
Start: 2019-07-02 | End: 2019-07-02 | Stop reason: HOSPADM

## 2019-07-08 ENCOUNTER — CARE COORDINATION (OUTPATIENT)
Dept: GASTROENTEROLOGY | Facility: CLINIC | Age: 79
End: 2019-07-08

## 2019-07-08 NOTE — PROGRESS NOTES
Post EGD (7/2/19) with Dr. Luu: Follow-up     Post procedure recommendations: No further ERCP is anticipated unless she has new symptoms or worsening LFTs     Orders placed: None at this time      Left message advising of the recommendations listed above from provider. Clinic number given to call with any concerning symptoms or questions.      CHARO Elmore Dr[address] Dr. Diana, & Dr. Luu  [facility]  [phone]

## 2019-07-17 ENCOUNTER — DOCUMENTATION ONLY (OUTPATIENT)
Dept: TRANSPLANT | Facility: CLINIC | Age: 79
End: 2019-07-17

## 2019-08-01 DIAGNOSIS — Z94.4 LIVER REPLACED BY TRANSPLANT (H): ICD-10-CM

## 2019-08-01 DIAGNOSIS — Z13.220 LIPID SCREENING: ICD-10-CM

## 2019-08-06 DIAGNOSIS — T86.41 LIVER TRANSPLANT REJECTION (H): ICD-10-CM

## 2019-08-06 DIAGNOSIS — Z94.4 LIVER TRANSPLANTED (H): ICD-10-CM

## 2019-08-06 DIAGNOSIS — Z94.4 LIVER REPLACED BY TRANSPLANT (H): ICD-10-CM

## 2019-08-06 RX ORDER — PREDNISONE 5 MG/1
5 TABLET ORAL DAILY
Qty: 90 TABLET | Refills: 3 | Status: SHIPPED | OUTPATIENT
Start: 2019-08-06 | End: 2020-08-04

## 2019-08-06 RX ORDER — TACROLIMUS 0.5 MG/1
CAPSULE ORAL
Qty: 360 CAPSULE | Refills: 3 | Status: SHIPPED | OUTPATIENT
Start: 2019-08-06 | End: 2020-08-04

## 2019-08-09 ENCOUNTER — TELEPHONE (OUTPATIENT)
Dept: CARDIOLOGY | Facility: CLINIC | Age: 79
End: 2019-08-09

## 2019-08-09 DIAGNOSIS — I10 ESSENTIAL HYPERTENSION WITH GOAL BLOOD PRESSURE LESS THAN 140/90: ICD-10-CM

## 2019-08-09 NOTE — TELEPHONE ENCOUNTER
Health Call Center    Phone Message    May a detailed message be left on voicemail: no    Reason for Call: Medication Question or concern regarding medication   Prescription Clarification  Name of Medication: hydrALAZINE (APRESOLINE) 25 MG tablet  Prescribing Provider: Dr. Lagunas   [facility]: [facility]    What on the order needs clarification? Pt doesn't have this script anymore and her blood pressure is getting high; last 2 readings were 162/94 and 176/100. Pt needs to know if she needs another script of this med or anything else. Please call her back.          Action Taken: Message routed to:  [facility] ([facility] CARDIOLOGY ADULT [facility]

## 2019-08-13 RX ORDER — HYDRALAZINE HYDROCHLORIDE 25 MG/1
25 TABLET, FILM COATED ORAL 3 TIMES DAILY
Qty: 90 TABLET | Refills: 3 | Status: SHIPPED | OUTPATIENT
Start: 2019-08-13 | End: 2020-04-27

## 2019-08-13 NOTE — TELEPHONE ENCOUNTER
"Called and spoke with Pt.  She noted that she was recently at a [employer] game and became very overheated.  She stated she fell and was evaluated by first aid and her BP was elevated, but her \"monitor\" was normal.  She stated she was intermittently monitoring her BP when she got home and her numbers were elevated as noted in the previous note from her initial call.  She noted also her BPs have since decreased back to a normal range with SBP in the 120-130s.  She stated the situation had her very nervous.      Discussed the Hydralazine prescription and how that can be used as a safety net for elevated BPs at 25 mg up to tid.  Pt requested a new prescription be sent to her pharmacy and she would pick it up and keep it on hand.  Order placed.    Pt verbalized understanding, agreed to current plan and denied any further questions.    Carol Powell LPN    "

## 2019-09-24 DIAGNOSIS — Z13.220 LIPID SCREENING: ICD-10-CM

## 2019-09-24 DIAGNOSIS — Z94.4 LIVER REPLACED BY TRANSPLANT (H): ICD-10-CM

## 2019-09-24 LAB
ALBUMIN SERPL-MCNC: 3.3 G/DL (ref 3.4–5)
ALP SERPL-CCNC: 140 U/L (ref 40–150)
ALT SERPL W P-5'-P-CCNC: 41 U/L (ref 0–50)
ANION GAP SERPL CALCULATED.3IONS-SCNC: 8 MMOL/L (ref 3–14)
AST SERPL W P-5'-P-CCNC: 38 U/L (ref 0–45)
BILIRUB DIRECT SERPL-MCNC: 0.1 MG/DL (ref 0–0.2)
BILIRUB SERPL-MCNC: 0.4 MG/DL (ref 0.2–1.3)
BUN SERPL-MCNC: 34 MG/DL (ref 7–30)
CALCIUM SERPL-MCNC: 8.9 MG/DL (ref 8.5–10.1)
CHLORIDE SERPL-SCNC: 100 MMOL/L (ref 94–109)
CO2 SERPL-SCNC: 26 MMOL/L (ref 20–32)
CREAT SERPL-MCNC: 1.37 MG/DL (ref 0.52–1.04)
ERYTHROCYTE [DISTWIDTH] IN BLOOD BY AUTOMATED COUNT: 13.3 % (ref 10–15)
GFR SERPL CREATININE-BSD FRML MDRD: 37 ML/MIN/{1.73_M2}
GLUCOSE SERPL-MCNC: 78 MG/DL (ref 70–99)
HCT VFR BLD AUTO: 34.2 % (ref 35–47)
HGB BLD-MCNC: 11.8 G/DL (ref 11.7–15.7)
MCH RBC QN AUTO: 31.3 PG (ref 26.5–33)
MCHC RBC AUTO-ENTMCNC: 34.5 G/DL (ref 31.5–36.5)
MCV RBC AUTO: 91 FL (ref 78–100)
PLATELET # BLD AUTO: 237 10E9/L (ref 150–450)
POTASSIUM SERPL-SCNC: 4.3 MMOL/L (ref 3.4–5.3)
PROT SERPL-MCNC: 7 G/DL (ref 6.8–8.8)
RBC # BLD AUTO: 3.77 10E12/L (ref 3.8–5.2)
SODIUM SERPL-SCNC: 134 MMOL/L (ref 133–144)
TACROLIMUS BLD-MCNC: 3.6 UG/L (ref 5–15)
TME LAST DOSE: 2010 H
WBC # BLD AUTO: 5.1 10E9/L (ref 4–11)

## 2019-09-24 PROCEDURE — 80197 ASSAY OF TACROLIMUS: CPT | Performed by: INTERNAL MEDICINE

## 2019-09-24 PROCEDURE — 80076 HEPATIC FUNCTION PANEL: CPT | Performed by: INTERNAL MEDICINE

## 2019-09-24 PROCEDURE — 80048 BASIC METABOLIC PNL TOTAL CA: CPT | Performed by: INTERNAL MEDICINE

## 2019-09-24 PROCEDURE — 36415 COLL VENOUS BLD VENIPUNCTURE: CPT | Performed by: INTERNAL MEDICINE

## 2019-09-24 PROCEDURE — 85027 COMPLETE CBC AUTOMATED: CPT | Performed by: INTERNAL MEDICINE

## 2019-09-27 ENCOUNTER — OFFICE VISIT (OUTPATIENT)
Dept: GASTROENTEROLOGY | Facility: CLINIC | Age: 79
End: 2019-09-27
Attending: INTERNAL MEDICINE
Payer: COMMERCIAL

## 2019-09-27 VITALS
HEART RATE: 78 BPM | OXYGEN SATURATION: 96 % | BODY MASS INDEX: 28.32 KG/M2 | WEIGHT: 145 LBS | TEMPERATURE: 97.4 F | SYSTOLIC BLOOD PRESSURE: 191 MMHG | DIASTOLIC BLOOD PRESSURE: 76 MMHG

## 2019-09-27 DIAGNOSIS — Z23 ENCOUNTER FOR IMMUNIZATION: ICD-10-CM

## 2019-09-27 DIAGNOSIS — Z94.4 LIVER REPLACED BY TRANSPLANT (H): Primary | ICD-10-CM

## 2019-09-27 PROCEDURE — 90662 IIV NO PRSV INCREASED AG IM: CPT | Mod: ZF | Performed by: INTERNAL MEDICINE

## 2019-09-27 PROCEDURE — 25000128 H RX IP 250 OP 636: Mod: ZF | Performed by: INTERNAL MEDICINE

## 2019-09-27 PROCEDURE — G0008 ADMIN INFLUENZA VIRUS VAC: HCPCS | Mod: ZF

## 2019-09-27 PROCEDURE — G0463 HOSPITAL OUTPT CLINIC VISIT: HCPCS | Mod: 25,ZF

## 2019-09-27 RX ADMIN — INFLUENZA A VIRUS A/MICHIGAN/45/2015 X-275 (H1N1) ANTIGEN (FORMALDEHYDE INACTIVATED), INFLUENZA A VIRUS A/SINGAPORE/INFIMH-16-0019/2016 IVR-186 (H3N2) ANTIGEN (FORMALDEHYDE INACTIVATED), AND INFLUENZA B VIRUS B/MARYLAND/15/2016 BX-69A (A B/COLORADO/6/2017-LIKE VIRUS) ANTIGEN (FORMALDEHYDE INACTIVATED) 0.5 ML: 60; 60; 60 INJECTION, SUSPENSION INTRAMUSCULAR at 10:57

## 2019-09-27 NOTE — PROGRESS NOTES
Chief Complaint   Patient presents with     Follow Up     1 year f/u     Blood pressure (!) 191/76, pulse 78, temperature 97.4  F (36.3  C), weight 65.8 kg (145 lb), SpO2 96 %, not currently breastfeeding.    Megan Pelayo, CMA

## 2019-09-27 NOTE — PROGRESS NOTES
HISTORY OF PRESENT ILLNESS:  I had the pleasure of seeing Lesli Lorenzana for followup in the Liver Transplant Clinic at the [facility] on 09/27/2019.  Ms. 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      She is doing well for the most part.  She denies any abdominal pain, itching or skin rash.  She has mild fatigue.  She denies any increased abdominal girth or lower extremity edema.  She denies any fevers or chills, cough or shortness of breath.  She denies any nausea, vomiting, diarrhea or constipation.  Her appetite has been good, and her weight has been stable.  There have been no other new events since she was last seen.      She does occasionally have some back pain, and occasionally, it does seem to go down the legs.  She had been diagnosed with spinal stenosis in the past and has had at least 2 steroid injections.     Current Outpatient Medications   Medication     aspirin 81 MG tablet     Blood Pressure Monitor KIT     calcium-vitamin D (CALTRATE) 600-400 MG-UNIT per tablet     clindamycin (CLEOCIN) 300 MG capsule     hydrALAZINE (APRESOLINE) 25 MG tablet     isosorbide mononitrate (IMDUR) 60 MG 24 hr tablet     lisinopril (PRINIVIL/ZESTRIL) 2.5 MG tablet     metoprolol tartrate (LOPRESSOR) 50 MG tablet     Multiple Vitamins-Minerals (PRESERVISION AREDS 2) CAPS     predniSONE (DELTASONE) 5 MG tablet     tacrolimus (GENERIC EQUIVALENT) 0.5 MG capsule     ferrous gluconate (FERGON) 324 (38 FE) MG tablet     MULTI-VITAMIN PO     nystatin-triamcinolone (MYCOLOG II) cream     predniSONE (DELTASONE) 20 MG tablet     Current Facility-Administered Medications   Medication     influenza Vac Split High-Dose (FLUZONE) injection 0.5 mL     BP (!) 191/76   Pulse 78   Temp 97.4  F (36.3  C)   Wt 65.8 kg (145 lb)   SpO2 96%   BMI 28.32 kg/m      PHYSICAL EXAMINATION:  In general, she looks quite well.  HEENT exam shows no scleral icterus  or temporal muscle wasting.  Chest is clear.  Abdominal exam shows no increase in girth.  No masses or tenderness to palpation are present.  Her liver is 10 cm in span without left lobe enlargement.  No spleen tip is palpable, and extremity exam shows no edema.  Skin exam shows no suspicious lesions.  Neurologic exam is nonfocal.     Recent Results (from the past 168 hour(s))   Tacrolimus level    Collection Time: 09/24/19  8:03 AM   Result Value Ref Range    Tacrolimus Last Dose 2,010     Tacrolimus Level 3.6 (L) 5.0 - 15.0 ug/L   Hepatic panel    Collection Time: 09/24/19  8:04 AM   Result Value Ref Range    Bilirubin Direct 0.1 0.0 - 0.2 mg/dL    Bilirubin Total 0.4 0.2 - 1.3 mg/dL    Albumin 3.3 (L) 3.4 - 5.0 g/dL    Protein Total 7.0 6.8 - 8.8 g/dL    Alkaline Phosphatase 140 40 - 150 U/L    ALT 41 0 - 50 U/L    AST 38 0 - 45 U/L   Basic metabolic panel    Collection Time: 09/24/19  8:04 AM   Result Value Ref Range    Sodium 134 133 - 144 mmol/L    Potassium 4.3 3.4 - 5.3 mmol/L    Chloride 100 94 - 109 mmol/L    Carbon Dioxide 26 20 - 32 mmol/L    Anion Gap 8 3 - 14 mmol/L    Glucose 78 70 - 99 mg/dL    Urea Nitrogen 34 (H) 7 - 30 mg/dL    Creatinine 1.37 (H) 0.52 - 1.04 mg/dL    GFR Estimate 37 (L) >60 mL/min/[1.73_m2]    GFR Estimate If Black 43 (L) >60 mL/min/[1.73_m2]    Calcium 8.9 8.5 - 10.1 mg/dL   CBC with platelets    Collection Time: 09/24/19  8:04 AM   Result Value Ref Range    WBC 5.1 4.0 - 11.0 10e9/L    RBC Count 3.77 (L) 3.8 - 5.2 10e12/L    Hemoglobin 11.8 11.7 - 15.7 g/dL    Hematocrit 34.2 (L) 35.0 - 47.0 %    MCV 91 78 - 100 fl    MCH 31.3 26.5 - 33.0 pg    MCHC 34.5 31.5 - 36.5 g/dL    RDW 13.3 10.0 - 15.0 %    Platelet Count 237 150 - 450 10e9/L      IMPRESSION:  My impression is that Ms. Lorenzana is 11-1/2 years status post liver transplantation and doing well.  Her liver tests are all completely normal.  She has had previous choledocholithiasis.  Her kidney function is stable.  Her blood  counts are good as well.  She is up to date with regard to vaccines after she gets a flu shot today.  She is up to date with regard to cancer screening as well.  Her last DEXA scan did show some osteopenia, although it seems as though the bone density in her spine is out of proportion to her hips and wrists.  My plan will be to see the patient back in the clinic in 6 months.      Thank you very much for allowing me to participate in the care of this patient.  If you have any questions regarding my recommendations, please do not hesitate to contact me.       Tucker Muhammad MD      Professor of Medicine  [employer]      Executive Medical Director, Solid Organ Transplant Program  [facility]

## 2019-09-27 NOTE — LETTER
9/27/2019       RE: Lesli Lorenzana  [id number] [address] [id number]     Dear Colleague,    Thank you for referring your patient, Lesli Lorenzana, to the [facility] HEPATOLOGY at [facility]. Please see a copy of my visit note below.    Chief Complaint   Patient presents with     Follow Up     1 year f/u     Blood pressure (!) 191/76, pulse 78, temperature 97.4  F (36.3  C), weight 65.8 kg (145 lb), SpO2 96 %, not currently breastfeeding.    Megan Pelayo CMA       HISTORY OF PRESENT ILLNESS:  I had the pleasure of seeing Lesli Lorenzana for followup in the Liver Transplant Clinic at the [facility] on 09/27/2019.  Ms. 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      She is doing well for the most part.  She denies any abdominal pain, itching or skin rash.  She has mild fatigue.  She denies any increased abdominal girth or lower extremity edema.  She denies any fevers or chills, cough or shortness of breath.  She denies any nausea, vomiting, diarrhea or constipation.  Her appetite has been good, and her weight has been stable.  There have been no other new events since she was last seen.      She does occasionally have some back pain, and occasionally, it does seem to go down the legs.  She had been diagnosed with spinal stenosis in the past and has had at least 2 steroid injections.     Current Outpatient Medications   Medication     aspirin 81 MG tablet     Blood Pressure Monitor KIT     calcium-vitamin D (CALTRATE) 600-400 MG-UNIT per tablet     clindamycin (CLEOCIN) 300 MG capsule     hydrALAZINE (APRESOLINE) 25 MG tablet     isosorbide mononitrate (IMDUR) 60 MG 24 hr tablet     lisinopril (PRINIVIL/ZESTRIL) 2.5 MG tablet     metoprolol tartrate (LOPRESSOR) 50 MG tablet     Multiple Vitamins-Minerals (PRESERVISION AREDS 2) CAPS     predniSONE (DELTASONE) 5 MG tablet      tacrolimus (GENERIC EQUIVALENT) 0.5 MG capsule     ferrous gluconate (FERGON) 324 (38 FE) MG tablet     MULTI-VITAMIN PO     nystatin-triamcinolone (MYCOLOG II) cream     predniSONE (DELTASONE) 20 MG tablet     Current Facility-Administered Medications   Medication     influenza Vac Split High-Dose (FLUZONE) injection 0.5 mL     BP (!) 191/76   Pulse 78   Temp 97.4  F (36.3  C)   Wt 65.8 kg (145 lb)   SpO2 96%   BMI 28.32 kg/m       PHYSICAL EXAMINATION:  In general, she looks quite well.  HEENT exam shows no scleral icterus or temporal muscle wasting.  Chest is clear.  Abdominal exam shows no increase in girth.  No masses or tenderness to palpation are present.  Her liver is 10 cm in span without left lobe enlargement.  No spleen tip is palpable, and extremity exam shows no edema.  Skin exam shows no suspicious lesions.  Neurologic exam is nonfocal.     Recent Results (from the past 168 hour(s))   Tacrolimus level    Collection Time: 09/24/19  8:03 AM   Result Value Ref Range    Tacrolimus Last Dose 2,010     Tacrolimus Level 3.6 (L) 5.0 - 15.0 ug/L   Hepatic panel    Collection Time: 09/24/19  8:04 AM   Result Value Ref Range    Bilirubin Direct 0.1 0.0 - 0.2 mg/dL    Bilirubin Total 0.4 0.2 - 1.3 mg/dL    Albumin 3.3 (L) 3.4 - 5.0 g/dL    Protein Total 7.0 6.8 - 8.8 g/dL    Alkaline Phosphatase 140 40 - 150 U/L    ALT 41 0 - 50 U/L    AST 38 0 - 45 U/L   Basic metabolic panel    Collection Time: 09/24/19  8:04 AM   Result Value Ref Range    Sodium 134 133 - 144 mmol/L    Potassium 4.3 3.4 - 5.3 mmol/L    Chloride 100 94 - 109 mmol/L    Carbon Dioxide 26 20 - 32 mmol/L    Anion Gap 8 3 - 14 mmol/L    Glucose 78 70 - 99 mg/dL    Urea Nitrogen 34 (H) 7 - 30 mg/dL    Creatinine 1.37 (H) 0.52 - 1.04 mg/dL    GFR Estimate 37 (L) >60 mL/min/[1.73_m2]    GFR Estimate If Black 43 (L) >60 mL/min/[1.73_m2]    Calcium 8.9 8.5 - 10.1 mg/dL   CBC with platelets    Collection Time: 09/24/19  8:04 AM   Result Value Ref  Range    WBC 5.1 4.0 - 11.0 10e9/L    RBC Count 3.77 (L) 3.8 - 5.2 10e12/L    Hemoglobin 11.8 11.7 - 15.7 g/dL    Hematocrit 34.2 (L) 35.0 - 47.0 %    MCV 91 78 - 100 fl    MCH 31.3 26.5 - 33.0 pg    MCHC 34.5 31.5 - 36.5 g/dL    RDW 13.3 10.0 - 15.0 %    Platelet Count 237 150 - 450 10e9/L      IMPRESSION:  My impression is that Ms. Lorenzana is 11-1/2 years status post liver transplantation and doing well.  Her liver tests are all completely normal.  She has had previous choledocholithiasis.  Her kidney function is stable.  Her blood counts are good as well.  She is up to date with regard to vaccines after she gets a flu shot today.  She is up to date with regard to cancer screening as well.  Her last DEXA scan did show some osteopenia, although it seems as though the bone density in her spine is out of proportion to her hips and wrists.  My plan will be to see the patient back in the clinic in 6 months.      Thank you very much for allowing me to participate in the care of this patient.  If you have any questions regarding my recommendations, please do not hesitate to contact me.       Tucker Muhammad MD      Professor of Medicine  [employer]      Executive Medical Director, Solid Organ Transplant Program  [facility]

## 2019-09-27 NOTE — NURSING NOTE
Injectable Influenza Immunization Documentation    1.  Has the patient received the information for the injectable influenza vaccine? YES     2. Is the patient 6 months of age or older? YES     3. Does the patient have any of the following contraindications?         Severe allergy to eggs? No     Severe allergic reaction to previous influenza vaccines? No   Severe allergy to latex? No       History of Guillain-[place] syndrome? No     Currently have a temperature greater than 100.4F? No        4.  Severely egg allergic patients should have flu vaccine eligibility assessed by an MD, RN, or pharmacist, and those who received flu vaccine should be observed for 15 min by an MD, RN, Pharmacist, Medic    5. Latex-allergic patients should be given latex-free influenza vaccine      Vaccination given by Ewa Stockton CMA     9/27/2019 10:58 AM

## 2019-10-15 NOTE — PROGRESS NOTES
[facility]  [address] [address] [phone]  Dept: [phone]    PRE-OP EVALUATION:  Today's date: 10/17/2019    Lesli Lorenzana (: 1940) presents for pre-operative evaluation assessment as requested by Dr. Patrick Riedel.  She requires evaluation and anesthesia risk assessment prior to undergoing surgery/procedure for treatment of Cataracts removed .    Fax number for surgical facility: [phone]  Primary Physician: Dee Awan  Type of Anesthesia Anticipated: to be determined    Patient has a Health Care Directive or Living Will:  YES     Preop Questions 10/17/2019   Who is doing your surgery? Dr.Patrick Riedel   What are you having done? cataracts removed.   Date of Surgery/Procedure: 2019   Facility or Hospital where procedure/surgery will be performed[facility]   1.  Do you have a history of Heart attack, stroke, stent, coronary bypass surgery, or other heart surgery? YES  - stent placed in    2.  Do you ever have any pain or discomfort in your chest? No   3.  Do you have a history of  Heart Failure? No   4.   Are you troubled by shortness of breath when:  walking on a level surface, or up a slight hill, or at night? No   5.  Do you currently have a cold, bronchitis or other respiratory infection? No   6.  Do you have a cough, shortness of breath, or wheezing? No   7.  Do you sometimes get pains in the calves of your legs when you walk? YES - intermittent, secondary to spinal stenosis    8. Do you or anyone in your family have previous history of blood clots? No   9.  Do you or does anyone in your family have a serious bleeding problem such as prolonged bleeding following surgeries or cuts? No   10. Have you ever had problems with anemia or been told to take iron pills? YES - had to take iron pills for a short period of time in the past   11. Have you had any abnormal blood loss such as black, tarry or bloody stools, or  abnormal vaginal bleeding? No   12. Have you ever had a blood transfusion? No   13. Have you or any of your relatives ever had problems with anesthesia? No   14. Do you have sleep apnea, excessive snoring or daytime drowsiness? No   15. Do you have any prosthetic heart valves? No   16. Do you have prosthetic joints? YES - artificial hip, left   17. Is there any chance that you may be pregnant? No         HPI:     HPI related to upcoming procedure:   Scheduled for bilateral cataract surgeries. Right eye will be done first on 10/22/2019t, then left eye will be done in 2 weeks.surgey will be done performed with a topical anesthesia and sedation.     History of PBC, s/p liver transplant in 2008, currently on Tacrolimus, and on prednisone 5 mg daily.following with dr. Muhammad.   Liver Function Studies -   Recent Labs   Lab Test 09/24/19  0804   PROTTOTAL 7.0   ALBUMIN 3.3*   BILITOTAL 0.4   ALKPHOS 140   AST 38   ALT 41     HYPERTENSION - Patient has longstanding history of moderate HTN , currently denies any symptoms referable to elevated blood pressure. Specifically denies chest pain, palpitations, dyspnea, orthopnea, PND or peripheral edema. Current medication : Lisinopril 2.5 mg, Lopressor 50 bid, Imdur 60 mg BID. She also takes Hydralazine if BP goes up to 190 as needed.    Patient denies any side effects of medication.                                                                                                                                                                        .                                                                                                   .  CAD - Patient has a longstanding history of mod-severe CAD. Patient denies recent chest pain or NTG use, denies exercise induced dyspnea or PND. Hx of stent placement 2009.                                                                                                                            .  RENAL INSUFFICIENCY - Patient has a  longstanding history of chronic renal insufficiency of moderate severity. Exacerbating triggers in the past have been hypovolemia.   Creatinine   Date Value Ref Range Status   09/24/2019 1.37 (H) 0.52 - 1.04 mg/dL Final     Stage 3 CKD.     HYPERLIPIDEMIA - Not on medications due to liver transplant , recommended by hepatology.   Angina Pectoris:    Cirrhosis of the liver: Hx of primary biliary cirrhosis. S/p liver transplant in 2008. currently doing well.     GERD: patient denies any current symptoms. Not on any medications.     Major organ transplant status: liver transplant.  Hx of primary biliary cirrhosis    Vascular disease:;CAD s/p stent placed in 2009.  Patient denies recent chest pain or NTG use, denies exercise induced dyspnea or PND.    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       Elevated transaminase level 08/10/2014     Priority: Medium     SNHL (sensory-neural hearing loss), asymmetrical 07/17/2014     Priority: Medium     Left shoulder pain 03/15/2014     Priority: Medium     Fatigue 03/20/2013     Priority: Medium     SOB (shortness of breath) 03/20/2013     Priority: Medium     Onychomycosis of toenail 01/28/2013     Priority: Medium     Right leg pain 07/10/2012     Priority: Medium     Osteopenia 05/16/2012     Priority: Medium     Hyperlipidemia LDL goal <70 05/15/2012     Priority: Medium     CKD (chronic kidney disease) stage 3, GFR 30-59 ml/min (H) 05/15/2012     Priority: Medium     Advance Care Planning 04/14/2011     Priority: Medium      Patient states has an ADand will bring in a copy to clinic       CAD (coronary artery disease) 01/01/2009     Priority: Medium     stent       History of liver transplant (H) 02/01/2008     Priority: Medium     (Problem list name updated by automated process. 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     Osteoarthritis of left hip      Osteopenia      Overweight(278.02)      Palpitations      Spinal stenosis      Past Surgical History:   Procedure Laterality Date     ARTHROPLASTY HIP Left 4/11/2017    Procedure: ARTHROPLASTY HIP;  Surgeon: Zhen Armstrong MD;  Location: [facility] OR     BIOPSY      liver      C STOMACH SURGERY PROCEDURE UNLISTED       COLONOSCOPY WITH CO2 INSUFFLATION N/A 9/15/2017    Procedure: COLONOSCOPY WITH CO2 INSUFFLATION;  Colonoscopy, History of colonic polyps, Ref by Muhammad, BMI 25, [place] [phone];  Surgeon: Dolly Hearn MD;  Location: [facility] OR     ENDOSCOPIC RETROGRADE CHOLANGIOPANCREATOGRAM N/A 7/17/2017    Procedure: COMBINED ENDOSCOPIC RETROGRADE CHOLANGIOPANCREATOGRAPHY, PLACE TUBE/STENT;  Endoscopic Retrograde Cholangiopancreatogram with bile duct spinchterotomy, ballon sweep of bile duct for stones, dilatation of bile duct;  Surgeon: Narendra Diana MD;  Location: [facility] OR     ENDOSCOPIC RETROGRADE CHOLANGIOPANCREATOGRAM N/A 5/29/2019    Procedure: Endoscopic Retrograde Cholangiopancreatogram with bile duct spinchterotomy, balloon sweep of bile ducts for stone, balloon dilatation ofbileduct and bile duct stent placement;  Surgeon: Guru Harpal Luu MD;  Location: [facility] OR     ESOPHAGOSCOPY, GASTROSCOPY, DUODENOSCOPY  (EGD), COMBINED N/A 7/2/2019    Procedure: ESOPHAGOGASTRODUODENOSCOPY, WITH FOREIGN BODY REMOVAL;  Surgeon: Guru Harpal Luu MD;  Location:  GI     GYN SURGERY      hysterectomy     SURGICAL HISTORY OF -       liver transplant 2008     SURGICAL HISTORY OF -       CAD with stent placement 2009     TRANSPLANT  02/03/2008    Liver transplant      Current Outpatient Medications   Medication Sig Dispense Refill     aspirin 81 MG tablet Take 1 tablet by mouth daily. 90 tablet 3     Blood Pressure Monitor KIT 1 each daily Monitor home blood pressure as instructed by physician.  Dispense [place] blood pressure kit. 1 kit 0     calcium-vitamin D (CALTRATE) 600-400 MG-UNIT per tablet Take 1 tablet by mouth daily       clindamycin (CLEOCIN) 300 MG capsule Take 2 capsules (600 mg) 1 hour before dental work. 2 capsule 3     hydrALAZINE (APRESOLINE) 25 MG tablet Take 1 tablet (25 mg) by mouth 3 times daily , as needed if systolic blood pressure (top number) is more than 180 mmHg. 90 tablet 3     isosorbide mononitrate (IMDUR) 60 MG 24 hr tablet Take 1 tablet (60 mg) by mouth 2 times daily 180 tablet 3     lisinopril (PRINIVIL/ZESTRIL) 2.5 MG tablet Take 1 tablet (2.5 mg) by mouth daily 90 tablet 3     metoprolol tartrate (LOPRESSOR) 50 MG tablet Take 1 tablet (50 mg) by mouth 2 times daily 180 tablet 3     Multiple Vitamins-Minerals (PRESERVISION AREDS 2) CAPS        predniSONE (DELTASONE) 5 MG tablet Take 1 tablet (5 mg) by mouth daily 90 tablet 3     tacrolimus (GENERIC EQUIVALENT) 0.5 MG capsule TAKE TWO CAPSULES BY MOUTH EVERY 12 HOURS 360 capsule 3     OTC products: no use of herbal medications or other supplements    Allergies   Allergen Reactions     Amlodipine Besylate Swelling     Edema in legs     Amoxicillin Rash      Latex Allergy: NO    Social History     Tobacco Use     Smoking status: Former Smoker     Packs/day: 0.50     Years: 10.00     Pack years: 5.00     Types: Cigarettes     Last attempt  to quit: 1975     Years since quittin.9     Smokeless tobacco: Never Used   Substance Use Topics     Alcohol use: No     Comment: Glass of wine occassionally     History   Drug Use No     Comment: Never       REVIEW OF SYSTEMS:   CONSTITUTIONAL: NEGATIVE for fever, chills, change in weight  INTEGUMENTARY/SKIN: NEGATIVE for worrisome rashes, moles or lesions  EYES: cataract  ENT/MOUTH: NEGATIVE for ear, mouth and throat problems  RESP: NEGATIVE for significant cough or SOB  BREAST: NEGATIVE for masses, tenderness or discharge  CV: NEGATIVE for chest pain, palpitations or peripheral edema  GI: NEGATIVE for nausea, abdominal pain, heartburn, or change in bowel habits  : NEGATIVE for frequency, dysuria, or hematuria  MUSCULOSKELETAL: NEGATIVE for significant arthralgias or myalgia  NEURO: NEGATIVE for weakness, dizziness or paresthesias  ENDOCRINE: NEGATIVE for temperature intolerance, skin/hair changes  HEME: NEGATIVE for bleeding problems  PSYCHIATRIC: anxiety     EXAM:   BP (!) 179/85   Pulse 78   Temp 97.7  F (36.5  C) (Oral)   Ht 1.524 m (5')   Wt 66.2 kg (146 lb)   SpO2 99%   BMI 28.51 kg/m      GENERAL APPEARANCE: healthy, alert and no distress     EYES: EOMI, PERRL     HENT: ear canals and TM's normal and nose and mouth without ulcers or lesions     NECK: no adenopathy, no asymmetry, masses, or scars and thyroid normal to palpation     RESP: lungs clear to auscultation - no rales, rhonchi or wheezes     CV: regular rates and rhythm, normal S1 S2, no S3 or S4 and no murmur, click or rub     ABDOMEN:  soft, nontender, no HSM or masses and bowel sounds normal     MS: extremities normal- no gross deformities noted, no evidence of inflammation in joints, FROM in all extremities.     SKIN: no suspicious lesions or rashes     NEURO: Normal strength and tone, sensory exam grossly normal, mentation intact and speech normal     PSYCH: mentation appears normal. and affect normal/bright     LYMPHATICS: No  cervical adenopathy    DIAGNOSTICS:   No labs or EKG required for low risk surgery (cataract, skin procedure, breast biopsy, etc)    Recent Labs   Lab Test 09/24/19  0804 06/26/19  0802  06/19/17  1208  05/08/17   HGB 11.8 12.6   < >  --    < >  --     214   < >  --    < >  --    INR  --   --   --  1.0  --  2.0    130*   < >  --    < >  --    POTASSIUM 4.3 4.9   < >  --    < >  --    CR 1.37* 1.34*   < >  --    < >  --     < > = values in this interval not displayed.        IMPRESSION:   Reason for surgery/procedure: cataract  The proposed surgical procedure is considered LOW risk.    REVISED CARDIAC RISK INDEX  The patient has the following serious cardiovascular risks for perioperative complications such as (MI, PE, VFib and 3  AV Block):  No serious cardiac risks  INTERPRETATION: 0 risks: Class I (very low risk - 0.4% complication rate)    The patient has the following additional risks for perioperative complications:  No identified additional risks      ICD-10-CM    1. Preop general physical exam Z01.818    2. CKD (chronic kidney disease) stage 3, GFR 30-59 ml/min (H) N18.3    3. Liver transplanted (H) Z94.4    4. Hyperlipidemia LDL goal <70 E78.5    5. Coronary artery disease involving native coronary artery of native heart without angina pectoris I25.10    6. Essential hypertension I10        RECOMMENDATIONS:       Cardiovascular Risk  Performs 4 METs exercise without symptoms (Light housework (dusting, washing dishes)) .   Patient is already on a Beta Blocker. Continue Betablocker therapy after surgery, using Beta blocker order set as necessary for NPO status.    --Patient is to take all scheduled medications on the day of surgery EXCEPT for modifications listed below.    APPROVAL GIVEN to proceed with proposed procedure, without further diagnostic evaluation       Signed Electronically by: Giovanni Ly MD    Copy of this evaluation report is provided to requesting physician.    [facility] Preop  Guidelines    Revised Cardiac Risk Index

## 2019-10-17 ENCOUNTER — OFFICE VISIT (OUTPATIENT)
Dept: FAMILY MEDICINE | Facility: CLINIC | Age: 79
End: 2019-10-17
Payer: COMMERCIAL

## 2019-10-17 VITALS
TEMPERATURE: 97.7 F | OXYGEN SATURATION: 99 % | BODY MASS INDEX: 28.66 KG/M2 | HEIGHT: 60 IN | WEIGHT: 146 LBS | HEART RATE: 78 BPM | SYSTOLIC BLOOD PRESSURE: 179 MMHG | DIASTOLIC BLOOD PRESSURE: 85 MMHG

## 2019-10-17 DIAGNOSIS — I25.10 CORONARY ARTERY DISEASE INVOLVING NATIVE CORONARY ARTERY OF NATIVE HEART WITHOUT ANGINA PECTORIS: ICD-10-CM

## 2019-10-17 DIAGNOSIS — Z01.818 PREOP GENERAL PHYSICAL EXAM: Primary | ICD-10-CM

## 2019-10-17 DIAGNOSIS — Z94.4 LIVER TRANSPLANTED (H): ICD-10-CM

## 2019-10-17 DIAGNOSIS — E78.5 HYPERLIPIDEMIA LDL GOAL <70: ICD-10-CM

## 2019-10-17 DIAGNOSIS — N18.30 CKD (CHRONIC KIDNEY DISEASE) STAGE 3, GFR 30-59 ML/MIN (H): ICD-10-CM

## 2019-10-17 DIAGNOSIS — I10 ESSENTIAL HYPERTENSION: ICD-10-CM

## 2019-10-17 PROCEDURE — 99207 C PAF COMPLETED  NO CHARGE: CPT | Performed by: FAMILY MEDICINE

## 2019-10-17 PROCEDURE — 99214 OFFICE O/P EST MOD 30 MIN: CPT | Performed by: FAMILY MEDICINE

## 2019-10-17 ASSESSMENT — MIFFLIN-ST. JEOR: SCORE: 1068.75

## 2019-11-08 ENCOUNTER — HEALTH MAINTENANCE LETTER (OUTPATIENT)
Age: 79
End: 2019-11-08

## 2019-11-09 ENCOUNTER — ANCILLARY PROCEDURE (OUTPATIENT)
Dept: MAMMOGRAPHY | Facility: CLINIC | Age: 79
End: 2019-11-09
Payer: COMMERCIAL

## 2019-11-09 DIAGNOSIS — Z12.31 VISIT FOR SCREENING MAMMOGRAM: ICD-10-CM

## 2019-11-09 PROCEDURE — 77067 SCR MAMMO BI INCL CAD: CPT | Mod: TC

## 2019-11-09 PROCEDURE — 77063 BREAST TOMOSYNTHESIS BI: CPT | Mod: TC

## 2019-11-21 ENCOUNTER — TELEPHONE (OUTPATIENT)
Dept: FAMILY MEDICINE | Facility: CLINIC | Age: 79
End: 2019-11-21

## 2019-11-21 NOTE — LETTER
Lesli Lorenzana  [address]  [place] [address]          November 21, 2019          Dear Lesli Lorenzana      Our records indicate that you have not scheduled for a(n)Blood pressure check  and Annual physical exam  which was recommended by your health care team. Monitoring and managing your preventative and chronic health conditions are very important to us.       If you have received your health care elsewhere, please provide us with that information so it can be documented in your chart.    Please call [phone] or message us through your [facility] account to schedule an appointment or provide information for your chart.     We look forward to seeing you and working with you on your health care needs.     Sincerely,   Dr. Ly/cristine          *If you have already scheduled an appointment, please disregard this reminder

## 2019-11-21 NOTE — TELEPHONE ENCOUNTER
Panel Management Review      Patient has the following on her problem list:     Hypertension   Last three blood pressure readings:  BP Readings from Last 3 Encounters:   10/17/19 (!) 179/85   09/27/19 (!) 191/76   07/02/19 134/82     Blood pressure: FAILED    HTN Guidelines:  Less than 140/90      Composite cancer screening  Chart review shows that this patient is due/due soon for the following None  Summary:    Patient is due/failing the following:   BP CHECK and PHYSICAL    Action needed:   Patient needs office visit for see above.    Type of outreach:    Sent letter.    Questions for provider review:    None                                                                                                                                    Lady Reyez [facility]       Chart routed to sent letter .

## 2019-12-10 DIAGNOSIS — Z94.4 LIVER REPLACED BY TRANSPLANT (H): ICD-10-CM

## 2019-12-10 DIAGNOSIS — Z13.220 LIPID SCREENING: ICD-10-CM

## 2019-12-10 LAB
ALBUMIN SERPL-MCNC: 3.3 G/DL (ref 3.4–5)
ALP SERPL-CCNC: 131 U/L (ref 40–150)
ALT SERPL W P-5'-P-CCNC: 36 U/L (ref 0–50)
ANION GAP SERPL CALCULATED.3IONS-SCNC: 6 MMOL/L (ref 3–14)
AST SERPL W P-5'-P-CCNC: 35 U/L (ref 0–45)
BILIRUB DIRECT SERPL-MCNC: 0.1 MG/DL (ref 0–0.2)
BILIRUB SERPL-MCNC: 0.3 MG/DL (ref 0.2–1.3)
BUN SERPL-MCNC: 30 MG/DL (ref 7–30)
CALCIUM SERPL-MCNC: 9.3 MG/DL (ref 8.5–10.1)
CHLORIDE SERPL-SCNC: 104 MMOL/L (ref 94–109)
CO2 SERPL-SCNC: 26 MMOL/L (ref 20–32)
CREAT SERPL-MCNC: 1.38 MG/DL (ref 0.52–1.04)
ERYTHROCYTE [DISTWIDTH] IN BLOOD BY AUTOMATED COUNT: 12.9 % (ref 10–15)
GFR SERPL CREATININE-BSD FRML MDRD: 37 ML/MIN/{1.73_M2}
GLUCOSE SERPL-MCNC: 80 MG/DL (ref 70–99)
HCT VFR BLD AUTO: 37.1 % (ref 35–47)
HGB BLD-MCNC: 12.5 G/DL (ref 11.7–15.7)
MCH RBC QN AUTO: 31.5 PG (ref 26.5–33)
MCHC RBC AUTO-ENTMCNC: 33.7 G/DL (ref 31.5–36.5)
MCV RBC AUTO: 94 FL (ref 78–100)
PLATELET # BLD AUTO: 214 10E9/L (ref 150–450)
POTASSIUM SERPL-SCNC: 4.4 MMOL/L (ref 3.4–5.3)
PROT SERPL-MCNC: 7.4 G/DL (ref 6.8–8.8)
RBC # BLD AUTO: 3.97 10E12/L (ref 3.8–5.2)
SODIUM SERPL-SCNC: 136 MMOL/L (ref 133–144)
TACROLIMUS BLD-MCNC: 5.1 UG/L (ref 5–15)
TME LAST DOSE: NORMAL H
WBC # BLD AUTO: 4.6 10E9/L (ref 4–11)

## 2019-12-10 PROCEDURE — 80197 ASSAY OF TACROLIMUS: CPT | Performed by: INTERNAL MEDICINE

## 2019-12-10 PROCEDURE — 36415 COLL VENOUS BLD VENIPUNCTURE: CPT | Performed by: INTERNAL MEDICINE

## 2019-12-10 PROCEDURE — 80076 HEPATIC FUNCTION PANEL: CPT | Performed by: INTERNAL MEDICINE

## 2019-12-10 PROCEDURE — 85027 COMPLETE CBC AUTOMATED: CPT | Performed by: INTERNAL MEDICINE

## 2019-12-10 PROCEDURE — 80048 BASIC METABOLIC PNL TOTAL CA: CPT | Performed by: INTERNAL MEDICINE

## 2019-12-21 NOTE — TELEPHONE ENCOUNTER
INR today was 1.7,  please call with instructions. She is with patient now.  
Nurse also reported patient in need of refill of warfarin. Will refill per protocol.Bhumika Snyder RN    
Spoke with nurse Cony. See anticoagulation encounter. Bhumika Snyder RN    
no tinnitus/no hearing difficulty/no ear pain

## 2020-02-21 ENCOUNTER — TELEPHONE (OUTPATIENT)
Dept: FAMILY MEDICINE | Facility: CLINIC | Age: 80
End: 2020-02-21

## 2020-02-21 NOTE — TELEPHONE ENCOUNTER
Patient Quality Outreach      Summary:    Patient is due/failing the following:   Hypertension follow-up visit and Annual wellness, date due: 7/25/2017    Type of outreach:    Sent letter.    Questions for provider review:    None                                                                                   **Start Working phrase here:**       Patient has the following on her problem list/HM:     Hypertension   Last three blood pressure readings:  BP Readings from Last 3 Encounters:   10/17/19 (!) 179/85   09/27/19 (!) 191/76   07/02/19 134/82     Blood pressure: Failed    HTN Guidelines:  ? 139/89                                                 Lady Reyez [facility]     Chart routed to sent letter.

## 2020-02-21 NOTE — LETTER
Lesli Lorenzana  [address]  [place] [address]          February 21, 2020          Dear Lesli Lorenzana      Our records indicate that you have not scheduled for a(n)Blood pressure check  and Annual physical exam  which was recommended by your health care team. Monitoring and managing your preventative and chronic health conditions are very important to us.       If you have received your health care elsewhere, please provide us with that information so it can be documented in your chart.    Please call [phone] or message us through your [facility] account to schedule an appointment or provide information for your chart.     We look forward to seeing you and working with you on your health care needs.     Sincerely,   Dee Awan MD/cristine          *If you have already scheduled an appointment, please disregard this reminder

## 2020-02-23 ENCOUNTER — HEALTH MAINTENANCE LETTER (OUTPATIENT)
Age: 80
End: 2020-02-23

## 2020-03-10 DIAGNOSIS — I25.10 CORONARY ARTERY DISEASE INVOLVING NATIVE CORONARY ARTERY OF NATIVE HEART WITHOUT ANGINA PECTORIS: ICD-10-CM

## 2020-03-10 DIAGNOSIS — E78.5 HYPERLIPIDEMIA LDL GOAL <70: ICD-10-CM

## 2020-03-10 DIAGNOSIS — Z94.4 LIVER REPLACED BY TRANSPLANT (H): ICD-10-CM

## 2020-03-10 DIAGNOSIS — Z13.220 LIPID SCREENING: ICD-10-CM

## 2020-03-10 DIAGNOSIS — I10 ESSENTIAL HYPERTENSION WITH GOAL BLOOD PRESSURE LESS THAN 140/90: ICD-10-CM

## 2020-03-10 DIAGNOSIS — N18.30 CKD (CHRONIC KIDNEY DISEASE) STAGE 3, GFR 30-59 ML/MIN (H): ICD-10-CM

## 2020-03-10 LAB
ALBUMIN SERPL-MCNC: 3.4 G/DL (ref 3.4–5)
ALBUMIN UR-MCNC: NEGATIVE MG/DL
ALP SERPL-CCNC: 127 U/L (ref 40–150)
ALT SERPL W P-5'-P-CCNC: 35 U/L (ref 0–50)
ANION GAP SERPL CALCULATED.3IONS-SCNC: 7 MMOL/L (ref 3–14)
APPEARANCE UR: CLEAR
AST SERPL W P-5'-P-CCNC: 37 U/L (ref 0–45)
BILIRUB SERPL-MCNC: 0.5 MG/DL (ref 0.2–1.3)
BILIRUB UR QL STRIP: NEGATIVE
BUN SERPL-MCNC: 31 MG/DL (ref 7–30)
CALCIUM SERPL-MCNC: 8.9 MG/DL (ref 8.5–10.1)
CHLORIDE SERPL-SCNC: 101 MMOL/L (ref 94–109)
CHOLEST SERPL-MCNC: 170 MG/DL
CO2 SERPL-SCNC: 25 MMOL/L (ref 20–32)
COLOR UR AUTO: YELLOW
CREAT SERPL-MCNC: 1.32 MG/DL (ref 0.52–1.04)
CREAT UR-MCNC: 28 MG/DL
ERYTHROCYTE [DISTWIDTH] IN BLOOD BY AUTOMATED COUNT: 13.1 % (ref 10–15)
GFR SERPL CREATININE-BSD FRML MDRD: 39 ML/MIN/{1.73_M2}
GLUCOSE SERPL-MCNC: 72 MG/DL (ref 70–99)
GLUCOSE UR STRIP-MCNC: NEGATIVE MG/DL
HCT VFR BLD AUTO: 38.2 % (ref 35–47)
HDLC SERPL-MCNC: 91 MG/DL
HGB BLD-MCNC: 13 G/DL (ref 11.7–15.7)
HGB UR QL STRIP: NEGATIVE
KETONES UR STRIP-MCNC: NEGATIVE MG/DL
LDLC SERPL CALC-MCNC: 54 MG/DL
LEUKOCYTE ESTERASE UR QL STRIP: ABNORMAL
MCH RBC QN AUTO: 31 PG (ref 26.5–33)
MCHC RBC AUTO-ENTMCNC: 34 G/DL (ref 31.5–36.5)
MCV RBC AUTO: 91 FL (ref 78–100)
MICROALBUMIN UR-MCNC: 88 MG/L
MICROALBUMIN/CREAT UR: 317.39 MG/G CR (ref 0–25)
NITRATE UR QL: NEGATIVE
NON-SQ EPI CELLS #/AREA URNS LPF: ABNORMAL /LPF
NONHDLC SERPL-MCNC: 79 MG/DL
PH UR STRIP: 6.5 PH (ref 5–7)
PLATELET # BLD AUTO: 246 10E9/L (ref 150–450)
POTASSIUM SERPL-SCNC: 4.4 MMOL/L (ref 3.4–5.3)
PROT SERPL-MCNC: 7.5 G/DL (ref 6.8–8.8)
PROT UR-MCNC: 0.14 G/L
PROT/CREAT 24H UR: 0.51 G/G CR (ref 0–0.2)
RBC # BLD AUTO: 4.2 10E12/L (ref 3.8–5.2)
RBC #/AREA URNS AUTO: ABNORMAL /HPF
SODIUM SERPL-SCNC: 133 MMOL/L (ref 133–144)
SOURCE: ABNORMAL
SP GR UR STRIP: <=1.005 (ref 1–1.03)
TRIGL SERPL-MCNC: 126 MG/DL
UROBILINOGEN UR STRIP-ACNC: 0.2 EU/DL (ref 0.2–1)
WBC # BLD AUTO: 5 10E9/L (ref 4–11)
WBC #/AREA URNS AUTO: ABNORMAL /HPF

## 2020-03-10 PROCEDURE — 84156 ASSAY OF PROTEIN URINE: CPT | Performed by: INTERNAL MEDICINE

## 2020-03-10 PROCEDURE — 85027 COMPLETE CBC AUTOMATED: CPT | Performed by: INTERNAL MEDICINE

## 2020-03-10 PROCEDURE — 80053 COMPREHEN METABOLIC PANEL: CPT | Performed by: INTERNAL MEDICINE

## 2020-03-10 PROCEDURE — 81001 URINALYSIS AUTO W/SCOPE: CPT | Performed by: INTERNAL MEDICINE

## 2020-03-10 PROCEDURE — 80197 ASSAY OF TACROLIMUS: CPT | Performed by: INTERNAL MEDICINE

## 2020-03-10 PROCEDURE — 82043 UR ALBUMIN QUANTITATIVE: CPT | Performed by: INTERNAL MEDICINE

## 2020-03-10 PROCEDURE — 36415 COLL VENOUS BLD VENIPUNCTURE: CPT | Performed by: INTERNAL MEDICINE

## 2020-03-10 PROCEDURE — 80061 LIPID PANEL: CPT | Performed by: INTERNAL MEDICINE

## 2020-03-11 LAB
TACROLIMUS BLD-MCNC: 4.9 UG/L (ref 5–15)
TME LAST DOSE: ABNORMAL H

## 2020-03-16 ENCOUNTER — MYC REFILL (OUTPATIENT)
Dept: CARDIOLOGY | Facility: CLINIC | Age: 80
End: 2020-03-16

## 2020-03-16 DIAGNOSIS — I25.83 CORONARY ARTERY DISEASE DUE TO LIPID RICH PLAQUE: ICD-10-CM

## 2020-03-16 DIAGNOSIS — I25.10 CORONARY ARTERY DISEASE DUE TO LIPID RICH PLAQUE: ICD-10-CM

## 2020-03-16 DIAGNOSIS — I15.9 SECONDARY HYPERTENSION WITH GOAL BLOOD PRESSURE LESS THAN 140/90: ICD-10-CM

## 2020-03-16 RX ORDER — ISOSORBIDE MONONITRATE 60 MG/1
60 TABLET, EXTENDED RELEASE ORAL 2 TIMES DAILY
Qty: 180 TABLET | Refills: 0 | Status: SHIPPED | OUTPATIENT
Start: 2020-03-16 | End: 2020-04-27

## 2020-03-20 ENCOUNTER — TELEPHONE (OUTPATIENT)
Dept: GASTROENTEROLOGY | Facility: CLINIC | Age: 80
End: 2020-03-20

## 2020-03-20 NOTE — TELEPHONE ENCOUNTER
Called pt, ivelisse explained about COVID 19 epidemic and not wanting patient to come to clinic and appt with Dr Muhammad will be a phone visit, she confirmed

## 2020-03-27 ENCOUNTER — VIRTUAL VISIT (OUTPATIENT)
Dept: GASTROENTEROLOGY | Facility: CLINIC | Age: 80
End: 2020-03-27
Attending: INTERNAL MEDICINE
Payer: COMMERCIAL

## 2020-03-27 DIAGNOSIS — Z94.4 LIVER REPLACED BY TRANSPLANT (H): Primary | ICD-10-CM

## 2020-04-15 DIAGNOSIS — I25.119 CORONARY ARTERY DISEASE INVOLVING NATIVE HEART WITH ANGINA PECTORIS, UNSPECIFIED VESSEL OR LESION TYPE (H): ICD-10-CM

## 2020-04-15 DIAGNOSIS — I10 ESSENTIAL HYPERTENSION WITH GOAL BLOOD PRESSURE LESS THAN 140/90: ICD-10-CM

## 2020-04-16 NOTE — TELEPHONE ENCOUNTER
METOPROLOL TARTRATE 50MG TABS      Last Written Prescription Date:  3/14/2019  Last Fill Quantity: 180,   # refills: 3  Last Office Visit : 3/14/2019  Future Office visit:  4/27/2020  Routing refill request to provider for review/approval because:  Blood pressure out of range       Refer to clinic for review  10/17/19 (!) 179/85   09/27/19 (!) 191/76   07/02/19 134/82       Cary Chen RN  Central Triage Red Flags/Med Refills

## 2020-04-17 ENCOUNTER — HOSPITAL ENCOUNTER (OUTPATIENT)
Dept: OCCUPATIONAL THERAPY | Facility: CLINIC | Age: 80
Setting detail: THERAPIES SERIES
End: 2020-04-17
Attending: PEDIATRICS
Payer: COMMERCIAL

## 2020-04-17 ENCOUNTER — HOSPITAL ENCOUNTER (EMERGENCY)
Facility: CLINIC | Age: 80
Discharge: HOME | End: 2020-04-17
Payer: COMMERCIAL

## 2020-04-17 ENCOUNTER — ANCILLARY PROCEDURE (OUTPATIENT)
Dept: GENERAL RADIOLOGY | Facility: CLINIC | Age: 80
End: 2020-04-17
Attending: PEDIATRICS
Payer: COMMERCIAL

## 2020-04-17 ENCOUNTER — OFFICE VISIT (OUTPATIENT)
Dept: ORTHOPEDICS | Facility: CLINIC | Age: 80
End: 2020-04-17
Payer: COMMERCIAL

## 2020-04-17 VITALS
SYSTOLIC BLOOD PRESSURE: 199 MMHG | TEMPERATURE: 98 F | WEIGHT: 145 LBS | RESPIRATION RATE: 18 BRPM | HEART RATE: 73 BPM | OXYGEN SATURATION: 95 % | DIASTOLIC BLOOD PRESSURE: 109 MMHG | BODY MASS INDEX: 28.32 KG/M2

## 2020-04-17 VITALS
SYSTOLIC BLOOD PRESSURE: 132 MMHG | WEIGHT: 145 LBS | BODY MASS INDEX: 28.47 KG/M2 | HEIGHT: 60 IN | DIASTOLIC BLOOD PRESSURE: 88 MMHG

## 2020-04-17 DIAGNOSIS — S69.92XA HAND INJURY, LEFT, INITIAL ENCOUNTER: ICD-10-CM

## 2020-04-17 DIAGNOSIS — S69.92XA HAND INJURY, LEFT, INITIAL ENCOUNTER: Primary | ICD-10-CM

## 2020-04-17 PROCEDURE — 97760 ORTHOTIC MGMT&TRAING 1ST ENC: CPT | Mod: GO | Performed by: OCCUPATIONAL THERAPIST

## 2020-04-17 PROCEDURE — 99204 OFFICE O/P NEW MOD 45 MIN: CPT | Performed by: PEDIATRICS

## 2020-04-17 PROCEDURE — 73130 X-RAY EXAM OF HAND: CPT | Mod: LT

## 2020-04-17 ASSESSMENT — MIFFLIN-ST. JEOR: SCORE: 1064.22

## 2020-04-17 NOTE — PROGRESS NOTES
[facility]      OUTPATIENT OCCUPATIONAL THERAPY HAND EVALUATION  PLAN OF TREATMENT FOR OUTPATIENT REHABILITATION  (COMPLETE FOR INITIAL CLAIMS ONLY)  Patient's Last Name, First Name, M.I.  YOB: 1940  Lesli Lorenzana                        Provider s Name: [facility] Medical Record No.  [id number]     Onset Date: 04/15/20    Start of Care Date: 04/17/20   Type:     ___PT  _X_OT   ___SLP    Medical Diagnosis: Left Hand Injury   Occupational Therapy Diagnosis:  decreased ADL's    Visits from SOC: 1      _________________________________________________________________________________  Plan of Treatment/Functional Goals:  Planned Therapy Interventions:        Goals  1.  Goal Identifier: do doff splint       Goal Description: patient to be able to don doff splint ind       Target Date: 04/17/20       Treatment Frequency: Therapy Frequency: 1x  Predicated Duration of Therapy Intervention:  Predicted Duration of Therapy Intervention (days/wks): 1week    Elizabet Miller OTR/L CHT  Occupational Therapist, Certified Hand Therapist         I CERTIFY THE NEED FOR THESE SERVICES FURNISHED UNDER        THIS PLAN OF TREATMENT AND WHILE UNDER MY CARE     (Physician co-signature of this document indicates review and certification of the therapy plan).                Certification Period:  04/17/20 to 05/17/20            Referring Physician:  Dr. Harris    Initial Assessment        See Epic Evaluation Start of Care Date: 04/17/20

## 2020-04-17 NOTE — PROGRESS NOTES
Sports Medicine Clinic Visit    PCP: Dee Awan Harriett is a 79 year old female who is seen  as a self referral presenting with left hand pain following pulling up her socks and felt a click or pop across the dorsal aspect of her MCP joints 4/15/20.  She has continued to have pain and swelling over the MCP joints, most prominent over the long and ring fingers.    No issue with her hand in the past.   She is right hand dominant.     Does take a daily prednisone for liver transplant     Injury: pulling up her socks   **  Long finger worst.  Noted a pop sensation around long finger MCP joint.  Pain, tenderness dorsal aspect.    Location of Pain: left hand, MCP joints   Duration of Pain: 2 day(s)  Rating of Pain at worst: 10/10  Rating of Pain Currently: 2/10  Symptoms are better with: Nothing  Symptoms are worse with: flexion   Additional Features:   Positive: swelling and popping   Negative: bruising, grinding, catching, locking, instability, paresthesias, numbness, weakness, pain in other joints and systemic symptoms  Other evaluation and/or treatments so far consists of: Nothing  Prior History of related problems: denires     Social History: retired     Review of Systems  Musculoskeletal: as above  Remainder of review of systems is negative including constitutional, CV, pulmonary, GI, Skin and Neurologic except as noted in HPI or medical history.    Past Medical History:   Diagnosis Date     Anemia      Arthritis      CAD (coronary artery disease) 1/2009    stent     Cholelithiasis     gallbladder removed with liver in 2008     Hallux valgus      History of blood transfusion      HTN (hypertension)      Hyperlipidemia LDL goal < 100      Liver transplant 2/2008    due to Primary biliary cirrhosis     Osteoarthritis of left hip      Osteopenia      Overweight(278.02)      Palpitations      Spinal stenosis      Past Surgical History:   Procedure Laterality Date     ARTHROPLASTY HIP Left 4/11/2017     Procedure: ARTHROPLASTY HIP;  Surgeon: Zhen Arsmtrong MD;  [facility]: [facility]     BIOPSY      liver      C STOMACH SURGERY PROCEDURE UNLISTED       COLONOSCOPY WITH CO2 INSUFFLATION N/A 9/15/2017    Procedure: COLONOSCOPY WITH CO2 INSUFFLATION;  Colonoscopy, History of colonic polyps, Ref by Muhammad, BMI 25, [place] [id number];  Surgeon: Dolly Hearn MD;  Location: [facility]     ENDOSCOPIC RETROGRADE CHOLANGIOPANCREATOGRAM N/A 7/17/2017    Procedure: COMBINED ENDOSCOPIC RETROGRADE CHOLANGIOPANCREATOGRAPHY, PLACE TUBE/STENT;  Endoscopic Retrograde Cholangiopancreatogram with bile duct spinchterotomy, ballon sweep of bile duct for stones, dilatation of bile duct;  Surgeon: Narendra Diana MD;  [facility]: [facility]     ENDOSCOPIC RETROGRADE CHOLANGIOPANCREATOGRAM N/A 5/29/2019    Procedure: Endoscopic Retrograde Cholangiopancreatogram with bile duct spinchterotomy, balloon sweep of bile ducts for stone, balloon dilatation ofbileduct and bile duct stent placement;  Surgeon: Guru Harpal Luu MD;  [facility]     ESOPHAGOSCOPY, GASTROSCOPY, DUODENOSCOPY (EGD), COMBINED N/A 7/2/2019    Procedure: ESOPHAGOGASTRODUODENOSCOPY, WITH FOREIGN BODY REMOVAL;  Surgeon: Guru Harpal Luu MD;  [facility]: [facility]     GYN SURGERY      hysterectomy     SURGICAL HISTORY OF -       liver transplant 2008     SURGICAL HISTORY OF -       CAD with stent placement 2009     TRANSPLANT  02/03/2008    Liver transplant      Family History   Problem Relation Age of Onset     Cancer Father         unknown     C.A.D. Father      Thyroid Disease Father      Cancer Mother         lung--smoker     Other Cancer Mother         lung     Thyroid Disease Mother      Cerebrovascular Disease Maternal Grandmother         ,     Unknown/Adopted Paternal Grandfather      Social History     Socioeconomic History     Marital status:      Spouse name: Not on file     Number of children: 3     Years of education: Not on  file     Highest education level: Not on file   Occupational History     Employer: RETIRED   Social Needs     Financial resource strain: Not on file     Food insecurity     Worry: Not on file     Inability: Not on file     Transportation needs     Medical: Not on file     Non-medical: Not on file   Tobacco Use     Smoking status: Former Smoker     Packs/day: 0.50     Years: 10.00     Pack years: 5.00     Types: Cigarettes     Last attempt to quit: 1975     Years since quittin.4     Smokeless tobacco: Never Used   Substance and Sexual Activity     Alcohol use: No     Comment: Glass of wine occassionally     Drug use: No     Comment: Never     Sexual activity: Not Currently   Lifestyle     Physical activity     Days per week: Not on file     Minutes per session: Not on file     Stress: Not on file   Relationships     Social connections     Talks on phone: Not on file     Gets together: Not on file     Attends Temple service: Not on file     Active member of club or organization: Not on file     Attends meetings of clubs or organizations: Not on file     Relationship status: Not on file     Intimate partner violence     Fear of current or ex partner: Not on file     Emotionally abused: Not on file     Physically abused: Not on file     Forced sexual activity: Not on file   Other Topics Concern     Parent/sibling w/ CABG, MI or angioplasty before 65F 55M? Not Asked      Service Not Asked     Blood Transfusions Yes     Comment:      Caffeine Concern Not Asked     Occupational Exposure Not Asked     Hobby Hazards Not Asked     Sleep Concern Not Asked     Stress Concern Not Asked     Weight Concern Not Asked     Special Diet Not Asked     Back Care Not Asked     Exercise No     Bike Helmet Not Asked     Seat Belt Not Asked     Self-Exams Not Asked   Social History Narrative    Lives alone with her dog. . Three grown children, very supportive and all live close by.  6 grandchildren. Feels  safe in her environment.       Objective  /88   Ht 1.524 m (5')   Wt 65.8 kg (145 lb)   BMI 28.32 kg/m      GENERAL APPEARANCE: healthy, alert and no distress   GAIT: NORMAL  SKIN: no suspicious lesions or rashes  NEURO: Normal strength and tone, mentation intact and speech normal  PSYCH:  mentation appears normal and affect normal/bright    Exam:    Hand/wrist (left):    Inspection:  Appearance of degenerative change multiple digits.  Mild-mod diffuse dorsal hand swelling. Diffuse dorsal hand ecchymosis.    Motion:  Forearm , wrist grossly full; mild dorsal discomfort with dorsal hand discomfort in flexion  Digit motion mild limitation composite flexion long finger, some pain  Able to actively extend and flex all digits  No tendon subluxation apparent, though rather swollen  Tenodesis grasp intact    Strength:  Able to resist all digits    Sensation:  Grossly intact light touch.    Radial pulses normal, +2/4, capillary refill brisk.    Palpation:  Tender dorsal hand, distal 3rd MC, long finger MCP joint, long finger extensor tendon      Skin:       well perfused       capillary refill brisk    Radiology:  Visualized radiographs of left hand obtained today, and reviewed the images with the patient.  Impression: no fracture. + degenerative changes.    Recent Results (from the past 744 hour(s))   XR Hand Left G/E 3 Views    Narrative    LEFT HAND THREE VIEWS   4/17/2020 11:07 AM    HISTORY: Left hand pain after injury.    COMPARISON: None.      Impression    IMPRESSION: No fracture or acute abnormality is demonstrated. There  are advanced degenerative changes in several IP joints of the fingers.  No other abnormality is noted.     FADI KING MD         Assessment:  1. Hand injury, left, initial encounter         Plan:  Discussed the assessment with the patient.  Extensor strain vs favor long finger sagittal band injury.  Splinting would help strain calm down, and would treat sagittal band injury.  Plan splinting, with OT referral placed for splint, anticipate yoke.  Icing, OTC meds prn.  Activity modification reviewed.  Follow up: 3-4 weeks by phone or MyChart, sooner prn.  Questions answered. The patient indicates understanding of these issues and agrees with the plan.    Luis Felipe Harris DO, CAQ              Patient Instructions   Favor sagittal band injury, vs strain  Icing, over the counter medication if needed  Discussed splinting for this; splint discussed today for short term use, and plan to refer to hand therapy for a custom splint  Contact clinic by phone or MyChart in 3-4 weeks with update, sooner if needed      Disclaimer: This note consists of symbols derived from keyboarding, dictation and/or voice recognition software. As a result, there may be errors in the script that have gone undetected. Please consider this when interpreting information found in this chart.

## 2020-04-17 NOTE — PROGRESS NOTES
04/17/20  Hand Therapy Splint note   Quick Adds   Quick Adds Certification   Therapy Certification   Certification date from 04/17/20   Certification date to 05/17/20   Medical Diagnosis Left Hand Injury   Certification I certify the need for these services furnished under this plan of treatment and while under my care.  (Physician co-signature of this document indicates review and certification of the therapy plan).   Splint Fabrication   Type of Splint yoke splint fabrication   Wearing schedule full time   Comments wear and care instructions given   Minutes of treatment 25   General Information/History   Start Of Care Date 04/17/20   Referring Physician Dr. Harris   Orders Other   Other Orders Yoke splint   Orders Date 04/17/20   Medical Diagnosis Left hand injury   Additional Occupational Profile Info/Pertinent history of current problem Patient hurt hand while trying to pull up socks 2 days ago   Onset date of current episode/exacerbation 04/15/20   Fall Risk Screen   Fall screen completed by OT   Have you fallen 2 or more times in the past year? No   Have you fallen and had an injury in the past year? No   Is patient a fall risk? No   Abuse Screen (yes response referral indicated)   Feels Unsafe at Home or Work/School no   Feels Threatened by Someone no   Does Anyone Try to Keep You From Having Contact with Others or Doing Things Outside Your Home? no   Physical Signs of Abuse Present no   Clinical Impression   Criteria for Skilled Therapeutic Interventions Met treatment indicated   OT Diagnosis decreased ADL's   Therapy Frequency 1x   Predicted Duration of Therapy Intervention (days/wks) week   Risks and Benefits of Treatment have been explained. Yes   Patient, Family & other staff in agreement with plan of care Yes   Clinical Impression Comments fits well   Hand Eval Goals   Hand Eval Goals 1weeek   Hand Goal 1   Goal Identifier do doff splint   Goal Description patient to be able to don doff splint ind    Target Date 04/17/20   Date Met 04/17/20   Elizabet Miller OTR/L CHT  Occupational Therapist, Certified Hand Therapist

## 2020-04-17 NOTE — ED NOTES
Pt was supposed to check in for OT services for hand injury. OT made aware and coming to get patient. Please disregard ED visit.

## 2020-04-17 NOTE — LETTER
4/17/2020         RE: Lesli Lorenzana  [id number] [id number]        Dear Colleague,    Thank you for referring your patient, Lesli Lorenzana, to the [facility]. Please see a copy of my visit note below.    Sports Medicine Clinic Visit    PCP: Dee Awan    Lesli Lorenzana is a 79 year old female who is seen  as a self referral presenting with left hand pain following pulling up her socks and felt a click or pop across the dorsal aspect of her MCP joints 4/15/20.  She has continued to have pain and swelling over the MCP joints, most prominent over the long and ring fingers.    No issue with her hand in the past.   She is right hand dominant.     Does take a daily prednisone for liver transplant     Injury: pulling up her socks   **  Long finger worst.  Noted a pop sensation around long finger MCP joint.  Pain, tenderness dorsal aspect.    Location of Pain: left hand, MCP joints   Duration of Pain: 2 day(s)  Rating of Pain at worst: 10/10  Rating of Pain Currently: 2/10  Symptoms are better with: Nothing  Symptoms are worse with: flexion   Additional Features:   Positive: swelling and popping   Negative: bruising, grinding, catching, locking, instability, paresthesias, numbness, weakness, pain in other joints and systemic symptoms  Other evaluation and/or treatments so far consists of: Nothing  Prior History of related problems: denires     Social History: retired     Review of Systems  Musculoskeletal: as above  Remainder of review of systems is negative including constitutional, CV, pulmonary, GI, Skin and Neurologic except as noted in HPI or medical history.    Past Medical History:   Diagnosis Date     Anemia      Arthritis      CAD (coronary artery disease) 1/2009    stent     Cholelithiasis     gallbladder removed with liver in 2008     Hallux valgus      History of blood transfusion      HTN (hypertension)      Hyperlipidemia LDL goal <  100      Liver transplant 2/2008    due to Primary biliary cirrhosis     Osteoarthritis of left hip      Osteopenia      Overweight(278.02)      Palpitations      Spinal stenosis      Past Surgical History:   Procedure Laterality Date     ARTHROPLASTY HIP Left 4/11/2017    Procedure: ARTHROPLASTY HIP;  Surgeon: Zhen Armstrong MD;  [facility]     BIOPSY      liver      C STOMACH SURGERY PROCEDURE UNLISTED       COLONOSCOPY WITH CO2 INSUFFLATION N/A 9/15/2017    Procedure: COLONOSCOPY WITH CO2 INSUFFLATION;  Colonoscopy, History of colonic polyps, Ref by Jb, BMI 25, [place] [id number];  Surgeon: Dolly Hearn MD;  Location: [facility]     ENDOSCOPIC RETROGRADE CHOLANGIOPANCREATOGRAM N/A 7/17/2017    Procedure: COMBINED ENDOSCOPIC RETROGRADE CHOLANGIOPANCREATOGRAPHY, PLACE TUBE/STENT;  Endoscopic Retrograde Cholangiopancreatogram with bile duct spinchterotomy, ballon sweep of bile duct for stones, dilatation of bile duct;  Surgeon: Narendra Diana MD;  [facility]     ENDOSCOPIC RETROGRADE CHOLANGIOPANCREATOGRAM N/A 5/29/2019    Procedure: Endoscopic Retrograde Cholangiopancreatogram with bile duct spinchterotomy, balloon sweep of bile ducts for stone, balloon dilatation ofbileduct and bile duct stent placement;  Surgeon: Guru Harpal Luu MD;  Location:  OR     ESOPHAGOSCOPY, GASTROSCOPY, DUODENOSCOPY (EGD), COMBINED N/A 7/2/2019    Procedure: ESOPHAGOGASTRODUODENOSCOPY, WITH FOREIGN BODY REMOVAL;  Surgeon: Guru Harpal Luu MD;  Location:  GI     GYN SURGERY      hysterectomy     SURGICAL HISTORY OF -       liver transplant 2008     SURGICAL HISTORY OF -       CAD with stent placement 2009     TRANSPLANT  02/03/2008    Liver transplant      Family History   Problem Relation Age of Onset     Cancer Father         unknown     C.A.D. Father      Thyroid Disease Father      Cancer Mother         lung--smoker     Other Cancer Mother         lung     Thyroid  Disease Mother      Cerebrovascular Disease Maternal Grandmother         ,     Unknown/Adopted Paternal Grandfather      Social History     Socioeconomic History     Marital status:      Spouse name: Not on file     Number of children: 3     Years of education: Not on file     Highest education level: Not on file   Occupational History     Employer: RETIRED   Social Needs     Financial resource strain: Not on file     Food insecurity     Worry: Not on file     Inability: Not on file     Transportation needs     Medical: Not on file     Non-medical: Not on file   Tobacco Use     Smoking status: Former Smoker     Packs/day: 0.50     Years: 10.00     Pack years: 5.00     Types: Cigarettes     Last attempt to quit: 1975     Years since quittin.4     Smokeless tobacco: Never Used   Substance and Sexual Activity     Alcohol use: No     Comment: Glass of wine occassionally     Drug use: No     Comment: Never     Sexual activity: Not Currently   Lifestyle     Physical activity     Days per week: Not on file     Minutes per session: Not on file     Stress: Not on file   Relationships     Social connections     Talks on phone: Not on file     Gets together: Not on file     Attends Evangelical service: Not on file     Active member of club or organization: Not on file     Attends meetings of clubs or organizations: Not on file     Relationship status: Not on file     Intimate partner violence     Fear of current or ex partner: Not on file     Emotionally abused: Not on file     Physically abused: Not on file     Forced sexual activity: Not on file   Other Topics Concern     Parent/sibling w/ CABG, MI or angioplasty before 65F 55M? Not Asked      Service Not Asked     Blood Transfusions Yes     Comment: 2007     Caffeine Concern Not Asked     Occupational Exposure Not Asked     Hobby Hazards Not Asked     Sleep Concern Not Asked     Stress Concern Not Asked     Weight Concern Not Asked     Special Diet  Not Asked     Back Care Not Asked     Exercise No     Bike Helmet Not Asked     Seat Belt Not Asked     Self-Exams Not Asked   Social History Narrative    Lives alone with her dog. . Three grown children, very supportive and all live close by.  6 grandchildren. Feels safe in her environment.       Objective  /88   Ht 1.524 m (5')   Wt 65.8 kg (145 lb)   BMI 28.32 kg/m      GENERAL APPEARANCE: healthy, alert and no distress   GAIT: NORMAL  SKIN: no suspicious lesions or rashes  NEURO: Normal strength and tone, mentation intact and speech normal  PSYCH:  mentation appears normal and affect normal/bright    Exam:    Hand/wrist (left):    Inspection:  Appearance of degenerative change multiple digits.  Mild-mod diffuse dorsal hand swelling. Diffuse dorsal hand ecchymosis.    Motion:  Forearm , wrist grossly full; mild dorsal discomfort with dorsal hand discomfort in flexion  Digit motion mild limitation composite flexion long finger, some pain  Able to actively extend and flex all digits  No tendon subluxation apparent, though rather swollen  Tenodesis grasp intact    Strength:  Able to resist all digits    Sensation:  Grossly intact light touch.    Radial pulses normal, +2/4, capillary refill brisk.    Palpation:  Tender dorsal hand, distal 3rd MC, long finger MCP joint, long finger extensor tendon      Skin:       well perfused       capillary refill brisk    Radiology:  Visualized radiographs of left hand obtained today, and reviewed the images with the patient.  Impression: no fracture. + degenerative changes.    Recent Results (from the past 744 hour(s))   XR Hand Left G/E 3 Views    Narrative    LEFT HAND THREE VIEWS   4/17/2020 11:07 AM    HISTORY: Left hand pain after injury.    COMPARISON: None.      Impression    IMPRESSION: No fracture or acute abnormality is demonstrated. There  are advanced degenerative changes in several IP joints of the fingers.  No other abnormality is noted.     FADI  MD CHRISTINE         Assessment:  1. Hand injury, left, initial encounter         Plan:  Discussed the assessment with the patient.  Extensor strain vs favor long finger sagittal band injury.  Splinting would help strain calm down, and would treat sagittal band injury. Plan splinting, with OT referral placed for splint, anticipate yoke.  Icing, OTC meds prn.  Activity modification reviewed.  Follow up: 3-4 weeks by phone or MyChart, sooner prn.  Questions answered. The patient indicates understanding of these issues and agrees with the plan.    Luis Felipe Harris DO, WALLY              Patient Instructions   Favor sagittal band injury, vs strain  Icing, over the counter medication if needed  Discussed splinting for this; splint discussed today for short term use, and plan to refer to hand therapy for a custom splint  Contact clinic by phone or MyChart in 3-4 weeks with update, sooner if needed      Disclaimer: This note consists of symbols derived from keyboarding, dictation and/or voice recognition software. As a result, there may be errors in the script that have gone undetected. Please consider this when interpreting information found in this chart.          Again, thank you for allowing me to participate in the care of your patient.        Sincerely,        Luis Felipe Harris DO

## 2020-04-17 NOTE — PATIENT INSTRUCTIONS
Favor sagittal band injury, vs strain  Icing, over the counter medication if needed  Discussed splinting for this; splint discussed today for short term use, and plan to refer to hand therapy for a custom splint  Contact clinic by phone or MyChart in 3-4 weeks with update, sooner if needed

## 2020-04-20 RX ORDER — METOPROLOL TARTRATE 50 MG
50 TABLET ORAL 2 TIMES DAILY
Qty: 180 TABLET | Refills: 1 | Status: SHIPPED | OUTPATIENT
Start: 2020-04-20 | End: 2020-04-27

## 2020-04-27 ENCOUNTER — VIRTUAL VISIT (OUTPATIENT)
Dept: CARDIOLOGY | Facility: CLINIC | Age: 80
End: 2020-04-27
Attending: INTERNAL MEDICINE
Payer: COMMERCIAL

## 2020-04-27 DIAGNOSIS — N18.30 CKD (CHRONIC KIDNEY DISEASE) STAGE 3, GFR 30-59 ML/MIN (H): ICD-10-CM

## 2020-04-27 DIAGNOSIS — I25.10 CORONARY ARTERY DISEASE DUE TO LIPID RICH PLAQUE: ICD-10-CM

## 2020-04-27 DIAGNOSIS — I15.9 SECONDARY HYPERTENSION WITH GOAL BLOOD PRESSURE LESS THAN 140/90: ICD-10-CM

## 2020-04-27 DIAGNOSIS — I25.119 CORONARY ARTERY DISEASE INVOLVING NATIVE HEART WITH ANGINA PECTORIS, UNSPECIFIED VESSEL OR LESION TYPE (H): ICD-10-CM

## 2020-04-27 DIAGNOSIS — I25.10 CORONARY ARTERY DISEASE INVOLVING NATIVE HEART WITHOUT ANGINA PECTORIS, UNSPECIFIED VESSEL OR LESION TYPE: ICD-10-CM

## 2020-04-27 DIAGNOSIS — E78.5 HYPERLIPIDEMIA LDL GOAL <70: ICD-10-CM

## 2020-04-27 DIAGNOSIS — I25.83 CORONARY ARTERY DISEASE DUE TO LIPID RICH PLAQUE: ICD-10-CM

## 2020-04-27 DIAGNOSIS — I25.10 CORONARY ARTERY DISEASE INVOLVING NATIVE CORONARY ARTERY OF NATIVE HEART WITHOUT ANGINA PECTORIS: ICD-10-CM

## 2020-04-27 DIAGNOSIS — I10 ESSENTIAL HYPERTENSION WITH GOAL BLOOD PRESSURE LESS THAN 140/90: ICD-10-CM

## 2020-04-27 PROCEDURE — 99214 OFFICE O/P EST MOD 30 MIN: CPT | Mod: 95 | Performed by: INTERNAL MEDICINE

## 2020-04-27 RX ORDER — METOPROLOL TARTRATE 50 MG
50 TABLET ORAL 2 TIMES DAILY
Qty: 180 TABLET | Refills: 3 | Status: SHIPPED | OUTPATIENT
Start: 2020-04-27 | End: 2021-07-15

## 2020-04-27 RX ORDER — LISINOPRIL 2.5 MG/1
2.5 TABLET ORAL DAILY
Qty: 90 TABLET | Refills: 3 | Status: SHIPPED | OUTPATIENT
Start: 2020-04-27 | End: 2021-04-29

## 2020-04-27 RX ORDER — HYDRALAZINE HYDROCHLORIDE 25 MG/1
12.5 TABLET, FILM COATED ORAL DAILY PRN
Qty: 45 TABLET | Refills: 3 | Status: SHIPPED | OUTPATIENT
Start: 2020-04-27 | End: 2021-10-11

## 2020-04-27 RX ORDER — ISOSORBIDE MONONITRATE 60 MG/1
60 TABLET, EXTENDED RELEASE ORAL 2 TIMES DAILY
Qty: 180 TABLET | Refills: 3 | Status: SHIPPED | OUTPATIENT
Start: 2020-04-27 | End: 2021-06-16

## 2020-04-27 ASSESSMENT — PAIN SCALES - GENERAL: PAINLEVEL: NO PAIN (0)

## 2020-04-27 NOTE — PATIENT INSTRUCTIONS
Patient Instructions:  It was a pleasure to see you in the cardiology clinic today.      If you have any questions, you can reach my nurse, Carol NAGY LPN, at [phone].  Press Option #1 for the [facility], and then press Option #4 for nursing.    We are encouraging the use of Razzhart to communicate with your HealthCare Provider    Medication Changes: Start Hydralazine 12.5 mg every day as needed for SBP (top number) greater than 150 mmHg.    Recommendations: Monitor daily blood pressure and pulse.  Send 1-2 weeks worth of readings to clinic if you continue to have elevated readings.    Studies Ordered: None.    The results from today include: Labs.    Please follow up: With Dr. Lagunas in one year with fasting labs prior.    Sincerely,    Nain Lagunas MD     If you have an urgent need after hours (8:00 am to 4:30 pm) please call [phone] and ask for the cardiology fellow on call.

## 2020-04-27 NOTE — PROGRESS NOTES
"Lesli Lorenzana is a 79 year old female who is being evaluated via a billable telephone visit.      The patient has been notified of following:     \"This telephone visit will be conducted via a call between you and your physician/provider. We have found that certain health care needs can be provided without the need for a physical exam.  This service lets us provide the care you need with a short phone conversation.  If a prescription is necessary we can send it directly to your pharmacy.  If lab work is needed we can place an order for that and you can then stop by our lab to have the test done at a later time.    Telephone visits are billed at different rates depending on your insurance coverage. During this emergency period, for some insurers they may be billed the same as an in-person visit.  Please reach out to your insurance provider with any questions.    If during the course of the call the physician/provider feels a telephone visit is not appropriate, you will not be charged for this service.\"    Patient has given verbal consent for Telephone visit?  Yes    HPI:  I had the privilege to evaluate Ms Lesli Lorenzana, who is a 79yr old female patient      Lesli Lorenzana is a 78 year old with a past medical history significant for liver transplant in 2008, total hip replacement in 2017, hypertension, hyperlipidemia, CAD with NANCY to OM1 in 2009 and CKD who presents for routine cardiology follow-up.     Since her last visit she has been doing quite well.   Her biggest complaint is back pain  pain and left calf pain with prolonged standing from her spinal stenosis.   She denies CP, SOB, palpitations, LE edema, orthopnea, PND.       Her home SBPs show a tendency to go up to above 150s mmHg.    ROS of 10 systems are negative except complaints formulated in HPI.    I reviewed the medications    Labs   Ref. Range 3/10/2020 07:49   Sodium Latest Ref Range: 133 - 144 mmol/L 133   Potassium Latest Ref Range: 3.4 - " 5.3 mmol/L 4.4   Chloride Latest Ref Range: 94 - 109 mmol/L 101   Carbon Dioxide Latest Ref Range: 20 - 32 mmol/L 25   Urea Nitrogen Latest Ref Range: 7 - 30 mg/dL 31 (H)   Creatinine Latest Ref Range: 0.52 - 1.04 mg/dL 1.32 (H)   GFR Estimate Latest Ref Range: >60 mL/min/1.73_m2 39 (L)   GFR Estimate If Black Latest Ref Range: >60 mL/min/1.73_m2 45 (L)   Calcium Latest Ref Range: 8.5 - 10.1 mg/dL 8.9   Anion Gap Latest Ref Range: 3 - 14 mmol/L 7   Albumin Latest Ref Range: 3.4 - 5.0 g/dL 3.4   Protein Total Latest Ref Range: 6.8 - 8.8 g/dL 7.5   Bilirubin Total Latest Ref Range: 0.2 - 1.3 mg/dL 0.5   Alkaline Phosphatase Latest Ref Range: 40 - 150 U/L 127   ALT Latest Ref Range: 0 - 50 U/L 35   AST Latest Ref Range: 0 - 45 U/L 37   Cholesterol Latest Ref Range: <200 mg/dL 170   HDL Cholesterol Latest Ref Range: >49 mg/dL 91   LDL Cholesterol Calculated Latest Ref Range: <100 mg/dL 54   Non HDL Cholesterol Latest Ref Range: <130 mg/dL 79   Triglycerides Latest Ref Range: <150 mg/dL 126   Glucose Latest Ref Range: 70 - 99 mg/dL 72        Ref. Range 3/10/2020 07:56   WBC Latest Ref Range: 4.0 - 11.0 10e9/L 5.0   Hemoglobin Latest Ref Range: 11.7 - 15.7 g/dL 13.0   Hematocrit Latest Ref Range: 35.0 - 47.0 % 38.2   Platelet Count Latest Ref Range: 150 - 450 10e9/L 246   RBC Count Latest Ref Range: 3.8 - 5.2 10e12/L 4.20   MCV Latest Ref Range: 78 - 100 fl 91   MCH Latest Ref Range: 26.5 - 33.0 pg 31.0   MCHC Latest Ref Range: 31.5 - 36.5 g/dL 34.0   RDW Latest Ref Range: 10.0 - 15.0 % 13.1      Ref. Range 3/10/2020 08:12   Albumin Urine mg/g Cr Latest Ref Range: 0 - 25 mg/g Cr 317.39 (H)   Albumin Urine mg/L Latest Units: mg/L 88   Creatinine Urine Latest Units: mg/dL 28   Protein Random Urine Latest Units: g/L 0.14   Protein Total Urine g/gr Creatinine Latest Ref Range: 0 - 0.2 g/g Cr 0.51 (H)      Ref. Range 3/10/2020 08:12   Color Urine Unknown Yellow   Appearance Urine Unknown Clear   Glucose Urine Latest Ref Range:  NEG^Negative mg/dL Negative   Bilirubin Urine Latest Ref Range: NEG^Negative  Negative   Ketones Urine Latest Ref Range: NEG^Negative mg/dL Negative   Specific Gravity Urine Latest Ref Range: 1.003 - 1.035  <=1.005   pH Urine Latest Ref Range: 5.0 - 7.0 pH 6.5   Protein Albumin Urine Latest Ref Range: NEG^Negative mg/dL Negative   Urobilinogen Urine Latest Ref Range: 0.2 - 1.0 EU/dL 0.2   Nitrite Urine Latest Ref Range: NEG^Negative  Negative   Blood Urine Latest Ref Range: NEG^Negative  Negative   Leukocyte Esterase Urine Latest Ref Range: NEG^Negative  Trace (A)   Source Unknown Midstream Urine   WBC Urine Latest Ref Range: OTO5^0 - 5 /HPF 0 - 5   RBC Urine Latest Ref Range: OTO2^O - 2 /HPF O - 2   Squamous Epithelial /LPF Urine Latest Ref Range: FEW^Few /LPF Few     Assessment and Plan:    I disucssed the results with patient.  We disucssed the importance of a heart healthy lifestyle and diet.    Medication Changes: Start Hydralazine 12.5 mg every day as needed for SBP (top number) greater than 150 mmHg.     Recommendations: Monitor daily blood pressure and pulse.  Send 1-2 weeks worth of readings to clinic if you continue to have elevated readings.       The results from today include: Labs.     Please follow up: With Dr. Lagunas in one year with fasting labs prior    I spent with patient 26 min directly at the phone.    Nain Lagunas MD, PhD  Professor of Medicine  Division of Cardiology

## 2020-05-08 NOTE — ANESTHESIA PREPROCEDURE EVALUATION
Anesthesia Pre-Procedure Evaluation    Patient: Lesli Lorenzana   MRN:     [id number] Gender:   female   Age:    78 year old :      1940        Preoperative Diagnosis: Elevated Liver Function Test   Procedure(s):  Endoscopic Retrograde Cholangiopancreatogram     Past Medical History:   Diagnosis Date     Anemia      Arthritis      CAD (coronary artery disease) 2009    stent     Cholelithiasis     gallbladder removed with liver in      Hallux valgus      History of blood transfusion      HTN (hypertension)      Hyperlipidemia LDL goal < 100      Liver transplant 2008    due to Primary biliary cirrhosis     Osteoarthritis of left hip      Osteopenia      Overweight(278.02)      Palpitations      Spinal stenosis       Past Surgical History:   Procedure Laterality Date     ARTHROPLASTY HIP Left 2017    Procedure: ARTHROPLASTY HIP;  Surgeon: Zhen Armstrong MD;  Location: [facility]     BIOPSY      liver      C STOMACH SURGERY PROCEDURE UNLISTED       COLONOSCOPY WITH CO2 INSUFFLATION N/A 9/15/2017    Procedure: COLONOSCOPY WITH CO2 INSUFFLATION;  Colonoscopy, History of colonic polyps, Ref by Jb, BMI 25, Weems [id number];  Surgeon: Dolly Hearn MD;  Location: [facility] OR     ENDOSCOPIC RETROGRADE CHOLANGIOPANCREATOGRAM N/A 2017    Procedure: COMBINED ENDOSCOPIC RETROGRADE CHOLANGIOPANCREATOGRAPHY, PLACE TUBE/STENT;  Endoscopic Retrograde Cholangiopancreatogram with bile duct spinchterotomy, ballon sweep of bile duct for stones, dilatation of bile duct;  Surgeon: Narendra Diana MD;  Location: [facility] OR     ENT SURGERY       GYN SURGERY       SURGICAL HISTORY OF -       liver transplant      SURGICAL HISTORY OF -       CAD with stent placement 2009     TRANSPLANT  2008    Liver transplant           Anesthesia Evaluation     . Pt has had prior anesthetic. Type: General    No history of anesthetic complications          ROS/MED HX    ENT/Pulmonary:  - neg pulmonary ROS      Neurologic:  - neg neurologic ROS     Cardiovascular:     (+) hypertension--CAD, --stent,1993  . : . . . :. . Previous cardiac testing Echodate:3/3/17results:Left ventricular function, chamber size, wall motion, and wall thickness are  normal.The EF is 55-60%. Biplane LVEF traced at 56%.  The right ventricle is normal size. Global right ventricular function is  normal.  The inferior vena cava was normal in size with preserved respiratory  variability. Estimated mean right atrial pressure is <3 mmHg.  No pericardial effusion is present.  This study was compared with the study from 3/20/13. There has been no change.Stress Testdate:3/16/14 results:(with dobutamine) was non-diagnostic d/t hypotension induced during the testECG reviewed date:7/10/17 results:appears normal, NSR, normal axis, normal intervals, no acute ST/T changes c/w ischemia, no LVH by voltage criteria, unchanged from previous tracings    date: results:         (-) angina and anginaCongenital heart disease: 7/10/17.   METS/Exercise Tolerance:     Hematologic:     (+) History of Transfusion no previous transfusion reaction -      Musculoskeletal:         GI/Hepatic: Comment: S/p liver transplant for PBC in 2008    (+) liver disease, Other GI/Hepatic asymptomatic choledocolithiasis with elevated LFTs      Renal/Genitourinary:     (+) chronic renal disease, type: CRI, Pt does not require dialysis, Pt has no history of transplant,       Endo:     (+) Chronic steroid usage for Post Transplant Immunosuppression .      Psychiatric:         Infectious Disease:   (+) VRE,       Malignancy:      - no malignancy   Other:    (+) No chance of pregnancy C-spine cleared: N/A, no H/O Chronic Pain,no other significant disability                        PHYSICAL EXAM:   Mental Status/Neuro: A/A/O   Airway: Facies: Feasible  Mallampati: Not Assessed  Mouth/Opening: Not Assessed  TM distance: Not Assessed  Neck ROM: Not Assessed   Respiratory: Auscultation: CTAB     Resp.  Rate: Normal     Resp. Effort: Normal      CV: Rhythm: Regular  Rate: Age appropriate  Heart: Normal Sounds   Comments:                    Lab Results   Component Value Date    WBC 5.4 05/16/2019    HGB 11.9 05/16/2019    HCT 35.2 05/16/2019     05/16/2019     05/16/2019    POTASSIUM 4.2 05/16/2019    CHLORIDE 102 05/16/2019    CO2 23 05/16/2019    BUN 27 05/16/2019    CR 1.33 (H) 05/16/2019    GLC 79 05/16/2019    HAYES 8.7 05/16/2019    PHOS 3.8 08/11/2015    MAG 1.7 08/11/2015    ALBUMIN 3.3 (L) 05/16/2019    PROTTOTAL 7.5 05/16/2019     (H) 05/16/2019    AST 97 (H) 05/16/2019    ALKPHOS 368 (H) 05/16/2019    BILITOTAL 0.9 05/16/2019    LIPASE 493 (H) 07/17/2017    AMYLASE 139 (H) 07/17/2017    BRADY 60 (H) 01/20/2008    PTT 28 03/15/2014    INR 1.0 06/19/2017    FIBR 195 (L) 02/03/2008    TSH 1.81 04/25/2017    T4 1.11 06/12/2008    T3 124 06/12/2008       Preop Vitals  BP Readings from Last 3 Encounters:   05/29/19 (!) 198/103   03/29/19 (!) 206/85   03/14/19 (!) 191/102    Pulse Readings from Last 3 Encounters:   05/29/19 76   03/29/19 78   03/14/19 81      Resp Readings from Last 3 Encounters:   09/15/17 16   07/17/17 16   07/06/17 16    SpO2 Readings from Last 3 Encounters:   05/29/19 97%   03/29/19 99%   03/14/19 99%      Temp Readings from Last 1 Encounters:   05/29/19 36  C (96.8  F) (Oral)    Ht Readings from Last 1 Encounters:   05/29/19 1.524 m (5')      Wt Readings from Last 1 Encounters:   05/29/19 65 kg (143 lb 4.8 oz)    Estimated body mass index is 27.99 kg/m  as calculated from the following:    Height as of this encounter: 1.524 m (5').    Weight as of this encounter: 65 kg (143 lb 4.8 oz).     LDA:  Peripheral IV Left Lower forearm (Active)   Site Assessment WDL except;Ecchymotic 7/17/2017 10:40 AM   Line Status Infusing 7/17/2017 10:40 AM   Phlebitis Scale 0-->no symptoms 7/17/2017 10:40 AM   Infiltration Scale 0 7/17/2017 10:40 AM   Infiltration Site Treatment Method  None  7/17/2017 10:40 AM   Extravasation? No 7/17/2017 10:40 AM   Number of days:        ETT (adult) (Active)   Number of days: 0            Assessment:   ASA SCORE: 3    NPO Status: > 2 hours since completed Clear Liquids; > 6 hours since completed Solid Foods   Documentation: H&P complete; Preop Testing complete; Consents complete   Proceeding: Proceed without further delay  Tobacco Use:  NO Active use of Tobacco/UNKNOWN Tobacco use status     Plan:   Anes. Type:  General   Pre-Induction: Acetaminophen PO   Induction:  IV (Standard)   Airway: Oral ETT   Access/Monitoring: PIV   Maintenance: Balanced   Emergence: Procedure Site   Logistics: Same Day Surgery     Postop Pain/Sedation Strategy:  Standard-Options: Opioids PRN     PONV Management:  Adult Risk Factors: Female, Non-Smoker, Postop Opioids  Prevention: Ondansetron; Dexamethasone     CONSENT: Direct conversation   Plan and risks discussed with: Patient   Blood Products: Consent Deferred (Minimal Blood Loss)                         Queenie Zavala MD   [phone]

## 2020-06-15 DIAGNOSIS — I25.119 CORONARY ARTERY DISEASE INVOLVING NATIVE HEART WITH ANGINA PECTORIS, UNSPECIFIED VESSEL OR LESION TYPE (H): ICD-10-CM

## 2020-06-15 DIAGNOSIS — Z94.4 LIVER REPLACED BY TRANSPLANT (H): ICD-10-CM

## 2020-06-15 DIAGNOSIS — I25.83 CORONARY ARTERY DISEASE DUE TO LIPID RICH PLAQUE: ICD-10-CM

## 2020-06-15 DIAGNOSIS — I15.9 SECONDARY HYPERTENSION WITH GOAL BLOOD PRESSURE LESS THAN 140/90: ICD-10-CM

## 2020-06-15 DIAGNOSIS — E78.5 HYPERLIPIDEMIA LDL GOAL <70: ICD-10-CM

## 2020-06-15 DIAGNOSIS — I25.10 CORONARY ARTERY DISEASE INVOLVING NATIVE HEART WITHOUT ANGINA PECTORIS, UNSPECIFIED VESSEL OR LESION TYPE: ICD-10-CM

## 2020-06-15 DIAGNOSIS — I25.10 CORONARY ARTERY DISEASE INVOLVING NATIVE CORONARY ARTERY OF NATIVE HEART WITHOUT ANGINA PECTORIS: ICD-10-CM

## 2020-06-15 DIAGNOSIS — Z13.220 LIPID SCREENING: ICD-10-CM

## 2020-06-15 DIAGNOSIS — I25.10 CORONARY ARTERY DISEASE DUE TO LIPID RICH PLAQUE: ICD-10-CM

## 2020-06-15 DIAGNOSIS — I10 ESSENTIAL HYPERTENSION WITH GOAL BLOOD PRESSURE LESS THAN 140/90: ICD-10-CM

## 2020-06-15 DIAGNOSIS — N18.30 CKD (CHRONIC KIDNEY DISEASE) STAGE 3, GFR 30-59 ML/MIN (H): ICD-10-CM

## 2020-06-15 LAB
ALBUMIN SERPL-MCNC: 3.5 G/DL (ref 3.4–5)
ALP SERPL-CCNC: 106 U/L (ref 40–150)
ALT SERPL W P-5'-P-CCNC: 30 U/L (ref 0–50)
ANION GAP SERPL CALCULATED.3IONS-SCNC: 5 MMOL/L (ref 3–14)
AST SERPL W P-5'-P-CCNC: 27 U/L (ref 0–45)
BILIRUB DIRECT SERPL-MCNC: 0.1 MG/DL (ref 0–0.2)
BILIRUB SERPL-MCNC: 0.4 MG/DL (ref 0.2–1.3)
BUN SERPL-MCNC: 36 MG/DL (ref 7–30)
CALCIUM SERPL-MCNC: 8.5 MG/DL (ref 8.5–10.1)
CHLORIDE SERPL-SCNC: 101 MMOL/L (ref 94–109)
CHOLEST SERPL-MCNC: 182 MG/DL
CO2 SERPL-SCNC: 25 MMOL/L (ref 20–32)
CREAT SERPL-MCNC: 1.49 MG/DL (ref 0.52–1.04)
ERYTHROCYTE [DISTWIDTH] IN BLOOD BY AUTOMATED COUNT: 12.9 % (ref 10–15)
GFR SERPL CREATININE-BSD FRML MDRD: 33 ML/MIN/{1.73_M2}
GLUCOSE SERPL-MCNC: 82 MG/DL (ref 70–99)
HCT VFR BLD AUTO: 35.7 % (ref 35–47)
HDLC SERPL-MCNC: 77 MG/DL
HGB BLD-MCNC: 12.1 G/DL (ref 11.7–15.7)
LDLC SERPL CALC-MCNC: 81 MG/DL
MCH RBC QN AUTO: 31.2 PG (ref 26.5–33)
MCHC RBC AUTO-ENTMCNC: 33.9 G/DL (ref 31.5–36.5)
MCV RBC AUTO: 92 FL (ref 78–100)
NONHDLC SERPL-MCNC: 105 MG/DL
PLATELET # BLD AUTO: 217 10E9/L (ref 150–450)
POTASSIUM SERPL-SCNC: 4.9 MMOL/L (ref 3.4–5.3)
PROT SERPL-MCNC: 7.3 G/DL (ref 6.8–8.8)
RBC # BLD AUTO: 3.88 10E12/L (ref 3.8–5.2)
SODIUM SERPL-SCNC: 131 MMOL/L (ref 133–144)
TACROLIMUS BLD-MCNC: 4.6 UG/L (ref 5–15)
TME LAST DOSE: ABNORMAL H
TRIGL SERPL-MCNC: 122 MG/DL
WBC # BLD AUTO: 4.5 10E9/L (ref 4–11)

## 2020-06-15 PROCEDURE — 80048 BASIC METABOLIC PNL TOTAL CA: CPT | Performed by: INTERNAL MEDICINE

## 2020-06-15 PROCEDURE — 80076 HEPATIC FUNCTION PANEL: CPT | Performed by: INTERNAL MEDICINE

## 2020-06-15 PROCEDURE — 36415 COLL VENOUS BLD VENIPUNCTURE: CPT | Performed by: INTERNAL MEDICINE

## 2020-06-15 PROCEDURE — 85027 COMPLETE CBC AUTOMATED: CPT | Performed by: INTERNAL MEDICINE

## 2020-06-15 PROCEDURE — 80197 ASSAY OF TACROLIMUS: CPT | Performed by: INTERNAL MEDICINE

## 2020-06-15 PROCEDURE — 80061 LIPID PANEL: CPT | Performed by: INTERNAL MEDICINE

## 2020-08-03 DIAGNOSIS — Z94.4 LIVER TRANSPLANTED (H): ICD-10-CM

## 2020-08-03 DIAGNOSIS — Z94.4 LIVER REPLACED BY TRANSPLANT (H): ICD-10-CM

## 2020-08-03 DIAGNOSIS — T86.41 LIVER TRANSPLANT REJECTION (H): ICD-10-CM

## 2020-08-04 RX ORDER — TACROLIMUS 0.5 MG/1
CAPSULE ORAL
Qty: 360 CAPSULE | Refills: 3 | Status: SHIPPED | OUTPATIENT
Start: 2020-08-04 | End: 2022-03-30 | Stop reason: DRUGHIGH

## 2020-08-04 RX ORDER — PREDNISONE 5 MG/1
TABLET ORAL
Qty: 90 TABLET | Refills: 3 | Status: SHIPPED | OUTPATIENT
Start: 2020-08-04 | End: 2021-08-03

## 2020-09-11 ENCOUNTER — DOCUMENTATION ONLY (OUTPATIENT)
Dept: LAB | Facility: CLINIC | Age: 80
End: 2020-09-11

## 2020-09-11 NOTE — PROGRESS NOTES
Pt has a lab appointment on 09.17.2020, please review their chart and add orders if necessary.    Thank you,    [facility]

## 2020-09-14 DIAGNOSIS — Z13.220 LIPID SCREENING: ICD-10-CM

## 2020-09-14 DIAGNOSIS — Z94.4 LIVER REPLACED BY TRANSPLANT (H): ICD-10-CM

## 2020-10-03 DIAGNOSIS — Z94.4 LIVER REPLACED BY TRANSPLANT (H): ICD-10-CM

## 2020-10-03 DIAGNOSIS — Z13.220 LIPID SCREENING: ICD-10-CM

## 2020-10-03 LAB
ERYTHROCYTE [DISTWIDTH] IN BLOOD BY AUTOMATED COUNT: 13.3 % (ref 10–15)
HCT VFR BLD AUTO: 35.5 % (ref 35–47)
HGB BLD-MCNC: 12.5 G/DL (ref 11.7–15.7)
MCH RBC QN AUTO: 32.4 PG (ref 26.5–33)
MCHC RBC AUTO-ENTMCNC: 35.2 G/DL (ref 31.5–36.5)
MCV RBC AUTO: 92 FL (ref 78–100)
PLATELET # BLD AUTO: 214 10E9/L (ref 150–450)
RBC # BLD AUTO: 3.86 10E12/L (ref 3.8–5.2)
WBC # BLD AUTO: 4.6 10E9/L (ref 4–11)

## 2020-10-03 PROCEDURE — 80076 HEPATIC FUNCTION PANEL: CPT | Performed by: INTERNAL MEDICINE

## 2020-10-03 PROCEDURE — 36415 COLL VENOUS BLD VENIPUNCTURE: CPT | Performed by: INTERNAL MEDICINE

## 2020-10-03 PROCEDURE — 80048 BASIC METABOLIC PNL TOTAL CA: CPT | Performed by: INTERNAL MEDICINE

## 2020-10-03 PROCEDURE — 85027 COMPLETE CBC AUTOMATED: CPT | Performed by: INTERNAL MEDICINE

## 2020-10-03 PROCEDURE — 80197 ASSAY OF TACROLIMUS: CPT | Performed by: INTERNAL MEDICINE

## 2020-10-04 LAB
TACROLIMUS BLD-MCNC: 4.2 UG/L (ref 5–15)
TME LAST DOSE: ABNORMAL H

## 2020-10-05 LAB
ALBUMIN SERPL-MCNC: 3.6 G/DL (ref 3.4–5)
ALP SERPL-CCNC: 105 U/L (ref 40–150)
ALT SERPL W P-5'-P-CCNC: 34 U/L (ref 0–50)
ANION GAP SERPL CALCULATED.3IONS-SCNC: 7 MMOL/L (ref 3–14)
AST SERPL W P-5'-P-CCNC: 33 U/L (ref 0–45)
BILIRUB DIRECT SERPL-MCNC: 0.2 MG/DL (ref 0–0.2)
BILIRUB SERPL-MCNC: 0.6 MG/DL (ref 0.2–1.3)
BUN SERPL-MCNC: 26 MG/DL (ref 7–30)
CALCIUM SERPL-MCNC: 8.8 MG/DL (ref 8.5–10.1)
CHLORIDE SERPL-SCNC: 99 MMOL/L (ref 94–109)
CO2 SERPL-SCNC: 25 MMOL/L (ref 20–32)
CREAT SERPL-MCNC: 1.38 MG/DL (ref 0.52–1.04)
GFR SERPL CREATININE-BSD FRML MDRD: 36 ML/MIN/{1.73_M2}
GLUCOSE SERPL-MCNC: 77 MG/DL (ref 70–99)
POTASSIUM SERPL-SCNC: 3.9 MMOL/L (ref 3.4–5.3)
PROT SERPL-MCNC: 7.4 G/DL (ref 6.8–8.8)
SODIUM SERPL-SCNC: 131 MMOL/L (ref 133–144)

## 2020-10-14 NOTE — PROGRESS NOTES
I had the pleasure of seeing Lesli Lorenzana for followup in the Liver Transplant Clinic at the [facility] on 03/29/2019.  Ms. Lorenzana returns for followup now more than 1 year status post liver transplantation.      For the most part she is doing well.  She denies any abdominal pain, itching or skin rash or fatigue.  She denies any increased abdominal girth or lower extremity edema.  She denies any fevers or chills, cough or shortness of breath.  She denies any nausea or vomiting, diarrhea or constipation.  Her appetite has been good, and her weight has remained the same.  There have been no other new events since she was last seen.       Current Outpatient Medications   Medication     aspirin 81 MG tablet     Blood Pressure Monitor KIT     calcium-vitamin D (CALTRATE) 600-400 MG-UNIT per tablet     clindamycin (CLEOCIN) 300 MG capsule     isosorbide mononitrate (IMDUR) 60 MG 24 hr tablet     lisinopril (PRINIVIL/ZESTRIL) 2.5 MG tablet     metoprolol tartrate (LOPRESSOR) 50 MG tablet     MULTI-VITAMIN PO     Multiple Vitamins-Minerals (PRESERVISION AREDS 2) CAPS     nystatin-triamcinolone (MYCOLOG II) cream     predniSONE (DELTASONE) 20 MG tablet     predniSONE (DELTASONE) 5 MG tablet     tacrolimus (GENERIC EQUIVALENT) 0.5 MG capsule     ferrous gluconate (FERGON) 324 (38 FE) MG tablet     hydrALAZINE (APRESOLINE) 25 MG tablet     No current facility-administered medications for this visit.      Vital signs:                   Height: 152.4 cm (5') Weight: 65.7 kg (144 lb 12.8 oz)    PHYSICAL EXAMINATION:  In general, she looks quite well.  HEENT exam shows no scleral icterus or temporal muscle wasting.  Her chest is clear.  His abdominal exam shows no increase in girth.  No masses or tenderness to palpation are present.  Her liver is 10 cm in span without left lobe enlargement.  No spleen tip is palpable.  Extremity exam shows no edema.  Skin exam shows no suspicious lesions.   Regarding coronary CTA results.    Neurologic exam shows no asterixis.      Her most recent laboratory tests show her white count is 4.7, hemoglobin 12.6, platelets are 242,000.  Sodium 131, potassium 4.5, BUN is 25, creatinine 1.26, AST is 43, ALT is 44, alkaline phosphatase is 221, albumin is 3.7, protein of 7.7, total bilirubin is 0.4.      IMPRESSION:  Ms. Lorenzana is now more than 11 years status post liver transplantation.  She was a bit concerned about the fact that her alkaline phosphatase had increased quite dramatically.  I will just simply wait until she gets her next blood draw, which should be in about 2 weeks.  If it is still elevated, we will begin evaluation with an ultrasound and possibly MRCP or liver biopsy.  I otherwise will not be making any other change to her medical regimen.  I will see him back in the clinic in 6 months.      Thank you very much for allowing me to participate in the care of this patient.  If you have any questions regarding my recommendations, please do not hesitate to contact me.       Tucker Muhammad MD      Professor of Medicine  [employer]      Executive Medical Director, Solid Organ Transplant Program  [facility]

## 2021-01-08 DIAGNOSIS — Z94.4 LIVER REPLACED BY TRANSPLANT (H): ICD-10-CM

## 2021-01-08 DIAGNOSIS — Z13.220 LIPID SCREENING: ICD-10-CM

## 2021-01-08 LAB
ALBUMIN SERPL-MCNC: 3.4 G/DL (ref 3.4–5)
ALP SERPL-CCNC: 97 U/L (ref 40–150)
ALT SERPL W P-5'-P-CCNC: 24 U/L (ref 0–50)
ANION GAP SERPL CALCULATED.3IONS-SCNC: 4 MMOL/L (ref 3–14)
AST SERPL W P-5'-P-CCNC: 22 U/L (ref 0–45)
BILIRUB DIRECT SERPL-MCNC: 0.1 MG/DL (ref 0–0.2)
BILIRUB SERPL-MCNC: 0.4 MG/DL (ref 0.2–1.3)
BUN SERPL-MCNC: 29 MG/DL (ref 7–30)
CALCIUM SERPL-MCNC: 8.8 MG/DL (ref 8.5–10.1)
CHLORIDE SERPL-SCNC: 101 MMOL/L (ref 94–109)
CO2 SERPL-SCNC: 26 MMOL/L (ref 20–32)
CREAT SERPL-MCNC: 1.33 MG/DL (ref 0.52–1.04)
ERYTHROCYTE [DISTWIDTH] IN BLOOD BY AUTOMATED COUNT: 12.5 % (ref 10–15)
GFR SERPL CREATININE-BSD FRML MDRD: 38 ML/MIN/{1.73_M2}
GLUCOSE SERPL-MCNC: 83 MG/DL (ref 70–99)
HCT VFR BLD AUTO: 35.4 % (ref 35–47)
HGB BLD-MCNC: 12 G/DL (ref 11.7–15.7)
MCH RBC QN AUTO: 31.3 PG (ref 26.5–33)
MCHC RBC AUTO-ENTMCNC: 33.9 G/DL (ref 31.5–36.5)
MCV RBC AUTO: 92 FL (ref 78–100)
PLATELET # BLD AUTO: 242 10E9/L (ref 150–450)
POTASSIUM SERPL-SCNC: 4.6 MMOL/L (ref 3.4–5.3)
PROT SERPL-MCNC: 7.1 G/DL (ref 6.8–8.8)
RBC # BLD AUTO: 3.84 10E12/L (ref 3.8–5.2)
SODIUM SERPL-SCNC: 131 MMOL/L (ref 133–144)
TACROLIMUS BLD-MCNC: 5 UG/L (ref 5–15)
TME LAST DOSE: NORMAL H
WBC # BLD AUTO: 4.6 10E9/L (ref 4–11)

## 2021-01-08 PROCEDURE — 80076 HEPATIC FUNCTION PANEL: CPT | Performed by: INTERNAL MEDICINE

## 2021-01-08 PROCEDURE — 80197 ASSAY OF TACROLIMUS: CPT | Performed by: INTERNAL MEDICINE

## 2021-01-08 PROCEDURE — 36415 COLL VENOUS BLD VENIPUNCTURE: CPT | Performed by: INTERNAL MEDICINE

## 2021-01-08 PROCEDURE — 85027 COMPLETE CBC AUTOMATED: CPT | Performed by: INTERNAL MEDICINE

## 2021-01-08 PROCEDURE — 80048 BASIC METABOLIC PNL TOTAL CA: CPT | Performed by: INTERNAL MEDICINE

## 2021-01-18 ENCOUNTER — DOCUMENTATION ONLY (OUTPATIENT)
Dept: TRANSPLANT | Facility: CLINIC | Age: 81
End: 2021-01-18

## 2021-02-09 ENCOUNTER — IMMUNIZATION (OUTPATIENT)
Dept: NURSING | Facility: CLINIC | Age: 81
End: 2021-02-09
Payer: COMMERCIAL

## 2021-02-09 PROCEDURE — 91300 PR COVID VAC PFIZER DIL RECON 30 MCG/0.3 ML IM: CPT

## 2021-02-09 PROCEDURE — 0001A PR COVID VAC PFIZER DIL RECON 30 MCG/0.3 ML IM: CPT

## 2021-03-02 ENCOUNTER — IMMUNIZATION (OUTPATIENT)
Dept: NURSING | Facility: CLINIC | Age: 81
End: 2021-03-02
Attending: FAMILY MEDICINE
Payer: COMMERCIAL

## 2021-03-02 PROCEDURE — 0002A PR COVID VAC PFIZER DIL RECON 30 MCG/0.3 ML IM: CPT

## 2021-03-02 PROCEDURE — 91300 PR COVID VAC PFIZER DIL RECON 30 MCG/0.3 ML IM: CPT

## 2021-03-18 ENCOUNTER — TELEPHONE (OUTPATIENT)
Dept: CARDIOLOGY | Facility: CLINIC | Age: 81
End: 2021-03-18

## 2021-03-18 NOTE — LETTER
3/24/2021      RE: Lesli Lorenzana  [address] [address] [address]       Dear Ms. Lorenzana,    You are due for fasting lab work and a follow up appointment. Please call our office to schedule these appointments at [phone].    As always, thank you for choosing [facility] for your health care needs.        Sincerely,     Nain Lagunas MD

## 2021-03-24 NOTE — TELEPHONE ENCOUNTER
Mychart letter sent to patient to remind of follow up scheduling for fasting lab work and follow up appointment.

## 2021-04-06 ENCOUNTER — TELEPHONE (OUTPATIENT)
Dept: TRANSPLANT | Facility: CLINIC | Age: 81
End: 2021-04-06

## 2021-04-06 DIAGNOSIS — Z94.4 HISTORY OF LIVER TRANSPLANT (H): Primary | ICD-10-CM

## 2021-04-06 DIAGNOSIS — Z13.220 LIPID SCREENING: ICD-10-CM

## 2021-04-06 DIAGNOSIS — Z94.4 LIVER REPLACED BY TRANSPLANT (H): ICD-10-CM

## 2021-04-06 LAB
ALBUMIN SERPL-MCNC: 3.4 G/DL (ref 3.4–5)
ALP SERPL-CCNC: 90 U/L (ref 40–150)
ALT SERPL W P-5'-P-CCNC: 24 U/L (ref 0–50)
ANION GAP SERPL CALCULATED.3IONS-SCNC: 5 MMOL/L (ref 3–14)
AST SERPL W P-5'-P-CCNC: 27 U/L (ref 0–45)
BILIRUB DIRECT SERPL-MCNC: 0.2 MG/DL (ref 0–0.2)
BILIRUB SERPL-MCNC: 0.6 MG/DL (ref 0.2–1.3)
BUN SERPL-MCNC: 24 MG/DL (ref 7–30)
CALCIUM SERPL-MCNC: 9 MG/DL (ref 8.5–10.1)
CHLORIDE SERPL-SCNC: 94 MMOL/L (ref 94–109)
CO2 SERPL-SCNC: 25 MMOL/L (ref 20–32)
CREAT SERPL-MCNC: 1.31 MG/DL (ref 0.52–1.04)
ERYTHROCYTE [DISTWIDTH] IN BLOOD BY AUTOMATED COUNT: 13 % (ref 10–15)
GFR SERPL CREATININE-BSD FRML MDRD: 39 ML/MIN/{1.73_M2}
GLUCOSE SERPL-MCNC: 78 MG/DL (ref 70–99)
HCT VFR BLD AUTO: 35 % (ref 35–47)
HGB BLD-MCNC: 12.1 G/DL (ref 11.7–15.7)
MCH RBC QN AUTO: 30.8 PG (ref 26.5–33)
MCHC RBC AUTO-ENTMCNC: 34.6 G/DL (ref 31.5–36.5)
MCV RBC AUTO: 89 FL (ref 78–100)
PLATELET # BLD AUTO: 245 10E9/L (ref 150–450)
POTASSIUM SERPL-SCNC: 4.3 MMOL/L (ref 3.4–5.3)
PROT SERPL-MCNC: 7.1 G/DL (ref 6.8–8.8)
RBC # BLD AUTO: 3.93 10E12/L (ref 3.8–5.2)
SODIUM SERPL-SCNC: 124 MMOL/L (ref 133–144)
TACROLIMUS BLD-MCNC: 5.1 UG/L (ref 5–15)
TME LAST DOSE: NORMAL H
WBC # BLD AUTO: 4.3 10E9/L (ref 4–11)

## 2021-04-06 PROCEDURE — 80076 HEPATIC FUNCTION PANEL: CPT | Performed by: INTERNAL MEDICINE

## 2021-04-06 PROCEDURE — 80048 BASIC METABOLIC PNL TOTAL CA: CPT | Performed by: INTERNAL MEDICINE

## 2021-04-06 PROCEDURE — 80197 ASSAY OF TACROLIMUS: CPT | Performed by: INTERNAL MEDICINE

## 2021-04-06 PROCEDURE — 36415 COLL VENOUS BLD VENIPUNCTURE: CPT | Performed by: INTERNAL MEDICINE

## 2021-04-06 PROCEDURE — 85027 COMPLETE CBC AUTOMATED: CPT | Performed by: INTERNAL MEDICINE

## 2021-04-06 NOTE — TELEPHONE ENCOUNTER
Spoke to pt and instructed her of the need to recheck a BMP and to cut back on water by half and include other beverages beside water. Pt understood.

## 2021-04-06 NOTE — TELEPHONE ENCOUNTER
Per Dr. Muhammad, recheck basic metabolic panel in 1-2 weeks.  He agrees w/ cutting back on water.

## 2021-04-06 NOTE — TELEPHONE ENCOUNTER
Call to Dot to check in as her sodium is 124.  She has been drinking 2 quarts of water per day.  Told her to cut back to 1, drink things other than just water.  Message to Dr. Muhammad.

## 2021-04-11 ENCOUNTER — HEALTH MAINTENANCE LETTER (OUTPATIENT)
Age: 81
End: 2021-04-11

## 2021-04-22 DIAGNOSIS — Z94.4 HISTORY OF LIVER TRANSPLANT (H): ICD-10-CM

## 2021-04-22 DIAGNOSIS — Z13.220 LIPID SCREENING: ICD-10-CM

## 2021-04-22 DIAGNOSIS — Z94.4 LIVER REPLACED BY TRANSPLANT (H): ICD-10-CM

## 2021-04-22 LAB
ALBUMIN UR-MCNC: NEGATIVE MG/DL
ANION GAP SERPL CALCULATED.3IONS-SCNC: 3 MMOL/L (ref 3–14)
APPEARANCE UR: CLEAR
BILIRUB UR QL STRIP: NEGATIVE
BUN SERPL-MCNC: 28 MG/DL (ref 7–30)
CALCIUM SERPL-MCNC: 9 MG/DL (ref 8.5–10.1)
CHLORIDE SERPL-SCNC: 101 MMOL/L (ref 94–109)
CHOLEST SERPL-MCNC: 199 MG/DL
CO2 SERPL-SCNC: 27 MMOL/L (ref 20–32)
COLOR UR AUTO: YELLOW
CREAT SERPL-MCNC: 1.39 MG/DL (ref 0.52–1.04)
CREAT UR-MCNC: 54 MG/DL
GFR SERPL CREATININE-BSD FRML MDRD: 36 ML/MIN/{1.73_M2}
GLUCOSE SERPL-MCNC: 88 MG/DL (ref 70–99)
GLUCOSE UR STRIP-MCNC: NEGATIVE MG/DL
HDLC SERPL-MCNC: 84 MG/DL
HGB UR QL STRIP: NEGATIVE
KETONES UR STRIP-MCNC: NEGATIVE MG/DL
LDLC SERPL CALC-MCNC: 99 MG/DL
LEUKOCYTE ESTERASE UR QL STRIP: ABNORMAL
NITRATE UR QL: NEGATIVE
NONHDLC SERPL-MCNC: 115 MG/DL
PH UR STRIP: 7 PH (ref 5–7)
POTASSIUM SERPL-SCNC: 4.6 MMOL/L (ref 3.4–5.3)
PROT UR-MCNC: 0.21 G/L
PROT/CREAT 24H UR: 0.39 G/G CR (ref 0–0.2)
RBC #/AREA URNS AUTO: ABNORMAL /HPF
SODIUM SERPL-SCNC: 131 MMOL/L (ref 133–144)
SOURCE: ABNORMAL
SP GR UR STRIP: 1.01 (ref 1–1.03)
TRIGL SERPL-MCNC: 81 MG/DL
UROBILINOGEN UR STRIP-ACNC: 0.2 EU/DL (ref 0.2–1)
WBC #/AREA URNS AUTO: ABNORMAL /HPF

## 2021-04-22 PROCEDURE — 36415 COLL VENOUS BLD VENIPUNCTURE: CPT | Performed by: INTERNAL MEDICINE

## 2021-04-22 PROCEDURE — 81001 URINALYSIS AUTO W/SCOPE: CPT | Performed by: INTERNAL MEDICINE

## 2021-04-22 PROCEDURE — 84156 ASSAY OF PROTEIN URINE: CPT | Performed by: INTERNAL MEDICINE

## 2021-04-22 PROCEDURE — 80061 LIPID PANEL: CPT | Performed by: INTERNAL MEDICINE

## 2021-04-22 PROCEDURE — 80048 BASIC METABOLIC PNL TOTAL CA: CPT | Performed by: INTERNAL MEDICINE

## 2021-04-28 DIAGNOSIS — I25.10 CORONARY ARTERY DISEASE INVOLVING NATIVE HEART WITHOUT ANGINA PECTORIS, UNSPECIFIED VESSEL OR LESION TYPE: ICD-10-CM

## 2021-04-29 RX ORDER — LISINOPRIL 2.5 MG/1
2.5 TABLET ORAL DAILY
Qty: 90 TABLET | Refills: 0 | Status: SHIPPED | OUTPATIENT
Start: 2021-04-29 | End: 2021-07-15

## 2021-04-29 NOTE — TELEPHONE ENCOUNTER
lisinopril (ZESTRIL) 2.5 MG  Last Written Prescription Date:  4/27/20  Last Fill Quantity: 90,   # refills: 3  Last Office Visit : 4/27/20  Future Office visit:  NONE  RTC  1 YEAR   Scheduling has been notified to contact the pt for appointment.  Routing refill request to provider for review/approval because:  ABNORM LAB CR     *OVER DUE &  >BP  Creatinine   Date Value Ref Range Status   04/22/2021 1.39 (H) 0.52 - 1.04 mg/dL Final

## 2021-06-14 DIAGNOSIS — I25.10 CORONARY ARTERY DISEASE DUE TO LIPID RICH PLAQUE: ICD-10-CM

## 2021-06-14 DIAGNOSIS — I25.83 CORONARY ARTERY DISEASE DUE TO LIPID RICH PLAQUE: ICD-10-CM

## 2021-06-14 DIAGNOSIS — I15.9 SECONDARY HYPERTENSION WITH GOAL BLOOD PRESSURE LESS THAN 140/90: ICD-10-CM

## 2021-06-16 RX ORDER — ISOSORBIDE MONONITRATE 60 MG/1
60 TABLET, EXTENDED RELEASE ORAL 2 TIMES DAILY
Qty: 60 TABLET | Refills: 0 | Status: SHIPPED | OUTPATIENT
Start: 2021-06-16 | End: 2021-07-15

## 2021-06-16 NOTE — TELEPHONE ENCOUNTER
ISOSORBIDE MONONITRATE ER 60 TB24   Last Written Prescription Date:  4/27/20  Last Fill Quantity: 180,   # refills: 3  Last Office Visit : 4/27/20 Janneth  Future Office visit:  None/ 1 year  Scheduling has been notified to contact the pt for appointment.      Routing refill request to provider for review/approval because:  Blood pressure out of range, appt due( last Duprez 4/27/20)     04/17/20 (!) 199/109 ED hand injury   04/17/20 132/88    10/17/19 (!) 179/85

## 2021-07-15 ENCOUNTER — TELEPHONE (OUTPATIENT)
Dept: CARDIOLOGY | Facility: CLINIC | Age: 81
End: 2021-07-15

## 2021-07-15 DIAGNOSIS — I10 ESSENTIAL HYPERTENSION WITH GOAL BLOOD PRESSURE LESS THAN 140/90: ICD-10-CM

## 2021-07-15 DIAGNOSIS — I25.119 CORONARY ARTERY DISEASE INVOLVING NATIVE HEART WITH ANGINA PECTORIS, UNSPECIFIED VESSEL OR LESION TYPE (H): ICD-10-CM

## 2021-07-15 DIAGNOSIS — I25.10 CORONARY ARTERY DISEASE DUE TO LIPID RICH PLAQUE: ICD-10-CM

## 2021-07-15 DIAGNOSIS — I25.10 CORONARY ARTERY DISEASE INVOLVING NATIVE HEART WITHOUT ANGINA PECTORIS, UNSPECIFIED VESSEL OR LESION TYPE: ICD-10-CM

## 2021-07-15 DIAGNOSIS — I15.9 SECONDARY HYPERTENSION WITH GOAL BLOOD PRESSURE LESS THAN 140/90: ICD-10-CM

## 2021-07-15 DIAGNOSIS — Z94.4 LIVER TRANSPLANTED (H): Primary | ICD-10-CM

## 2021-07-15 DIAGNOSIS — I25.83 CORONARY ARTERY DISEASE DUE TO LIPID RICH PLAQUE: ICD-10-CM

## 2021-07-15 RX ORDER — TACROLIMUS 1 MG/1
CAPSULE ORAL
Qty: 180 CAPSULE | Refills: 3 | Status: SHIPPED | OUTPATIENT
Start: 2021-07-15 | End: 2022-03-30 | Stop reason: DRUGHIGH

## 2021-07-15 RX ORDER — ISOSORBIDE MONONITRATE 60 MG/1
60 TABLET, EXTENDED RELEASE ORAL 2 TIMES DAILY
Qty: 180 TABLET | Refills: 0 | Status: SHIPPED | OUTPATIENT
Start: 2021-07-15 | End: 2021-10-25

## 2021-07-15 RX ORDER — METOPROLOL TARTRATE 50 MG
50 TABLET ORAL 2 TIMES DAILY
Qty: 180 TABLET | Refills: 0 | Status: SHIPPED | OUTPATIENT
Start: 2021-07-15 | End: 2021-10-25

## 2021-07-15 RX ORDER — LISINOPRIL 2.5 MG/1
2.5 TABLET ORAL DAILY
Qty: 90 TABLET | Refills: 0 | Status: SHIPPED | OUTPATIENT
Start: 2021-07-15 | End: 2021-10-25

## 2021-07-15 NOTE — TELEPHONE ENCOUNTER
Called and spoke with Pt.  Discussed prescriptions would be given through scheduled appt.  Pt verbalized understanding, agreed to current plan and denied any further questions.    Carol Powell LPN

## 2021-07-15 NOTE — TELEPHONE ENCOUNTER
[facility] Call Center    Phone Message    May a detailed message be left on voicemail: yes     Reason for Call: Other: Pt would like a call back as she has a few refills that she can not get refilled until she sees the Dr but he didn't have anything available until oct 25th and she would like pa care team member to reach out to discuss     Action Taken: Message routed to:  [facility] Cardio    Travel Screening: Not Applicable

## 2021-07-26 ENCOUNTER — LAB (OUTPATIENT)
Dept: LAB | Facility: CLINIC | Age: 81
End: 2021-07-26
Payer: COMMERCIAL

## 2021-07-26 DIAGNOSIS — Z94.4 LIVER REPLACED BY TRANSPLANT (H): ICD-10-CM

## 2021-07-26 DIAGNOSIS — Z13.220 LIPID SCREENING: ICD-10-CM

## 2021-07-26 LAB
ALBUMIN SERPL-MCNC: 3.4 G/DL (ref 3.4–5)
ALP SERPL-CCNC: 73 U/L (ref 40–150)
ALT SERPL W P-5'-P-CCNC: 25 U/L (ref 0–50)
ANION GAP SERPL CALCULATED.3IONS-SCNC: 4 MMOL/L (ref 3–14)
AST SERPL W P-5'-P-CCNC: 26 U/L (ref 0–45)
BILIRUB DIRECT SERPL-MCNC: 0.1 MG/DL (ref 0–0.2)
BILIRUB SERPL-MCNC: 0.4 MG/DL (ref 0.2–1.3)
BUN SERPL-MCNC: 25 MG/DL (ref 7–30)
CALCIUM SERPL-MCNC: 9.3 MG/DL (ref 8.5–10.1)
CHLORIDE BLD-SCNC: 102 MMOL/L (ref 94–109)
CO2 SERPL-SCNC: 25 MMOL/L (ref 20–32)
CREAT SERPL-MCNC: 1.54 MG/DL (ref 0.52–1.04)
ERYTHROCYTE [DISTWIDTH] IN BLOOD BY AUTOMATED COUNT: 13.1 % (ref 10–15)
GFR SERPL CREATININE-BSD FRML MDRD: 32 ML/MIN/1.73M2
GLUCOSE BLD-MCNC: 88 MG/DL (ref 70–99)
HCT VFR BLD AUTO: 35.3 % (ref 35–47)
HGB BLD-MCNC: 11.9 G/DL (ref 11.7–15.7)
MCH RBC QN AUTO: 31 PG (ref 26.5–33)
MCHC RBC AUTO-ENTMCNC: 33.7 G/DL (ref 31.5–36.5)
MCV RBC AUTO: 92 FL (ref 78–100)
PLATELET # BLD AUTO: 232 10E3/UL (ref 150–450)
POTASSIUM BLD-SCNC: 5.2 MMOL/L (ref 3.4–5.3)
PROT SERPL-MCNC: 6.8 G/DL (ref 6.8–8.8)
RBC # BLD AUTO: 3.84 10E6/UL (ref 3.8–5.2)
SODIUM SERPL-SCNC: 131 MMOL/L (ref 133–144)
WBC # BLD AUTO: 5 10E3/UL (ref 4–11)

## 2021-07-26 PROCEDURE — 80053 COMPREHEN METABOLIC PANEL: CPT

## 2021-07-26 PROCEDURE — 80197 ASSAY OF TACROLIMUS: CPT

## 2021-07-26 PROCEDURE — 36415 COLL VENOUS BLD VENIPUNCTURE: CPT

## 2021-07-26 PROCEDURE — 82248 BILIRUBIN DIRECT: CPT

## 2021-07-26 PROCEDURE — 85027 COMPLETE CBC AUTOMATED: CPT

## 2021-07-27 LAB
TACROLIMUS BLD-MCNC: 5.7 UG/L (ref 5–15)
TME LAST DOSE: NORMAL H
TME LAST DOSE: NORMAL H

## 2021-08-03 DIAGNOSIS — Z94.4 LIVER TRANSPLANTED (H): ICD-10-CM

## 2021-08-03 DIAGNOSIS — T86.41 LIVER TRANSPLANT REJECTION (H): ICD-10-CM

## 2021-08-03 RX ORDER — PREDNISONE 5 MG/1
TABLET ORAL
Qty: 90 TABLET | Refills: 3 | Status: SHIPPED | OUTPATIENT
Start: 2021-08-03 | End: 2022-08-04

## 2021-10-11 ENCOUNTER — ANCILLARY PROCEDURE (OUTPATIENT)
Dept: GENERAL RADIOLOGY | Facility: CLINIC | Age: 81
End: 2021-10-11
Attending: FAMILY MEDICINE
Payer: COMMERCIAL

## 2021-10-11 ENCOUNTER — OFFICE VISIT (OUTPATIENT)
Dept: FAMILY MEDICINE | Facility: CLINIC | Age: 81
End: 2021-10-11
Payer: COMMERCIAL

## 2021-10-11 VITALS
WEIGHT: 138 LBS | BODY MASS INDEX: 26.95 KG/M2 | OXYGEN SATURATION: 99 % | TEMPERATURE: 98.2 F | SYSTOLIC BLOOD PRESSURE: 178 MMHG | HEART RATE: 77 BPM | DIASTOLIC BLOOD PRESSURE: 80 MMHG

## 2021-10-11 DIAGNOSIS — M25.561 RIGHT KNEE PAIN, UNSPECIFIED CHRONICITY: ICD-10-CM

## 2021-10-11 DIAGNOSIS — M48.061 SPINAL STENOSIS OF LUMBAR REGION, UNSPECIFIED WHETHER NEUROGENIC CLAUDICATION PRESENT: ICD-10-CM

## 2021-10-11 DIAGNOSIS — M25.551 HIP PAIN, RIGHT: ICD-10-CM

## 2021-10-11 DIAGNOSIS — M25.561 RIGHT KNEE PAIN, UNSPECIFIED CHRONICITY: Primary | ICD-10-CM

## 2021-10-11 PROCEDURE — 73502 X-RAY EXAM HIP UNI 2-3 VIEWS: CPT | Mod: RT | Performed by: RADIOLOGY

## 2021-10-11 PROCEDURE — 73562 X-RAY EXAM OF KNEE 3: CPT | Mod: RT | Performed by: RADIOLOGY

## 2021-10-11 PROCEDURE — 99213 OFFICE O/P EST LOW 20 MIN: CPT | Performed by: FAMILY MEDICINE

## 2021-10-15 ENCOUNTER — OFFICE VISIT (OUTPATIENT)
Dept: NEUROSURGERY | Facility: CLINIC | Age: 81
End: 2021-10-15
Payer: COMMERCIAL

## 2021-10-15 VITALS
SYSTOLIC BLOOD PRESSURE: 196 MMHG | DIASTOLIC BLOOD PRESSURE: 98 MMHG | BODY MASS INDEX: 26.95 KG/M2 | WEIGHT: 138 LBS | HEART RATE: 88 BPM | RESPIRATION RATE: 16 BRPM

## 2021-10-15 DIAGNOSIS — M48.062 SPINAL STENOSIS OF LUMBAR REGION WITH NEUROGENIC CLAUDICATION: Primary | ICD-10-CM

## 2021-10-15 DIAGNOSIS — M48.061 SPINAL STENOSIS OF LUMBAR REGION, UNSPECIFIED WHETHER NEUROGENIC CLAUDICATION PRESENT: ICD-10-CM

## 2021-10-15 PROCEDURE — 99203 OFFICE O/P NEW LOW 30 MIN: CPT | Performed by: PHYSICIAN ASSISTANT

## 2021-10-15 NOTE — PROGRESS NOTES
Neurosurgery Consult    HPI    Ms. Lorenzana is a 81-year-old female with history of lumbar stenosis that was noted on MRI in 2004 at the L4-5 level at that time it was severe.  She states recently she has been having increased pain in her back and bilateral lower extremities is worse with standing and essentially resolved when she is sitting down.  She has severely limited capacity for walking and standing.  She no longer goes to the grocery store because it is too difficult for her with all the standing and walking that is involved.  She has done physical therapy in the past and a few injections many years ago which were helpful but has not done any recent therapy.        Medical history  Status post liver transplant  Shortness of breath  Coronary artery disease  Hypertension  Osteopenia  Chronic kidney disease        B/P: 196/98, T: Data Unavailable, P: 88, R: 16       Exam    Alert and oriented no acute distress  Bilateral lower extremities with 5/5 strength  Reflexes 2+ patella/ankle  Negative straight leg raise bilaterally  Negative ankle clonus negative Babinski bilaterally  Lumbar spine nontender to palpation  Able to stand on heels and toes  Gait is normal    Imaging    Lumbar MRI from 2004 demonstrated severe L4-5 spinal stenosis on the basis of ligamentum flavum hypertrophy, facet arthropathy and anterolisthesis.    Assessment    Lumbar stenosis with neurogenic claudication    Plan:      We will update the patient's MRI and I will follow-up with her when the results are available.    Total time of 30 minutes spent with the patient today in counseling and coordination of care.

## 2021-10-15 NOTE — LETTER
10/15/2021         RE: Lesli Lorenzana  [address] [address] [address]        Dear Colleague,    Thank you for referring your patient, Lesli Lorenzana, to the [facility] [place]. Please see a copy of my visit note below.    Neurosurgery Consult    HPI    Ms. Lorenzana is a 81-year-old female with history of lumbar stenosis that was noted on MRI in 2004 at the L4-5 level at that time it was severe.  She states recently she has been having increased pain in her back and bilateral lower extremities is worse with standing and essentially resolved when she is sitting down.  She has severely limited capacity for walking and standing.  She no longer goes to the grocery store because it is too difficult for her with all the standing and walking that is involved.  She has done physical therapy in the past and a few injections many years ago which were helpful but has not done any recent therapy.        Medical history  Status post liver transplant  Shortness of breath  Coronary artery disease  Hypertension  Osteopenia  Chronic kidney disease        B/P: 196/98, T: Data Unavailable, P: 88, R: 16       Exam    Alert and oriented no acute distress  Bilateral lower extremities with 5/5 strength  Reflexes 2+ patella/ankle  Negative straight leg raise bilaterally  Negative ankle clonus negative Babinski bilaterally  Lumbar spine nontender to palpation  Able to stand on heels and toes  Gait is normal    Imaging    Lumbar MRI from 2004 demonstrated severe L4-5 spinal stenosis on the basis of ligamentum flavum hypertrophy, facet arthropathy and anterolisthesis.    Assessment    Lumbar stenosis with neurogenic claudication    Plan:      We will update the patient's MRI and I will follow-up with her when the results are available.    Total time of 30 minutes spent with the patient today in counseling and coordination of care.      Again, thank you for allowing me to participate  in the care of your patient.        Sincerely,        Jag Cabral PA-C

## 2021-10-25 DIAGNOSIS — I25.10 CORONARY ARTERY DISEASE INVOLVING NATIVE HEART WITHOUT ANGINA PECTORIS, UNSPECIFIED VESSEL OR LESION TYPE: ICD-10-CM

## 2021-10-25 DIAGNOSIS — I25.83 CORONARY ARTERY DISEASE DUE TO LIPID RICH PLAQUE: ICD-10-CM

## 2021-10-25 DIAGNOSIS — I10 ESSENTIAL HYPERTENSION WITH GOAL BLOOD PRESSURE LESS THAN 140/90: ICD-10-CM

## 2021-10-25 DIAGNOSIS — I25.10 CORONARY ARTERY DISEASE DUE TO LIPID RICH PLAQUE: ICD-10-CM

## 2021-10-25 DIAGNOSIS — I25.119 CORONARY ARTERY DISEASE INVOLVING NATIVE HEART WITH ANGINA PECTORIS, UNSPECIFIED VESSEL OR LESION TYPE (H): ICD-10-CM

## 2021-10-25 DIAGNOSIS — I15.9 SECONDARY HYPERTENSION WITH GOAL BLOOD PRESSURE LESS THAN 140/90: ICD-10-CM

## 2021-10-25 RX ORDER — ISOSORBIDE MONONITRATE 60 MG/1
60 TABLET, EXTENDED RELEASE ORAL 2 TIMES DAILY
Qty: 180 TABLET | Refills: 1 | Status: SHIPPED | OUTPATIENT
Start: 2021-10-25 | End: 2022-02-14

## 2021-10-25 RX ORDER — LISINOPRIL 2.5 MG/1
2.5 TABLET ORAL DAILY
Qty: 90 TABLET | Refills: 1 | Status: SHIPPED | OUTPATIENT
Start: 2021-10-25 | End: 2022-02-14

## 2021-10-25 RX ORDER — METOPROLOL TARTRATE 50 MG
50 TABLET ORAL 2 TIMES DAILY
Qty: 180 TABLET | Refills: 1 | Status: SHIPPED | OUTPATIENT
Start: 2021-10-25 | End: 2022-02-14

## 2021-10-29 ENCOUNTER — LAB (OUTPATIENT)
Dept: LAB | Facility: CLINIC | Age: 81
End: 2021-10-29
Payer: COMMERCIAL

## 2021-10-29 DIAGNOSIS — Z13.220 LIPID SCREENING: ICD-10-CM

## 2021-10-29 DIAGNOSIS — Z94.4 LIVER REPLACED BY TRANSPLANT (H): ICD-10-CM

## 2021-10-29 LAB
ALBUMIN SERPL-MCNC: 3.3 G/DL (ref 3.4–5)
ALP SERPL-CCNC: 87 U/L (ref 40–150)
ALT SERPL W P-5'-P-CCNC: 24 U/L (ref 0–50)
ANION GAP SERPL CALCULATED.3IONS-SCNC: 4 MMOL/L (ref 3–14)
AST SERPL W P-5'-P-CCNC: 22 U/L (ref 0–45)
BILIRUB DIRECT SERPL-MCNC: 0.1 MG/DL (ref 0–0.2)
BILIRUB SERPL-MCNC: 0.4 MG/DL (ref 0.2–1.3)
BUN SERPL-MCNC: 22 MG/DL (ref 7–30)
CALCIUM SERPL-MCNC: 8.7 MG/DL (ref 8.5–10.1)
CHLORIDE BLD-SCNC: 100 MMOL/L (ref 94–109)
CO2 SERPL-SCNC: 26 MMOL/L (ref 20–32)
CREAT SERPL-MCNC: 1.43 MG/DL (ref 0.52–1.04)
ERYTHROCYTE [DISTWIDTH] IN BLOOD BY AUTOMATED COUNT: 12.9 % (ref 10–15)
GFR SERPL CREATININE-BSD FRML MDRD: 35 ML/MIN/1.73M2
GLUCOSE BLD-MCNC: 91 MG/DL (ref 70–99)
HCT VFR BLD AUTO: 35.2 % (ref 35–47)
HGB BLD-MCNC: 12 G/DL (ref 11.7–15.7)
MCH RBC QN AUTO: 31.3 PG (ref 26.5–33)
MCHC RBC AUTO-ENTMCNC: 34.1 G/DL (ref 31.5–36.5)
MCV RBC AUTO: 92 FL (ref 78–100)
PLATELET # BLD AUTO: 258 10E3/UL (ref 150–450)
POTASSIUM BLD-SCNC: 4.4 MMOL/L (ref 3.4–5.3)
PROT SERPL-MCNC: 7 G/DL (ref 6.8–8.8)
RBC # BLD AUTO: 3.83 10E6/UL (ref 3.8–5.2)
SODIUM SERPL-SCNC: 130 MMOL/L (ref 133–144)
TACROLIMUS BLD-MCNC: 6.5 UG/L (ref 5–15)
TME LAST DOSE: NORMAL H
TME LAST DOSE: NORMAL H
WBC # BLD AUTO: 4.6 10E3/UL (ref 4–11)

## 2021-10-29 PROCEDURE — 36415 COLL VENOUS BLD VENIPUNCTURE: CPT

## 2021-10-29 PROCEDURE — 80197 ASSAY OF TACROLIMUS: CPT

## 2021-10-29 PROCEDURE — 82248 BILIRUBIN DIRECT: CPT

## 2021-10-29 PROCEDURE — 80053 COMPREHEN METABOLIC PANEL: CPT

## 2021-10-29 PROCEDURE — 85027 COMPLETE CBC AUTOMATED: CPT

## 2021-11-03 ENCOUNTER — ANCILLARY PROCEDURE (OUTPATIENT)
Dept: MRI IMAGING | Facility: CLINIC | Age: 81
End: 2021-11-03
Attending: PHYSICIAN ASSISTANT
Payer: COMMERCIAL

## 2021-11-03 DIAGNOSIS — M48.062 SPINAL STENOSIS OF LUMBAR REGION WITH NEUROGENIC CLAUDICATION: ICD-10-CM

## 2021-11-03 PROCEDURE — 72148 MRI LUMBAR SPINE W/O DYE: CPT | Mod: GC | Performed by: RADIOLOGY

## 2021-11-12 ENCOUNTER — OFFICE VISIT (OUTPATIENT)
Dept: NEUROSURGERY | Facility: CLINIC | Age: 81
End: 2021-11-12
Payer: COMMERCIAL

## 2021-11-12 VITALS
DIASTOLIC BLOOD PRESSURE: 90 MMHG | BODY MASS INDEX: 26.95 KG/M2 | WEIGHT: 138 LBS | SYSTOLIC BLOOD PRESSURE: 170 MMHG | HEART RATE: 76 BPM

## 2021-11-12 DIAGNOSIS — M48.062 SPINAL STENOSIS OF LUMBAR REGION WITH NEUROGENIC CLAUDICATION: Primary | ICD-10-CM

## 2021-11-12 PROCEDURE — 99213 OFFICE O/P EST LOW 20 MIN: CPT | Performed by: PHYSICIAN ASSISTANT

## 2021-11-12 ASSESSMENT — PAIN SCALES - GENERAL: PAINLEVEL: SEVERE PAIN (6)

## 2021-11-12 NOTE — LETTER
11/12/2021         RE: Lesli Lorenzana  [address] [address] [address]        Dear Colleague,    Thank you for referring your patient, Lesli Lorenzana, to the [facility]UROS[facility] [place]. Please see a copy of my visit note below.    Neurosurgery follow-up    Ms. Lorenzana is a 81-year-old female who presents to clinic for evaluation of lumbar stenosis, she recently updated her lumbar MRI.  Continues to demonstrate severe stenosis at L4-5 with also moderate stenosis at L3-4 and L2-3.  She describes pain in her low back also rating down her legs primarily on the right side.  It is worse with standing improved with sitting most of the time also symptoms bending forward causes increase in her pain.  She has not yet tried physical therapy or had an injection.  She would like to proceed with conservative therapies first before considering surgery.  She denies any bowel or bladder symptoms or saddle anesthesia.    Exam    B/P: 170/90, T: Data Unavailable, P: 76, R: Data Unavailable     Alert oriented no acute distress  Bilateral lower extremities appropriate strength  Reflexes 2+ bilateral patella  Gait is normal patient is walking with a walker    Imaging    Lumbar MRI demonstrates severe spinal stenosis at L4-5 with lesser degrees of spinal stenosis above that, severe left and moderate right neuroforaminal stenosis at L4-5.    Assessment    Lumbar spinal stenosis with neurogenic claudication      Plan    The patient has been referred for physical therapy as well as an injection, she will follow-up with us as needed if she is not improving after this therapies and we can discuss surgical options at that time.      Total time of 30 minutes spent with the patient today in counseling and coordination of care.        Again, thank you for allowing me to participate in the care of your patient.        Sincerely,        Jag Cabral PA-C

## 2021-11-12 NOTE — PROGRESS NOTES
Neurosurgery follow-up    Ms. Lorenzana is a 81-year-old female who presents to clinic for evaluation of lumbar stenosis, she recently updated her lumbar MRI.  Continues to demonstrate severe stenosis at L4-5 with also moderate stenosis at L3-4 and L2-3.  She describes pain in her low back also rating down her legs primarily on the right side.  It is worse with standing improved with sitting most of the time also symptoms bending forward causes increase in her pain.  She has not yet tried physical therapy or had an injection.  She would like to proceed with conservative therapies first before considering surgery.  She denies any bowel or bladder symptoms or saddle anesthesia.    Exam    B/P: 170/90, T: Data Unavailable, P: 76, R: Data Unavailable     Alert oriented no acute distress  Bilateral lower extremities appropriate strength  Reflexes 2+ bilateral patella  Gait is normal patient is walking with a walker    Imaging    Lumbar MRI demonstrates severe spinal stenosis at L4-5 with lesser degrees of spinal stenosis above that, severe left and moderate right neuroforaminal stenosis at L4-5.    Assessment    Lumbar spinal stenosis with neurogenic claudication      Plan    The patient has been referred for physical therapy as well as an injection, she will follow-up with us as needed if she is not improving after this therapies and we can discuss surgical options at that time.      Total time of 30 minutes spent with the patient today in counseling and coordination of care.

## 2021-11-13 DIAGNOSIS — M48.07 NEURAL FORAMINAL STENOSIS OF LUMBOSACRAL SPINE: Primary | ICD-10-CM

## 2021-11-13 DIAGNOSIS — M47.816 LUMBAR SPONDYLOSIS: ICD-10-CM

## 2021-11-13 DIAGNOSIS — M54.16 LUMBAR RADICULITIS: ICD-10-CM

## 2021-11-16 DIAGNOSIS — Z11.59 ENCOUNTER FOR SCREENING FOR OTHER VIRAL DISEASES: ICD-10-CM

## 2021-11-16 PROBLEM — M54.16 LUMBAR RADICULITIS: Status: ACTIVE | Noted: 2021-11-16

## 2021-11-16 PROBLEM — M47.816 LUMBAR SPONDYLOSIS: Status: ACTIVE | Noted: 2021-11-16

## 2021-11-16 PROBLEM — M48.07 NEURAL FORAMINAL STENOSIS OF LUMBOSACRAL SPINE: Status: ACTIVE | Noted: 2021-11-16

## 2021-11-29 ENCOUNTER — LAB (OUTPATIENT)
Dept: LAB | Facility: CLINIC | Age: 81
End: 2021-11-29
Payer: COMMERCIAL

## 2021-11-29 DIAGNOSIS — Z11.59 ENCOUNTER FOR SCREENING FOR OTHER VIRAL DISEASES: ICD-10-CM

## 2021-11-29 PROCEDURE — U0005 INFEC AGEN DETEC AMPLI PROBE: HCPCS

## 2021-11-29 PROCEDURE — U0003 INFECTIOUS AGENT DETECTION BY NUCLEIC ACID (DNA OR RNA); SEVERE ACUTE RESPIRATORY SYNDROME CORONAVIRUS 2 (SARS-COV-2) (CORONAVIRUS DISEASE [COVID-19]), AMPLIFIED PROBE TECHNIQUE, MAKING USE OF HIGH THROUGHPUT TECHNOLOGIES AS DESCRIBED BY CMS-2020-01-R: HCPCS

## 2021-11-30 ENCOUNTER — THERAPY VISIT (OUTPATIENT)
Dept: PHYSICAL THERAPY | Facility: CLINIC | Age: 81
End: 2021-11-30
Attending: PHYSICIAN ASSISTANT
Payer: COMMERCIAL

## 2021-11-30 DIAGNOSIS — Z94.4 LIVER REPLACED BY TRANSPLANT (H): ICD-10-CM

## 2021-11-30 DIAGNOSIS — M48.062 SPINAL STENOSIS OF LUMBAR REGION WITH NEUROGENIC CLAUDICATION: ICD-10-CM

## 2021-11-30 DIAGNOSIS — Z13.220 LIPID SCREENING: ICD-10-CM

## 2021-11-30 DIAGNOSIS — M54.50 CHRONIC BILATERAL LOW BACK PAIN WITHOUT SCIATICA: ICD-10-CM

## 2021-11-30 DIAGNOSIS — G89.29 CHRONIC BILATERAL LOW BACK PAIN WITHOUT SCIATICA: ICD-10-CM

## 2021-11-30 LAB — SARS-COV-2 RNA RESP QL NAA+PROBE: NEGATIVE

## 2021-11-30 PROCEDURE — 97161 PT EVAL LOW COMPLEX 20 MIN: CPT | Mod: GP | Performed by: PHYSICAL THERAPIST

## 2021-11-30 PROCEDURE — 97110 THERAPEUTIC EXERCISES: CPT | Mod: GP | Performed by: PHYSICAL THERAPIST

## 2021-11-30 NOTE — PROGRESS NOTES
[facility]    OUTPATIENT Physical Therapy ORTHOPEDIC EVALUATION  PLAN OF TREATMENT FOR OUTPATIENT REHABILITATION  (COMPLETE FOR INITIAL CLAIMS ONLY)  Patient's Last Name, First Name, M.I.  YOB: 1940  Lesli Lorenzana    Provider s Name:  [facility]   Medical Record No.  [id number]   Start of Care Date:  11/30/21   Onset Date:       Type:     _X__PT   ___OT Medical Diagnosis:    Encounter Diagnoses   Name Primary?     Spinal stenosis of lumbar region with neurogenic claudication      Chronic bilateral low back pain without sciatica         Treatment Diagnosis:  LBP        Goals:     11/30/21 0500   Body Part   Goals listed below are for LBP   Goal #1   Goal #1 standing   Previous Functional Level No restrictions   Current Functional Level Minutes patient can stand   Performance level 10   STG Target Performance Minutes patient will be able to stand   Performance level 20   Rationale for housekeeping tasks such as vacuuming, bed making, mowing, gardening;for safe community ambulation   Due date 12/30/21   LTG Target Performance Minutes patient will be able to stand   Performance Level 30   Rationale for housekeeping tasks such as vacuuming, bed making, mowing;for safe community ambulation   Due date 01/29/22       Therapy Frequency:  1/wk  Predicted Duration of Therapy Intervention:  8 wks    Miguel Perkins, PT                 I CERTIFY THE NEED FOR THESE SERVICES FURNISHED UNDER        THIS PLAN OF TREATMENT AND WHILE UNDER MY CARE     (Physician attestation of this document indicates review and certification of the therapy plan).                       Certification Date From:  11/30/21   Certification Date To:  02/27/22    Referring Provider:  Jag Cabral    Initial Assessment        See Epic Evaluation SOC Date: 11/30/21

## 2021-11-30 NOTE — PROGRESS NOTES
Physical Therapy Initial Evaluation  Subjective:    Patient Health History  Lesli Lorenzana being seen for low back.     Problem began: 11/12/2021 (date of order).   Problem occurred: I have spinal stenosis   Pain is reported as 3/10 on pain scale.  General health as reported by patient is fair.  Pertinent medical history includes: none.   Red flags:  None as reported by patient.  Medical allergies: other. Other medical allergies details: amoxicillan, and amlodipine.   Surgeries include:  Other. Other surgery history details: liver transplant.    Current medications:  High blood pressure medication and steroids.    Current occupation is retired.   Primary job tasks include:  Driving, lifting/carrying, prolonged sitting and other.   Other job/home tasks details: general housework.                Therapist Generated HPI Evaluation  Problem details: Chronic LBP due to spinal stenosis.  It's been getting worse in the last few months.  A few weeks ago switched to a rolling walker from her cane because the pain was getting so much worse with sitting.         Type of problem:  Lumbar.    This is a chronic condition.  Condition occurred with:  Degenerative joint disease.  Where condition occurred: for unknown reasons.  Patient reports pain:  Lower lumbar spine.  Pain is described as burning and aching and is intermittent.  Pain radiates to:  Lower leg right and thigh left. Pain is the same all the time.  Since onset symptoms are gradually worsening.  Associated symptoms:  Loss of strength and loss of motion/stiffness. Symptoms are exacerbated by walking  and relieved by rest (sitting).  Special tests included:  X-ray.    Barriers include:  Lives alone and stairs.                        Objective:  Standing Alignment:    Cervical/Thoracic:  Thoracic kyphosis increased                Gait:    Assistive Devices:  Walker  Deviations:  Lumbar:  Trunk flexion    Flexibility/Screens:       Lower Extremity:  Decreased left lower  extremity flexibility:Adductors    Decreased right lower extremity flexibility:  Adductors and Hip Flexors               Lumbar/SI Evaluation  ROM:    AROM Lumbar:   Flexion:          WNL  Ext:                    Mod loss (+)   Side Bend:        Left:  Min loss    Right:  Min loss  Rotation:           Left:     Right:   Side Glide:        Left:     Right:           Lumbar Myotomes:    T12-L3 (Hip Flex):  Left: 4+    Right: 4+  L2-4 (Quads):  Left:  5    Right:  5  L4 (Ankle DF):  Left:  5    Right:  5  L5 (Great Toe Ext): Left: 5    Right: 5           Neural Tension/Mobility:      Left side:SLR  negative.     Right side:   SLR  negative.       Lumbar Provocation:      Left negative with:  PROM hip (good PROM considering NAREN)    Right negative with:  PROM hip (limitations in all directions due to OA)                                                 General     ROS    Assessment/Plan:    Patient is a 81 year old female with lumbar complaints.    Patient has the following significant findings with corresponding treatment plan.                Diagnosis 1:  Lumbar stenosis  Pain -  self management, education and home program  Decreased ROM/flexibility - manual therapy, therapeutic exercise and home program  Decreased strength - therapeutic exercise, therapeutic activities and home program  Impaired gait - gait training and home program  Impaired posture - neuro re-education and home program    Cumulative Therapy Evaluation is: Low complexity.    Previous and current functional limitations:  (See Goal Flow Sheet for this information)    Short term and Long term goals: (See Goal Flow Sheet for this information)     Communication ability:  Patient appears to be able to clearly communicate and understand verbal and written communication and follow directions correctly.  Treatment Explanation - The following has been discussed with the patient:   RX ordered/plan of care  Anticipated outcomes  Possible risks and side  effects  This patient would benefit from PT intervention to resume normal activities.   Rehab potential is good.    Frequency:  1 X week, once daily  Duration:  for 8 weeks  Discharge Plan:  Achieve all LTG.  Independent in home treatment program.  Reach maximal therapeutic benefit.    Please refer to the daily flowsheet for treatment today, total treatment time and time spent performing 1:1 timed codes.

## 2021-12-03 ENCOUNTER — TELEPHONE (OUTPATIENT)
Dept: NEUROLOGY | Facility: CLINIC | Age: 81
End: 2021-12-03

## 2021-12-03 ENCOUNTER — ANCILLARY PROCEDURE (OUTPATIENT)
Dept: GENERAL RADIOLOGY | Facility: CLINIC | Age: 81
End: 2021-12-03
Attending: PHYSICAL MEDICINE & REHABILITATION
Payer: COMMERCIAL

## 2021-12-03 ENCOUNTER — HOSPITAL ENCOUNTER (OUTPATIENT)
Facility: AMBULATORY SURGERY CENTER | Age: 81
Discharge: HOME | End: 2021-12-03
Attending: PHYSICAL MEDICINE & REHABILITATION | Admitting: PHYSICAL MEDICINE & REHABILITATION
Payer: COMMERCIAL

## 2021-12-03 VITALS
RESPIRATION RATE: 16 BRPM | TEMPERATURE: 97 F | DIASTOLIC BLOOD PRESSURE: 103 MMHG | OXYGEN SATURATION: 97 % | SYSTOLIC BLOOD PRESSURE: 195 MMHG

## 2021-12-03 DIAGNOSIS — M54.16 LUMBAR RADICULITIS: ICD-10-CM

## 2021-12-03 DIAGNOSIS — R52 PAIN: ICD-10-CM

## 2021-12-03 DIAGNOSIS — M47.816 LUMBAR SPONDYLOSIS: ICD-10-CM

## 2021-12-03 DIAGNOSIS — M48.07 NEURAL FORAMINAL STENOSIS OF LUMBOSACRAL SPINE: ICD-10-CM

## 2021-12-03 PROCEDURE — G8918 PT W/O PREOP ORDER IV AB PRO: HCPCS

## 2021-12-03 PROCEDURE — 62323 NJX INTERLAMINAR LMBR/SAC: CPT

## 2021-12-03 PROCEDURE — G8907 PT DOC NO EVENTS ON DISCHARG: HCPCS

## 2021-12-03 RX ORDER — DEXAMETHASONE SODIUM PHOSPHATE 10 MG/ML
INJECTION INTRAMUSCULAR; INTRAVENOUS PRN
Status: DISCONTINUED | OUTPATIENT
Start: 2021-12-03 | End: 2021-12-03 | Stop reason: HOSPADM

## 2021-12-03 RX ORDER — LIDOCAINE HYDROCHLORIDE 10 MG/ML
INJECTION, SOLUTION EPIDURAL; INFILTRATION; INTRACAUDAL; PERINEURAL PRN
Status: DISCONTINUED | OUTPATIENT
Start: 2021-12-03 | End: 2021-12-03 | Stop reason: HOSPADM

## 2021-12-03 RX ORDER — IOPAMIDOL 408 MG/ML
INJECTION, SOLUTION INTRATHECAL PRN
Status: DISCONTINUED | OUTPATIENT
Start: 2021-12-03 | End: 2021-12-03 | Stop reason: HOSPADM

## 2021-12-03 NOTE — TELEPHONE ENCOUNTER
Pt scheduled for 4 week follow-up on 12/31/21 post-procedure lumbar MEAGHAN as requested by Dr. Smith.       Sarah Lacey, RNCC  Neurology

## 2021-12-03 NOTE — PROCEDURES
PROCEDURE NOTE: Lumbar Interlaminar Epidural Steroid Injection    PROCEDURE DATE: 12/3/2021    PATIENT NAME: Lesli Lorenzana  YOB: 1940    ATTENDING PHYSICIAN: Brent Smith MD    PREOPERATIVE DIAGNOSIS:   Neural foraminal stenosis of lumbosacral spine  Lumbar spondylosis  Lumbar radiculitis   POSTOPERATIVE DIAGNOSIS: Same    PROCEDURE PERFORMED: Interlaminar Epidural Steroid Injection at the L5-S1 level, with a right paramedian approach under fluoroscopic guidance       FLUOROSCOPY WAS USED.     INDICATIONS FOR PROCEDURE:   This is a 81 year old female with a clinical picture consistent with the above-mentioned diagnosis, resulting in radicular pain to the right and left lower extremities (R>L).    PROCEDURE AND FINDINGS:   The patient was greeted in the pre procedure holding area. The risk, benefits and alternatives to the procedure were again reviewed with the patient and written informed consent was placed in the chart. Prior to the procedure a time out was completed, verifying correct patient, procedure, site, positioning, and implants and/or special equipment.    An IV line was not placed. The patient was taken to the procedure room and positioned prone on the fluoroscopy table. Routine monitors were applied including blood pressure cuff, and pulse oximetry. Then a  film was taken to identify the correct level. The skin was prepped and draped in the usual sterile fashion. The overlying skin and subcutaneous tissue was anesthetized using a 27-guage 1-1/2 inch needle with 1% preservative-free lidocaine for a total volume of 4 mls. Then an 20G: 10 cm Tuohy needle was advanced under fluorosocpic guidance using an AP lateral views into the interlaminar space. A loss of resistance syringe was attached and loss of resistance to saline.    After negative aspiration, 1mls of isovue 200-220 mg/ml contrast was injected under AP view with live fluoroscopy and confirmed adequate spread along the  nerve root and in the epidural space. There was no evidence of intravascular uptake or intrathecal spread on imaging. A lateral view was also taken confirming adequate epidural spread.     At this point, after negative aspiration, a total 5mL volume of treatment injectate, consisting of 1mL Dexamethasone (10mg/mL), and 4mL of PFNS was injected easily.  The needle was then flushed with 1 ml of local anesthetic and removed. The needle insertion site was dressed appropriately.     Before the procedure, she reported a pain score of 3/10.   After the procedure, she reported a pain score of 0/10.      The patient was taken to the recovery room where they were monitored for a brief period of time. she tolerated the procedure well and were discharged home in stable condition with post procedural instructions.     Follow-up will be in clinic with Neurosurgery    COMPLICATIONS: None    COMMENTS: None

## 2021-12-03 NOTE — DISCHARGE INSTRUCTIONS
PAIN INJECTION HOME CARE INSTRUCTIONS  Activity  -You may resume most normal activity levels with the exception of strenuous activity. It may help to move in ways that hurt before the injection, to see if the pain is still there, but do not overdo it. Take it easy for the rest of the day.    -DO NOT remove bandaid for 24 hours  -DO NOT shower for 24 hours      Pain  -You may feel immediate pain relief and numbness for a period of time after the injection. This may indicate the medication has reached the right spot.  -Your pain may return after this short pain-free period, or may even be a little worse for a day or two. It may be caused by needle irritation or by the medication itself. The medications usually take two or three days to start working, but can take as long as a week.    -You may use an ice pack for 20 minutes every 2 hours for the first 24 hours  -You may use a heating pad after the first 24 hours  -You may use Tylenol (acetaminophen) every 4 hours or other pain medicines as directed by your physician      DID YOU RECEIVE STEROIDS TODAY?  Yes    Common side effects of steroids:  Not everyone will experience corticosteroid side effects. If side effects are experienced, they will gradually subside in the 7-10 day period following an injection. Most common side effects include:  -Flushed face and/or chest  -Feeling of warmth, particularly in the face but could be an overall feeling of warmth  -Increased blood sugar in diabetic patients  -Menstrual irregularities my occur. If taking hormone-based birth control an alternate method of birth control is recommended  -Sleep disturbances and/or mood swings are possible  -Leg cramps    PLEASE KEEP TRACK OF YOUR SYMPTOMS AND NOTE ANY CHANGES FOR YOUR DOCTOR.       Please contact us if you have:  -Severe pain  -Fever more than 101.5 degrees Fahrenheit  -Signs of infection at the injection site (redness, swelling, or drainage)    If you have questions, please contact  the Pain Clinic at [phone] Option #1 between the hours of 7:00 am and 3:00 pm Monday through Friday. After office hours you can contact the on call provider by dialing [phone]. If you need immediate attention, we recommend that you go to a hospital emergency room or dial [phone].    For patients seen by the PM and R service, please call [phone].    If you have procedure scheduling questions please call [phone] Option #2

## 2021-12-14 ENCOUNTER — THERAPY VISIT (OUTPATIENT)
Dept: PHYSICAL THERAPY | Facility: CLINIC | Age: 81
End: 2021-12-14
Payer: COMMERCIAL

## 2021-12-14 DIAGNOSIS — M54.50 CHRONIC BILATERAL LOW BACK PAIN WITHOUT SCIATICA: ICD-10-CM

## 2021-12-14 DIAGNOSIS — G89.29 CHRONIC BILATERAL LOW BACK PAIN WITHOUT SCIATICA: ICD-10-CM

## 2021-12-14 PROCEDURE — 97110 THERAPEUTIC EXERCISES: CPT | Mod: GP | Performed by: PHYSICAL THERAPIST

## 2021-12-14 NOTE — PROGRESS NOTES
SUBJECTIVE  Subjective: Patient reports that she had a MEAGHAN about a week and a half ago and felt great for 2-3 days following the injection, but everything has gone downhill since then. Patient reports she has a bruise on her back so it makes it difficult to lie on her back for exercises and sleep. Patient states her legs have been more painful and feet have been feeling more numb since the injection. However, the pain in the back is feeling much better. Patient reports that she can only walk and stand for a short time due to back pain.   Current Pain level: 4/10   Changes in function:  None     Adverse reaction to treatment or activity:  None    OBJECTIVE  Objective: Patient was able to go on recumbent bike without fatigue or back pain. Patient had patellar tendon tenderness today and stiff R quadriceps, which responded well to quadriceps stretch in sidelying. Patient tolerated modified lumbar exercises well in standing vs. laying on back. Patient had good control during posterior pelvic tilt.      ASSESSMENT  Lesli continues to require intervention to meet STG and LTG's: PT  No change of symptoms has been noted.  Response to therapy has shown an improvement in  Back pain.  Progress made towards STG/LTG?  None    PLAN  Continue current treatment plan until patient demonstrates readiness to progress to higher level exercises.    PTA/ATC plan:  N/A    Please refer to the daily flowsheet for treatment today, total treatment time and time spent performing 1:1 timed codes.

## 2021-12-20 ENCOUNTER — THERAPY VISIT (OUTPATIENT)
Dept: PHYSICAL THERAPY | Facility: CLINIC | Age: 81
End: 2021-12-20
Payer: COMMERCIAL

## 2021-12-20 DIAGNOSIS — M54.50 CHRONIC BILATERAL LOW BACK PAIN WITHOUT SCIATICA: ICD-10-CM

## 2021-12-20 DIAGNOSIS — G89.29 CHRONIC BILATERAL LOW BACK PAIN WITHOUT SCIATICA: ICD-10-CM

## 2021-12-20 PROCEDURE — 97140 MANUAL THERAPY 1/> REGIONS: CPT | Mod: GP | Performed by: PHYSICAL THERAPIST

## 2021-12-20 PROCEDURE — 97110 THERAPEUTIC EXERCISES: CPT | Mod: GP | Performed by: PHYSICAL THERAPIST

## 2021-12-31 ENCOUNTER — OFFICE VISIT (OUTPATIENT)
Dept: NEUROSURGERY | Facility: CLINIC | Age: 81
End: 2021-12-31
Payer: COMMERCIAL

## 2021-12-31 VITALS
HEART RATE: 73 BPM | HEIGHT: 60 IN | BODY MASS INDEX: 26.31 KG/M2 | WEIGHT: 134 LBS | SYSTOLIC BLOOD PRESSURE: 206 MMHG | DIASTOLIC BLOOD PRESSURE: 98 MMHG

## 2021-12-31 DIAGNOSIS — M48.062 SPINAL STENOSIS OF LUMBAR REGION WITH NEUROGENIC CLAUDICATION: Primary | ICD-10-CM

## 2021-12-31 PROCEDURE — 99213 OFFICE O/P EST LOW 20 MIN: CPT | Performed by: PHYSICIAN ASSISTANT

## 2021-12-31 ASSESSMENT — MIFFLIN-ST. JEOR: SCORE: 1004.32

## 2021-12-31 ASSESSMENT — PAIN SCALES - GENERAL: PAINLEVEL: NO PAIN (0)

## 2021-12-31 NOTE — NURSING NOTE
Lesli Lorenzana's goals for this visit include: return  She requests these members of her care team be copied on today's visit information:     PCP: Dee Chiu    Referring Provider:  No referring provider defined for this encounter.    BP (!) 206/98   Pulse 73   Ht 1.524 m (5')   Wt 60.8 kg (134 lb)   BMI 26.17 kg/m      Do you need any medication refills at today's visit? n

## 2021-12-31 NOTE — PROGRESS NOTES
Neurosurgery follow-up     returns to clinic after undergoing a epidural steroid injection, she states it significantly improved her back symptoms, and she is doing well.  Her main symptoms now are pain in her right knee and hip which she is working with orthopedics.  She had her injection about a month ago initially she had relief then symptoms returned and then at about 2 or 3 weeks afterwards she began to feel better again.  Currently she is anxious in any type of surgical intervention.    Exam    B/P: 206/98, T: Data Unavailable, P: 73, R: Data Unavailable     Alert not in acute distress  Bilateral lower extremities with appropriate strength  Walking with a walker    Imaging    Severe lumbar stenosis at L4-5    Assessment    Lumbar stenosis at L4-5      Plan    The patient should monitor her symptoms and see how she does after this injection, and if her symptoms return or worsen she can contact our clinic for further evaluation.      Total time of 30 minutes spent with the patient today in counseling and coordination of care.

## 2021-12-31 NOTE — LETTER
12/31/2021         RE: Lesli Lorenzana  [id number] [facility] [id number]        Dear Colleague,    Thank you for referring your patient, Lesli Lorenzana, to the [facility] [place]. Please see a copy of my visit note below.    Neurosurgery follow-up     returns to clinic after undergoing a epidural steroid injection, she states it significantly improved her back symptoms, and she is doing well.  Her main symptoms now are pain in her right knee and hip which she is working with orthopedics.  She had her injection about a month ago initially she had relief then symptoms returned and then at about 2 or 3 weeks afterwards she began to feel better again.  Currently she is anxious in any type of surgical intervention.    Exam    B/P: 206/98, T: Data Unavailable, P: 73, R: Data Unavailable     Alert not in acute distress  Bilateral lower extremities with appropriate strength  Walking with a walker    Imaging    Severe lumbar stenosis at L4-5    Assessment    Lumbar stenosis at L4-5      Plan    The patient should monitor her symptoms and see how she does after this injection, and if her symptoms return or worsen she can contact our clinic for further evaluation.      Total time of 30 minutes spent with the patient today in counseling and coordination of care.        Again, thank you for allowing me to participate in the care of your patient.        Sincerely,        Jag Cabral PA-C

## 2022-01-28 DIAGNOSIS — I25.10 CORONARY ARTERY DISEASE DUE TO LIPID RICH PLAQUE: Primary | ICD-10-CM

## 2022-01-28 DIAGNOSIS — I25.83 CORONARY ARTERY DISEASE DUE TO LIPID RICH PLAQUE: Primary | ICD-10-CM

## 2022-02-01 ENCOUNTER — DOCUMENTATION ONLY (OUTPATIENT)
Dept: TRANSPLANT | Facility: CLINIC | Age: 82
End: 2022-02-01
Payer: COMMERCIAL

## 2022-02-01 NOTE — PROGRESS NOTES
Annual chart review completed.      Patient is overdue for labs and Muhammad appt.   Please send a reminder letter.

## 2022-02-13 ASSESSMENT — ENCOUNTER SYMPTOMS
LOSS OF CONSCIOUSNESS: 0
TREMORS: 0
EXERCISE INTOLERANCE: 0
MUSCLE WEAKNESS: 1
SPEECH CHANGE: 0
SLEEP DISTURBANCES DUE TO BREATHING: 0
PARALYSIS: 0
HYPOTENSION: 0
LIGHT-HEADEDNESS: 0
ORTHOPNEA: 0
NECK PAIN: 0
MUSCLE CRAMPS: 0
HEADACHES: 0
MEMORY LOSS: 0
BACK PAIN: 0
LEG PAIN: 1
STIFFNESS: 0
DISTURBANCES IN COORDINATION: 0
NUMBNESS: 1
PALPITATIONS: 0
SEIZURES: 0
WEAKNESS: 0
SYNCOPE: 0
TINGLING: 0
ARTHRALGIAS: 0
HYPERTENSION: 1
MYALGIAS: 1
DIZZINESS: 1

## 2022-02-14 ENCOUNTER — OFFICE VISIT (OUTPATIENT)
Dept: CARDIOLOGY | Facility: CLINIC | Age: 82
End: 2022-02-14
Attending: INTERNAL MEDICINE
Payer: COMMERCIAL

## 2022-02-14 VITALS
BODY MASS INDEX: 25.91 KG/M2 | HEIGHT: 60 IN | DIASTOLIC BLOOD PRESSURE: 97 MMHG | HEART RATE: 72 BPM | SYSTOLIC BLOOD PRESSURE: 188 MMHG | WEIGHT: 132 LBS | OXYGEN SATURATION: 99 %

## 2022-02-14 DIAGNOSIS — I25.10 CORONARY ARTERY DISEASE DUE TO LIPID RICH PLAQUE: ICD-10-CM

## 2022-02-14 DIAGNOSIS — I25.10 CORONARY ARTERY DISEASE INVOLVING NATIVE HEART WITHOUT ANGINA PECTORIS, UNSPECIFIED VESSEL OR LESION TYPE: ICD-10-CM

## 2022-02-14 DIAGNOSIS — I25.119 CORONARY ARTERY DISEASE INVOLVING NATIVE HEART WITH ANGINA PECTORIS, UNSPECIFIED VESSEL OR LESION TYPE (H): ICD-10-CM

## 2022-02-14 DIAGNOSIS — I10 ESSENTIAL HYPERTENSION WITH GOAL BLOOD PRESSURE LESS THAN 140/90: ICD-10-CM

## 2022-02-14 DIAGNOSIS — I25.83 CORONARY ARTERY DISEASE DUE TO LIPID RICH PLAQUE: ICD-10-CM

## 2022-02-14 DIAGNOSIS — I15.9 SECONDARY HYPERTENSION WITH GOAL BLOOD PRESSURE LESS THAN 140/90: ICD-10-CM

## 2022-02-14 DIAGNOSIS — I25.10 CORONARY ARTERY DISEASE INVOLVING NATIVE CORONARY ARTERY OF NATIVE HEART WITHOUT ANGINA PECTORIS: Primary | ICD-10-CM

## 2022-02-14 PROCEDURE — G0463 HOSPITAL OUTPT CLINIC VISIT: HCPCS

## 2022-02-14 PROCEDURE — 99214 OFFICE O/P EST MOD 30 MIN: CPT | Performed by: INTERNAL MEDICINE

## 2022-02-14 RX ORDER — LISINOPRIL 2.5 MG/1
2.5 TABLET ORAL DAILY
Qty: 90 TABLET | Refills: 3 | Status: SHIPPED | OUTPATIENT
Start: 2022-02-14 | End: 2023-05-05

## 2022-02-14 RX ORDER — ISOSORBIDE MONONITRATE 60 MG/1
60 TABLET, EXTENDED RELEASE ORAL 2 TIMES DAILY
Qty: 180 TABLET | Refills: 3 | Status: SHIPPED | OUTPATIENT
Start: 2022-02-14 | End: 2023-05-05

## 2022-02-14 RX ORDER — METOPROLOL TARTRATE 50 MG
50 TABLET ORAL 2 TIMES DAILY
Qty: 180 TABLET | Refills: 3 | Status: SHIPPED | OUTPATIENT
Start: 2022-02-14 | End: 2023-04-13

## 2022-02-14 ASSESSMENT — MIFFLIN-ST. JEOR: SCORE: 990.25

## 2022-02-14 ASSESSMENT — PAIN SCALES - GENERAL: PAINLEVEL: NO PAIN (0)

## 2022-02-14 NOTE — NURSING NOTE
February 14, 2022    Medication Changes: None    Follow Up: Next year in March or April with fasting labs prior    Patient verbalized understanding of all health information given and agreed to call with further questions or concerns.      Radha Cortes RN

## 2022-02-14 NOTE — PATIENT INSTRUCTIONS
February 14, 2022    Cardiology Provider You Saw During Your Visit: Dr. Lagunas      Medication Changes: None      Follow Up: Next year in March or April with fasting labs prior        Follow the American Heart Association Diet and Lifestyle Recommendations:  -Limit saturated fat, trans fat, sodium, red meat, sweets and sugar-sweetened beverages. If you choose to eat red meat, compare labels and select the leanest cuts available.  -Aim for at least 150 minutes of moderate physical activity or 75 minutes of vigorous physical activity - or an equal combination of both - each week.      To Reach Us:  -During business hours: [phone], press option # 1 to schedule an appointment or to leave a message for your care team.     -After hours, weekends or holidays: [phone], press option #4 and ask to speak to the on-call cardiologist. Inform them you are a patient at the [facility].      Radha Cortes RN  Cardiology Care Coordinator - General Cardiology  [facility]

## 2022-02-14 NOTE — PROGRESS NOTES
HPI:   I had the privilege to evaluate Ms Lesli Lorenzana, who is a 81yr old female patient       Patient  a past medical history significant for liver transplant in 2008, total hip replacement in 2017, hypertension, hyperlipidemia, CAD with NANCY to OM1 in 2009 and CKD who presents for routine cardiology follow-up.      Since her last visit she has been doing quite well.   Her biggest complaint is back pain  pain and left calf pain with prolonged standing from her spinal stenosis.   She denies CP, SOB, palpitations, LE edema, orthopnea, PND    SBPat  home are between140-160 mmHg     PAST MEDICAL HISTORY:  Past Medical History:   Diagnosis Date     Anemia      Arthritis      CAD (coronary artery disease) 1/2009    stent     Cholelithiasis     gallbladder removed with liver in 2008     Hallux valgus      History of blood transfusion      HTN (hypertension)      Hyperlipidemia LDL goal < 100      Liver transplant 2/2008    due to Primary biliary cirrhosis     Osteoarthritis of left hip      Osteopenia      Overweight(278.02)      Palpitations      Spinal stenosis        CURRENT MEDICATIONS:  Current Outpatient Medications   Medication Sig Dispense Refill     aspirin 81 MG tablet Take 1 tablet by mouth daily. 90 tablet 3     Blood Pressure Monitor KIT 1 each daily Monitor home blood pressure as instructed by physician.  Dispense [facility] blood pressure kit. 1 kit 0     calcium-vitamin D (CALTRATE) 600-400 MG-UNIT per tablet Take 1 tablet by mouth daily       isosorbide mononitrate (IMDUR) 60 MG 24 hr tablet Take 1 tablet (60 mg) by mouth 2 times daily 180 tablet 1     lisinopril (ZESTRIL) 2.5 MG tablet Take 1 tablet (2.5 mg) by mouth daily 90 tablet 1     metoprolol tartrate (LOPRESSOR) 50 MG tablet Take 1 tablet (50 mg) by mouth 2 times daily . Follow up as scheduled for further refills. 180 tablet 1     Multiple Vitamins-Minerals (PRESERVISION AREDS 2) CAPS        predniSONE (DELTASONE) 5 MG tablet TAKE ONE TABLET BY  MOUTH ONCE DAILY 90 tablet 3     tacrolimus (GENERIC EQUIVALENT) 1 MG capsule TAKE ONE CAPSULE BY MOUTH TWICE A  capsule 3     clindamycin (CLEOCIN) 300 MG capsule Take 2 capsules (600 mg) 1 hour before dental work. (Patient not taking: Reported on 2/14/2022) 2 capsule 3     tacrolimus (GENERIC EQUIVALENT) 0.5 MG capsule TAKE TWO CAPSULES BY MOUTH EVERY 12 HOURS 360 capsule 3       PAST SURGICAL HISTORY:  Past Surgical History:   Procedure Laterality Date     ARTHROPLASTY HIP Left 4/11/2017    Procedure: ARTHROPLASTY HIP;  Surgeon: Zhen Armstrong MD;  [facility]     BIOPSY      liver      COLONOSCOPY WITH CO2 INSUFFLATION N/A 9/15/2017    Procedure: COLONOSCOPY WITH CO2 INSUFFLATION;  Colonoscopy, History of colonic polyps, Ref by Muhammad, BMI 25, [place] [phone];  Surgeon: Dolly Hearn MD;  [facility]     ENDOSCOPIC RETROGRADE CHOLANGIOPANCREATOGRAM N/A 7/17/2017    Procedure: COMBINED ENDOSCOPIC RETROGRADE CHOLANGIOPANCREATOGRAPHY, PLACE TUBE/STENT;  Endoscopic Retrograde Cholangiopancreatogram with bile duct spinchterotomy, ballon sweep of bile duct for stones, dilatation of bile duct;  Surgeon: Narendra Diana MD;  [facility]     ENDOSCOPIC RETROGRADE CHOLANGIOPANCREATOGRAM N/A 5/29/2019    Procedure: Endoscopic Retrograde Cholangiopancreatogram with bile duct spinchterotomy, balloon sweep of bile ducts for stone, balloon dilatation ofbileduct and bile duct stent placement;  Surgeon: Guru Harpal Luu MD;  [facility]     ESOPHAGOSCOPY, GASTROSCOPY, DUODENOSCOPY (EGD), COMBINED N/A 7/2/2019    Procedure: ESOPHAGOGASTRODUODENOSCOPY, WITH FOREIGN BODY REMOVAL;  Surgeon: Guru Harpal Luu MD;  [facility]     GYN SURGERY      hysterectomy     INJECT EPIDURAL LUMBAR N/A 12/3/2021    Procedure: INJECTION, SPINE, LUMBAR, EPIDURAL - L5-S1 interlaminar epidural steroid injection;  Surgeon: Brent Smith MD;  [facility]      SURGICAL HISTORY OF -       liver transplant      SURGICAL HISTORY OF -       CAD with stent placement 2009     TRANSPLANT  2008    Liver transplant      ZZC STOMACH SURGERY PROCEDURE UNLISTED         ALLERGIES     Allergies   Allergen Reactions     Amlodipine Besylate Swelling     Edema in legs     Amoxicillin Rash       FAMILY HISTORY:  Family History   Problem Relation Age of Onset     Cancer Father         unknown     C.A.D. Father      Thyroid Disease Father      Cancer Mother         lung--smoker     Other Cancer Mother         lung     Thyroid Disease Mother      Cerebrovascular Disease Maternal Grandmother         ,     Unknown/Adopted Paternal Grandfather        SOCIAL HISTORY:  Social History     Socioeconomic History     Marital status:      Spouse name: None     Number of children: 3     Years of education: None     Highest education level: None   Occupational History     Employer: RETIRED   Tobacco Use     Smoking status: Former Smoker     Packs/day: 0.50     Years: 10.00     Pack years: 5.00     Types: Cigarettes     Quit date: 1975     Years since quittin.2     Smokeless tobacco: Never Used   Substance and Sexual Activity     Alcohol use: No     Comment: Glass of wine occassionally     Drug use: No     Comment: Never     Sexual activity: Not Currently     Birth control/protection: Post-menopausal   Other Topics Concern     Parent/sibling w/ CABG, MI or angioplasty before 65F 55M? No      Service Not Asked     Blood Transfusions Yes     Comment:      Caffeine Concern Not Asked     Occupational Exposure Not Asked     Hobby Hazards Not Asked     Sleep Concern Not Asked     Stress Concern Not Asked     Weight Concern Not Asked     Special Diet Not Asked     Back Care Not Asked     Exercise No     Bike Helmet Not Asked     Seat Belt Not Asked     Self-Exams Not Asked   Social History Narrative    Lives alone with her dog. . Three grown children, very  "supportive and all live close by.  6 grandchildren. Feels safe in her environment.     Social Determinants of Health     Financial Resource Strain: Not on file   Food Insecurity: Not on file   Transportation Needs: Not on file   Physical Activity: Not on file   Stress: Not on file   Social Connections: Not on file   Intimate Partner Violence: Not on file   Housing Stability: Not on file       ROS:   Constitutional: No fever, chills, or sweats. No weight gain/loss   ENT: No visual disturbance, ear ache, epistaxis, sore throat  Allergies/Immunologic: Negative.   Respiratory: No cough, hemoptysia  Cardiovascular: As per HPI  GI: No nausea, vomiting, hematemesis, melena, or hematochezia  : No urinary frequency, dysuria, or hematuria  Integument: Negative  Psychiatric: Negative  Neuro: Negative  Endocrinology: Negative   Musculoskeletal: Negative    EXAM:  BP (!) 188/97 (BP Location: Right arm, Patient Position: Sitting, Cuff Size: Adult Regular)   Pulse 72   Ht 1.516 m (4' 11.69\")   Wt 59.9 kg (132 lb)   SpO2 99%   BMI 26.05 kg/m    In general, the patient is a pleasant female in no apparent distress.    HEENT: NC/AT.  PERRLA.  EOMI.  Sclerae white, not injected.  Nares clear.  Pharynx without erythema or exudate.  Dentition intact.    Neck: No adenopathy.  No thyromegaly. Carotids +4/4 bilaterally without bruits.  No jugular venous distension.   Heart: RRR. Normal S1, S2 splits physiologically. No murmur, rub, click, or gallop. The PMI is in the 5th ICS in the midclavicular line. There is no heave.    Lungs: CTA.  No ronchi, wheezes, rales.  No dullness to percussion.   Abdomen: Soft, nontender, nondistended. No organomegaly.  No bruits.   Extremities: No clubbing, cyanosis, or edema.  The pulses are +4/4 at the radial, brachial, femoral, popliteal, DP, and PT sites bilaterally.  No bruits are noted.  Neurologic: Alert and oriented to person/place/time, normal speech, gait and affect  Skin: No petechiae, purpura " or rash.    BP at the end of the visit- 150/86    Labs:  LIPID RESULTS:  Lab Results   Component Value Date    CHOL 199 04/22/2021    HDL 84 04/22/2021    LDL 99 04/22/2021    TRIG 81 04/22/2021    CHOLHDLRATIO 2.4 08/11/2015    NHDL 115 04/22/2021       LIVER ENZYME RESULTS:  Lab Results   Component Value Date    AST 22 10/29/2021    AST 27 04/06/2021    ALT 24 10/29/2021    ALT 24 04/06/2021       CBC RESULTS:  Lab Results   Component Value Date    WBC 4.6 10/29/2021    WBC 4.3 04/06/2021    RBC 3.83 10/29/2021    RBC 3.93 04/06/2021    HGB 12.0 10/29/2021    HGB 12.1 04/06/2021    HCT 35.2 10/29/2021    HCT 35.0 04/06/2021    MCV 92 10/29/2021    MCV 89 04/06/2021    MCH 31.3 10/29/2021    MCH 30.8 04/06/2021    MCHC 34.1 10/29/2021    MCHC 34.6 04/06/2021    RDW 12.9 10/29/2021    RDW 13.0 04/06/2021     10/29/2021     04/06/2021       BMP RESULTS:  Lab Results   Component Value Date     (L) 10/29/2021     (L) 04/22/2021    POTASSIUM 4.4 10/29/2021    POTASSIUM 4.6 04/22/2021    CHLORIDE 100 10/29/2021    CHLORIDE 101 04/22/2021    CO2 26 10/29/2021    CO2 27 04/22/2021    ANIONGAP 4 10/29/2021    ANIONGAP 3 04/22/2021    GLC 91 10/29/2021    GLC 88 04/22/2021    BUN 22 10/29/2021    BUN 28 04/22/2021    CR 1.43 (H) 10/29/2021    CR 1.39 (H) 04/22/2021    GFRESTIMATED 35 (L) 10/29/2021    GFRESTIMATED 36 (L) 04/22/2021    GFRESTBLACK 42 (L) 04/22/2021    HAYES 8.7 10/29/2021    HAYES 9.0 04/22/2021          INR RESULTS:  Lab Results   Component Value Date    INR 1.0 06/19/2017    INR 2.0 05/08/2017    INR 2.2 05/05/2017             Assessment and Plan:     We discussed the results with patient.  We discussed the importance of a heart healthy diet and lifestyle.  We continue with the same medical regimen - we asked patient to continue with home BP measurements and let us know the home BP results    Follow Up: Next year in March or April with fasting labs prior.      Nain Lagunas MD,  PhD  Professor of Medicine  Division of Cardiology    CC  Patient Care Team:  Dee Chiu MD as PCP - General  Nain Lagunas MD as PCP - Cardiology (Cardiology)  Nain Lagunas MD as MD (Cardiology)  Zhen Armstrong MD as MD (Orthopedics)  Carol Powell LPN as Nurse Coordinator (Cardiology)  Carol Powell LPN as Nurse Coordinator (Cardiology)  Giovanni Ly MD as Assigned PCP  Jag Cabral PA-C as Assigned Neuroscience Provider  SELF, REFERRED  Answers for HPI/ROS submitted by the patient on 2/13/2022  General Symptoms: No  Skin Symptoms: No  HENT Symptoms: No  EYE SYMPTOMS: No  HEART SYMPTOMS: Yes  LUNG SYMPTOMS: No  INTESTINAL SYMPTOMS: No  URINARY SYMPTOMS: No  GYNECOLOGIC SYMPTOMS: No  BREAST SYMPTOMS: No  SKELETAL SYMPTOMS: Yes  BLOOD SYMPTOMS: No  NERVOUS SYSTEM SYMPTOMS: Yes  MENTAL HEALTH SYMPTOMS: No  Chest pain or pressure: No  Fast or irregular heartbeat: No  Pain in legs with walking: Yes  Trouble breathing while lying down: No  Fingers or toes appear blue: No  High blood pressure: Yes  Low blood pressure: No  Fainting: No  Murmurs: No  Pacemaker: No  Varicose veins: No  Edema or swelling: No  Wake up at night with shortness of breath: No  Light-headedness: No  Exercise intolerance: No  Back pain: No  Muscle aches: Yes  Neck pain: No  Joint pain: No  Bone pain: Yes  Muscle cramps: No  Muscle weakness: Yes  Joint stiffness: No  Bone fracture: No  Trouble with coordination: No  Dizziness or trouble with balance: Yes  Fainting or black-out spells: No  Memory loss: No  Headache: No  Seizures: No  Speech problems: No  Tingling: No  Tremor: No  Weakness: No  Difficulty walking: Yes  Paralysis: No  Numbness: Yes

## 2022-02-14 NOTE — LETTER
2/14/2022      RE: Lesli Lorenzana  [id number] [address] [id number]       Dear Colleague,    Thank you for the opportunity to participate in the care of your patient, Lesli Lorenzana, at the [facility] [place] at [facility]. Please see a copy of my visit note below.    HPI:   I had the privilege to evaluate Ms Lesli Lorenzana, who is a 81yr old female patient       Patient  a past medical history significant for liver transplant in 2008, total hip replacement in 2017, hypertension, hyperlipidemia, CAD with NANCY to OM1 in 2009 and CKD who presents for routine cardiology follow-up.      Since her last visit she has been doing quite well.   Her biggest complaint is back pain  pain and left calf pain with prolonged standing from her spinal stenosis.   She denies CP, SOB, palpitations, LE edema, orthopnea, PND    SBPat  home are between140-160 mmHg     PAST MEDICAL HISTORY:  Past Medical History:   Diagnosis Date     Anemia      Arthritis      CAD (coronary artery disease) 1/2009    stent     Cholelithiasis     gallbladder removed with liver in 2008     Hallux valgus      History of blood transfusion      HTN (hypertension)      Hyperlipidemia LDL goal < 100      Liver transplant 2/2008    due to Primary biliary cirrhosis     Osteoarthritis of left hip      Osteopenia      Overweight(278.02)      Palpitations      Spinal stenosis        CURRENT MEDICATIONS:  Current Outpatient Medications   Medication Sig Dispense Refill     aspirin 81 MG tablet Take 1 tablet by mouth daily. 90 tablet 3     Blood Pressure Monitor KIT 1 each daily Monitor home blood pressure as instructed by physician.  Dispense [place] blood pressure kit. 1 kit 0     calcium-vitamin D (CALTRATE) 600-400 MG-UNIT per tablet Take 1 tablet by mouth daily       isosorbide mononitrate (IMDUR) 60 MG 24 hr tablet Take 1 tablet (60 mg) by mouth 2 times daily 180 tablet 1      lisinopril (ZESTRIL) 2.5 MG tablet Take 1 tablet (2.5 mg) by mouth daily 90 tablet 1     metoprolol tartrate (LOPRESSOR) 50 MG tablet Take 1 tablet (50 mg) by mouth 2 times daily . Follow up as scheduled for further refills. 180 tablet 1     Multiple Vitamins-Minerals (PRESERVISION AREDS 2) CAPS        predniSONE (DELTASONE) 5 MG tablet TAKE ONE TABLET BY MOUTH ONCE DAILY 90 tablet 3     tacrolimus (GENERIC EQUIVALENT) 1 MG capsule TAKE ONE CAPSULE BY MOUTH TWICE A  capsule 3     clindamycin (CLEOCIN) 300 MG capsule Take 2 capsules (600 mg) 1 hour before dental work. (Patient not taking: Reported on 2/14/2022) 2 capsule 3     tacrolimus (GENERIC EQUIVALENT) 0.5 MG capsule TAKE TWO CAPSULES BY MOUTH EVERY 12 HOURS 360 capsule 3       PAST SURGICAL HISTORY:  Past Surgical History:   Procedure Laterality Date     ARTHROPLASTY HIP Left 4/11/2017    Procedure: ARTHROPLASTY HIP;  Surgeon: Zhen Armstrong MD;  [facility]     BIOPSY      liver      COLONOSCOPY WITH CO2 INSUFFLATION N/A 9/15/2017    Procedure: COLONOSCOPY WITH CO2 INSUFFLATION;  Colonoscopy, History of colonic polyps, Ref by Jb, BMI 25, [place] [phone];  Surgeon: Dolly Hearn MD;  [facility]     ENDOSCOPIC RETROGRADE CHOLANGIOPANCREATOGRAM N/A 7/17/2017    Procedure: COMBINED ENDOSCOPIC RETROGRADE CHOLANGIOPANCREATOGRAPHY, PLACE TUBE/STENT;  Endoscopic Retrograde Cholangiopancreatogram with bile duct spinchterotomy, ballon sweep of bile duct for stones, dilatation of bile duct;  Surgeon: Narendra Diana MD;  [facility]     ENDOSCOPIC RETROGRADE CHOLANGIOPANCREATOGRAM N/A 5/29/2019    Procedure: Endoscopic Retrograde Cholangiopancreatogram with bile duct spinchterotomy, balloon sweep of bile ducts for stone, balloon dilatation ofbileduct and bile duct stent placement;  Surgeon: Guru Harpal Luu MD;  [facility]     ESOPHAGOSCOPY, GASTROSCOPY, DUODENOSCOPY (EGD), COMBINED N/A 7/2/2019     Procedure: ESOPHAGOGASTRODUODENOSCOPY, WITH FOREIGN BODY REMOVAL;  Surgeon: Guru Harpal Luu MD;  [facility]     GYN SURGERY      hysterectomy     INJECT EPIDURAL LUMBAR N/A 12/3/2021    Procedure: INJECTION, SPINE, LUMBAR, EPIDURAL - L5-S1 interlaminar epidural steroid injection;  Surgeon: Brent Smith MD;  [facility]     SURGICAL HISTORY OF -       liver transplant      SURGICAL HISTORY OF -       CAD with stent placement 2009     TRANSPLANT  2008    Liver transplant      ZZC STOMACH SURGERY PROCEDURE UNLISTED         ALLERGIES     Allergies   Allergen Reactions     Amlodipine Besylate Swelling     Edema in legs     Amoxicillin Rash       FAMILY HISTORY:  Family History   Problem Relation Age of Onset     Cancer Father         unknown     C.A.D. Father      Thyroid Disease Father      Cancer Mother         lung--smoker     Other Cancer Mother         lung     Thyroid Disease Mother      Cerebrovascular Disease Maternal Grandmother         ,     Unknown/Adopted Paternal Grandfather        SOCIAL HISTORY:  Social History     Socioeconomic History     Marital status:      Spouse name: None     Number of children: 3     Years of education: None     Highest education level: None   Occupational History     Employer: RETIRED   Tobacco Use     Smoking status: Former Smoker     Packs/day: 0.50     Years: 10.00     Pack years: 5.00     Types: Cigarettes     Quit date: 1975     Years since quittin.2     Smokeless tobacco: Never Used   Substance and Sexual Activity     Alcohol use: No     Comment: Glass of wine occassionally     Drug use: No     Comment: Never     Sexual activity: Not Currently     Birth control/protection: Post-menopausal   Other Topics Concern     Parent/sibling w/ CABG, MI or angioplasty before 65F 55M? No      Service Not Asked     Blood Transfusions Yes     Comment:      Caffeine Concern Not Asked     Occupational Exposure Not  "Asked     Hobby Hazards Not Asked     Sleep Concern Not Asked     Stress Concern Not Asked     Weight Concern Not Asked     Special Diet Not Asked     Back Care Not Asked     Exercise No     Bike Helmet Not Asked     Seat Belt Not Asked     Self-Exams Not Asked   Social History Narrative    Lives alone with her dog. . Three grown children, very supportive and all live close by.  6 grandchildren. Feels safe in her environment.     Social Determinants of Health     Financial Resource Strain: Not on file   Food Insecurity: Not on file   Transportation Needs: Not on file   Physical Activity: Not on file   Stress: Not on file   Social Connections: Not on file   Intimate Partner Violence: Not on file   Housing Stability: Not on file       ROS:   Constitutional: No fever, chills, or sweats. No weight gain/loss   ENT: No visual disturbance, ear ache, epistaxis, sore throat  Allergies/Immunologic: Negative.   Respiratory: No cough, hemoptysia  Cardiovascular: As per HPI  GI: No nausea, vomiting, hematemesis, melena, or hematochezia  : No urinary frequency, dysuria, or hematuria  Integument: Negative  Psychiatric: Negative  Neuro: Negative  Endocrinology: Negative   Musculoskeletal: Negative    EXAM:  BP (!) 188/97 (BP Location: Right arm, Patient Position: Sitting, Cuff Size: Adult Regular)   Pulse 72   Ht 1.516 m (4' 11.69\")   Wt 59.9 kg (132 lb)   SpO2 99%   BMI 26.05 kg/m    In general, the patient is a pleasant female in no apparent distress.    HEENT: NC/AT.  PERRLA.  EOMI.  Sclerae white, not injected.  Nares clear.  Pharynx without erythema or exudate.  Dentition intact.    Neck: No adenopathy.  No thyromegaly. Carotids +4/4 bilaterally without bruits.  No jugular venous distension.   Heart: RRR. Normal S1, S2 splits physiologically. No murmur, rub, click, or gallop. The PMI is in the 5th ICS in the midclavicular line. There is no heave.    Lungs: CTA.  No ronchi, wheezes, rales.  No dullness to " percussion.   Abdomen: Soft, nontender, nondistended. No organomegaly.  No bruits.   Extremities: No clubbing, cyanosis, or edema.  The pulses are +4/4 at the radial, brachial, femoral, popliteal, DP, and PT sites bilaterally.  No bruits are noted.  Neurologic: Alert and oriented to person/place/time, normal speech, gait and affect  Skin: No petechiae, purpura or rash.    BP at the end of the visit- 150/86    Labs:  LIPID RESULTS:  Lab Results   Component Value Date    CHOL 199 04/22/2021    HDL 84 04/22/2021    LDL 99 04/22/2021    TRIG 81 04/22/2021    CHOLHDLRATIO 2.4 08/11/2015    NHDL 115 04/22/2021       LIVER ENZYME RESULTS:  Lab Results   Component Value Date    AST 22 10/29/2021    AST 27 04/06/2021    ALT 24 10/29/2021    ALT 24 04/06/2021       CBC RESULTS:  Lab Results   Component Value Date    WBC 4.6 10/29/2021    WBC 4.3 04/06/2021    RBC 3.83 10/29/2021    RBC 3.93 04/06/2021    HGB 12.0 10/29/2021    HGB 12.1 04/06/2021    HCT 35.2 10/29/2021    HCT 35.0 04/06/2021    MCV 92 10/29/2021    MCV 89 04/06/2021    MCH 31.3 10/29/2021    MCH 30.8 04/06/2021    MCHC 34.1 10/29/2021    MCHC 34.6 04/06/2021    RDW 12.9 10/29/2021    RDW 13.0 04/06/2021     10/29/2021     04/06/2021       BMP RESULTS:  Lab Results   Component Value Date     (L) 10/29/2021     (L) 04/22/2021    POTASSIUM 4.4 10/29/2021    POTASSIUM 4.6 04/22/2021    CHLORIDE 100 10/29/2021    CHLORIDE 101 04/22/2021    CO2 26 10/29/2021    CO2 27 04/22/2021    ANIONGAP 4 10/29/2021    ANIONGAP 3 04/22/2021    GLC 91 10/29/2021    GLC 88 04/22/2021    BUN 22 10/29/2021    BUN 28 04/22/2021    CR 1.43 (H) 10/29/2021    CR 1.39 (H) 04/22/2021    GFRESTIMATED 35 (L) 10/29/2021    GFRESTIMATED 36 (L) 04/22/2021    GFRESTBLACK 42 (L) 04/22/2021    HAYES 8.7 10/29/2021    HAYES 9.0 04/22/2021          INR RESULTS:  Lab Results   Component Value Date    INR 1.0 06/19/2017    INR 2.0 05/08/2017    INR 2.2 05/05/2017     Assessment  and Plan:     We discussed the results with patient.  We discussed the importance of a heart healthy diet and lifestyle.  We continue with the same medical regimen - we asked patient to continue with home BP measurements and let us know the home BP results    Follow Up: Next year in March or April with fasting labs prior.      Nain Lagunas MD, PhD  Professor of Medicine  Division of Cardiology    CC  Patient Care Team:  Dee Chiu MD as PCP - General  Zhen Armstrong MD as MD (Orthopedics)  Carol Powell LPN as Nurse Coordinator (Cardiology)  Giovanni Ly MD as Assigned PCP  Jag Cabral PA-C as Assigned Neuroscience Provider

## 2022-02-14 NOTE — NURSING NOTE
Chief Complaint   Patient presents with     Follow Up     Janneth yearly F/U   Vitals were taken and medications reconciled.    Ravindra Nolan, EMT  1:51 PM

## 2022-03-25 ENCOUNTER — LAB (OUTPATIENT)
Dept: LAB | Facility: CLINIC | Age: 82
End: 2022-03-25
Payer: COMMERCIAL

## 2022-03-25 ENCOUNTER — IMMUNIZATION (OUTPATIENT)
Dept: NURSING | Facility: CLINIC | Age: 82
End: 2022-03-25
Payer: COMMERCIAL

## 2022-03-25 DIAGNOSIS — I25.83 CORONARY ARTERY DISEASE DUE TO LIPID RICH PLAQUE: ICD-10-CM

## 2022-03-25 DIAGNOSIS — Z94.4 LIVER REPLACED BY TRANSPLANT (H): ICD-10-CM

## 2022-03-25 DIAGNOSIS — Z13.220 LIPID SCREENING: ICD-10-CM

## 2022-03-25 DIAGNOSIS — I25.10 CORONARY ARTERY DISEASE DUE TO LIPID RICH PLAQUE: ICD-10-CM

## 2022-03-25 DIAGNOSIS — I25.10 CORONARY ARTERY DISEASE INVOLVING NATIVE CORONARY ARTERY OF NATIVE HEART WITHOUT ANGINA PECTORIS: ICD-10-CM

## 2022-03-25 DIAGNOSIS — Z23 HIGH PRIORITY FOR 2019-NCOV VACCINE: Primary | ICD-10-CM

## 2022-03-25 LAB
ALBUMIN SERPL-MCNC: 3.3 G/DL (ref 3.4–5)
ALBUMIN UR-MCNC: ABNORMAL MG/DL
ALP SERPL-CCNC: 90 U/L (ref 40–150)
ALT SERPL W P-5'-P-CCNC: 22 U/L (ref 0–50)
ANION GAP SERPL CALCULATED.3IONS-SCNC: 7 MMOL/L (ref 3–14)
APPEARANCE UR: CLEAR
AST SERPL W P-5'-P-CCNC: 24 U/L (ref 0–45)
BILIRUB DIRECT SERPL-MCNC: 0.1 MG/DL (ref 0–0.2)
BILIRUB SERPL-MCNC: 0.4 MG/DL (ref 0.2–1.3)
BILIRUB UR QL STRIP: NEGATIVE
BUN SERPL-MCNC: 30 MG/DL (ref 7–30)
CALCIUM SERPL-MCNC: 8.6 MG/DL (ref 8.5–10.1)
CHLORIDE BLD-SCNC: 101 MMOL/L (ref 94–109)
CHOLEST SERPL-MCNC: 172 MG/DL
CO2 SERPL-SCNC: 24 MMOL/L (ref 20–32)
COLOR UR AUTO: YELLOW
CREAT SERPL-MCNC: 1.49 MG/DL (ref 0.52–1.04)
CREAT UR-MCNC: 42 MG/DL
ERYTHROCYTE [DISTWIDTH] IN BLOOD BY AUTOMATED COUNT: 13.2 % (ref 10–15)
FASTING STATUS PATIENT QL REPORTED: YES
GFR SERPL CREATININE-BSD FRML MDRD: 35 ML/MIN/1.73M2
GLUCOSE BLD-MCNC: 87 MG/DL (ref 70–99)
GLUCOSE UR STRIP-MCNC: NEGATIVE MG/DL
HCT VFR BLD AUTO: 35 % (ref 35–47)
HDLC SERPL-MCNC: 79 MG/DL
HGB BLD-MCNC: 12 G/DL (ref 11.7–15.7)
HGB UR QL STRIP: NEGATIVE
KETONES UR STRIP-MCNC: NEGATIVE MG/DL
LDLC SERPL CALC-MCNC: 73 MG/DL
LEUKOCYTE ESTERASE UR QL STRIP: ABNORMAL
MCH RBC QN AUTO: 31.9 PG (ref 26.5–33)
MCHC RBC AUTO-ENTMCNC: 34.3 G/DL (ref 31.5–36.5)
MCV RBC AUTO: 93 FL (ref 78–100)
NITRATE UR QL: NEGATIVE
NONHDLC SERPL-MCNC: 93 MG/DL
PH UR STRIP: 7 [PH] (ref 5–7)
PLATELET # BLD AUTO: 247 10E3/UL (ref 150–450)
POTASSIUM BLD-SCNC: 4.6 MMOL/L (ref 3.4–5.3)
PROT SERPL-MCNC: 6.7 G/DL (ref 6.8–8.8)
PROT UR-MCNC: 0.39 G/L
PROT/CREAT 24H UR: 0.93 G/G CR (ref 0–0.2)
RBC # BLD AUTO: 3.76 10E6/UL (ref 3.8–5.2)
RBC #/AREA URNS AUTO: ABNORMAL /HPF
SODIUM SERPL-SCNC: 132 MMOL/L (ref 133–144)
SP GR UR STRIP: 1.01 (ref 1–1.03)
SQUAMOUS #/AREA URNS AUTO: ABNORMAL /LPF
TACROLIMUS BLD-MCNC: 6.9 UG/L (ref 5–15)
TME LAST DOSE: NORMAL H
TME LAST DOSE: NORMAL H
TRIGL SERPL-MCNC: 102 MG/DL
UROBILINOGEN UR STRIP-ACNC: 0.2 E.U./DL
WBC # BLD AUTO: 4.3 10E3/UL (ref 4–11)
WBC #/AREA URNS AUTO: ABNORMAL /HPF

## 2022-03-25 PROCEDURE — 80197 ASSAY OF TACROLIMUS: CPT

## 2022-03-25 PROCEDURE — 80053 COMPREHEN METABOLIC PANEL: CPT

## 2022-03-25 PROCEDURE — 80061 LIPID PANEL: CPT

## 2022-03-25 PROCEDURE — 91305 COVID-19,PF,PFIZER (12+ YRS): CPT

## 2022-03-25 PROCEDURE — 85027 COMPLETE CBC AUTOMATED: CPT

## 2022-03-25 PROCEDURE — 0054A COVID-19,PF,PFIZER (12+ YRS): CPT

## 2022-03-25 PROCEDURE — 82248 BILIRUBIN DIRECT: CPT

## 2022-03-25 PROCEDURE — 81001 URINALYSIS AUTO W/SCOPE: CPT

## 2022-03-25 PROCEDURE — 36415 COLL VENOUS BLD VENIPUNCTURE: CPT

## 2022-03-25 PROCEDURE — 99207 PR NO CHARGE LOS: CPT

## 2022-03-25 PROCEDURE — 84156 ASSAY OF PROTEIN URINE: CPT

## 2022-03-28 DIAGNOSIS — Z94.4 LIVER REPLACED BY TRANSPLANT (H): Primary | ICD-10-CM

## 2022-03-28 NOTE — TELEPHONE ENCOUNTER
ISSUE:   Tacrolimus IR level 6.9 on 3/25, goal 3-5, dose per Carmen is 1 mg po bid.      PLAN:   Please call patient and confirm this was an accurate 12-hour trough. Verify Tacrolimus IR dose  Confirm no new medications or illness. Confirm no missed doses. If accurate trough and accurate dose, decrease Tacrolimus IR dose to 0.5 mg in the morning and  1mg in the evening and repeat labs in 2-3 months.  Porsha Sanchez RN    OUTCOME:   Spoke with patient, they confirm accurate trough level and current dose 1 mg BID. Patient confirmed dose change to 0.5 mg am, 1 mg pm and to repeat labs in 2-3  months. Orders sent to preferred pharmacy for dose change and lab for repeat labs. Patient voiced understanding of plan.     Carmen Simon LPN

## 2022-03-30 ENCOUNTER — TELEPHONE (OUTPATIENT)
Dept: TRANSPLANT | Facility: CLINIC | Age: 82
End: 2022-03-30
Payer: COMMERCIAL

## 2022-03-30 RX ORDER — TACROLIMUS 0.5 MG/1
CAPSULE ORAL
Qty: 270 CAPSULE | Refills: 3 | Status: SHIPPED | OUTPATIENT
Start: 2022-03-30 | End: 2023-05-02

## 2022-03-30 NOTE — TELEPHONE ENCOUNTER
Patient Called;  Returning call       Call back needed? Yes    Return Call Needed  Same as documented in contacts section  When to return call?: Same day: Route High Priority

## 2022-05-07 ENCOUNTER — HEALTH MAINTENANCE LETTER (OUTPATIENT)
Age: 82
End: 2022-05-07

## 2022-05-17 ENCOUNTER — PATIENT OUTREACH (OUTPATIENT)
Dept: FAMILY MEDICINE | Facility: CLINIC | Age: 82
End: 2022-05-17
Payer: COMMERCIAL

## 2022-06-01 ASSESSMENT — ENCOUNTER SYMPTOMS
NERVOUS/ANXIOUS: 1
BREAST MASS: 0
ARTHRALGIAS: 1

## 2022-06-01 ASSESSMENT — ACTIVITIES OF DAILY LIVING (ADL): CURRENT_FUNCTION: SHOPPING REQUIRES ASSISTANCE

## 2022-06-02 ENCOUNTER — OFFICE VISIT (OUTPATIENT)
Dept: FAMILY MEDICINE | Facility: CLINIC | Age: 82
End: 2022-06-02
Payer: COMMERCIAL

## 2022-06-02 VITALS
OXYGEN SATURATION: 100 % | WEIGHT: 130 LBS | DIASTOLIC BLOOD PRESSURE: 90 MMHG | SYSTOLIC BLOOD PRESSURE: 172 MMHG | RESPIRATION RATE: 16 BRPM | TEMPERATURE: 97.8 F | BODY MASS INDEX: 25.66 KG/M2 | HEART RATE: 78 BPM

## 2022-06-02 DIAGNOSIS — I25.10 CORONARY ARTERY DISEASE INVOLVING NATIVE CORONARY ARTERY OF NATIVE HEART WITHOUT ANGINA PECTORIS: ICD-10-CM

## 2022-06-02 DIAGNOSIS — M48.061 SPINAL STENOSIS OF LUMBAR REGION, UNSPECIFIED WHETHER NEUROGENIC CLAUDICATION PRESENT: ICD-10-CM

## 2022-06-02 DIAGNOSIS — Z94.4 HISTORY OF LIVER TRANSPLANT (H): ICD-10-CM

## 2022-06-02 DIAGNOSIS — B37.2 YEAST DERMATITIS: ICD-10-CM

## 2022-06-02 DIAGNOSIS — I10 ESSENTIAL HYPERTENSION WITH GOAL BLOOD PRESSURE LESS THAN 140/90: ICD-10-CM

## 2022-06-02 DIAGNOSIS — Z00.00 ENCOUNTER FOR MEDICARE ANNUAL WELLNESS EXAM: Primary | ICD-10-CM

## 2022-06-02 DIAGNOSIS — N18.32 STAGE 3B CHRONIC KIDNEY DISEASE (H): ICD-10-CM

## 2022-06-02 PROCEDURE — 99213 OFFICE O/P EST LOW 20 MIN: CPT | Mod: 25 | Performed by: FAMILY MEDICINE

## 2022-06-02 PROCEDURE — 99397 PER PM REEVAL EST PAT 65+ YR: CPT | Performed by: FAMILY MEDICINE

## 2022-06-02 RX ORDER — NYSTATIN 100000 [USP'U]/G
POWDER TOPICAL 2 TIMES DAILY
Qty: 60 G | Refills: 3 | Status: SHIPPED | OUTPATIENT
Start: 2022-06-02 | End: 2023-06-26

## 2022-06-02 ASSESSMENT — ACTIVITIES OF DAILY LIVING (ADL): CURRENT_FUNCTION: SHOPPING REQUIRES ASSISTANCE

## 2022-06-02 ASSESSMENT — ENCOUNTER SYMPTOMS
NERVOUS/ANXIOUS: 1
ARTHRALGIAS: 1
BREAST MASS: 0

## 2022-06-02 ASSESSMENT — PAIN SCALES - GENERAL: PAINLEVEL: NO PAIN (0)

## 2022-06-02 NOTE — PROGRESS NOTES
"SUBJECTIVE:   Lesli Lorenzana is a 81 year old female who presents for Preventive Visit.      Patient has been advised of split billing requirements and indicates understanding: Yes  Are you in the first 12 months of your Medicare coverage?  No    Healthy Habits:     In general, how would you rate your overall health?  Good    Frequency of exercise:  2-3 days/week    Duration of exercise:  15-30 minutes    Do you usually eat at least 4 servings of fruit and vegetables a day, include whole grains    & fiber and avoid regularly eating high fat or \"junk\" foods?  Yes    Taking medications regularly:  Yes    Medication side effects:  None    Ability to successfully perform activities of daily living:  Shopping requires assistance    Home Safety:  No safety concerns identified    Hearing Impairment:  No hearing concerns    In the past 6 months, have you been bothered by leaking of urine?  No    In general, how would you rate your overall mental or emotional health?  Good      PHQ-2 Total Score: 0    Additional concerns today:  Yes    Do you feel safe in your environment? Yes    Have you ever done Advance Care Planning? (For example, a Health Directive, POLST, or a discussion with a medical provider or your loved ones about your wishes): Yes, advance care planning is on file.       Fall risk  Fallen 2 or more times in the past year?: No  Any fall with injury in the past year?: No    Cognitive Screening   1) Repeat 3 items (Leader, Season, Table)    2) Clock draw: NORMAL  3) 3 item recall: Recalls 3 objects  Results: 3 items recalled: COGNITIVE IMPAIRMENT LESS LIKELY    Mini-CogTM Copyright KARTIK Mir. Licensed by the author for use in [facility]; reprinted with permission ([email or web address]@.[email or web address]). All rights reserved.      Do you have sleep apnea, excessive snoring or daytime drowsiness?: no    Reviewed and updated as needed this visit by clinical staff   Tobacco  Allergies  Meds   Med Hx  Surg Hx  Fam Hx  Soc Hx "          Reviewed and updated as needed this visit by Provider                   Social History     Tobacco Use     Smoking status: Former Smoker     Packs/day: 0.50     Years: 10.00     Pack years: 5.00     Types: Cigarettes     Quit date: 1975     Years since quittin.5     Smokeless tobacco: Never Used   Substance Use Topics     Alcohol use: No     Comment: Glass of wine occassionally     If you drink alcohol do you typically have >3 drinks per day or >7 drinks per week? No    Alcohol Use 2022   Prescreen: >3 drinks/day or >7 drinks/week? -   Prescreen: >3 drinks/day or >7 drinks/week? No     BP normal at home when she has checked in the past  Machine at home is broken          Current providers sharing in care for this patient include:   Patient Care Team:  Dee Chiu MD as PCP - General  Nain Lagunas MD as PCP - Cardiology (Cardiology)  Nain Lagunas MD as MD (Cardiology)  Zhen Armstrong MD as MD (Orthopedics)  Carol Powell LPN as Nurse Coordinator (Cardiology)  Carol Powell LPN as Nurse Coordinator (Cardiology)  Giovanni Ly MD as Assigned PCP  Jag Cabral PA-C as Assigned Neuroscience Provider  Nain Lagunas MD as Assigned Heart and Vascular Provider    The following health maintenance items are reviewed in Epic and correct as of today:  Health Maintenance Due   Topic Date Due     ANNUAL REVIEW OF HM ORDERS  Never done     DEXA  2021     Lab work is in process      Mammogram Screening - Patient over age 75, has elected to discontinue screenings.  Pertinent mammograms are reviewed under the imaging tab.    Review of Systems   Cardiovascular: Positive for peripheral edema.   Breasts:  Negative for breast mass and discharge.   Genitourinary: Negative for pelvic pain and vaginal bleeding.   Musculoskeletal: Positive for arthralgias.   Psychiatric/Behavioral: The patient is nervous/anxious.      Constitutional, HEENT, cardiovascular, pulmonary,  "gi and gu systems are negative, except as otherwise noted.    OBJECTIVE:   BP (!) 200/71   Pulse 78   Temp 97.8  F (36.6  C) (Tympanic)   Resp 16   Wt 59 kg (130 lb)   SpO2 100%   BMI 25.66 kg/m   Estimated body mass index is 25.66 kg/m  as calculated from the following:    Height as of 2/14/22: 1.516 m (4' 11.69\").    Weight as of this encounter: 59 kg (130 lb).  Physical Exam  GENERAL: healthy, alert and no distress  EYES: Eyes grossly normal to inspection, PERRL and conjunctivae and sclerae normal  HENT: ear canals and TM's normal, nose and mouth without ulcers or lesions  NECK: no adenopathy, no asymmetry, masses, or scars and thyroid normal to palpation  RESP: lungs clear to auscultation - no rales, rhonchi or wheezes  BREAST: normal without masses, tenderness or nipple discharge and no palpable axillary masses or adenopathy  CV: regular rate and rhythm, normal S1 S2, no S3 or S4, no murmur, click or rub, no peripheral edema and peripheral pulses strong  ABDOMEN: soft, nontender, no hepatosplenomegaly, no masses and bowel sounds normal  MS: no gross musculoskeletal defects noted, no edema  SKIN: no suspicious lesions or rashes  NEURO: Normal strength and tone, mentation intact and speech normal  PSYCH: mentation appears normal, affect normal/bright    Diagnostic Test Results:  Labs reviewed in Epic    ASSESSMENT / PLAN:   (Z00.00) Encounter for Medicare annual wellness exam  (primary encounter diagnosis)  Comment: completed  Plan:     (Z94.4) History of liver transplant (H)  Comment: per transplant  Plan:     (I25.10) Coronary artery disease involving native coronary artery of native heart without angina pectoris  Comment: per cards  Plan:     (M48.061) Spinal stenosis of lumbar region, unspecified whether neurogenic claudication present  Comment: follow-up with neurosurgery again. She is rethinking surgery  Plan: Spine Referral            (N18.32) Stage 3b chronic kidney disease (H)  Comment: stable on " "acee  Plan:     (B37.2) Yeast dermatitis  Comment: use as needed under breasts  Plan: nystatin (MYCOSTATIN) 133140 UNIT/GM external         powder            (I10) Essential hypertension with goal blood pressure less than 140/90  Comment: per cards  Plan:     Patient has been advised of split billing requirements and indicates understanding: Yes    COUNSELING:  Reviewed preventive health counseling, as reflected in patient instructions       Regular exercise       Healthy diet/nutrition       Vision screening       Dental care    Estimated body mass index is 25.66 kg/m  as calculated from the following:    Height as of 2/14/22: 1.516 m (4' 11.69\").    Weight as of this encounter: 59 kg (130 lb).        She reports that she quit smoking about 46 years ago. Her smoking use included cigarettes. She has a 5.00 pack-year smoking history. She has never used smokeless tobacco.      Appropriate preventive services were discussed with this patient, including applicable screening as appropriate for cardiovascular disease, diabetes, osteopenia/osteoporosis, and glaucoma.  As appropriate for age/gender, discussed screening for colorectal cancer, prostate cancer, breast cancer, and cervical cancer. Checklist reviewing preventive services available has been given to the patient.    Reviewed patients plan of care and provided an AVS. The Intermediate Care Plan ( asthma action plan, low back pain action plan, and migraine action plan) for Lesli meets the Care Plan requirement. This Care Plan has been established and reviewed with the Patient.    Counseling Resources:  ATP IV Guidelines  Pooled Cohorts Equation Calculator  Breast Cancer Risk Calculator  Breast Cancer: Medication to Reduce Risk  FRAX Risk Assessment  ICSI Preventive Guidelines  Dietary Guidelines for Americans, 2010  Pictarine's MyPlate  ASA Prophylaxis  Lung CA Screening    Dee Chiu MD  [facility]    Identified Health Risks:  "

## 2022-06-02 NOTE — NURSING NOTE
"Chief Complaint   Patient presents with     Wellness Visit       Initial BP (!) 200/71   Pulse 78   Temp 97.8  F (36.6  C) (Tympanic)   Resp 16   Wt 59 kg (130 lb)   SpO2 100%   BMI 25.66 kg/m   Estimated body mass index is 25.66 kg/m  as calculated from the following:    Height as of 2/14/22: 1.516 m (4' 11.69\").    Weight as of this encounter: 59 kg (130 lb).  Medication Reconciliation: complete  Aimee Ruvalcaba CMA  "

## 2022-06-02 NOTE — PATIENT INSTRUCTIONS
Patient Education   Personalized Prevention Plan  You are due for the preventive services outlined below.  Your care team is available to assist you in scheduling these services.  If you have already completed any of these items, please share that information with your care team to update in your medical record.  Health Maintenance Due   Topic Date Due     ANNUAL REVIEW OF HM ORDERS  Never done     Osteoporosis Screening  08/13/2021

## 2022-06-30 ENCOUNTER — LAB (OUTPATIENT)
Dept: LAB | Facility: CLINIC | Age: 82
End: 2022-06-30
Payer: COMMERCIAL

## 2022-06-30 DIAGNOSIS — Z13.220 LIPID SCREENING: ICD-10-CM

## 2022-06-30 DIAGNOSIS — Z94.4 LIVER REPLACED BY TRANSPLANT (H): ICD-10-CM

## 2022-06-30 LAB
ALBUMIN SERPL-MCNC: 3.3 G/DL (ref 3.4–5)
ALP SERPL-CCNC: 85 U/L (ref 40–150)
ALT SERPL W P-5'-P-CCNC: 33 U/L (ref 0–50)
ANION GAP SERPL CALCULATED.3IONS-SCNC: 6 MMOL/L (ref 3–14)
AST SERPL W P-5'-P-CCNC: 26 U/L (ref 0–45)
BILIRUB DIRECT SERPL-MCNC: 0.1 MG/DL (ref 0–0.2)
BILIRUB SERPL-MCNC: 0.4 MG/DL (ref 0.2–1.3)
BUN SERPL-MCNC: 29 MG/DL (ref 7–30)
CALCIUM SERPL-MCNC: 8.7 MG/DL (ref 8.5–10.1)
CHLORIDE BLD-SCNC: 99 MMOL/L (ref 94–109)
CO2 SERPL-SCNC: 26 MMOL/L (ref 20–32)
CREAT SERPL-MCNC: 1.41 MG/DL (ref 0.52–1.04)
ERYTHROCYTE [DISTWIDTH] IN BLOOD BY AUTOMATED COUNT: 12.6 % (ref 10–15)
GFR SERPL CREATININE-BSD FRML MDRD: 38 ML/MIN/1.73M2
GLUCOSE BLD-MCNC: 92 MG/DL (ref 70–99)
HCT VFR BLD AUTO: 35 % (ref 35–47)
HGB BLD-MCNC: 12.2 G/DL (ref 11.7–15.7)
MCH RBC QN AUTO: 32.1 PG (ref 26.5–33)
MCHC RBC AUTO-ENTMCNC: 34.9 G/DL (ref 31.5–36.5)
MCV RBC AUTO: 92 FL (ref 78–100)
PLATELET # BLD AUTO: 260 10E3/UL (ref 150–450)
POTASSIUM BLD-SCNC: 4.5 MMOL/L (ref 3.4–5.3)
PROT SERPL-MCNC: 6.9 G/DL (ref 6.8–8.8)
RBC # BLD AUTO: 3.8 10E6/UL (ref 3.8–5.2)
SODIUM SERPL-SCNC: 131 MMOL/L (ref 133–144)
TACROLIMUS BLD-MCNC: 4.8 UG/L (ref 5–15)
TME LAST DOSE: ABNORMAL H
TME LAST DOSE: ABNORMAL H
WBC # BLD AUTO: 4.3 10E3/UL (ref 4–11)

## 2022-06-30 PROCEDURE — 85027 COMPLETE CBC AUTOMATED: CPT

## 2022-06-30 PROCEDURE — 36415 COLL VENOUS BLD VENIPUNCTURE: CPT

## 2022-06-30 PROCEDURE — 82248 BILIRUBIN DIRECT: CPT

## 2022-06-30 PROCEDURE — 80053 COMPREHEN METABOLIC PANEL: CPT

## 2022-06-30 PROCEDURE — 80197 ASSAY OF TACROLIMUS: CPT

## 2022-07-16 NOTE — TELEPHONE ENCOUNTER
Patient Quality Outreach    Patient is due for the following:   Hypertension -  BP check    NEXT STEPS:   Patient has upcoming appointment, these items will be addressed at that time.    Type of outreach:    Sent Dctiot message.      Questions for provider review:    None     Sindhu Russell, CMA           <-- Click to add NO pertinent Family History

## 2022-08-04 DIAGNOSIS — T86.41 LIVER TRANSPLANT REJECTION (H): ICD-10-CM

## 2022-08-04 DIAGNOSIS — Z94.4 LIVER TRANSPLANTED (H): ICD-10-CM

## 2022-08-04 RX ORDER — PREDNISONE 5 MG/1
TABLET ORAL
Qty: 90 TABLET | Refills: 3 | Status: SHIPPED | OUTPATIENT
Start: 2022-08-04 | End: 2023-08-03

## 2022-10-25 ENCOUNTER — LAB (OUTPATIENT)
Dept: LAB | Facility: CLINIC | Age: 82
End: 2022-10-25
Payer: COMMERCIAL

## 2022-10-25 DIAGNOSIS — Z13.220 LIPID SCREENING: ICD-10-CM

## 2022-10-25 DIAGNOSIS — Z94.4 LIVER REPLACED BY TRANSPLANT (H): ICD-10-CM

## 2022-10-25 LAB
ALBUMIN SERPL-MCNC: 3.4 G/DL (ref 3.4–5)
ALP SERPL-CCNC: 105 U/L (ref 40–150)
ALT SERPL W P-5'-P-CCNC: 31 U/L (ref 0–50)
ANION GAP SERPL CALCULATED.3IONS-SCNC: 6 MMOL/L (ref 3–14)
AST SERPL W P-5'-P-CCNC: 24 U/L (ref 0–45)
BILIRUB DIRECT SERPL-MCNC: 0.1 MG/DL (ref 0–0.2)
BILIRUB SERPL-MCNC: 0.4 MG/DL (ref 0.2–1.3)
BUN SERPL-MCNC: 33 MG/DL (ref 7–30)
CALCIUM SERPL-MCNC: 8.8 MG/DL (ref 8.5–10.1)
CHLORIDE BLD-SCNC: 100 MMOL/L (ref 94–109)
CO2 SERPL-SCNC: 25 MMOL/L (ref 20–32)
CREAT SERPL-MCNC: 1.56 MG/DL (ref 0.52–1.04)
ERYTHROCYTE [DISTWIDTH] IN BLOOD BY AUTOMATED COUNT: 13.3 % (ref 10–15)
GFR SERPL CREATININE-BSD FRML MDRD: 33 ML/MIN/1.73M2
GLUCOSE BLD-MCNC: 88 MG/DL (ref 70–99)
HCT VFR BLD AUTO: 35 % (ref 35–47)
HGB BLD-MCNC: 12.1 G/DL (ref 11.7–15.7)
MCH RBC QN AUTO: 32.4 PG (ref 26.5–33)
MCHC RBC AUTO-ENTMCNC: 34.6 G/DL (ref 31.5–36.5)
MCV RBC AUTO: 94 FL (ref 78–100)
PLATELET # BLD AUTO: 263 10E3/UL (ref 150–450)
POTASSIUM BLD-SCNC: 4.6 MMOL/L (ref 3.4–5.3)
PROT SERPL-MCNC: 7.2 G/DL (ref 6.8–8.8)
RBC # BLD AUTO: 3.73 10E6/UL (ref 3.8–5.2)
SODIUM SERPL-SCNC: 131 MMOL/L (ref 133–144)
TACROLIMUS BLD-MCNC: 4.8 UG/L (ref 5–15)
TME LAST DOSE: ABNORMAL H
TME LAST DOSE: ABNORMAL H
WBC # BLD AUTO: 4.9 10E3/UL (ref 4–11)

## 2022-10-25 PROCEDURE — 36415 COLL VENOUS BLD VENIPUNCTURE: CPT

## 2022-10-25 PROCEDURE — 80197 ASSAY OF TACROLIMUS: CPT

## 2022-10-25 PROCEDURE — 82248 BILIRUBIN DIRECT: CPT

## 2022-10-25 PROCEDURE — 85027 COMPLETE CBC AUTOMATED: CPT

## 2022-10-25 PROCEDURE — 80053 COMPREHEN METABOLIC PANEL: CPT

## 2022-10-27 ENCOUNTER — TELEPHONE (OUTPATIENT)
Dept: CARDIOLOGY | Facility: CLINIC | Age: 82
End: 2022-10-27

## 2023-02-07 DIAGNOSIS — I25.10 CORONARY ARTERY DISEASE INVOLVING NATIVE HEART WITHOUT ANGINA PECTORIS, UNSPECIFIED VESSEL OR LESION TYPE: Primary | ICD-10-CM

## 2023-02-17 ENCOUNTER — LAB (OUTPATIENT)
Dept: LAB | Facility: CLINIC | Age: 83
End: 2023-02-17
Payer: COMMERCIAL

## 2023-02-17 DIAGNOSIS — I25.10 CORONARY ARTERY DISEASE INVOLVING NATIVE HEART WITHOUT ANGINA PECTORIS, UNSPECIFIED VESSEL OR LESION TYPE: ICD-10-CM

## 2023-02-17 LAB
ALBUMIN SERPL-MCNC: 3.2 G/DL (ref 3.4–5)
ALP SERPL-CCNC: 94 U/L (ref 40–150)
ALT SERPL W P-5'-P-CCNC: 19 U/L (ref 0–50)
ANION GAP SERPL CALCULATED.3IONS-SCNC: 7 MMOL/L (ref 3–14)
AST SERPL W P-5'-P-CCNC: 22 U/L (ref 0–45)
BILIRUB SERPL-MCNC: 0.5 MG/DL (ref 0.2–1.3)
BUN SERPL-MCNC: 25 MG/DL (ref 7–30)
CALCIUM SERPL-MCNC: 8.7 MG/DL (ref 8.5–10.1)
CHLORIDE BLD-SCNC: 98 MMOL/L (ref 94–109)
CHOLEST SERPL-MCNC: 179 MG/DL
CO2 SERPL-SCNC: 27 MMOL/L (ref 20–32)
CREAT SERPL-MCNC: 1.48 MG/DL (ref 0.52–1.04)
FASTING STATUS PATIENT QL REPORTED: YES
GFR SERPL CREATININE-BSD FRML MDRD: 35 ML/MIN/1.73M2
GLUCOSE BLD-MCNC: 89 MG/DL (ref 70–99)
HDLC SERPL-MCNC: 84 MG/DL
LDLC SERPL CALC-MCNC: 79 MG/DL
LDLC SERPL CALC-MCNC: 93 MG/DL
NONHDLC SERPL-MCNC: 95 MG/DL
POTASSIUM BLD-SCNC: 3.9 MMOL/L (ref 3.4–5.3)
PROT SERPL-MCNC: 7.3 G/DL (ref 6.8–8.8)
SODIUM SERPL-SCNC: 132 MMOL/L (ref 133–144)
TRIGL SERPL-MCNC: 81 MG/DL

## 2023-02-17 PROCEDURE — 80061 LIPID PANEL: CPT

## 2023-02-17 PROCEDURE — 36415 COLL VENOUS BLD VENIPUNCTURE: CPT

## 2023-02-17 PROCEDURE — 83721 ASSAY OF BLOOD LIPOPROTEIN: CPT | Mod: 59

## 2023-02-17 PROCEDURE — 80053 COMPREHEN METABOLIC PANEL: CPT

## 2023-03-01 DIAGNOSIS — Z94.4 LIVER REPLACED BY TRANSPLANT (H): Primary | ICD-10-CM

## 2023-03-30 ENCOUNTER — TELEPHONE (OUTPATIENT)
Dept: FAMILY MEDICINE | Facility: CLINIC | Age: 83
End: 2023-03-30
Payer: COMMERCIAL

## 2023-03-30 NOTE — TELEPHONE ENCOUNTER
Patient Quality Outreach    Patient is due for the following:   Hypertension -  BP check  Colon Cancer Screening    Next Steps:   Schedule a nurse only visit for blood pressure check    Type of outreach:    Sent StrongView message.      Questions for provider review:    None     Miracle Simental MA

## 2023-04-10 DIAGNOSIS — I10 ESSENTIAL HYPERTENSION WITH GOAL BLOOD PRESSURE LESS THAN 140/90: ICD-10-CM

## 2023-04-10 DIAGNOSIS — I25.119 CORONARY ARTERY DISEASE INVOLVING NATIVE HEART WITH ANGINA PECTORIS, UNSPECIFIED VESSEL OR LESION TYPE (H): ICD-10-CM

## 2023-04-12 ENCOUNTER — OFFICE VISIT (OUTPATIENT)
Dept: FAMILY MEDICINE | Facility: CLINIC | Age: 83
End: 2023-04-12
Payer: COMMERCIAL

## 2023-04-12 VITALS
SYSTOLIC BLOOD PRESSURE: 148 MMHG | TEMPERATURE: 98.1 F | DIASTOLIC BLOOD PRESSURE: 82 MMHG | HEART RATE: 89 BPM | OXYGEN SATURATION: 95 %
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
## 2023-04-12 DIAGNOSIS — S00.83XA CONTUSION OF OTHER PART OF HEAD, INITIAL ENCOUNTER: ICD-10-CM

## 2023-04-12 PROCEDURE — 99213 OFFICE O/P EST LOW 20 MIN: CPT | Performed by: PHYSICIAN ASSISTANT

## 2023-04-12 NOTE — PROGRESS NOTES
Assessment & Plan     Fall, initial encounter  Contusion of other part of head, initial encounter  Contusion of her left cheek, improving with application of ice pack.   There is some new bruising of her forearms as well, but normal ROM of all joints and no concern for fracture.   She declines Xray at this time.   She was brought out to her vehicle by nursing staff  She will notify if any further concerns.                    Kristen M. Kehr, PA-C [facility]   Lesli is a 82 year old, presenting for the following health issues:  Fall        4/12/2023     9:46 AM   Additional Questions   Roomed by Yelena MATA     Concern - Fall  Onset: 10 min ago in clinic entry way, foot caught on the carpeting and she fell, she hit the left side of her face and has a carpet burn and bruise on her cheek. She also has some bruising on her forearms where staff helped to pick her up. She bruises easily. She has been sitting with an ice pack on her contusion on her face and monitored by nursing staff.   Description: hit left side of face. Bruised and lump present. Mild bleeding  Intensity: moderate  Progression of Symptoms:  same  Accompanying Signs & Symptoms:   Previous history of similar problem:   Precipitating factors:        Worsened by:   Alleviating factors:        Improved by:   Therapies tried and outcome: using ice.           Review of Systems   Constitutional, HEENT, cardiovascular, pulmonary, GI, , musculoskeletal, neuro, skin, endocrine and psych systems are negative, except as otherwise noted.      Objective    BP (!) 148/82   Pulse 89   Temp 98.1  F (36.7  C) (Oral)   SpO2 95%   There is no height or weight on file to calculate BMI.  Physical Exam   GENERAL: healthy, alert and no distress. She is sitting in a wheelchair with an ice pack on her left cheek.   EYES: Eyes grossly normal to inspection, PERRL and conjunctivae and sclerae normal  MS: arthritic deformities of her  hands, normal ROM of hands, wrists and elbows. There is bruising on her both forearms, no swelling. No tenderness.   SKIN: contusion with small abrasion on her left cheek, small amount of bleeding. Little tenderness over the cheek bone and normal movement of her jaw.   NEURO: Normal strength and tone, mentation intact and speech normal  PSYCH: mentation appears normal, affect normal/bright

## 2023-04-13 RX ORDER — METOPROLOL TARTRATE 50 MG
50 TABLET ORAL 2 TIMES DAILY
Qty: 180 TABLET | Refills: 0 | Status: SHIPPED | OUTPATIENT
Start: 2023-04-13 | End: 2023-06-12

## 2023-04-13 NOTE — TELEPHONE ENCOUNTER
metoprolol tartrate (LOPRESSOR) 50 MG tablet      Last Written Prescription Date:  2/14/22  Last Fill Quantity: 180,   # refills: 3  Last Office Visit : 2/14/22  Future Office visit:  6/12/23    Routing refill request to provider for review/approval because:  Blood pressure out of range   BP Readings from Last 3 Encounters:   04/12/23 (!) 148/82   06/02/22 (!) 172/90   02/14/22 (!) 188/97

## 2023-05-02 DIAGNOSIS — I25.10 CORONARY ARTERY DISEASE DUE TO LIPID RICH PLAQUE: ICD-10-CM

## 2023-05-02 DIAGNOSIS — I15.9 SECONDARY HYPERTENSION WITH GOAL BLOOD PRESSURE LESS THAN 140/90: ICD-10-CM

## 2023-05-02 DIAGNOSIS — I25.83 CORONARY ARTERY DISEASE DUE TO LIPID RICH PLAQUE: ICD-10-CM

## 2023-05-02 DIAGNOSIS — Z94.4 LIVER REPLACED BY TRANSPLANT (H): ICD-10-CM

## 2023-05-02 DIAGNOSIS — I10 ESSENTIAL HYPERTENSION WITH GOAL BLOOD PRESSURE LESS THAN 140/90: ICD-10-CM

## 2023-05-02 DIAGNOSIS — I25.10 CORONARY ARTERY DISEASE INVOLVING NATIVE HEART WITHOUT ANGINA PECTORIS, UNSPECIFIED VESSEL OR LESION TYPE: ICD-10-CM

## 2023-05-02 RX ORDER — TACROLIMUS 0.5 MG/1
CAPSULE ORAL
Qty: 270 CAPSULE | Refills: 3 | Status: SHIPPED | OUTPATIENT
Start: 2023-05-02 | End: 2024-04-17

## 2023-05-03 ENCOUNTER — PATIENT OUTREACH (OUTPATIENT)
Dept: CARE COORDINATION | Facility: CLINIC | Age: 83
End: 2023-05-03
Payer: COMMERCIAL

## 2023-05-05 RX ORDER — ISOSORBIDE MONONITRATE 60 MG/1
60 TABLET, EXTENDED RELEASE ORAL 2 TIMES DAILY
Qty: 180 TABLET | Refills: 0 | Status: SHIPPED | OUTPATIENT
Start: 2023-05-05 | End: 2023-06-12

## 2023-05-05 RX ORDER — LISINOPRIL 2.5 MG/1
2.5 TABLET ORAL DAILY
Qty: 90 TABLET | Refills: 0 | Status: SHIPPED | OUTPATIENT
Start: 2023-05-05 | End: 2023-06-12

## 2023-05-05 NOTE — TELEPHONE ENCOUNTER
LCV:2/14/2022  [facility] [place]  NCV:6-12-23  ABN Cr- reviewed by provider  BP above protocol- FYI to clinic  RF 90 day    BP Readings from Last 3 Encounters:   04/12/23 (!) 148/82   06/02/22 (!) 172/90   02/14/22 (!) 188/97

## 2023-05-17 ENCOUNTER — PATIENT OUTREACH (OUTPATIENT)
Dept: CARE COORDINATION | Facility: CLINIC | Age: 83
End: 2023-05-17
Payer: COMMERCIAL

## 2023-06-03 ENCOUNTER — LAB (OUTPATIENT)
Dept: LAB | Facility: CLINIC | Age: 83
End: 2023-06-03
Payer: COMMERCIAL

## 2023-06-03 DIAGNOSIS — Z94.4 LIVER REPLACED BY TRANSPLANT (H): ICD-10-CM

## 2023-06-03 LAB
ALBUMIN MFR UR ELPH: 50.5 MG/DL (ref 1–14)
ALBUMIN SERPL BCG-MCNC: 3.6 G/DL (ref 3.5–5.2)
ALBUMIN UR-MCNC: 100 MG/DL
ALP SERPL-CCNC: 95 U/L (ref 35–104)
ALT SERPL W P-5'-P-CCNC: 21 U/L (ref 10–35)
ANION GAP SERPL CALCULATED.3IONS-SCNC: 14 MMOL/L (ref 7–15)
APPEARANCE UR: CLEAR
AST SERPL W P-5'-P-CCNC: 40 U/L (ref 10–35)
BACTERIA #/AREA URNS HPF: ABNORMAL /HPF
BILIRUB DIRECT SERPL-MCNC: <0.2 MG/DL (ref 0–0.3)
BILIRUB SERPL-MCNC: 0.3 MG/DL
BILIRUB UR QL STRIP: NEGATIVE
BUN SERPL-MCNC: 30.5 MG/DL (ref 8–23)
CALCIUM SERPL-MCNC: 9 MG/DL (ref 8.8–10.2)
CHLORIDE SERPL-SCNC: 92 MMOL/L (ref 98–107)
COLOR UR AUTO: YELLOW
CREAT SERPL-MCNC: 1.41 MG/DL (ref 0.51–0.95)
CREAT UR-MCNC: 38.5 MG/DL
DEPRECATED HCO3 PLAS-SCNC: 19 MMOL/L (ref 22–29)
ERYTHROCYTE [DISTWIDTH] IN BLOOD BY AUTOMATED COUNT: 13 % (ref 10–15)
GFR SERPL CREATININE-BSD FRML MDRD: 38 ML/MIN/1.73M2
GLUCOSE SERPL-MCNC: 88 MG/DL (ref 70–99)
GLUCOSE UR STRIP-MCNC: NEGATIVE MG/DL
HCT VFR BLD AUTO: 34.9 % (ref 35–47)
HGB BLD-MCNC: 11.8 G/DL (ref 11.7–15.7)
HGB UR QL STRIP: NEGATIVE
KETONES UR STRIP-MCNC: NEGATIVE MG/DL
LEUKOCYTE ESTERASE UR QL STRIP: NEGATIVE
MCH RBC QN AUTO: 31.3 PG (ref 26.5–33)
MCHC RBC AUTO-ENTMCNC: 33.8 G/DL (ref 31.5–36.5)
MCV RBC AUTO: 93 FL (ref 78–100)
NITRATE UR QL: NEGATIVE
PH UR STRIP: 7 [PH] (ref 5–7)
PLATELET # BLD AUTO: 223 10E3/UL (ref 150–450)
POTASSIUM SERPL-SCNC: 5 MMOL/L (ref 3.4–5.3)
PROT SERPL-MCNC: 6.3 G/DL (ref 6.4–8.3)
PROT/CREAT 24H UR: 1.31 MG/MG CR (ref 0–0.2)
RBC # BLD AUTO: 3.77 10E6/UL (ref 3.8–5.2)
RBC #/AREA URNS AUTO: ABNORMAL /HPF
SODIUM SERPL-SCNC: 125 MMOL/L (ref 136–145)
SP GR UR STRIP: 1.01 (ref 1–1.03)
SQUAMOUS #/AREA URNS AUTO: ABNORMAL /LPF
UROBILINOGEN UR STRIP-ACNC: 0.2 E.U./DL
WBC # BLD AUTO: 4.7 10E3/UL (ref 4–11)
WBC #/AREA URNS AUTO: ABNORMAL /HPF

## 2023-06-03 PROCEDURE — 80197 ASSAY OF TACROLIMUS: CPT

## 2023-06-03 PROCEDURE — 84156 ASSAY OF PROTEIN URINE: CPT

## 2023-06-03 PROCEDURE — 80053 COMPREHEN METABOLIC PANEL: CPT

## 2023-06-03 PROCEDURE — 81001 URINALYSIS AUTO W/SCOPE: CPT

## 2023-06-03 PROCEDURE — 82248 BILIRUBIN DIRECT: CPT

## 2023-06-03 PROCEDURE — 85027 COMPLETE CBC AUTOMATED: CPT

## 2023-06-03 PROCEDURE — 36415 COLL VENOUS BLD VENIPUNCTURE: CPT

## 2023-06-04 LAB
TACROLIMUS BLD-MCNC: 3.8 UG/L (ref 5–15)
TME LAST DOSE: ABNORMAL H
TME LAST DOSE: ABNORMAL H

## 2023-06-08 ENCOUNTER — TELEPHONE (OUTPATIENT)
Dept: CARDIOLOGY | Facility: CLINIC | Age: 83
End: 2023-06-08
Payer: COMMERCIAL

## 2023-06-08 DIAGNOSIS — I25.10 CORONARY ARTERY DISEASE INVOLVING NATIVE HEART WITHOUT ANGINA PECTORIS, UNSPECIFIED VESSEL OR LESION TYPE: Primary | ICD-10-CM

## 2023-06-12 ENCOUNTER — OFFICE VISIT (OUTPATIENT)
Dept: CARDIOLOGY | Facility: CLINIC | Age: 83
End: 2023-06-12
Attending: INTERNAL MEDICINE
Payer: COMMERCIAL

## 2023-06-12 VITALS
SYSTOLIC BLOOD PRESSURE: 219 MMHG | HEART RATE: 77 BPM | WEIGHT: 126 LBS | OXYGEN SATURATION: 96 % | DIASTOLIC BLOOD PRESSURE: 97 MMHG | BODY MASS INDEX: 24.87 KG/M2

## 2023-06-12 DIAGNOSIS — I15.9 SECONDARY HYPERTENSION WITH GOAL BLOOD PRESSURE LESS THAN 140/90: ICD-10-CM

## 2023-06-12 DIAGNOSIS — I10 ESSENTIAL HYPERTENSION WITH GOAL BLOOD PRESSURE LESS THAN 140/90: ICD-10-CM

## 2023-06-12 DIAGNOSIS — I25.10 CORONARY ARTERY DISEASE DUE TO LIPID RICH PLAQUE: ICD-10-CM

## 2023-06-12 DIAGNOSIS — I25.119 CORONARY ARTERY DISEASE INVOLVING NATIVE HEART WITH ANGINA PECTORIS, UNSPECIFIED VESSEL OR LESION TYPE (H): ICD-10-CM

## 2023-06-12 DIAGNOSIS — I25.83 CORONARY ARTERY DISEASE DUE TO LIPID RICH PLAQUE: ICD-10-CM

## 2023-06-12 DIAGNOSIS — I25.10 CORONARY ARTERY DISEASE INVOLVING NATIVE HEART WITHOUT ANGINA PECTORIS, UNSPECIFIED VESSEL OR LESION TYPE: Primary | ICD-10-CM

## 2023-06-12 PROCEDURE — G0463 HOSPITAL OUTPT CLINIC VISIT: HCPCS | Performed by: INTERNAL MEDICINE

## 2023-06-12 PROCEDURE — 99214 OFFICE O/P EST MOD 30 MIN: CPT | Performed by: INTERNAL MEDICINE

## 2023-06-12 RX ORDER — LISINOPRIL 2.5 MG/1
2.5 TABLET ORAL DAILY
Qty: 90 TABLET | Refills: 3 | Status: SHIPPED | OUTPATIENT
Start: 2023-06-12 | End: 2024-07-09

## 2023-06-12 RX ORDER — METOPROLOL TARTRATE 50 MG
50 TABLET ORAL 2 TIMES DAILY
Qty: 180 TABLET | Refills: 3 | Status: SHIPPED | OUTPATIENT
Start: 2023-06-12 | End: 2024-07-09

## 2023-06-12 RX ORDER — ISOSORBIDE MONONITRATE 60 MG/1
60 TABLET, EXTENDED RELEASE ORAL 2 TIMES DAILY
Qty: 180 TABLET | Refills: 3 | Status: SHIPPED | OUTPATIENT
Start: 2023-06-12

## 2023-06-12 NOTE — NURSING NOTE
"Chief Complaint   Patient presents with     LUCHO Lagunas F/U  Cardiac Dx: CAD s/p NANCY, HLD, HTN       Initial BP (!) 219/97 (BP Location: Right arm, Patient Position: Chair, Cuff Size: Adult Regular)   Pulse 77   Wt 57.2 kg (126 lb)   SpO2 96%   BMI 24.87 kg/m   Estimated body mass index is 24.87 kg/m  as calculated from the following:    Height as of 2/14/22: 1.516 m (4' 11.69\").    Weight as of this encounter: 57.2 kg (126 lb)..  BP completed using cuff size: regular    LAURA Dove  "

## 2023-06-12 NOTE — PROGRESS NOTES
HPI:     I had the privilege to evaluate Ms Lesli Lorenzana, who is a 82yr old female patient       Patient  a past medical history significant for liver transplant in 2008, total hip replacement in 2017, hypertension, hyperlipidemia, CAD with NANCY to OM1 in 2009 and CKD who presents for routine cardiology follow-up.      Since her last visit she has been doing quite well.   Her biggest complaint is back pain  pain and left calf pain with prolonged standing from her spinal stenosis.   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 90 tablet 3     Blood Pressure Monitor KIT 1 each daily Monitor home blood pressure as instructed by physician.  Dispense [facility] blood pressure kit. 1 kit 0     calcium-vitamin D (CALTRATE) 600-400 MG-UNIT per tablet Take 1 tablet by mouth daily       clindamycin (CLEOCIN) 300 MG capsule Take 2 capsules (600 mg) 1 hour before dental work. 2 capsule 3     isosorbide mononitrate (IMDUR) 60 MG 24 hr tablet Take 1 tablet (60 mg) by mouth 2 times daily 180 tablet 0     lisinopril (ZESTRIL) 2.5 MG tablet Take 1 tablet (2.5 mg) by mouth daily .Please keep 6-12-23 clinic appt for refills 90 tablet 0     metoprolol tartrate (LOPRESSOR) 50 MG tablet Take 1 tablet (50 mg) by mouth 2 times daily . Follow up as scheduled for  further refills. 180 tablet 0     Multiple Vitamins-Minerals (PRESERVISION AREDS 2) CAPS        nystatin (MYCOSTATIN) 713291 UNIT/GM external powder Apply topically 2 times daily 60 g 3     predniSONE (DELTASONE) 5 MG tablet TAKE ONE TABLET BY MOUTH ONCE DAILY 90 tablet 3     tacrolimus (GENERIC EQUIVALENT) 0.5 MG capsule TAKE 1 CAPSULE (0.5 MG) BY MOUTH EVERY MORNING AND 2 CAPSULES (1 MG) EVERY EVENING. 270 capsule 3       PAST SURGICAL HISTORY:  Past Surgical History:   Procedure Laterality Date     ARTHROPLASTY HIP Left 4/11/2017    Procedure: ARTHROPLASTY HIP;  Surgeon: Zhen Armstrong MD;  Location: [facility] OR     BIOPSY      liver      COLONOSCOPY WITH CO2 INSUFFLATION N/A 9/15/2017    Procedure: COLONOSCOPY WITH CO2 INSUFFLATION;  Colonoscopy, History of colonic polyps, Ref by Muhammad, BMI 25, [place] [phone];  Surgeon: Dolly Hearn MD;  Location: [facility] OR     ENDOSCOPIC RETROGRADE CHOLANGIOPANCREATOGRAM N/A 7/17/2017    Procedure: COMBINED ENDOSCOPIC RETROGRADE CHOLANGIOPANCREATOGRAPHY, PLACE TUBE/STENT;  Endoscopic Retrograde Cholangiopancreatogram with bile duct spinchterotomy, ballon sweep of bile duct for stones, dilatation of bile duct;  Surgeon: Narendra Diana MD;  Location: [facility] OR     ENDOSCOPIC RETROGRADE CHOLANGIOPANCREATOGRAM N/A 5/29/2019    Procedure: Endoscopic Retrograde Cholangiopancreatogram with bile duct spinchterotomy, balloon sweep of bile ducts for stone, balloon dilatation ofbileduct and bile duct stent placement;  Surgeon: Guru Harpal Luu MD;  Location: [facility] OR     ESOPHAGOSCOPY, GASTROSCOPY, DUODENOSCOPY (EGD), COMBINED N/A 7/2/2019    Procedure: ESOPHAGOGASTRODUODENOSCOPY, WITH FOREIGN BODY REMOVAL;  Surgeon: Guru Harpal Luu MD;  Location: [facility] GI     GYN SURGERY      hysterectomy     INJECT EPIDURAL LUMBAR N/A 12/3/2021    Procedure: INJECTION, SPINE, LUMBAR, EPIDURAL - L5-S1 interlaminar epidural steroid injection;  Surgeon: Luis  MD Brent;  Location: [facility] OR     SURGICAL HISTORY OF -       liver transplant 2008     SURGICAL HISTORY OF -       CAD with stent placement 2009     TRANSPLANT  2008    Liver transplant      ZZC STOMACH SURGERY PROCEDURE UNLISTED         ALLERGIES     Allergies   Allergen Reactions     Amlodipine Besylate Swelling     Edema in legs     Amoxicillin Rash       FAMILY HISTORY:  Family History   Problem Relation Age of Onset     Cancer Father         unknown     C.A.D. Father      Thyroid Disease Father      Cancer Mother         lung--smoker     Other Cancer Mother         lung     Thyroid Disease Mother      Cerebrovascular Disease Maternal Grandmother         ,     Unknown/Adopted Paternal Grandfather        SOCIAL HISTORY:  Social History     Socioeconomic History     Marital status:      Spouse name: None     Number of children: 3     Years of education: None     Highest education level: None   Occupational History     Employer: RETIRED   Tobacco Use     Smoking status: Former     Packs/day: 0.50     Years: 10.00     Pack years: 5.00     Types: Cigarettes     Quit date: 1975     Years since quittin.5     Smokeless tobacco: Never   Vaping Use     Vaping status: Never Used   Substance and Sexual Activity     Alcohol use: No     Comment: Glass of wine occassionally     Drug use: No     Comment: Never     Sexual activity: Not Currently     Birth control/protection: Post-menopausal   Other Topics Concern     Parent/sibling w/ CABG, MI or angioplasty before 65F 55M? No     Blood Transfusions Yes     Comment: 2007     Exercise No   Social History Narrative    Lives alone with her dog. . Three grown children, very supportive and all live close by.  6 grandchildren. Feels safe in her environment.       ROS:   Constitutional: No fever, chills, or sweats. No weight gain/loss   ENT: No visual disturbance, ear ache, epistaxis, sore throat  Allergies/Immunologic: Negative.   Respiratory: No cough,  hemoptysia  Cardiovascular: As per HPI  GI: No nausea, vomiting, hematemesis, melena, or hematochezia  : No urinary frequency, dysuria, or hematuria  Integument: Negative  Psychiatric: Negative  Neuro: Negative  Endocrinology: Negative   Musculoskeletal: Negative    EXAM:  BP (!) 219/97 (BP Location: Right arm, Patient Position: Chair, Cuff Size: Adult Regular)   Pulse 77   Wt 57.2 kg (126 lb)   SpO2 96%   BMI 24.87 kg/m    In general, the patient is a pleasant female in no apparent distress.    HEENT: NC/AT.  PERRLA.  EOMI.  Sclerae white, not injected.  Nares clear.  Pharynx without erythema or exudate.  Dentition intact.    Neck: No adenopathy.  No thyromegaly. Carotids +4/4 bilaterally without bruits.  No jugular venous distension.   Heart: RRR. Normal S1, S2 splits physiologically. No murmur, rub, click, or gallop. The PMI is in the 5th ICS in the midclavicular line. There is no heave.    Lungs: CTA.  No ronchi, wheezes, rales.  No dullness to percussion.   Abdomen: Soft, nontender, nondistended. No organomegaly.  No bruits.   Extremities: No clubbing, cyanosis, or edema.  The pulses are +4/4 at the radial, brachial, femoral, popliteal, DP, and PT sites bilaterally.  No bruits are noted.  Neurologic: Alert and oriented to person/place/time, normal speech, gait and affect  Skin: No petechiae, purpura or rash.    BP at the end of the visit: 158/84    Labs:  LIPID RESULTS:  Lab Results   Component Value Date    CHOL 179 02/17/2023    CHOL 199 04/22/2021    HDL 84 02/17/2023    HDL 84 04/22/2021    LDL 93 02/17/2023    LDL 79 02/17/2023    LDL 99 04/22/2021    TRIG 81 02/17/2023    TRIG 81 04/22/2021    CHOLHDLRATIO 2.4 08/11/2015    NHDL 95 02/17/2023    NHDL 115 04/22/2021       LIVER ENZYME RESULTS:  Lab Results   Component Value Date    AST 40 (H) 06/03/2023    AST 27 04/06/2021    ALT 21 06/03/2023    ALT 24 04/06/2021       CBC RESULTS:  Lab Results   Component Value Date    WBC 4.7 06/03/2023    WBC  4.3 04/06/2021    RBC 3.77 (L) 06/03/2023    RBC 3.93 04/06/2021    HGB 11.8 06/03/2023    HGB 12.1 04/06/2021    HCT 34.9 (L) 06/03/2023    HCT 35.0 04/06/2021    MCV 93 06/03/2023    MCV 89 04/06/2021    MCH 31.3 06/03/2023    MCH 30.8 04/06/2021    MCHC 33.8 06/03/2023    MCHC 34.6 04/06/2021    RDW 13.0 06/03/2023    RDW 13.0 04/06/2021     06/03/2023     04/06/2021       BMP RESULTS:  Lab Results   Component Value Date     (L) 06/03/2023     (L) 04/22/2021    POTASSIUM 5.0 06/03/2023    POTASSIUM 3.9 02/17/2023    POTASSIUM 4.6 04/22/2021    CHLORIDE 92 (L) 06/03/2023    CHLORIDE 98 02/17/2023    CHLORIDE 101 04/22/2021    CO2 19 (L) 06/03/2023    CO2 27 02/17/2023    CO2 27 04/22/2021    ANIONGAP 14 06/03/2023    ANIONGAP 7 02/17/2023    ANIONGAP 3 04/22/2021    GLC 88 06/03/2023    GLC 89 02/17/2023    GLC 88 04/22/2021    BUN 30.5 (H) 06/03/2023    BUN 25 02/17/2023    BUN 28 04/22/2021    CR 1.41 (H) 06/03/2023    CR 1.39 (H) 04/22/2021    GFRESTIMATED 38 (L) 06/03/2023    GFRESTIMATED 36 (L) 04/22/2021    GFRESTBLACK 42 (L) 04/22/2021    HAYES 9.0 06/03/2023    HAYES 9.0 04/22/2021          INR RESULTS:  Lab Results   Component Value Date    INR 1.0 06/19/2017    INR 2.0 05/08/2017    INR 2.2 05/05/2017         Assessment and Plan:     We discussed the results with patient:  We discussed the importance of a heart healthy diet and lifestyle.  We discussed the following items:    Medication Changes: None      Patient tells me that she likes to come to [facility] [place], [place]  But she is overstressed -   Therefore I recommended that she can seen by a colleague  Follow Up: In 1 year with Dr. Rashid at [facility].        Follow the American Heart Association Diet and Lifestyle Recommendations:  -Limit saturated fat, trans fat, sodium, red meat, sweets and sugar-sweetened beverages. If you choose to eat red meat, compare labels and select the leanest cuts available.    Medication Changes:  None        Follow Up: In 1 year with Dr. Rashid at [facility].        Follow the American Heart Association Diet and Lifestyle Recommendations:  -Limit saturated fat, trans fat, sodium, red meat, sweets and sugar-sweetened beverages. If you choose to eat red meat, compare labels and select the leanest cuts available.    Nain Lagunas MD, PhD  Professor of Medicine  Division of Cardiology    CC  Patient Care Team:  Dee Chiu MD as PCP - General  Nain Lagunas MD as PCP - Cardiology (Cardiology)  Nain Lagunas MD as MD (Cardiology)  Zhen Armstrong MD as MD (Orthopedics)  Jag Cabral PA-C as Assigned Neuroscience Provider  Nain Lagunas MD as Assigned Heart and Vascular Provider  Dee Chiu MD as Assigned PCP  Radha Cortes RN as Specialty Care Coordinator (Cardiology)  NAIN LAGUNAS

## 2023-06-12 NOTE — LETTER
6/12/2023      RE: Lesli Lorenzana  [id number] [facility] [place] [address]       Dear Colleague,    Thank you for the opportunity to participate in the care of your patient, Lesli Lorenzana, at the [facility] [place] at [facility]. Please see a copy of my visit note below.    HPI:     I had the privilege to evaluate Ms Lesli Lorenzana, who is a 82yr old female patient       Patient  a past medical history significant for liver transplant in 2008, total hip replacement in 2017, hypertension, hyperlipidemia, CAD with NANCY to OM1 in 2009 and CKD who presents for routine cardiology follow-up.      Since her last visit she has been doing quite well.   Her biggest complaint is back pain  pain and left calf pain with prolonged standing from her spinal stenosis.   She denies CP, SOB, palpitations, LE edema, orthopnea, PND     SBPat  home are between140-160 mmHg     PAST MEDICAL HISTORY:  Past Medical History:   Diagnosis Date    Anemia     Arthritis     CAD (coronary artery disease) 1/2009    stent    Cholelithiasis     gallbladder removed with liver in 2008    Hallux valgus     History of blood transfusion     HTN (hypertension)     Hyperlipidemia LDL goal < 100     Liver transplant 2/2008    due to Primary biliary cirrhosis    Osteoarthritis of left hip     Osteopenia     Overweight(278.02)     Palpitations     Spinal stenosis        CURRENT MEDICATIONS:  Current Outpatient Medications   Medication Sig Dispense Refill    aspirin 81 MG tablet Take 1 tablet by mouth daily. 90 tablet 3    Blood Pressure Monitor KIT 1 each daily Monitor home blood pressure as instructed by physician.  Dispense [facility] blood pressure kit. 1 kit 0    calcium-vitamin D (CALTRATE) 600-400 MG-UNIT per tablet Take 1 tablet by mouth daily      clindamycin (CLEOCIN) 300 MG capsule Take 2 capsules (600 mg) 1 hour before dental work. 2 capsule 3    isosorbide mononitrate  (IMDUR) 60 MG 24 hr tablet Take 1 tablet (60 mg) by mouth 2 times daily 180 tablet 0    lisinopril (ZESTRIL) 2.5 MG tablet Take 1 tablet (2.5 mg) by mouth daily .Please keep 6-12-23 clinic appt for refills 90 tablet 0    metoprolol tartrate (LOPRESSOR) 50 MG tablet Take 1 tablet (50 mg) by mouth 2 times daily . Follow up as scheduled for further refills. 180 tablet 0    Multiple Vitamins-Minerals (PRESERVISION AREDS 2) CAPS       nystatin (MYCOSTATIN) 618760 UNIT/GM external powder Apply topically 2 times daily 60 g 3    predniSONE (DELTASONE) 5 MG tablet TAKE ONE TABLET BY MOUTH ONCE DAILY 90 tablet 3    tacrolimus (GENERIC EQUIVALENT) 0.5 MG capsule TAKE 1 CAPSULE (0.5 MG) BY MOUTH EVERY MORNING AND 2 CAPSULES (1 MG) EVERY EVENING. 270 capsule 3       PAST SURGICAL HISTORY:  Past Surgical History:   Procedure Laterality Date    ARTHROPLASTY HIP Left 4/11/2017    Procedure: ARTHROPLASTY HIP;  Surgeon: Zhen Armstrong MD;  Location: [facility] OR    BIOPSY      liver     COLONOSCOPY WITH CO2 INSUFFLATION N/A 9/15/2017    Procedure: COLONOSCOPY WITH CO2 INSUFFLATION;  Colonoscopy, History of colonic polyps, Ref by Muhammad, BMI 25, [place] [id number];  Surgeon: Dolly Hearn MD;  Location: [facility] OR    ENDOSCOPIC RETROGRADE CHOLANGIOPANCREATOGRAM N/A 7/17/2017    Procedure: COMBINED ENDOSCOPIC RETROGRADE CHOLANGIOPANCREATOGRAPHY, PLACE TUBE/STENT;  Endoscopic Retrograde Cholangiopancreatogram with bile duct spinchterotomy, ballon sweep of bile duct for stones, dilatation of bile duct;  Surgeon: Narendra Diana MD;  Location: [facility] OR    ENDOSCOPIC RETROGRADE CHOLANGIOPANCREATOGRAM N/A 5/29/2019    Procedure: Endoscopic Retrograde Cholangiopancreatogram with bile duct spinchterotomy, balloon sweep of bile ducts for stone, balloon dilatation ofbileduct and bile duct stent placement;  Surgeon: Guru Harpal Luu MD;  Location: [facility] OR    ESOPHAGOSCOPY, GASTROSCOPY, DUODENOSCOPY (EGD), COMBINED N/A 7/2/2019     Procedure: ESOPHAGOGASTRODUODENOSCOPY, WITH FOREIGN BODY REMOVAL;  Surgeon: Guru Harpal Luu MD;  Location: [facility] GI    GYN SURGERY      hysterectomy    INJECT EPIDURAL LUMBAR N/A 12/3/2021    Procedure: INJECTION, SPINE, LUMBAR, EPIDURAL - L5-S1 interlaminar epidural steroid injection;  Surgeon: Brent Smith MD;  Location: [facility] OR    SURGICAL HISTORY OF -       liver transplant     SURGICAL HISTORY OF -       CAD with stent placement 2009    TRANSPLANT  2008    Liver transplant     ZZC STOMACH SURGERY PROCEDURE UNLISTED         ALLERGIES     Allergies   Allergen Reactions    Amlodipine Besylate Swelling     Edema in legs    Amoxicillin Rash       FAMILY HISTORY:  Family History   Problem Relation Age of Onset    Cancer Father         unknown    C.A.D. Father     Thyroid Disease Father     Cancer Mother         lung--smoker    Other Cancer Mother         lung    Thyroid Disease Mother     Cerebrovascular Disease Maternal Grandmother         ,    Unknown/Adopted Paternal Grandfather        SOCIAL HISTORY:  Social History     Socioeconomic History    Marital status:      Spouse name: None    Number of children: 3    Years of education: None    Highest education level: None   Occupational History     Employer: RETIRED   Tobacco Use    Smoking status: Former     Packs/day: 0.50     Years: 10.00     Pack years: 5.00     Types: Cigarettes     Quit date: 1975     Years since quittin.5    Smokeless tobacco: Never   Vaping Use    Vaping status: Never Used   Substance and Sexual Activity    Alcohol use: No     Comment: Glass of wine occassionally    Drug use: No     Comment: Never    Sexual activity: Not Currently     Birth control/protection: Post-menopausal   Other Topics Concern    Parent/sibling w/ CABG, MI or angioplasty before 65F 55M? No    Blood Transfusions Yes     Comment:     Exercise No   Social History Narrative    Lives alone with her dog. . Three grown  children, very supportive and all live close by.  6 grandchildren. Feels safe in her environment.       ROS:   Constitutional: No fever, chills, or sweats. No weight gain/loss   ENT: No visual disturbance, ear ache, epistaxis, sore throat  Allergies/Immunologic: Negative.   Respiratory: No cough, hemoptysia  Cardiovascular: As per HPI  GI: No nausea, vomiting, hematemesis, melena, or hematochezia  : No urinary frequency, dysuria, or hematuria  Integument: Negative  Psychiatric: Negative  Neuro: Negative  Endocrinology: Negative   Musculoskeletal: Negative    EXAM:  BP (!) 219/97 (BP Location: Right arm, Patient Position: Chair, Cuff Size: Adult Regular)   Pulse 77   Wt 57.2 kg (126 lb)   SpO2 96%   BMI 24.87 kg/m    In general, the patient is a pleasant female in no apparent distress.    HEENT: NC/AT.  PERRLA.  EOMI.  Sclerae white, not injected.  Nares clear.  Pharynx without erythema or exudate.  Dentition intact.    Neck: No adenopathy.  No thyromegaly. Carotids +4/4 bilaterally without bruits.  No jugular venous distension.   Heart: RRR. Normal S1, S2 splits physiologically. No murmur, rub, click, or gallop. The PMI is in the 5th ICS in the midclavicular line. There is no heave.    Lungs: CTA.  No ronchi, wheezes, rales.  No dullness to percussion.   Abdomen: Soft, nontender, nondistended. No organomegaly.  No bruits.   Extremities: No clubbing, cyanosis, or edema.  The pulses are +4/4 at the radial, brachial, femoral, popliteal, DP, and PT sites bilaterally.  No bruits are noted.  Neurologic: Alert and oriented to person/place/time, normal speech, gait and affect  Skin: No petechiae, purpura or rash.    BP at the end of the visit: 158/84    Labs:  LIPID RESULTS:  Lab Results   Component Value Date    CHOL 179 02/17/2023    CHOL 199 04/22/2021    HDL 84 02/17/2023    HDL 84 04/22/2021    LDL 93 02/17/2023    LDL 79 02/17/2023    LDL 99 04/22/2021    TRIG 81 02/17/2023    TRIG 81 04/22/2021    GARY  2.4 08/11/2015    NHDL 95 02/17/2023    NHDL 115 04/22/2021       LIVER ENZYME RESULTS:  Lab Results   Component Value Date    AST 40 (H) 06/03/2023    AST 27 04/06/2021    ALT 21 06/03/2023    ALT 24 04/06/2021       CBC RESULTS:  Lab Results   Component Value Date    WBC 4.7 06/03/2023    WBC 4.3 04/06/2021    RBC 3.77 (L) 06/03/2023    RBC 3.93 04/06/2021    HGB 11.8 06/03/2023    HGB 12.1 04/06/2021    HCT 34.9 (L) 06/03/2023    HCT 35.0 04/06/2021    MCV 93 06/03/2023    MCV 89 04/06/2021    MCH 31.3 06/03/2023    MCH 30.8 04/06/2021    MCHC 33.8 06/03/2023    MCHC 34.6 04/06/2021    RDW 13.0 06/03/2023    RDW 13.0 04/06/2021     06/03/2023     04/06/2021       BMP RESULTS:  Lab Results   Component Value Date     (L) 06/03/2023     (L) 04/22/2021    POTASSIUM 5.0 06/03/2023    POTASSIUM 3.9 02/17/2023    POTASSIUM 4.6 04/22/2021    CHLORIDE 92 (L) 06/03/2023    CHLORIDE 98 02/17/2023    CHLORIDE 101 04/22/2021    CO2 19 (L) 06/03/2023    CO2 27 02/17/2023    CO2 27 04/22/2021    ANIONGAP 14 06/03/2023    ANIONGAP 7 02/17/2023    ANIONGAP 3 04/22/2021    GLC 88 06/03/2023    GLC 89 02/17/2023    GLC 88 04/22/2021    BUN 30.5 (H) 06/03/2023    BUN 25 02/17/2023    BUN 28 04/22/2021    CR 1.41 (H) 06/03/2023    CR 1.39 (H) 04/22/2021    GFRESTIMATED 38 (L) 06/03/2023    GFRESTIMATED 36 (L) 04/22/2021    GFRESTBLACK 42 (L) 04/22/2021    HAYES 9.0 06/03/2023    HAYES 9.0 04/22/2021          INR RESULTS:  Lab Results   Component Value Date    INR 1.0 06/19/2017    INR 2.0 05/08/2017    INR 2.2 05/05/2017         Assessment and Plan:     We discussed the results with patient:  We discussed the importance of a heart healthy diet and lifestyle.  We discussed the following items:    Medication Changes: None      Patient tells me that she likes to come to [place]  But she is overstressed -   Therefore I recommended that she can seen by a colleague  Follow Up: In 1 year with Dr. Rashid at [facility].        Follow the American Heart Association Diet and Lifestyle Recommendations:  -Limit saturated fat, trans fat, sodium, red meat, sweets and sugar-sweetened beverages. If you choose to eat red meat, compare labels and select the leanest cuts available.    Medication Changes: None        Follow Up: In 1 year with Dr. Rashid at [facility].        Follow the American Heart Association Diet and Lifestyle Recommendations:  -Limit saturated fat, trans fat, sodium, red meat, sweets and sugar-sweetened beverages. If you choose to eat red meat, compare labels and select the leanest cuts available.    Nain Lagunas MD, PhD  Professor of Medicine  Division of Cardiology    CC  Patient Care Team:  Dee Chiu MD as PCP - General

## 2023-06-12 NOTE — PATIENT INSTRUCTIONS
June 12, 2023    Cardiology Provider You Saw During Your Visit: Dr. Lagunas      Medication Changes: None      Follow Up: In 1 year with Dr. Rashid at [facility].      Follow the American Heart Association Diet and Lifestyle Recommendations:  -Limit saturated fat, trans fat, sodium, red meat, sweets and sugar-sweetened beverages. If you choose to eat red meat, compare labels and select the leanest cuts available.  -Aim for at least 150 minutes of moderate physical activity or 75 minutes of vigorous physical activity - or an equal combination of both - each week.      To Reach Us:  -During business hours: [phone], press option # 1 to schedule an appointment or to leave a message for your care team.     -After hours, weekends or holidays: [phone], press option #4 and ask to speak to the on-call cardiologist. Inform them you are a patient at the [facility].        **If you have a cardiac device, please make sure you schedule an in-person device check just prior to your cardiology provider appointments**        Radha Cortes RN  Cardiology Care Coordinator - General Cardiology  [facility]

## 2023-06-12 NOTE — NURSING NOTE
June 12, 2023    Medication Changes: None      Follow Up: In 1 year with Dr. Rashid at [facility].      Patient verbalized understanding of all health information given and agreed to call with further questions or concerns.      Radha Cortes RN

## 2023-06-23 ENCOUNTER — OFFICE VISIT (OUTPATIENT)
Dept: NEUROSURGERY | Facility: CLINIC | Age: 83
End: 2023-06-23
Payer: COMMERCIAL

## 2023-06-23 VITALS
HEART RATE: 77 BPM | WEIGHT: 126 LBS | HEIGHT: 60 IN | SYSTOLIC BLOOD PRESSURE: 197 MMHG | DIASTOLIC BLOOD PRESSURE: 87 MMHG | BODY MASS INDEX: 24.74 KG/M2

## 2023-06-23 DIAGNOSIS — M48.061 SPINAL STENOSIS OF LUMBAR REGION, UNSPECIFIED WHETHER NEUROGENIC CLAUDICATION PRESENT: Primary | ICD-10-CM

## 2023-06-23 PROCEDURE — 99213 OFFICE O/P EST LOW 20 MIN: CPT | Performed by: PHYSICIAN ASSISTANT

## 2023-06-23 ASSESSMENT — PAIN SCALES - GENERAL: PAINLEVEL: MILD PAIN (2)

## 2023-06-23 NOTE — NURSING NOTE
Lesli Lorenzana's goals for this visit include:   Chief Complaint   Patient presents with     RECHECK     Spinal stenosis       She requests these members of her care team be copied on today's visit information: yes    PCP: Dee Chiu    Referring Provider:  No referring provider defined for this encounter.    BP (!) 197/87   Pulse 77   Ht 1.524 m (5')   Wt 57.2 kg (126 lb)   BMI 24.61 kg/m      Do you need any medication refills at today's visit? No  DOLLY Quintana, MARIIA ([facility])

## 2023-06-23 NOTE — PROGRESS NOTES
Neurosurgery follow-up    Ms. Lorenzana is a an 82-year-old female who presents to clinic for follow-up regarding lumbar stenosis at L4-5.  She states the last injection she had was very helpful to help for more than a year.  She is reporting some back pain.  She is also reporting numbness in her feet.,  She denies pain radiating down into her lower extremities, and her main concern is back pain when she is standing and walking.    Exam    B/P: 197/87, T: Data Unavailable, P: 77, R: Data Unavailable     Alert and oriented no acute distress  Bilateral lower extremities with 5/5 strength  Reflexes 2+ patella/ankle  Negative straight leg raise bilaterally  Negative ankle clonus negative Babinski bilaterally  Lumbar spine nontender to palpation  Able to stand on heels and toes  Gait is normal    Imaging    Prior lumbar MRI demonstrates severe stenosis at L4-5    Assessment    Lumbar spinal stenosis  Back pain      Plan    I recommend the patient repeat the injection she had previously which gave her long-term relief.  She should follow-up with us as needed if she is not improving.

## 2023-06-23 NOTE — LETTER
6/23/2023         RE: Lesli Lorenzana  [address] [facility] [address]        Dear Colleague,    Thank you for referring your patient, Lesli Lorenzana, to the [facility] [place]. Please see a copy of my visit note below.    Neurosurgery follow-up    Ms. Lorenzana is a an 82-year-old female who presents to clinic for follow-up regarding lumbar stenosis at L4-5.  She states the last injection she had was very helpful to help for more than a year.  She is reporting some back pain.  She is also reporting numbness in her feet.,  She denies pain radiating down into her lower extremities, and her main concern is back pain when she is standing and walking.    Exam    B/P: 197/87, T: Data Unavailable, P: 77, R: Data Unavailable     Alert and oriented no acute distress  Bilateral lower extremities with 5/5 strength  Reflexes 2+ patella/ankle  Negative straight leg raise bilaterally  Negative ankle clonus negative Babinski bilaterally  Lumbar spine nontender to palpation  Able to stand on heels and toes  Gait is normal    Imaging    Prior lumbar MRI demonstrates severe stenosis at L4-5    Assessment    Lumbar spinal stenosis  Back pain      Plan    I recommend the patient repeat the injection she had previously which gave her long-term relief.  She should follow-up with us as needed if she is not improving.            Again, thank you for allowing me to participate in the care of your patient.        Sincerely,        Jag Cabral PA-C

## 2023-06-26 ENCOUNTER — TELEPHONE (OUTPATIENT)
Dept: PHYSICAL MEDICINE AND REHAB | Facility: CLINIC | Age: 83
End: 2023-06-26

## 2023-06-26 DIAGNOSIS — M48.062 SPINAL STENOSIS OF LUMBAR REGION WITH NEUROGENIC CLAUDICATION: ICD-10-CM

## 2023-06-26 DIAGNOSIS — M48.061 SPINAL STENOSIS OF LUMBAR REGION, UNSPECIFIED WHETHER NEUROGENIC CLAUDICATION PRESENT: Primary | ICD-10-CM

## 2023-06-26 NOTE — TELEPHONE ENCOUNTER
Scheduled injection to be with Dr. Smith on 7/14 in .     Confirmed with patient they will need a . Confirmed with patient the pre-op nurses will call about 1 week before procedure to confirm arrival/start time and review further instructions.    Patient has no further questions/concerns at this time.      Amelia Skinner on 06/26/23 at 10:41 AM  Surgery Scheduling  Ph: [phone]

## 2023-07-13 ENCOUNTER — ANCILLARY ORDERS (OUTPATIENT)
Dept: PHYSICAL MEDICINE AND REHAB | Facility: CLINIC | Age: 83
End: 2023-07-13

## 2023-07-13 DIAGNOSIS — R52 PAIN: ICD-10-CM

## 2023-07-14 ENCOUNTER — HOSPITAL ENCOUNTER (OUTPATIENT)
Facility: AMBULATORY SURGERY CENTER | Age: 83
Discharge: HOME | End: 2023-07-14
Attending: PHYSICAL MEDICINE & REHABILITATION
Payer: COMMERCIAL

## 2023-07-14 VITALS
DIASTOLIC BLOOD PRESSURE: 99 MMHG | TEMPERATURE: 97.6 F | OXYGEN SATURATION: 100 % | SYSTOLIC BLOOD PRESSURE: 215 MMHG | RESPIRATION RATE: 16 BRPM

## 2023-07-15 ENCOUNTER — HEALTH MAINTENANCE LETTER (OUTPATIENT)
Age: 83
End: 2023-07-15

## 2023-08-02 DIAGNOSIS — Z94.4 LIVER TRANSPLANTED (H): ICD-10-CM

## 2023-08-02 DIAGNOSIS — T86.41 LIVER TRANSPLANT REJECTION (H): ICD-10-CM

## 2023-08-03 RX ORDER — PREDNISONE 5 MG/1
TABLET ORAL
Qty: 90 TABLET | Refills: 0 | Status: SHIPPED | OUTPATIENT
Start: 2023-08-03 | End: 2023-10-24

## 2023-09-01 ENCOUNTER — TELEPHONE (OUTPATIENT)
Dept: TRANSPLANT | Facility: CLINIC | Age: 83
End: 2023-09-01

## 2023-09-01 NOTE — TELEPHONE ENCOUNTER
Health Call Center    Phone Message    May a detailed message be left on voicemail: yes     Reason for Call: Appointment Intake    Patient is wondering if she can be seen virtually instead of in-person for follow-up with Dr. Muhammad.  Unable to drive that far at this time and does not have a support person to bring her (kids all work full-time).   If a virtual visit is an option, please include date and time, as provider is not templated for video visits at this time.  Thank you!    Action Taken: Message routed to:  [facility] EBONIE LOCKWOOD    Travel Screening: Not Applicable

## 2023-09-05 NOTE — TELEPHONE ENCOUNTER
[facility] Call [facility]    Phone Message    May a detailed message be left on voicemail: yes     Reason for Call: Appointment Intake    Following up to see if patient can be seen virtually per her request with Dr. Muhammad.  Thank you!    Action Taken: Message routed to:  [facility] EBONIE LOCKWOOD    Travel Screening: Not Applicable

## 2023-09-19 ENCOUNTER — LAB (OUTPATIENT)
Dept: LAB | Facility: CLINIC | Age: 83
End: 2023-09-19
Payer: COMMERCIAL

## 2023-09-19 DIAGNOSIS — I25.10 CORONARY ARTERY DISEASE INVOLVING NATIVE HEART WITHOUT ANGINA PECTORIS, UNSPECIFIED VESSEL OR LESION TYPE: ICD-10-CM

## 2023-09-19 DIAGNOSIS — Z94.4 LIVER REPLACED BY TRANSPLANT (H): ICD-10-CM

## 2023-09-19 LAB
ALBUMIN SERPL BCG-MCNC: 3.7 G/DL (ref 3.5–5.2)
ALP SERPL-CCNC: 118 U/L (ref 35–104)
ALT SERPL W P-5'-P-CCNC: 34 U/L (ref 0–50)
ANION GAP SERPL CALCULATED.3IONS-SCNC: 12 MMOL/L (ref 7–15)
AST SERPL W P-5'-P-CCNC: 42 U/L (ref 0–45)
BILIRUB DIRECT SERPL-MCNC: <0.2 MG/DL (ref 0–0.3)
BILIRUB SERPL-MCNC: 0.4 MG/DL
BUN SERPL-MCNC: 30.9 MG/DL (ref 8–23)
CALCIUM SERPL-MCNC: 9.6 MG/DL (ref 8.8–10.2)
CHLORIDE SERPL-SCNC: 94 MMOL/L (ref 98–107)
CHOLEST SERPL-MCNC: 181 MG/DL
CREAT SERPL-MCNC: 1.38 MG/DL (ref 0.51–0.95)
DEPRECATED HCO3 PLAS-SCNC: 21 MMOL/L (ref 22–29)
EGFRCR SERPLBLD CKD-EPI 2021: 38 ML/MIN/1.73M2
ERYTHROCYTE [DISTWIDTH] IN BLOOD BY AUTOMATED COUNT: 12.5 % (ref 10–15)
GLUCOSE SERPL-MCNC: 95 MG/DL (ref 70–99)
HCT VFR BLD AUTO: 33.9 % (ref 35–47)
HDLC SERPL-MCNC: 83 MG/DL
HGB BLD-MCNC: 11.9 G/DL (ref 11.7–15.7)
LDLC SERPL CALC-MCNC: 76 MG/DL
LDLC SERPL DIRECT ASSAY-MCNC: 86 MG/DL
MCH RBC QN AUTO: 31.6 PG (ref 26.5–33)
MCHC RBC AUTO-ENTMCNC: 35.1 G/DL (ref 31.5–36.5)
MCV RBC AUTO: 90 FL (ref 78–100)
NONHDLC SERPL-MCNC: 98 MG/DL
PLATELET # BLD AUTO: 231 10E3/UL (ref 150–450)
POTASSIUM SERPL-SCNC: 4.4 MMOL/L (ref 3.4–5.3)
PROT SERPL-MCNC: 6.8 G/DL (ref 6.4–8.3)
RBC # BLD AUTO: 3.76 10E6/UL (ref 3.8–5.2)
SODIUM SERPL-SCNC: 127 MMOL/L (ref 136–145)
TACROLIMUS BLD-MCNC: 4.5 UG/L (ref 5–15)
TME LAST DOSE: ABNORMAL H
TME LAST DOSE: ABNORMAL H
TRIGL SERPL-MCNC: 111 MG/DL
WBC # BLD AUTO: 4.3 10E3/UL (ref 4–11)

## 2023-09-19 PROCEDURE — 85027 COMPLETE CBC AUTOMATED: CPT

## 2023-09-19 PROCEDURE — 36415 COLL VENOUS BLD VENIPUNCTURE: CPT

## 2023-09-19 PROCEDURE — 80061 LIPID PANEL: CPT

## 2023-09-19 PROCEDURE — 82248 BILIRUBIN DIRECT: CPT

## 2023-09-19 PROCEDURE — 80197 ASSAY OF TACROLIMUS: CPT

## 2023-09-19 PROCEDURE — 83721 ASSAY OF BLOOD LIPOPROTEIN: CPT | Mod: 59

## 2023-09-19 PROCEDURE — 80053 COMPREHEN METABOLIC PANEL: CPT

## 2023-09-25 ENCOUNTER — OFFICE VISIT (OUTPATIENT)
Dept: FAMILY MEDICINE | Facility: CLINIC | Age: 83
End: 2023-09-25
Payer: COMMERCIAL

## 2023-09-25 VITALS
HEIGHT: 60 IN | RESPIRATION RATE: 17 BRPM | TEMPERATURE: 97.5 F | HEART RATE: 78 BPM | DIASTOLIC BLOOD PRESSURE: 82 MMHG | BODY MASS INDEX: 24.74 KG/M2 | WEIGHT: 126 LBS | OXYGEN SATURATION: 99 % | SYSTOLIC BLOOD PRESSURE: 174 MMHG

## 2023-09-25 DIAGNOSIS — M25.561 CHRONIC PAIN OF RIGHT KNEE: ICD-10-CM

## 2023-09-25 DIAGNOSIS — G89.29 CHRONIC PAIN OF RIGHT KNEE: ICD-10-CM

## 2023-09-25 DIAGNOSIS — I25.10 CORONARY ARTERY DISEASE INVOLVING NATIVE CORONARY ARTERY OF NATIVE HEART WITHOUT ANGINA PECTORIS: ICD-10-CM

## 2023-09-25 DIAGNOSIS — M25.551 HIP PAIN, RIGHT: ICD-10-CM

## 2023-09-25 DIAGNOSIS — E78.5 HYPERLIPIDEMIA LDL GOAL <70: ICD-10-CM

## 2023-09-25 DIAGNOSIS — I10 ESSENTIAL HYPERTENSION WITH GOAL BLOOD PRESSURE LESS THAN 140/90: ICD-10-CM

## 2023-09-25 DIAGNOSIS — Z00.00 ENCOUNTER FOR MEDICARE ANNUAL WELLNESS EXAM: Primary | ICD-10-CM

## 2023-09-25 DIAGNOSIS — Z94.4 HISTORY OF LIVER TRANSPLANT (H): ICD-10-CM

## 2023-09-25 DIAGNOSIS — N18.32 STAGE 3B CHRONIC KIDNEY DISEASE (H): ICD-10-CM

## 2023-09-25 DIAGNOSIS — K74.5 BILIARY CIRRHOSIS (H): ICD-10-CM

## 2023-09-25 LAB
CREAT UR-MCNC: 61.8 MG/DL
MICROALBUMIN UR-MCNC: 315 MG/L
MICROALBUMIN/CREAT UR: 509.71 MG/G CR (ref 0–25)

## 2023-09-25 PROCEDURE — 99213 OFFICE O/P EST LOW 20 MIN: CPT | Mod: 25 | Performed by: FAMILY MEDICINE

## 2023-09-25 PROCEDURE — 82043 UR ALBUMIN QUANTITATIVE: CPT | Performed by: FAMILY MEDICINE

## 2023-09-25 PROCEDURE — G0439 PPPS, SUBSEQ VISIT: HCPCS | Performed by: FAMILY MEDICINE

## 2023-09-25 PROCEDURE — 82570 ASSAY OF URINE CREATININE: CPT | Performed by: FAMILY MEDICINE

## 2023-09-25 ASSESSMENT — PAIN SCALES - GENERAL: PAINLEVEL: MILD PAIN (3)

## 2023-09-25 ASSESSMENT — ENCOUNTER SYMPTOMS: HIP PAIN: 1

## 2023-09-25 NOTE — PATIENT INSTRUCTIONS
Patient Education   Personalized Prevention Plan  You are due for the preventive services outlined below.  Your care team is available to assist you in scheduling these services.  If you have already completed any of these items, please share that information with your care team to update in your medical record.  Health Maintenance Due   Topic Date Due     ANNUAL REVIEW OF HM ORDERS  Never done     Kidney Microalbumin Urine Test  03/10/2021     Osteoporosis Screening  08/13/2021     Colorectal Cancer Screening  09/15/2022     COVID-19 Vaccine (6 - Pfizer risk series) 12/27/2022     Annual Wellness Visit  06/02/2023     Flu Vaccine (1) 09/01/2023

## 2023-09-25 NOTE — PROGRESS NOTES
Assessment & Plan     Encounter for Medicare annual wellness exam      Hip pain, right  Assessed in the past, mild to moderate arthritis.  Better with one regular strength tylenol  She will reach out to transplant surgeon to see acceptable dose of pain meds given liver transplant    Chronic pain of right knee  same    Stage 3b chronic kidney disease (H)  Stable, monitoring  - Albumin Random Urine Quantitative with Creat Ratio; Future  - Albumin Random Urine Quantitative with Creat Ratio    History of liver transplant (H)  Per transplant    Biliary cirrhosis (H)  Cause of liver transplant    Coronary artery disease involving native coronary artery of native heart without angina pectoris  Per cardioogy    Hyperlipidemia LDL goal <70  Near goal    Essential hypertension with goal blood pressure less than 140/90  At goal on meds                 Dee Chiu MD  [facility]   Lesli is a 83 year old, presenting for the following health issues:  Hip Pain        9/25/2023    11:07 AM   Additional Questions   Roomed by roxanna       History of Present Illness       Reason for visit:  Knee and hip pain    She eats 2-3 servings of fruits and vegetables daily.She consumes 1 sweetened beverage(s) daily. She exercises with enough effort to increase her heart rate 3 or less days per week.   She is taking medications regularly.     Notes right knee and hip pain  Started with the knee  Has been seen for this and was told she has arthritis  Hurts all the time, kind of weather dependent  It is a dull ache, worse with use.   Notes feet are numb and was told she has neuropathy, feels like pins and needles  Same pain she has been having for years  Had xray of right knee and hip 2 years ago and knee was normal and right hip with mild to moderate arthritis.   When she takes one regular strength tylenol the pain goes away  She tries to minimize meds due to her liver transplant  Given she gets  "relief with one tylenol, recommend she reach out to transplant surgeon to see the limits on pain meds given her liver transplant.       Annual Wellness Visit    Patient has been advised of split billing requirements and indicates understanding: Yes     Are you in the first 12 months of your Medicare Part B coverage?  No    Physical Health:  In general, how would you rate your overall physical health? good  Outside of work, how many days during the week do you exercise?none  Outside of work, approximately how many minutes a day do you exercise?not applicable  If you drink alcohol do you typically have >3 drinks per day or >7 drinks per week? No  Do you usually eat at least 4 servings of fruit and vegetables a day, include whole grains & fiber and avoid regularly eating high fat or \"junk\" foods? Yes  Do you have any problems taking medications regularly? No  Do you have any side effects from medications? none  Needs assistance for the following daily activities: no assistance needed  Which of the following safety concerns are present in your home?  none identified   Hearing impairment: Yes,  has hearing aides   In the past 6 months, have you been bothered by leaking of urine? no    Mental Health:  In general, how would you rate your overall mental or emotional health? good    Today's PHQ-2 Score:       9/25/2023    11:06 AM   PHQ-2 ( 1999 [employer])   Q1: Little interest or pleasure in doing things 0   Q2: Feeling down, depressed or hopeless 0   PHQ-2 Score 0   Q1: Little interest or pleasure in doing things Not at all   Q2: Feeling down, depressed or hopeless Not at all   PHQ-2 Score 0         Do you feel safe in your environment? Yes    Have you ever done Advance Care Planning? (For example, a Health Directive, POLST, or a discussion with a medical provider or your loved ones about your wishes)? Yes, advance care planning is on file.    Fall risk:  Fallen 2 or more times in the past year?: No  Any fall with injury in " the past year?: No    Cognitive Screening: no concerns based on direct observation    Do you have sleep apnea, excessive snoring or daytime drowsiness? : no    Social History     Tobacco Use    Smoking status: Former     Packs/day: 0.50     Years: 10.00     Pack years: 5.00     Types: Cigarettes     Quit date: 1975     Years since quittin.8    Smokeless tobacco: Never   Substance Use Topics    Alcohol use: No     Comment: Glass of wine occassionally             2022    12:39 PM   Alcohol Use   Prescreen: >3 drinks/day or >7 drinks/week? No     Do you have a current opioid prescription? No  Do you use any other controlled substances or medications that are not prescribed by a provider? None    Current providers sharing in care for this patient include:   Patient Care Team:  Dee Chiu MD as PCP - General  Nain Lagunas MD as PCP - Cardiology (Cardiology)  Nain Lagunas MD as MD (Cardiology)  Zhen Armstrong MD as MD (Orthopedics)  Jag Cabral PA-C as Assigned Neuroscience Provider  Nain Lagunas MD as Assigned Heart and Vascular Provider  Dee Chiu MD as Assigned PCP  Radha Cortes RN as Specialty Care Coordinator (Cardiology)    The following health maintenance items are reviewed in Epic and correct as of today:  Health Maintenance   Topic Date Due    ANNUAL REVIEW OF HM ORDERS  Never done    MICROALBUMIN  03/10/2021    DEXA  2021    COVID-19 Vaccine (6 - Pfizer risk series) 2022    MEDICARE ANNUAL WELLNESS VISIT  2023    INFLUENZA VACCINE (1) 2023    BMP  2024    LIPID  2024    HEMOGLOBIN  2024    FALL RISK ASSESSMENT  2024    DTAP/TDAP/TD IMMUNIZATION (2 - Td or Tdap) 2026    ADVANCE CARE PLANNING  2028    PHQ-2 (once per calendar year)  Completed    Pneumococcal Vaccine: 65+ Years  Completed    URINALYSIS  Completed    ZOSTER IMMUNIZATION  Completed    IPV IMMUNIZATION  Aged Out     HPV IMMUNIZATION  Aged Out    MENINGITIS IMMUNIZATION  Aged Out    COLORECTAL CANCER SCREENING  Discontinued       Patient has been advised of split billing requirements and indicates understanding: Yes    Appropriate preventive services were discussed with this patient, including applicable screening as appropriate for fall prevention, nutrition, physical activity, Tobacco-use cessation, weight loss and cognition.  Checklist reviewing preventive services available has been given to the patient.        Review of Systems   Constitutional, HEENT, cardiovascular, pulmonary, gi and gu systems are negative, except as otherwise noted.      Objective    BP (!) 174/82   Pulse 78   Temp 97.5  F (36.4  C) (Oral)   Resp 17   Ht 1.524 m (5')   Wt 57.2 kg (126 lb)   SpO2 99%   BMI 24.61 kg/m    Body mass index is 24.61 kg/m .  Physical Exam   GENERAL: healthy, alert and no distress  EYES: Eyes grossly normal to inspection, PERRL and conjunctivae and sclerae normal  HENT: ear canals and TM's normal, nose and mouth without ulcers or lesions  NECK: no adenopathy, no asymmetry, masses, or scars and thyroid normal to palpation  RESP: lungs clear to auscultation - no rales, rhonchi or wheezes  CV: regular rate and rhythm, normal S1 S2, no S3 or S4, no murmur, click or rub, no peripheral edema and peripheral pulses strong  ABDOMEN: soft, nontender, no hepatosplenomegaly, no masses and bowel sounds normal  MS: no gross musculoskeletal defects noted, no edema, no pain to palpation over her right knee patella or joint lines.   SKIN: no suspicious lesions or rashes  NEURO: Normal strength and tone, mentation intact and speech normal  PSYCH: mentation appears normal, affect normal/bright    No results found. However, due to the size of the patient record, not all encounters were searched. Please check Results Review for a complete set of results.

## 2023-09-27 ENCOUNTER — TELEPHONE (OUTPATIENT)
Dept: TRANSPLANT | Facility: CLINIC | Age: 83
End: 2023-09-27
Payer: COMMERCIAL

## 2023-09-28 ENCOUNTER — APPOINTMENT (OUTPATIENT)
Dept: CT IMAGING | Facility: CLINIC | Age: 83
DRG: 378 | End: 2023-09-28
Attending: EMERGENCY MEDICINE
Payer: COMMERCIAL

## 2023-09-28 ENCOUNTER — HOSPITAL ENCOUNTER (INPATIENT)
Facility: CLINIC | Age: 83
LOS: 1 days | Discharge: HOME | DRG: 378 | End: 2023-09-29
Attending: EMERGENCY MEDICINE | Admitting: INTERNAL MEDICINE
Payer: COMMERCIAL

## 2023-09-28 DIAGNOSIS — K62.5 RECTAL BLEEDING: ICD-10-CM

## 2023-09-28 DIAGNOSIS — K57.31 DIVERTICULOSIS OF LARGE INTESTINE WITH HEMORRHAGE: Primary | ICD-10-CM

## 2023-09-28 LAB
ABO/RH(D): NORMAL
ALBUMIN SERPL BCG-MCNC: 3.3 G/DL (ref 3.5–5.2)
ALP SERPL-CCNC: 95 U/L (ref 35–104)
ALT SERPL W P-5'-P-CCNC: 27 U/L (ref 0–50)
ANION GAP SERPL CALCULATED.3IONS-SCNC: 10 MMOL/L (ref 7–15)
ANION GAP SERPL CALCULATED.3IONS-SCNC: 12 MMOL/L (ref 7–15)
ANTIBODY SCREEN: NEGATIVE
AST SERPL W P-5'-P-CCNC: 37 U/L (ref 0–45)
BASOPHILS # BLD AUTO: 0 10E3/UL (ref 0–0.2)
BASOPHILS NFR BLD AUTO: 0 %
BILIRUB SERPL-MCNC: 0.3 MG/DL
BLD PROD TYP BPU: NORMAL
BLOOD COMPONENT TYPE: NORMAL
BUN SERPL-MCNC: 42.6 MG/DL (ref 8–23)
BUN SERPL-MCNC: 46.3 MG/DL (ref 8–23)
CALCIUM SERPL-MCNC: 8 MG/DL (ref 8.8–10.2)
CALCIUM SERPL-MCNC: 9.3 MG/DL (ref 8.8–10.2)
CHLORIDE SERPL-SCNC: 89 MMOL/L (ref 98–107)
CHLORIDE SERPL-SCNC: 97 MMOL/L (ref 98–107)
CODING SYSTEM: NORMAL
CREAT SERPL-MCNC: 1.51 MG/DL (ref 0.51–0.95)
CREAT SERPL-MCNC: 1.65 MG/DL (ref 0.51–0.95)
CROSSMATCH: NORMAL
EGFRCR SERPLBLD CKD-EPI 2021: 31 ML/MIN/1.73M2
EGFRCR SERPLBLD CKD-EPI 2021: 34 ML/MIN/1.73M2
EOSINOPHIL # BLD AUTO: 0 10E3/UL (ref 0–0.7)
EOSINOPHIL NFR BLD AUTO: 1 %
ERYTHROCYTE [DISTWIDTH] IN BLOOD BY AUTOMATED COUNT: 13 % (ref 10–15)
FLEXIBLE SIGMOIDOSCOPY: NORMAL
GLUCOSE SERPL-MCNC: 106 MG/DL (ref 70–99)
GLUCOSE SERPL-MCNC: 91 MG/DL (ref 70–99)
HCO3 SERPL-SCNC: 19 MMOL/L (ref 22–29)
HCO3 SERPL-SCNC: 22 MMOL/L (ref 22–29)
HCT VFR BLD AUTO: 28.9 % (ref 35–47)
HGB BLD-MCNC: 10.1 G/DL (ref 11.7–15.7)
HGB BLD-MCNC: 10.1 G/DL (ref 11.7–15.7)
HGB BLD-MCNC: 7 G/DL (ref 11.7–15.7)
HOLD SPECIMEN: NORMAL
IMM GRANULOCYTES # BLD: 0 10E3/UL
IMM GRANULOCYTES NFR BLD: 1 %
INR PPP: 1.08 (ref 0.85–1.15)
INR PPP: 1.19 (ref 0.85–1.15)
ISSUE DATE AND TIME: NORMAL
LYMPHOCYTES # BLD AUTO: 1.2 10E3/UL (ref 0.8–5.3)
LYMPHOCYTES NFR BLD AUTO: 23 %
MCH RBC QN AUTO: 31.8 PG (ref 26.5–33)
MCHC RBC AUTO-ENTMCNC: 34.9 G/DL (ref 31.5–36.5)
MCV RBC AUTO: 91 FL (ref 78–100)
MONOCYTES # BLD AUTO: 0.6 10E3/UL (ref 0–1.3)
MONOCYTES NFR BLD AUTO: 11 %
NEUTROPHILS # BLD AUTO: 3.2 10E3/UL (ref 1.6–8.3)
NEUTROPHILS NFR BLD AUTO: 64 %
NRBC # BLD AUTO: 0 10E3/UL
NRBC BLD AUTO-RTO: 0 /100
OSMOLALITY SERPL: 283 MMOL/KG (ref 280–301)
OSMOLALITY SERPL: 286 MMOL/KG (ref 280–301)
PLATELET # BLD AUTO: 220 10E3/UL (ref 150–450)
POTASSIUM SERPL-SCNC: 4.3 MMOL/L (ref 3.4–5.3)
POTASSIUM SERPL-SCNC: 4.6 MMOL/L (ref 3.4–5.3)
PROT SERPL-MCNC: 6.3 G/DL (ref 6.4–8.3)
RBC # BLD AUTO: 3.18 10E6/UL (ref 3.8–5.2)
SODIUM SERPL-SCNC: 123 MMOL/L (ref 135–145)
SODIUM SERPL-SCNC: 126 MMOL/L (ref 135–145)
SODIUM SERPL-SCNC: 126 MMOL/L (ref 135–145)
SPECIMEN EXPIRATION DATE: NORMAL
TACROLIMUS BLD-MCNC: 4.3 UG/L (ref 5–15)
TME LAST DOSE: ABNORMAL H
TME LAST DOSE: ABNORMAL H
UNIT ABO/RH: NORMAL
UNIT NUMBER: NORMAL
UNIT STATUS: NORMAL
UNIT TYPE ISBT: 5100
WBC # BLD AUTO: 5.1 10E3/UL (ref 4–11)

## 2023-09-28 PROCEDURE — 84295 ASSAY OF SERUM SODIUM: CPT

## 2023-09-28 PROCEDURE — 85025 COMPLETE CBC W/AUTO DIFF WBC: CPT | Performed by: EMERGENCY MEDICINE

## 2023-09-28 PROCEDURE — 74177 CT ABD & PELVIS W/CONTRAST: CPT | Mod: 26 | Performed by: RADIOLOGY

## 2023-09-28 PROCEDURE — 83930 ASSAY OF BLOOD OSMOLALITY: CPT

## 2023-09-28 PROCEDURE — 45378 DIAGNOSTIC COLONOSCOPY: CPT | Performed by: INTERNAL MEDICINE

## 2023-09-28 PROCEDURE — G0500 MOD SEDAT ENDO SERVICE >5YRS: HCPCS | Performed by: INTERNAL MEDICINE

## 2023-09-28 PROCEDURE — 250N000012 HC RX MED GY IP 250 OP 636 PS 637

## 2023-09-28 PROCEDURE — 85018 HEMOGLOBIN: CPT

## 2023-09-28 PROCEDURE — 99223 1ST HOSP IP/OBS HIGH 75: CPT | Performed by: NURSE PRACTITIONER

## 2023-09-28 PROCEDURE — C9113 INJ PANTOPRAZOLE SODIUM, VIA: HCPCS | Mod: JZ

## 2023-09-28 PROCEDURE — 36415 COLL VENOUS BLD VENIPUNCTURE: CPT

## 2023-09-28 PROCEDURE — 258N000003 HC RX IP 258 OP 636

## 2023-09-28 PROCEDURE — 86850 RBC ANTIBODY SCREEN: CPT | Performed by: INTERNAL MEDICINE

## 2023-09-28 PROCEDURE — P9016 RBC LEUKOCYTES REDUCED: HCPCS

## 2023-09-28 PROCEDURE — 250N000011 HC RX IP 250 OP 636: Performed by: EMERGENCY MEDICINE

## 2023-09-28 PROCEDURE — 86901 BLOOD TYPING SEROLOGIC RH(D): CPT | Performed by: INTERNAL MEDICINE

## 2023-09-28 PROCEDURE — 250N000011 HC RX IP 250 OP 636: Performed by: INTERNAL MEDICINE

## 2023-09-28 PROCEDURE — 120N000002 HC R&B MED SURG/OB UMMC

## 2023-09-28 PROCEDURE — 250N000011 HC RX IP 250 OP 636

## 2023-09-28 PROCEDURE — 0DJD8ZZ INSPECTION OF LOWER INTESTINAL TRACT, VIA NATURAL OR ARTIFICIAL OPENING ENDOSCOPIC: ICD-10-PCS | Performed by: INTERNAL MEDICINE

## 2023-09-28 PROCEDURE — 74177 CT ABD & PELVIS W/CONTRAST: CPT

## 2023-09-28 PROCEDURE — 82310 ASSAY OF CALCIUM: CPT

## 2023-09-28 PROCEDURE — 85610 PROTHROMBIN TIME: CPT | Performed by: EMERGENCY MEDICINE

## 2023-09-28 PROCEDURE — 36415 COLL VENOUS BLD VENIPUNCTURE: CPT | Performed by: EMERGENCY MEDICINE

## 2023-09-28 PROCEDURE — 99153 MOD SED SAME PHYS/QHP EA: CPT | Performed by: INTERNAL MEDICINE

## 2023-09-28 PROCEDURE — 258N000003 HC RX IP 258 OP 636: Performed by: EMERGENCY MEDICINE

## 2023-09-28 PROCEDURE — 99223 1ST HOSP IP/OBS HIGH 75: CPT | Mod: AI | Performed by: INTERNAL MEDICINE

## 2023-09-28 PROCEDURE — 80053 COMPREHEN METABOLIC PANEL: CPT | Performed by: EMERGENCY MEDICINE

## 2023-09-28 PROCEDURE — 99285 EMERGENCY DEPT VISIT HI MDM: CPT | Mod: 25 | Performed by: EMERGENCY MEDICINE

## 2023-09-28 PROCEDURE — 250N000011 HC RX IP 250 OP 636: Mod: JZ | Performed by: EMERGENCY MEDICINE

## 2023-09-28 PROCEDURE — C9113 INJ PANTOPRAZOLE SODIUM, VIA: HCPCS | Mod: JZ | Performed by: EMERGENCY MEDICINE

## 2023-09-28 PROCEDURE — 99285 EMERGENCY DEPT VISIT HI MDM: CPT | Performed by: EMERGENCY MEDICINE

## 2023-09-28 PROCEDURE — 86923 COMPATIBILITY TEST ELECTRIC: CPT

## 2023-09-28 PROCEDURE — 80197 ASSAY OF TACROLIMUS: CPT

## 2023-09-28 PROCEDURE — 250N000011 HC RX IP 250 OP 636: Mod: JZ

## 2023-09-28 PROCEDURE — 85610 PROTHROMBIN TIME: CPT

## 2023-09-28 RX ORDER — FENTANYL CITRATE 50 UG/ML
INJECTION, SOLUTION INTRAMUSCULAR; INTRAVENOUS PRN
Status: DISCONTINUED | OUTPATIENT
Start: 2023-09-28 | End: 2023-09-29 | Stop reason: HOSPADM

## 2023-09-28 RX ORDER — TACROLIMUS 0.5 MG/1
0.5 CAPSULE ORAL EVERY MORNING
Status: DISCONTINUED | OUTPATIENT
Start: 2023-09-28 | End: 2023-09-29 | Stop reason: HOSPADM

## 2023-09-28 RX ORDER — IOPAMIDOL 755 MG/ML
78 INJECTION, SOLUTION INTRAVASCULAR ONCE
Status: COMPLETED | OUTPATIENT
Start: 2023-09-28 | End: 2023-09-28

## 2023-09-28 RX ORDER — TACROLIMUS 1 MG/1
1 CAPSULE ORAL
Status: DISCONTINUED | OUTPATIENT
Start: 2023-09-28 | End: 2023-09-29 | Stop reason: HOSPADM

## 2023-09-28 RX ORDER — METOPROLOL TARTRATE 50 MG
50 TABLET ORAL 2 TIMES DAILY
Status: DISCONTINUED | OUTPATIENT
Start: 2023-09-28 | End: 2023-09-28

## 2023-09-28 RX ORDER — SODIUM CHLORIDE 9 MG/ML
INJECTION, SOLUTION INTRAVENOUS CONTINUOUS
Status: DISCONTINUED | OUTPATIENT
Start: 2023-09-28 | End: 2023-09-29

## 2023-09-28 RX ORDER — ACETAMINOPHEN 500 MG
500 TABLET ORAL EVERY 8 HOURS PRN
Status: DISCONTINUED | OUTPATIENT
Start: 2023-09-28 | End: 2023-09-29 | Stop reason: HOSPADM

## 2023-09-28 RX ORDER — PREDNISONE 5 MG/1
5 TABLET ORAL DAILY
Status: DISCONTINUED | OUTPATIENT
Start: 2023-09-28 | End: 2023-09-29 | Stop reason: HOSPADM

## 2023-09-28 RX ORDER — ONDANSETRON 2 MG/ML
4 INJECTION INTRAMUSCULAR; INTRAVENOUS EVERY 6 HOURS PRN
Status: DISCONTINUED | OUTPATIENT
Start: 2023-09-28 | End: 2023-09-29 | Stop reason: HOSPADM

## 2023-09-28 RX ORDER — LABETALOL HYDROCHLORIDE 5 MG/ML
10 INJECTION, SOLUTION INTRAVENOUS EVERY 6 HOURS PRN
Status: DISCONTINUED | OUTPATIENT
Start: 2023-09-28 | End: 2023-09-29 | Stop reason: HOSPADM

## 2023-09-28 RX ORDER — METOPROLOL TARTRATE 25 MG/1
50 TABLET, FILM COATED ORAL 2 TIMES DAILY
Status: DISCONTINUED | OUTPATIENT
Start: 2023-09-29 | End: 2023-09-29 | Stop reason: HOSPADM

## 2023-09-28 RX ORDER — ISOSORBIDE MONONITRATE 60 MG/1
60 TABLET, EXTENDED RELEASE ORAL 2 TIMES DAILY
Status: DISCONTINUED | OUTPATIENT
Start: 2023-09-28 | End: 2023-09-29

## 2023-09-28 RX ORDER — LISINOPRIL 2.5 MG/1
2.5 TABLET ORAL DAILY
Status: DISCONTINUED | OUTPATIENT
Start: 2023-09-28 | End: 2023-09-29 | Stop reason: HOSPADM

## 2023-09-28 RX ORDER — ONDANSETRON 4 MG/1
4 TABLET, ORALLY DISINTEGRATING ORAL EVERY 6 HOURS PRN
Status: DISCONTINUED | OUTPATIENT
Start: 2023-09-28 | End: 2023-09-29 | Stop reason: HOSPADM

## 2023-09-28 RX ADMIN — TACROLIMUS 0.5 MG: 0.5 CAPSULE ORAL at 08:45

## 2023-09-28 RX ADMIN — LABETALOL HYDROCHLORIDE 10 MG: 5 INJECTION, SOLUTION INTRAVENOUS at 22:41

## 2023-09-28 RX ADMIN — PANTOPRAZOLE SODIUM 40 MG: 40 INJECTION, POWDER, FOR SOLUTION INTRAVENOUS at 02:57

## 2023-09-28 RX ADMIN — SODIUM CHLORIDE 500 ML: 9 INJECTION, SOLUTION INTRAVENOUS at 03:26

## 2023-09-28 RX ADMIN — SODIUM CHLORIDE: 9 INJECTION, SOLUTION INTRAVENOUS at 05:29

## 2023-09-28 RX ADMIN — PANTOPRAZOLE SODIUM 40 MG: 40 INJECTION, POWDER, FOR SOLUTION INTRAVENOUS at 08:45

## 2023-09-28 RX ADMIN — SODIUM CHLORIDE 500 ML: 9 INJECTION, SOLUTION INTRAVENOUS at 08:39

## 2023-09-28 RX ADMIN — PREDNISONE 5 MG: 5 TABLET ORAL at 08:45

## 2023-09-28 RX ADMIN — TACROLIMUS 1 MG: 1 CAPSULE ORAL at 18:10

## 2023-09-28 RX ADMIN — SODIUM CHLORIDE 500 ML: 9 INJECTION, SOLUTION INTRAVENOUS at 05:29

## 2023-09-28 RX ADMIN — IOPAMIDOL 78 ML: 755 INJECTION, SOLUTION INTRAVENOUS at 03:16

## 2023-09-28 ASSESSMENT — ACTIVITIES OF DAILY LIVING (ADL)
ADLS_ACUITY_SCORE: 35
ADLS_ACUITY_SCORE: 36
ADLS_ACUITY_SCORE: 35
ADLS_ACUITY_SCORE: 36
CHANGE_IN_FUNCTIONAL_STATUS_SINCE_ONSET_OF_CURRENT_ILLNESS/INJURY: NO
EQUIPMENT_CURRENTLY_USED_AT_HOME: WALKER, STANDARD
ADLS_ACUITY_SCORE: 35
TOILETING_ISSUES: NO
ADLS_ACUITY_SCORE: 35
VISION_MANAGEMENT: GLASSES
FALL_HISTORY_WITHIN_LAST_SIX_MONTHS: YES
ADLS_ACUITY_SCORE: 35
ADLS_ACUITY_SCORE: 27
WALKING_OR_CLIMBING_STAIRS: AMBULATION DIFFICULTY, REQUIRES EQUIPMENT
DIFFICULTY_EATING/SWALLOWING: NO
ADLS_ACUITY_SCORE: 35
DRESSING/BATHING_DIFFICULTY: NO
DOING_ERRANDS_INDEPENDENTLY_DIFFICULTY: YES
NUMBER_OF_TIMES_PATIENT_HAS_FALLEN_WITHIN_LAST_SIX_MONTHS: 1
WEAR_GLASSES_OR_BLIND: YES
WALKING_OR_CLIMBING_STAIRS_DIFFICULTY: YES
ADLS_ACUITY_SCORE: 35
CONCENTRATING,_REMEMBERING_OR_MAKING_DECISIONS_DIFFICULTY: NO
ADLS_ACUITY_SCORE: 35

## 2023-09-28 NOTE — OR NURSING
Pt had colonoscopy under moderate sedation, no endoscopic interventions. Pt tolerated procedure well. Pt taken back to ED in stable condition on RA. Procedural report to Satya, primary RN.

## 2023-09-28 NOTE — ED TRIAGE NOTES
Patient presents to ED with daughter with CC rectal bleeding that started this morning around midnight. Patient states she thought she had to have a bowel movement and when she finished the toilet bowl water was bright red.  +weakness   3 episodes since midnight.   No hx of rectal bleeding     Patient now states she also fell in the kitchen yesterday and landed on left side. Patient states she has hx of neuropathy and couldn't feel her feet.  Denies hitting head. Denies blood thinners.

## 2023-09-28 NOTE — CARE PLAN
A&O. 1 bloody stool at 7am. Up to commode with stand by assistance. Denies dizziness, SOB, or lightheadedness. Denies pain. VSS on RA. Call light in reach. Daughter at bedside.

## 2023-09-28 NOTE — ED NOTES
Assisted to use the commode, with urge to pass stool. Noted patient output to be red, moderate amount, no Stool and just blood, updated ED-MD.

## 2023-09-28 NOTE — CONSULTS
Hepatology Consultation    Lesli Lorenzana   MRN# [id number]     Age: 83 year old YOB: 1940       Referring provider: Deon Siegel  Attending Hepatologist: Dr. Heather Valverde   Consult requested for: post liver transplant       Assessment and Recommendation:   Assessment:   Ms. 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 Her hemoglobin dropped from 11 to 7.0.       Recommendations:   # BRBPR  # Diverticular bleeding  - plan for flex sigmoidoscopy today- was able to get to cecum with only area of prior bleeding near sigmoid at diverticulum. No active bleeding. No evidence of diverticulitis.   - supportive care, clear liquids this evening. Can advanced diet if remains stable.   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   - has elevated urine albumin. May need to be seen by nephrology in the future.   - has bene on tacrolimus 0.5 mg am/1 mg pm (she stated 1 and 2- likely # of pills rather than mg). Tacrolimus level in am. Goal trough level has been 3-5.   - Continue prednisone 5 mg daily.     # Osteoarthritis  - has been using intermittent doses of acetaminophen for pain. She is able to take acetaminophen daily, encourage her to use <2000 mg daily. She is abstinent from alcohol.     Plan of care discussed with Dr. Heather Valverde. The above note reflects our joint plan.     Thank you for the opportunity to be involved in Lesli Lorenzana care. Please call with any questions or concerns.     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 Her post operative course has been complicated by choldocholithiasis requiring ERCP (last 2019), CKD, ACR (2014), spinal stenosis, HTN, who was admitted with complaints of rectal bleeding and a recent fall. She was noted to have a hemoglobin of 11.9 on 9/19 and on admit dropped to 10.1. She reports x2 episodes of bright red blood per rectum that started at midnight, and has had x3-4 BMs that were bloody since. She did have some dizziness last evening, improved this morning following IVF. She had a drop in her hemoglobin to 7.0 this morning. She has not been taking nsaids at home, has been taking a baby ASA daily. Her last colonoscopy was in 2017 without concern for hemorrhoids and has never had IBD.     Ms. Lorenzana did have a fall last evening due to known lower extremity neuropathy. She denies syncope, abdominal pain, fevers, nausea, vomiting, or melena. She continues to complain of right hip and leg arthritis pain, improved when she took acetaminophen 650 mg. She does not take this daily. She has not had weight loss, fatigue or pruritus.     She reports taking tacrolimus 1mg am, 2 mg pm along with prednisone 5 mg daily. She denies missing doses. Her creatinine has been trending up and she has had prior increase in proteinuria.              Past Medical History:     Past Medical History:   Diagnosis Date    Anemia     Arthritis     CAD (coronary artery disease) 1/2009    stent    Cholelithiasis     gallbladder removed with liver in 2008    Hallux valgus     History of blood transfusion     HTN (hypertension)     Hyperlipidemia LDL goal < 100     Liver transplant 2/2008    due to Primary biliary cirrhosis    Osteoarthritis of left hip     Osteopenia     Overweight(278.02)     Palpitations     Spinal stenosis               Past Surgical History:     Past Surgical History:   Procedure Laterality Date    ARTHROPLASTY HIP Left 4/11/2017    Procedure: ARTHROPLASTY HIP;  Surgeon:  Zhen Armstrong MD;  Location: [facility] OR    BIOPSY      liver     COLONOSCOPY WITH CO2 INSUFFLATION N/A 9/15/2017    Procedure: COLONOSCOPY WITH CO2 INSUFFLATION;  Colonoscopy, History of colonic polyps, Ref by Muhammad, BMI 25, [place] [id number];  Surgeon: Dolly Hearn MD;  Location: [facility] OR    ENDOSCOPIC RETROGRADE CHOLANGIOPANCREATOGRAM N/A 2017    Procedure: COMBINED ENDOSCOPIC RETROGRADE CHOLANGIOPANCREATOGRAPHY, PLACE TUBE/STENT;  Endoscopic Retrograde Cholangiopancreatogram with bile duct spinchterotomy, ballon sweep of bile duct for stones, dilatation of bile duct;  Surgeon: Narendra Diana MD;  Location: [facility] OR    ENDOSCOPIC RETROGRADE CHOLANGIOPANCREATOGRAM N/A 2019    Procedure: Endoscopic Retrograde Cholangiopancreatogram with bile duct spinchterotomy, balloon sweep of bile ducts for stone, balloon dilatation ofbileduct and bile duct stent placement;  Surgeon: Guru Harpal Luu MD;  Location: [facility] OR    ESOPHAGOSCOPY, GASTROSCOPY, DUODENOSCOPY (EGD), COMBINED N/A 2019    Procedure: ESOPHAGOGASTRODUODENOSCOPY, WITH FOREIGN BODY REMOVAL;  Surgeon: Guru Harpal Luu MD;  Location: [facility] GI    GYN SURGERY      hysterectomy    INJECT EPIDURAL LUMBAR N/A 12/3/2021    Procedure: INJECTION, SPINE, LUMBAR, EPIDURAL - L5-S1 interlaminar epidural steroid injection;  Surgeon: Brent Smith MD;  Location:  OR    SURGICAL HISTORY OF -       liver transplant     SURGICAL HISTORY OF -       CAD with stent placement 2009    TRANSPLANT  2008    Liver transplant     [facility] STOMACH SURGERY PROCEDURE UNLISTED                Social History:     Social History     Tobacco Use    Smoking status: Former     Packs/day: 0.50     Years: 10.00     Pack years: 5.00     Types: Cigarettes     Quit date: 1975     Years since quittin.8    Smokeless tobacco: Never   Substance Use Topics    Alcohol use: No     Comment: Glass of wine occassionally             Family  History:   The   I have reviewed this patient's family history and updated it with pertinent information if needed.  Family History   Problem Relation Age of Onset    Cancer Father         unknown    C.A.D. Father     Thyroid Disease Father     Cancer Mother         lung--smoker    Other Cancer Mother         lung    Thyroid Disease Mother     Cerebrovascular Disease Maternal Grandmother         ,    Unknown/Adopted Paternal Grandfather                   Immunizations:     Immunization History   Administered Date(s) Administered    COVID-19 Bivalent 12+ (Pfizer) 11/01/2022    COVID-19 MONOVALENT 12+ (Pfizer) 02/09/2021, 03/02/2021, 09/28/2021    COVID-19 Monovalent 12+ (Pfizer 2022) 03/25/2022    HEPA 12/13/2002, 06/10/2003    HepB 12/13/2002, 01/10/2003, 06/10/2003    Influenza (High Dose) 3 valent vaccine 09/28/2011, 09/18/2012, 09/28/2015, 10/05/2016, 09/06/2017, 08/29/2018, 09/27/2019    Influenza (IIV3) PF 10/25/2004, 12/12/2005, 10/27/2006, 10/25/2007, 10/01/2010    Influenza Vaccine 65+ (Fluzone HD) 09/10/2020, 09/15/2021, 10/24/2022    Influenza Vaccine >6 months (Alfuria,Fluzone) 09/24/2013, 09/23/2014    Pneumo Conj 13-V (2010&after) 07/07/2015    Pneumococcal 23 valent 08/27/2007, 11/01/2007, 07/27/2012    TD,PF 7+ (Tenivac) 06/10/1997, 09/10/1997, 03/29/2007, 09/10/2007    TDAP Vaccine (Adacel) 01/06/2016    Zoster recombinant adjuvanted (SHINGRIX) 09/19/2018, 11/28/2018            Allergies:     Allergies   Allergen Reactions    Amlodipine Besylate Swelling     Edema in legs    Amoxicillin Rash             Medications:     Current Facility-Administered Medications   Medication    acetaminophen (TYLENOL) tablet 500 mg    [Held by provider] isosorbide mononitrate (IMDUR) 24 hr tablet 60 mg    labetalol (NORMODYNE/TRANDATE) injection 10 mg    [Held by provider] lisinopril (ZESTRIL) tablet 2.5 mg    magnesium hydroxide (MILK OF MAGNESIA) suspension 30 mL    melatonin tablet 1 mg    [Held by provider]  metoprolol tartrate (LOPRESSOR) tablet 50 mg    ondansetron (ZOFRAN ODT) ODT tab 4 mg    Or    ondansetron (ZOFRAN) injection 4 mg    pantoprazole (PROTONIX) IV push injection 40 mg    predniSONE (DELTASONE) tablet 5 mg    sodium chloride 0.9% BOLUS 500 mL    sodium chloride 0.9% infusion    tacrolimus (GENERIC EQUIVALENT) capsule 0.5 mg    tacrolimus (GENERIC EQUIVALENT) capsule 1 mg     Current Outpatient Medications   Medication    aspirin 81 MG tablet    Blood Pressure Monitor KIT    calcium-vitamin D (CALTRATE) 600-400 MG-UNIT per tablet    clindamycin (CLEOCIN) 300 MG capsule    isosorbide mononitrate (IMDUR) 60 MG 24 hr tablet    lisinopril (ZESTRIL) 2.5 MG tablet    metoprolol tartrate (LOPRESSOR) 50 MG tablet    Multiple Vitamins-Minerals (PRESERVISION AREDS 2) CAPS    predniSONE (DELTASONE) 5 MG tablet    tacrolimus (GENERIC EQUIVALENT) 0.5 MG capsule               Review of Systems:    ROS: 10 point ROS neg other than the symptoms noted above in the HPI.          Physical Exam:   Blood pressure (!) 153/74, pulse 73, temperature 98.2  F (36.8  C), temperature source Oral, resp. rate 25, height 1.524 m (5'), weight 57.6 kg (127 lb), SpO2 99 %, not currently breastfeeding. Body mass index is 24.8 kg/m .    General: In no acute distress, no facial muscle wasting  Neuro: AOx3, No asterixis  HEENT: PERRL Noscleral icterus, Nooral lesions  Lymph:  Nocervical lymphadenoapthy  CV:  Skin warm and dry  Lungs:  Respirations even and nonlabored on room air  Abd: Nontender, nondistended  Extrem: trace lower peripheral edema  Skin: Nojaundice  Psych: pleasant         Data:     Lab Results   Component Value Date    WBC 5.1 09/28/2023    WBC 4.3 04/06/2021     Lab Results   Component Value Date    RBC 3.18 09/28/2023    RBC 3.93 04/06/2021     Lab Results   Component Value Date    HGB 10.1 09/28/2023    HGB 12.1 04/06/2021     Lab Results   Component Value Date    HCT 28.9 09/28/2023    HCT 35.0 04/06/2021     No  components found for: MCT  Lab Results   Component Value Date    MCV 91 09/28/2023    MCV 89 04/06/2021     Lab Results   Component Value Date    MCH 31.8 09/28/2023    MCH 30.8 04/06/2021     Lab Results   Component Value Date    MCHC 34.9 09/28/2023    MCHC 34.6 04/06/2021     Lab Results   Component Value Date    RDW 13.0 09/28/2023    RDW 13.0 04/06/2021     Lab Results   Component Value Date     09/28/2023     04/06/2021       Last Basic Metabolic Panel:  Lab Results   Component Value Date     09/28/2023     04/22/2021      Lab Results   Component Value Date    POTASSIUM 4.3 09/28/2023    POTASSIUM 3.9 02/17/2023    POTASSIUM 4.6 04/22/2021     Lab Results   Component Value Date    CHLORIDE 89 09/28/2023    CHLORIDE 98 02/17/2023    CHLORIDE 101 04/22/2021     Lab Results   Component Value Date    HAYES 9.3 09/28/2023    HAYES 9.0 04/22/2021     Lab Results   Component Value Date    CO2 22 09/28/2023    CO2 27 02/17/2023    CO2 27 04/22/2021     Lab Results   Component Value Date    BUN 46.3 09/28/2023    BUN 25 02/17/2023    BUN 28 04/22/2021     Lab Results   Component Value Date    CR 1.65 09/28/2023    CR 1.39 04/22/2021     Lab Results   Component Value Date     09/28/2023    GLC 89 02/17/2023    GLC 88 04/22/2021       Liver Function Studies -   Recent Labs   Lab Test 09/28/23  0157   PROTTOTAL 6.3*   ALBUMIN 3.3*   BILITOTAL 0.3   ALKPHOS 95   AST 37   ALT 27       Lab Results   Component Value Date    INR 1.08 09/28/2023           Previous work-up:   Lab Results   Component Value Date    HEPBANG Negative 08/09/2014    HBCAB Negative 12/13/2007    HCVAB Negative 12/13/2007    SUNSHINE 504 (H) 03/17/2008    SUNSHINE Canceled, Test credited 03/17/2008     12/14/2007    TSH 1.81 04/25/2017    CHOL 181 09/19/2023    HDL 83 09/19/2023    LDL 86 09/19/2023    LDL 76 09/19/2023    TRIG 111 09/19/2023      No results found for: SPECDES, LDRESULTS         Previous Endoscopy:     Results  for orders placed or performed during the hospital encounter of 07/02/19   UPPER GI ENDOSCOPY   Result Value Ref Range    Upper GI Endoscopy       [facility], [address] [place] [address] [phone]     Endoscopy Department  _______________________________________________________________________________  Patient Name: Lesli Lorenzana        Procedure Date: 7/2/2019 12:58 PM  MRN: [id number]                       Account Number: [id number]  YOB: 1940               Admit Type: Outpatient  Age: 78                               Room: [facility]  Gender: Female                        Note Status: Finalized  Attending MD: Guru Luu ,     Total Sedation Time:   _______________________________________________________________________________     Procedure:           Upper GI endoscopy  Indications:         Biliary stent removal  Providers:           Jayesh Lomeli, RN  Referring MD:        Tucker Muhammad MD  Medicines:           Midazolam 3 mg IV, Fentanyl 50 micrograms IV, Cetacaine                        spray  Complications:       No immediate complications.  _____________ __________________________________________________________________  Procedure:           Pre-Anesthesia Assessment:                       - Prior to the procedure, a History and Physical was                        performed, and patient medications and allergies were                        reviewed. The patient is competent. The risks and                        benefits of the procedure and the sedation options and                        risks were discussed with the patient. All questions                        were answered and informed consent was obtained. Patient                        identification and proposed procedure were verified by                        the physician and the nurse in the pre-procedure area.                        Mental Status Examination: alert and oriented.  Airway                        Examination: normal oropharyngeal airway and neck                        mobility. Respiratory Examination: clear to                        auscultation. CV Examination:  normal. Prophylactic                        Antibiotics: The patient does not require prophylactic                        antibiotics. Prior Anticoagulants: The patient has taken                        no previous anticoagulant or antiplatelet agents. ASA                        Grade Assessment: II - A patient with mild systemic                        disease. After reviewing the risks and benefits, the                        patient was deemed in satisfactory condition to undergo                        the procedure. The anesthesia plan was to use moderate                        sedation / analgesia (conscious sedation). Immediately                        prior to administration of medications, the patient was                        re-assessed for adequacy to receive sedatives. The heart                        rate, respiratory rate, oxygen saturations, blood                        pressure, adequacy of pulmonary ventilation, and                        response to care were monitore d throughout the                        procedure. The physical status of the patient was                        re-assessed after the procedure.                       After obtaining informed consent, the endoscope was                        passed under direct vision. Throughout the procedure,                        the patient's blood pressure, pulse, and oxygen                        saturations were monitored continuously. The Endoscope                        was introduced through the mouth, and advanced to the                        second part of duodenum.                                                                                   Findings:       No gross lesions were noted in the entire esophagus.       No gross lesions  were noted in the stomach.       A previously placed plastic biliary stent was seen in the ampulla. Stent        removal was accomplished with a snare.                                                                                   Impression:           - Uncomplicated removal of a well-positioned biliary                        stent  Recommendation:      - Observe patient in GI recovery unit for ongoing care.                       - No further ERCP is anticipated unless she has new                        symptoms or worsening LFTs                                                                                     ____________________  Guru Luu,   7/2/2019 1:12:19 PM  I was physically present for the entire viewing portion of the exam.  __________________________  Signature of teaching physician  Keturah/[email or web address] Luu  Number of Addenda: 0    Note Initiated On: 7/2/2019 12:58 PM       Results for orders placed or performed during the hospital encounter of 09/15/17   COLONOSCOPY   Result Value Ref Range    COLONOSCOPY       [facility]  Endoscopy Department-[place]  _______________________________________________________________________________  Patient Name: Lesli Lorenzana        Procedure Date: 9/15/2017 7:11 AM  MRN: [id number]                       YOB: 1940  Admit Type: Outpatient                Age: 77  Gender: Female                        Note Status: Finalized  Attending MD: Dolly Hearn MD        _______________________________________________________________________________     Procedure:                Colonoscopy  Indications:              Surveillance: Personal history of adenomatous                             polyps on last colonoscopy 5 years ago  Providers:                Dolly Hearn MD, Lucy Max RN  Referring MD:             Tucker Muhammad MD  Medicines:                Midazolam 3 mg IV, Fentanyl 150 micrograms  IV  Complications:            No immediate complications.  ____________________________________________________________ ___________________  Procedure:                Pre-Anesthesia Assessment:                            - Prior to the procedure, a History and Physical                             was performed, and patient medications and                             allergies were reviewed. The risks and benefits of                             the procedure and the sedation options and risks                             were discussed with the patient. All questions were                             answered and informed consent was obtained. Patient                             identification and proposed procedure were verified                             by the physician in the pre-procedure area. Mental                             Status Examination: alert and oriented. Airway                             Examination: normal oropharyngeal airway and neck                             mobility. Respiratory Examination: clear to                             auscultation. CV Examination: normal. Prophylactic                              Antibiotics: The patient does not require                             prophylactic antibiotics. Prior Anticoagulants: The                             patient has taken aspirin, last dose was 7 days                             prior to procedure. ASA Grade Assessment: II - A                             patient with mild systemic disease. After reviewing                             the risks and benefits, the patient was deemed in                             satisfactory condition to undergo the procedure.                             The anesthesia plan was to use moderate sedation /                             analgesia (conscious sedation). This assessment was                             completed before the administration of sedation at                             07:30 AM.                             After obtaining informed consent, the colonoscope                             was passed under direct vision. Throughout the                             proced ure, the patient's blood pressure, pulse, and                             oxygen saturations were monitored continuously. The                             Colonoscope was introduced through the anus and                             advanced to the cecum, identified by appendiceal                             orifice and ileocecal valve. The colonoscopy was                             performed without difficulty. The patient tolerated                             the procedure well. The quality of the bowel                             preparation was good.                                                                                   Findings:       The digital rectal exam was normal. Pertinent negatives include no        palpable rectal lesions.       Multiple small and large-mouthed diverticula were found in the sigmoid        colon.       The exam was otherwise without abnormality on direct and retroflexion        views.                                                                                    Moderate Sedation:       Moderate (conscious) sedation was administered by the endoscopy nurse        and supervised by the endoscopist. The following parameters were        monitored: oxygen saturation, heart rate, blood pressure, and response        to care. Total physician intraservice time was 28 minutes.  Impression:               - Diverticulosis in the sigmoid colon.                            - The examination was otherwise normal on direct                             and retroflexion views.                            - No specimens collected.  Recommendation:           - Discharge patient to home.                            - Continue present medications.                                                                                      __________________  Dolly Hearn MD  9/15/2017 8:17:50 AM  I was physically present for the entire viewing portion of the exam.Dolly Hearn MD  Number of Addenda: 0    Note Initiated On: 9/15/2017 7:11 AM  Scope In:   Scope Out:       Results for orders placed or performed during the hospital encounter of 05/29/19   ENDOSCOPIC RETROGRADE CHOLANGIOPANCREATOGRAPHY   Result Value Ref Range    ERCP       [address] [place] [address] [phone]     Endoscopy Department  _______________________________________________________________________________  Patient Name: Lesli Lorenzana        Procedure Date: 5/29/2019 9:38 AM  MRN: [id number]                       Account Number: [id number]  YOB: 1940               Admit Type: Outpatient  Age: 78                                Gender: Female  Note Status: Finalized                Attending MD: Guru Luu ,   Total Sedation Time:                    _______________________________________________________________________________     Procedure:           ERCP  Indications:         Elevated liver enzymes                       78 year old white female with h/o PBC s/p OLT with duct                        to duct anastamosis s/p ERCP for bile duct stone was                        referred by hepatology clinic for recently worsening                        LFTs. ERCP with plans to evaluate  for bile duct stone or                        anastamotic stricture  Providers:           Keena Lomeli, BRANDON  Referring MD:        Tucker Muhammad MD  Medicines:           General Anesthesia, Levaquin 500 mg IV  Complications:       No immediate complications.  _______________________________________________________________________________  Procedure:           Pre-Anesthesia Assessment:                       - Prior to the procedure, a History and Physical was                        performed, and patient  medications and allergies were                        reviewed. The patient is competent. The risks and                        benefits of the procedure and the sedation options and                        risks were discussed with the patient. All questions                        were answered and informed consent was obtained. Patient                        identification and proposed procedure were verified by                        the physician and the nurse in [facility] pre-procedure area.                        Mental Status Examination: alert and oriented. Airway                        Examination: normal oropharyngeal airway and neck                        mobility. Respiratory Examination: clear to                        auscultation. CV Examination: normal. Prophylactic                        Antibiotics: The patient requires prophylactic                        antibiotics for the planned ERCP in an obstructed bile                        duct. The patient received antibiotic therapy before the                        procedure. Prior Anticoagulants: The patient has taken                        no previous anticoagulant or antiplatelet agents. ASA                        Grade Assessment: III - A patient with severe systemic                        disease. After reviewing the risks and benefits, the                        patient was deemed in satisfactory condition to undergo                        the procedure. The anesthesia plan was to use ge neral                        anesthesia. Immediately prior to administration of                        medications, the patient was re-assessed for adequacy to                        receive sedatives. The heart rate, respiratory rate,                        oxygen saturations, blood pressure, adequacy of                        pulmonary ventilation, and response to care were                        monitored throughout the procedure. The physical status                         of the patient was re-assessed after the procedure.                       After obtaining informed consent, the scope was passed                        under direct vision. Throughout the procedure, the                        patient's blood pressure, pulse, and oxygen saturations                        were monitored continuously. The Duodenoscope was                        introduced through the mouth, and used to inject                        contrast into and used to inject contrast into the bile                         duct.                                                                                   Findings:       A  film of the abdomen was obtained. Surgical clips, consistent        with previous liver transplantation and cholecystectomy, were seen in        the area of the right upper quadrant of the abdomen. A biliary        sphincterotomy had been performed. The sphincterotomy appeared stenosed        or narrowed. The bile duct was deeply cannulated with the short-nosed        traction sphincterotome. Contrast was injected. I personally interpreted        the bile duct images. There was brisk flow of contrast through the        ducts. Image quality was excellent. Contrast extended to the entire        biliary tree. The lower third of the main bile duct contained one stone,        which was 10 mm in diameter. A 0.025 inch x 270 cm straight Visiglide        wire was passed into the biliary tree. The biliary sphincterotomy was        extended to a total of 5 mm in length with a  monofilament traction        (standard) sphincterotome using ERBE electrocautery. The sphincterotomy        oozed blood. Dilation of bile duct orifice with a 10 mm balloon dilator        was successful. The biliary tree was swept with a 12 mm balloon starting        at the upper third of the main bile duct. Sludge was swept from the        duct. All stones were removed. One 10 Fr by 5 cm plastic biliary stent         with a single external flap and a single internal flap was placed 5 cm        into the common bile duct. Bile flowed through the stent. The stent was        in good position.                                                                                   Impression:          - Prior biliary sphinctertomy was stenosed but a 10 mm                        stone was seen in the distal bile duct which could                        explain currently elevated liver function test. No                        anastamotic stricture or leak seen                       - Stone was extrac dary with an extension biliary                        sphinctertomy and 10 mm balloon dilation of the biliary                        orifice                       - A 10 Fr by 5 cm Sofflex stent was placed in the CBD  Recommendation:      - Observe patient in same day observation unit for                        ongoing care.                       - Avoid ASA and anti-coagulation for 3 days                       - Confirm spontaneous stent passage by performing a flat                        and upright abdominal x-ray in 4 weeks. If stent is                        present,will need an upper endoscopy under moderate                        conscious sedation in Unit J for biliary stent removal.                       - Check liver enzymes (AST, ALT, alkaline phosphatase,                        bilirubin) at hepatology clinic to confirm resolution                                                                                     ____________________  Guru Luu,   5/29/2019 11:11:07  AM  I was physically present for the entire viewing portion of the exam.  __________________________  Signature of teaching physician  Keturah/[id number] Luu  Number of Addenda: 0    Note Initiated On: 5/29/2019 9:38 AM  Scope In:  Scope Out:           IMAGING:  Results for orders placed during the hospital encounter of 08/09/14     Liver  Transplant    Narrative  Ultrasound liver transplant,  8/9/2014 10:59 PM.    Comparison: 2/3/2008.    History:  Increased LFTs, diarrhea.    Findings:  Trace amount of ascites.    Liver parenchyma demonstrates normal echogenicity, without evidence of  focal hepatic lesion. No intrahepatic biliary dilatation.    Kidneys: Right kidney shows normal echotexture. It measures 10 cm in  greatest length. No shadowing stone, focal mass or hydronephrosis.  Left kidney shows normal echotexture. It measures and cm in greatest  length. No shadowing stone, focal mass or hydronephrosis.    The common bile duct measures up to 7 mm.    Pancreas: The visualized pancreas is normal in appearance.    Spleen: The spleen measures 11 cm, and is otherwise unremarkable.    The visualized bladder is partially distended.    Liver Doppler:  Extrahepatic portal vein: Continuous waveform with flow towards the  liver, 18 cm/sec.  Portal vein at anastomosis: patent continuous flow towards the liver,  53 cm/sec.  Portal vein, intrahepatic: 31 cm/sec.  Right portal vein: flow towards the liver, 23 cm/sec.  Left portal vein: flow towards the liver,  20 cm/sec.  Extrahepatic artery: low resistance waveform with flow towards the  liver. 68/13 cm/sec with resistive index 0.81.  Intrahepatic artery: shows low resistance waveform with flow towards  the liver. 52/9 cm/sec with resistive index 0.83.  Main hepatic artery, anastomosis:  66/13 cm/sec with resistive index  0.80.  Right hepatic artery: 43/7 cm/sec with resistive index 0.83.  Left hepatic artery: 49/14 cm/sec with resistive index 0.72.  Inferior vena cava above the anastomosis: patent with flow toward the  heart, 356 cm/sec, previously at least 200 cm/sec.  Inferior vena cava anastomosis: patent with velocity of 140 cm/sec.  Inferior vena cava intrahepatic, 59 cm/sec.  Inferior vena cava extrahepatic, 13 cm/sec.  Right, mid, left hepatic veins: patent with flow towards the inferior  vena  cava.    IMPRESSION  Impression:  1.  No acute sonographic abnormality involving the transplant liver.  2.  Mildly elevated resistive index in the hepatic artery suggesting  mild underlying increased parenchymal resistance.  3.  Trace amount of ascites.    I have personally reviewed the examination and initial interpretation  and I agree with the findings.    AARON PETERS MD    Results for orders placed during the hospital encounter of 03/15/14    XR Chest Port 1 View    Narrative  Exam: Single view chest, 3/15/2013    History: Chest pain    Comparison: Two-view chest, 1/14/2009    Findings: Cardiac silhouette and pulmonary vasculature are within  normal limits. Focal airspace opacity in the lateral left lower lung  zone. No pneumothorax or right pleural effusion.    IMPRESSION  Impression: Left basilar airspace opacity, atelectasis versus  consolidation. Possible small superimposed pleural effusion on the  left.    I have personally reviewed the examination and initial interpretation  and I agree with the findings.    KARSTEN HUERTAS MD    No results found for this or any previous visit.    No results found for this or any previous visit.    CT Abdomen Pelvis w Contrast    Result Date: 9/28/2023  EXAM: CT ABDOMEN PELVIS W CONTRAST LOCATION: [facility] DATE: 9/28/2023 INDICATION: b l LQ tenderness, rectal bleeding, fall yesterday COMPARISON: None. TECHNIQUE: CT scan of the abdomen and pelvis was performed following injection of IV contrast. Multiplanar reformats were obtained. Dose reduction techniques were used. CONTRAST: iopamidol (ISOVUE 370) solution 78 mL FINDINGS: LOWER CHEST: Normal. HEPATOBILIARY: Probable hepatic steatosis. Cholecystectomy. Mild extrahepatic biliary enhancement which may be seen with cholangitis. PANCREAS: Normal. SPLEEN: Normal. ADRENAL GLANDS: Normal. KIDNEYS/BLADDER: Renal cysts which require no dedicated follow-up. No hydronephrosis.  Unremarkable urinary bladder. BOWEL: Colonic diverticulosis. Normal appendix. Small sliding-type hiatal hernia. Diffuse thickening of the stomach with trace perigastric stranding. Distal esophagus also appears somewhat thickened. No bowel obstruction. LYMPH NODES: No lymphadenopathy. VASCULATURE: Atherosclerotic calcifications of the aortoiliac vessels without evidence of aneurysmal dilatation. PELVIC ORGANS: Normal. MUSCULOSKELETAL: Small left fat-containing ventral hernia. Tiny fat-containing umbilical hernia. Left hip arthroplasty. Severe degenerative changes of the right hip. Multilevel degenerative changes of the thoracolumbar spine. No acute osseous abnormality  or suspicious bony lesion.     IMPRESSION: 1.  Diffuse thickening of the stomach with trace perigastric stranding. Distal esophagus also appears somewhat thickened and there is a small sliding-type hiatal hernia. Findings suggestive of gastritis/esophagitis. 2.  Cholecystectomy. Mild extrahepatic enhancement which may be seen with cholangitis. 3.  No other acute process within the abdomen or pelvis.

## 2023-09-28 NOTE — H&P
[facility]    History and Physical - Medicine Service, [facility] TEAM        Date of Admission:  9/28/2023    Assessment & Plan      Lesli Lorenzana is a 83 year old female admitted on 9/28/2023. 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    #Acute rectal bleed  #Acute anemia  Pt with hx of PBC (s/p liver transplant) otherwise doing well. Has frequent colonoscopy due to hx of PBC. No known hx of IBD. Has had benign polypectomy a while back, otherwise no known concerns in prior colonoscopy. Vitals stable. CT imaging unremarkable for diverticulosis; Hx not suggestive of melena or constipation (pt has regular Bms).   - GI consult  - IV PPI as above  - NPO for procedure  - Hold pta aspirin  - PPI one time dose in the ED: decision to continue per GI/primary  - No indications for octreotide    #Gastritis/esophagitis   Per CT, no hx of heart burn, melena, N, V or abd pain  - CTM  - PPI as above      # Acute on chronic Hyponatremia: Lowest Na = 123 mmol/L in last 2 days, will monitor as appropriate    Unclear why; pt appear normovolemic;   - UA  - Urine osm, urine sodium, s osm    #Mildly elevated creatinine  Base ~1.4; at admission, creatinine is 1.65. Pt appear normovolemic. Except for the rectal bleed, no other sources of fluid loss; appetite and PO intake unremarkable.  - s/p IVF  - monitor   - FeNa, UA    # Hypoalbuminemia: Lowest albumin = 3.3 g/dL at 9/28/2023  1:57 AM, will monitor as appropriate   # Hypertension:    Hold pta isosorbide nitrate, lisinopril and metoprolol given acuity, and possible procedure; PRN IV labetalol for BP>180/110  # Hx of liver transplant (for PBS) - on immunosuppression with prednisone 5mg daily and tacrolimus  0.5mg in the am 1 mg in the pm daily  # hx of CAD: Hold pta isosorbide nitrate, lisinopril and metoprolol given acuity, and  possible procedure    Diet:  NPO for any GI procedure  DVT Prophylaxis: Pneumatic Compression Devices; given rectal bleeding will avoid medications  Coronel Catheter: Not present  Fluids: IVF as above  Lines: None     Cardiac Monitoring: None  Code Status:  FULL      Disposition Plan  Acute medicine     Expected Discharge Date: 09/30/2023                The patient's care will be staffed in the am      MARIA L DAWN MD  Medicine Service, [facility]  Securely message with 2-Observe (more info)  Text page via [facility] Paging/Directory   See signed in provider for up to date coverage information  ______________________________________________________________________    Chief Complaint   Rectal bleeding    History is obtained from the patient    History of Present Illness   Lesli Lorenzana is a 83 year old female who presents with sudden onset of vomiting this morning around 12:30 AM.  Patient had 3 episodes altogether after significant amount of blood each time (about half a cup).  Denies lightheadedness/dizziness/constipation/history of melena/nausea/vomiting/belly ache.  She has not had similar episodes in the past.  No known luminal diseases including hemorrhoids.  She has history of biliary cirrhosis and is on chronic colonoscopy surveillance.  She had polypectomy (benign) a few years ago.  She is not aware of any history of diverticulosis.     ED course: Patient was hemodynamically stable and is on room air.  She had 2 more episodes of bowel movements with blood in them in the ED. CT abdomen unremarkable except for esophagitis.     Past Medical History    Past Medical History:   Diagnosis Date    Anemia     Arthritis     CAD (coronary artery disease) 1/2009    stent    Cholelithiasis     gallbladder removed with liver in 2008    Hallux valgus     History of blood transfusion     HTN (hypertension)     Hyperlipidemia LDL goal < 100     Liver  transplant 2/2008    due to Primary biliary cirrhosis    Osteoarthritis of left hip     Osteopenia     Overweight(278.02)     Palpitations     Spinal stenosis        Past Surgical History   Past Surgical History:   Procedure Laterality Date    ARTHROPLASTY HIP Left 4/11/2017    Procedure: ARTHROPLASTY HIP;  Surgeon: Zhen Armstrong MD;  Location: [facility] OR    BIOPSY      liver     COLONOSCOPY WITH CO2 INSUFFLATION N/A 9/15/2017    Procedure: COLONOSCOPY WITH CO2 INSUFFLATION;  Colonoscopy, History of colonic polyps, Ref by Muhammad, BMI 25, [place] [phone];  Surgeon: Dolly Hearn MD;  Location: [facility] OR    ENDOSCOPIC RETROGRADE CHOLANGIOPANCREATOGRAM N/A 7/17/2017    Procedure: COMBINED ENDOSCOPIC RETROGRADE CHOLANGIOPANCREATOGRAPHY, PLACE TUBE/STENT;  Endoscopic Retrograde Cholangiopancreatogram with bile duct spinchterotomy, ballon sweep of bile duct for stones, dilatation of bile duct;  Surgeon: Narendra Diana MD;  Location: [facility] OR    ENDOSCOPIC RETROGRADE CHOLANGIOPANCREATOGRAM N/A 5/29/2019    Procedure: Endoscopic Retrograde Cholangiopancreatogram with bile duct spinchterotomy, balloon sweep of bile ducts for stone, balloon dilatation ofbileduct and bile duct stent placement;  Surgeon: Guru Harpal Luu MD;  Location: [facility] OR    ESOPHAGOSCOPY, GASTROSCOPY, DUODENOSCOPY (EGD), COMBINED N/A 7/2/2019    Procedure: ESOPHAGOGASTRODUODENOSCOPY, WITH FOREIGN BODY REMOVAL;  Surgeon: Guru Harpal Luu MD;  Location: [facility] GI    GYN SURGERY      hysterectomy    INJECT EPIDURAL LUMBAR N/A 12/3/2021    Procedure: INJECTION, SPINE, LUMBAR, EPIDURAL - L5-S1 interlaminar epidural steroid injection;  Surgeon: Brent Smith MD;  Location: [facility] OR    SURGICAL HISTORY OF -       liver transplant 2008    SURGICAL HISTORY OF -       CAD with stent placement 2009    TRANSPLANT  02/03/2008    Liver transplant     RUST STOMACH SURGERY PROCEDURE UNLISTED         Prior to Admission Medications    Prior to Admission Medications   Prescriptions Last Dose Informant Patient Reported? Taking?   Blood Pressure Monitor KIT   No No   Si each daily Monitor home blood pressure as instructed by physician.  Dispense [facility] blood pressure kit.   Multiple Vitamins-Minerals (PRESERVISION AREDS 2) CAPS   Yes No   aspirin 81 MG tablet  Self No No   Sig: Take 1 tablet by mouth daily.   calcium-vitamin D (CALTRATE) 600-400 MG-UNIT per tablet  Self Yes No   Sig: Take 1 tablet by mouth daily   clindamycin (CLEOCIN) 300 MG capsule   No No   Sig: Take 2 capsules (600 mg) 1 hour before dental work.   isosorbide mononitrate (IMDUR) 60 MG 24 hr tablet   No No   Sig: Take 1 tablet (60 mg) by mouth 2 times daily   lisinopril (ZESTRIL) 2.5 MG tablet   No No   Sig: Take 1 tablet (2.5 mg) by mouth daily .Please keep 23 clinic appt for refills   metoprolol tartrate (LOPRESSOR) 50 MG tablet   No No   Sig: Take 1 tablet (50 mg) by mouth 2 times daily . Follow up as scheduled for further refills.   predniSONE (DELTASONE) 5 MG tablet   No No   Sig: TAKE ONE TABLET BY MOUTH ONCE DAILY   tacrolimus (GENERIC EQUIVALENT) 0.5 MG capsule   No No   Sig: TAKE 1 CAPSULE (0.5 MG) BY MOUTH EVERY MORNING AND 2 CAPSULES (1 MG) EVERY EVENING.      Facility-Administered Medications: None        Physical Exam   Vital Signs: Temp: 97.5  F (36.4  C)   BP: (!) 152/73 Pulse: 72   Resp: 16 SpO2: 99 % O2 Device: None (Room air)    Weight: 127 lbs 0 oz    General: Pt NAD  Neck: Supple  Card: RRR, normal S1 and S2, no murmurs  Lung: Clear; equal BS bilaterally  Abdomen:  soft and nontender, nondistended, no organomegaly, BS+  Neuro: Alert and oriented X 3; grossly nonfocal;   Extremities: No pedal  edema    Data     I have personally reviewed the following data over the past 24 hrs:    5.1  \   10.1 (L)   / 220     123 (L) 89 (L) 46.3 (H) /  106 (H)   4.3 22 1.65 (H) \     ALT: 27 AST: 37 AP: 95 TBILI: 0.3   ALB: 3.3 (L) TOT PROTEIN: 6.3 (L) LIPASE: N/A      INR:  1.08 PTT:  N/A   D-dimer:  N/A Fibrinogen:  N/A       Imaging results reviewed over the past 24 hrs:   No results found for this or any previous visit (from the past 24 hour(s)).

## 2023-09-28 NOTE — ED PROVIDER NOTES
ED Provider Note  [facility]      History     Chief Complaint   Patient presents with    Rectal Bleeding     HPI  Lesli Lorenzana is a 83 year old female who presents to the ED with concern for rectal bleeding.  She states that she woke in the middle night with the urge to have a bowel movement.  When she did, she noticed bright red blood without any stool.  She has had 2 additional episodes of similar bright red blood per rectum.  She denies any rectal pain.  No abdominal pain.  She has never had this before.  She has had regular colonoscopies, last which was 2 years ago.  She once had a benign polyp removed.  Colonoscopies have otherwise been unremarkable.  No blood thinners.  Endorses some generalized weakness/fatigue.  Also had a ground-level fall yesterday when she tripped over her .  Noted to have peripheral neuropathy at baseline which she states has been previously attributed to spinal stenosis.    Past Medical History  Past Medical History:   Diagnosis Date    Anemia     Arthritis     CAD (coronary artery disease) 1/2009    stent    Cholelithiasis     gallbladder removed with liver in 2008    Hallux valgus     History of blood transfusion     HTN (hypertension)     Hyperlipidemia LDL goal < 100     Liver transplant 2/2008    due to Primary biliary cirrhosis    Osteoarthritis of left hip     Osteopenia     Overweight(278.02)     Palpitations     Spinal stenosis      Past Surgical History:   Procedure Laterality Date    ARTHROPLASTY HIP Left 4/11/2017    Procedure: ARTHROPLASTY HIP;  Surgeon: Zhen Armstrong MD;  Location: [facility] OR    BIOPSY      liver     COLONOSCOPY WITH CO2 INSUFFLATION N/A 9/15/2017    Procedure: COLONOSCOPY WITH CO2 INSUFFLATION;  Colonoscopy, History of colonic polyps, Ref by Jb, BMI 25, [place] [phone];  Surgeon: Dolly Hearn MD;  Location: [facility] OR    ENDOSCOPIC RETROGRADE CHOLANGIOPANCREATOGRAM N/A 7/17/2017    Procedure: COMBINED  ENDOSCOPIC RETROGRADE CHOLANGIOPANCREATOGRAPHY, PLACE TUBE/STENT;  Endoscopic Retrograde Cholangiopancreatogram with bile duct spinchterotomy, ballon sweep of bile duct for stones, dilatation of bile duct;  Surgeon: Narendra Diana MD;  Location: [facility] OR    ENDOSCOPIC RETROGRADE CHOLANGIOPANCREATOGRAM N/A 5/29/2019    Procedure: Endoscopic Retrograde Cholangiopancreatogram with bile duct spinchterotomy, balloon sweep of bile ducts for stone, balloon dilatation ofbileduct and bile duct stent placement;  Surgeon: Guru Harpal Luu MD;  Location:  OR    ESOPHAGOSCOPY, GASTROSCOPY, DUODENOSCOPY (EGD), COMBINED N/A 7/2/2019    Procedure: ESOPHAGOGASTRODUODENOSCOPY, WITH FOREIGN BODY REMOVAL;  Surgeon: Guru Harpal Luu MD;  Location: [facility]    GYN SURGERY      hysterectomy    INJECT EPIDURAL LUMBAR N/A 12/3/2021    Procedure: INJECTION, SPINE, LUMBAR, EPIDURAL - L5-S1 interlaminar epidural steroid injection;  Surgeon: Brent Smith MD;  Location:  OR    SURGICAL HISTORY OF -       liver transplant 2008    SURGICAL HISTORY OF -       CAD with stent placement 2009    TRANSPLANT  02/03/2008    Liver transplant     Z STOMACH SURGERY PROCEDURE UNLISTED       aspirin 81 MG tablet  Blood Pressure Monitor KIT  calcium-vitamin D (CALTRATE) 600-400 MG-UNIT per tablet  clindamycin (CLEOCIN) 300 MG capsule  isosorbide mononitrate (IMDUR) 60 MG 24 hr tablet  lisinopril (ZESTRIL) 2.5 MG tablet  metoprolol tartrate (LOPRESSOR) 50 MG tablet  Multiple Vitamins-Minerals (PRESERVISION AREDS 2) CAPS  predniSONE (DELTASONE) 5 MG tablet  tacrolimus (GENERIC EQUIVALENT) 0.5 MG capsule      Allergies   Allergen Reactions    Amlodipine Besylate Swelling     Edema in legs    Amoxicillin Rash     Family History  Family History   Problem Relation Age of Onset    Cancer Father         unknown    C.A.D. Father     Thyroid Disease Father     Cancer Mother         lung--smoker    Other Cancer Mother          lung    Thyroid Disease Mother     Cerebrovascular Disease Maternal Grandmother         ,    Unknown/Adopted Paternal Grandfather      Social History   Social History     Tobacco Use    Smoking status: Former     Packs/day: 0.50     Years: 10.00     Pack years: 5.00     Types: Cigarettes     Quit date: 1975     Years since quittin.8    Smokeless tobacco: Never   Vaping Use    Vaping Use: Never used   Substance Use Topics    Alcohol use: No     Comment: Glass of wine occassionally    Drug use: No     Comment: Never         A medically appropriate review of systems was performed with pertinent positives and negatives noted in the HPI, and all other systems negative.    Physical Exam   BP: (!) 168/80  Pulse: 80  Temp: 97.5  F (36.4  C)  Resp: 16  Height: 152.4 cm (5')  Weight: 57.6 kg (127 lb)  SpO2: 100 %  Physical Exam  Vitals and nursing note reviewed.   Constitutional:       General: She is in acute distress.      Appearance: Normal appearance.   HENT:      Head: Normocephalic.      Nose: Nose normal.   Eyes:      Pupils: Pupils are equal, round, and reactive to light.   Cardiovascular:      Rate and Rhythm: Normal rate and regular rhythm.   Pulmonary:      Effort: Pulmonary effort is normal.   Abdominal:      General: There is no distension.      Palpations: Abdomen is soft.      Tenderness: There is no abdominal tenderness. There is no right CVA tenderness, left CVA tenderness, guarding or rebound.   Genitourinary:     Comments: BRBPR  Musculoskeletal:         General: No deformity. Normal range of motion.      Cervical back: Normal range of motion.   Skin:     General: Skin is warm.   Neurological:      Mental Status: She is alert and oriented to person, place, and time.   Psychiatric:         Mood and Affect: Mood normal.           ED Course, Procedures, & Data           Results for orders placed or performed during the hospital encounter of 23   INR     Status: Normal   Result Value Ref  Range    INR 1.08 0.85 - 1.15   Comprehensive metabolic panel     Status: Abnormal   Result Value Ref Range    Sodium 123 (L) 135 - 145 mmol/L    Potassium 4.3 3.4 - 5.3 mmol/L    Carbon Dioxide (CO2) 22 22 - 29 mmol/L    Anion Gap 12 7 - 15 mmol/L    Urea Nitrogen 46.3 (H) 8.0 - 23.0 mg/dL    Creatinine 1.65 (H) 0.51 - 0.95 mg/dL    GFR Estimate 31 (L) >60 mL/min/1.73m2    Calcium 9.3 8.8 - 10.2 mg/dL    Chloride 89 (L) 98 - 107 mmol/L    Glucose 106 (H) 70 - 99 mg/dL    Alkaline Phosphatase 95 35 - 104 U/L    AST 37 0 - 45 U/L    ALT 27 0 - 50 U/L    Protein Total 6.3 (L) 6.4 - 8.3 g/dL    Albumin 3.3 (L) 3.5 - 5.2 g/dL    Bilirubin Total 0.3 <=1.2 mg/dL   [place] Draw     Status: None (In process)    Narrative    The following orders were created for panel order [place] Draw.  Procedure                               Abnormality         Status                     ---------                               -----------         ------                     Extra Blue Top Tube[[id number]]                                                         Extra Red Top Tube[[id number]]                               In process                 Extra Green Top (Lithium...[[id number]]                                                 Extra Purple Top Tube[[id number]]                                                         Please view results for these tests on the individual orders.   CBC with platelets and differential     Status: Abnormal   Result Value Ref Range    WBC Count 5.1 4.0 - 11.0 10e3/uL    RBC Count 3.18 (L) 3.80 - 5.20 10e6/uL    Hemoglobin 10.1 (L) 11.7 - 15.7 g/dL    Hematocrit 28.9 (L) 35.0 - 47.0 %    MCV 91 78 - 100 fL    MCH 31.8 26.5 - 33.0 pg    MCHC 34.9 31.5 - 36.5 g/dL    RDW 13.0 10.0 - 15.0 %    Platelet Count 220 150 - 450 10e3/uL    % Neutrophils 64 %    % Lymphocytes 23 %    % Monocytes 11 %    % Eosinophils 1 %    % Basophils 0 %    % Immature Granulocytes 1 %    NRBCs per 100 WBC 0 <1 /100    Absolute  Neutrophils 3.2 1.6 - 8.3 10e3/uL    Absolute Lymphocytes 1.2 0.8 - 5.3 10e3/uL    Absolute Monocytes 0.6 0.0 - 1.3 10e3/uL    Absolute Eosinophils 0.0 0.0 - 0.7 10e3/uL    Absolute Basophils 0.0 0.0 - 0.2 10e3/uL    Absolute Immature Granulocytes 0.0 <=0.4 10e3/uL    Absolute NRBCs 0.0 10e3/uL   CBC with Platelets & Differential     Status: Abnormal    Narrative    The following orders were created for panel order CBC with Platelets & Differential.  Procedure                               Abnormality         Status                     ---------                               -----------         ------                     CBC with platelets and d...[[id number]]  Abnormal            Final result                 Please view results for these tests on the individual orders.     Medications   sodium chloride 0.9% BOLUS 500 mL (500 mLs Intravenous $New Bag 9/28/23 0326)   pantoprazole (PROTONIX) IV push injection 40 mg (40 mg Intravenous $Given 9/28/23 0257)   iopamidol (ISOVUE-370) solution 78 mL (78 mLs Intravenous $Given 9/28/23 0316)   sodium chloride (PF) 0.9% PF flush 70 mL (70 mLs Intravenous $Given 9/28/23 0316)     Labs Ordered and Resulted from Time of ED Arrival to Time of ED Departure   COMPREHENSIVE METABOLIC PANEL - Abnormal       Result Value    Sodium 123 (*)     Potassium 4.3      Carbon Dioxide (CO2) 22      Anion Gap 12      Urea Nitrogen 46.3 (*)     Creatinine 1.65 (*)     GFR Estimate 31 (*)     Calcium 9.3      Chloride 89 (*)     Glucose 106 (*)     Alkaline Phosphatase 95      AST 37      ALT 27      Protein Total 6.3 (*)     Albumin 3.3 (*)     Bilirubin Total 0.3     CBC WITH PLATELETS AND DIFFERENTIAL - Abnormal    WBC Count 5.1      RBC Count 3.18 (*)     Hemoglobin 10.1 (*)     Hematocrit 28.9 (*)     MCV 91      MCH 31.8      MCHC 34.9      RDW 13.0      Platelet Count 220      % Neutrophils 64      % Lymphocytes 23      % Monocytes 11      % Eosinophils 1      % Basophils 0      % Immature  Granulocytes 1      NRBCs per 100 WBC 0      Absolute Neutrophils 3.2      Absolute Lymphocytes 1.2      Absolute Monocytes 0.6      Absolute Eosinophils 0.0      Absolute Basophils 0.0      Absolute Immature Granulocytes 0.0      Absolute NRBCs 0.0     INR - Normal    INR 1.08       CT Abdomen Pelvis w Contrast    (Results Pending)          Critical care was not performed.     Medical Decision Making  The patient's presentation was of high complexity (an acute health issue posing potential threat to life or bodily function).    The patient's evaluation involved:  review of 3+ test result(s) ordered prior to this encounter (see separate area of note for details)  ordering and/or review of 3+ test(s) in this encounter (see separate area of note for details)    The patient's management necessitated high risk (a decision regarding hospitalization).    Assessment & Plan    On arrival, patient is slightly hypertensive, otherwise has normal vital signs.  She is distressed due to her symptoms but does not appear acutely toxic.  She has bright red blood per rectum.  She had 1 episode of approximate 100 cc of jonathan blood just prior to my examination.  Her abdomen is soft without focal tenderness.    Labs ordered/reviewed.  Notable for BOZENA with creatinine of 1.65, previous baseline approximately 1.4.  Most recently 1.38 on 9/19/2023.  She was given 500 cc of normal saline.  Sodium decreased at 123 which is fairly consistent with baseline.  Otherwise normal electrolytes.  No transaminitis or bilirubin elevation to suggest hepatobiliary pathology.  CBC without leukocytosis.  Hemoglobin is 10.1.  This represents a drop of 1.8 g when compared to hemoglobin of 11.9 on 9/19/2023.    CT abdomen pelvis with IV contrast is pending.    Due to her persistent hematochezia, patient will need admission to the medicine service.  Will likely need to undergo colonoscopy tomorrow.  Exact etiology is unclear but most likely related to  adenomyosis, diverticulosis, etc.  Exam without evidence of bleeding anal fissure/hemorrhoid.      She continues to be hemodynamically stable in the ED.  Discussed with the triage hospitalist.      I have reviewed the nursing notes. I have reviewed the findings, diagnosis, plan and need for follow up with the patient.    New Prescriptions    No medications on file       Final diagnoses:   Rectal bleeding       Ranjit Villarreal DO  [facility] EMERGENCY DEPARTMENT  9/28/2023     Ranjit Villarreal DO  09/28/23 0330

## 2023-09-29 VITALS
HEART RATE: 90 BPM | BODY MASS INDEX: 24.94 KG/M2 | RESPIRATION RATE: 18 BRPM | TEMPERATURE: 98.1 F | OXYGEN SATURATION: 98 % | WEIGHT: 127 LBS | HEIGHT: 60 IN | DIASTOLIC BLOOD PRESSURE: 55 MMHG | SYSTOLIC BLOOD PRESSURE: 139 MMHG

## 2023-09-29 LAB
ANION GAP SERPL CALCULATED.3IONS-SCNC: 10 MMOL/L (ref 7–15)
BUN SERPL-MCNC: 36.2 MG/DL (ref 8–23)
CALCIUM SERPL-MCNC: 7.8 MG/DL (ref 8.8–10.2)
CHLORIDE SERPL-SCNC: 99 MMOL/L (ref 98–107)
CREAT SERPL-MCNC: 1.42 MG/DL (ref 0.51–0.95)
DEPRECATED HCO3 PLAS-SCNC: 17 MMOL/L (ref 22–29)
EGFRCR SERPLBLD CKD-EPI 2021: 37 ML/MIN/1.73M2
ERYTHROCYTE [DISTWIDTH] IN BLOOD BY AUTOMATED COUNT: 13.9 % (ref 10–15)
GLUCOSE SERPL-MCNC: 79 MG/DL (ref 70–99)
HCT VFR BLD AUTO: 27.6 % (ref 35–47)
HGB BLD-MCNC: 9.8 G/DL (ref 11.7–15.7)
MAGNESIUM SERPL-MCNC: 1.5 MG/DL (ref 1.7–2.3)
MCH RBC QN AUTO: 31.5 PG (ref 26.5–33)
MCHC RBC AUTO-ENTMCNC: 35.5 G/DL (ref 31.5–36.5)
MCV RBC AUTO: 89 FL (ref 78–100)
PLATELET # BLD AUTO: 179 10E3/UL (ref 150–450)
POTASSIUM SERPL-SCNC: 4.2 MMOL/L (ref 3.4–5.3)
RBC # BLD AUTO: 3.11 10E6/UL (ref 3.8–5.2)
SODIUM SERPL-SCNC: 126 MMOL/L (ref 135–145)
TACROLIMUS BLD-MCNC: 4.5 UG/L (ref 5–15)
TME LAST DOSE: ABNORMAL H
TME LAST DOSE: ABNORMAL H
WBC # BLD AUTO: 4.7 10E3/UL (ref 4–11)

## 2023-09-29 PROCEDURE — 99238 HOSP IP/OBS DSCHRG MGMT 30/<: CPT | Mod: GC | Performed by: INTERNAL MEDICINE

## 2023-09-29 PROCEDURE — 250N000012 HC RX MED GY IP 250 OP 636 PS 637

## 2023-09-29 PROCEDURE — 36415 COLL VENOUS BLD VENIPUNCTURE: CPT

## 2023-09-29 PROCEDURE — 83735 ASSAY OF MAGNESIUM: CPT

## 2023-09-29 PROCEDURE — 250N000013 HC RX MED GY IP 250 OP 250 PS 637

## 2023-09-29 PROCEDURE — 80197 ASSAY OF TACROLIMUS: CPT

## 2023-09-29 PROCEDURE — 999N000128 HC STATISTIC PERIPHERAL IV START W/O US GUIDANCE

## 2023-09-29 PROCEDURE — 80048 BASIC METABOLIC PNL TOTAL CA: CPT

## 2023-09-29 PROCEDURE — 99233 SBSQ HOSP IP/OBS HIGH 50: CPT | Performed by: NURSE PRACTITIONER

## 2023-09-29 PROCEDURE — 250N000011 HC RX IP 250 OP 636

## 2023-09-29 PROCEDURE — 85014 HEMATOCRIT: CPT

## 2023-09-29 RX ORDER — ISOSORBIDE MONONITRATE 60 MG/1
60 TABLET, EXTENDED RELEASE ORAL
Status: DISCONTINUED | OUTPATIENT
Start: 2023-09-29 | End: 2023-09-29 | Stop reason: HOSPADM

## 2023-09-29 RX ORDER — SENNA AND DOCUSATE SODIUM 50; 8.6 MG/1; MG/1
1 TABLET, FILM COATED ORAL DAILY PRN
Qty: 30 TABLET | Refills: 0 | Status: SHIPPED | OUTPATIENT
Start: 2023-09-29

## 2023-09-29 RX ORDER — MAGNESIUM SULFATE HEPTAHYDRATE 40 MG/ML
2 INJECTION, SOLUTION INTRAVENOUS ONCE
Status: COMPLETED | OUTPATIENT
Start: 2023-09-29 | End: 2023-09-29

## 2023-09-29 RX ORDER — POLYETHYLENE GLYCOL 3350 17 G/17G
17 POWDER, FOR SOLUTION ORAL DAILY PRN
Qty: 510 G | Refills: 0 | Status: SHIPPED | OUTPATIENT
Start: 2023-09-29

## 2023-09-29 RX ADMIN — TACROLIMUS 0.5 MG: 0.5 CAPSULE ORAL at 08:35

## 2023-09-29 RX ADMIN — MAGNESIUM SULFATE IN WATER 2 G: 40 INJECTION, SOLUTION INTRAVENOUS at 08:35

## 2023-09-29 RX ADMIN — ISOSORBIDE MONONITRATE 60 MG: 60 TABLET, EXTENDED RELEASE ORAL at 08:38

## 2023-09-29 RX ADMIN — METOPROLOL TARTRATE 50 MG: 25 TABLET, FILM COATED ORAL at 08:35

## 2023-09-29 RX ADMIN — PREDNISONE 5 MG: 5 TABLET ORAL at 08:35

## 2023-09-29 RX ADMIN — LISINOPRIL 2.5 MG: 2.5 TABLET ORAL at 08:35

## 2023-09-29 ASSESSMENT — ACTIVITIES OF DAILY LIVING (ADL)
ADLS_ACUITY_SCORE: 28

## 2023-09-29 NOTE — PLAN OF CARE
RN assumed cares at [phone]     Vitals: VSS on room air ex. For HTN. PRN labatelol given for /71.  Pain: denies  IV/Drains: L PIV saline locked.   Neuro: A&Ox4; calm and cooperative.   Respiratory: Lung sounds clear and equal bilaterally. Stable on room air. Pt denies SOB/URIZ. No respiratory distress noted overnight.  Cardiac: HTN   Peripheral neurovascular: numbness and tingling reported in BLE but at baseline per pt - peripheral neuropathy   GI/: Continent of bowel & bladder. Voiding spontaneously. Last BM: 9/28. No BM overnight. Uses bedside commode with hat. Urine had a red dime-sized clot-like thing in hat when emptied. Unknown source - vaginal vs rectal.   Skin: Rigo bilateral feet. Bilateral shin bruising. R thigh bruising from fall 9/27. Bilateral forearm bruising. Blanchable redness on back of neck. Blanchable redness on coccyx - mepi applied and barrier cream applied near rectum due to redness (from wiping per pt).   Labs: hgb: 10.1  Activity: Assist x1 with gb & walker.   Other: takes pills via PO     Events/plan: No BM overnight. Continue to monitor stools.     No acute events overnight. Pt slept between cares. Q2hr rounding completed. Call light within reach and is able to make needs known.    Goal Outcome Evaluation:      Plan of Care Reviewed With: patient    Overall Patient Progress: improvingOverall Patient Progress: improving    Outcome Evaluation: No BM this shift.

## 2023-09-29 NOTE — UTILIZATION REVIEW
"  Admission Status; Secondary Review Determination         Under the authority of the Utilization Management Committee, the utilization review process indicated a secondary review on the above patient.  The review outcome is based on review of the medical records, discussions with staff, and applying clinical experience noted on the date of the review.        (xxx)      Inpatient Status Appropriate - This patient's medical care is consistent with medical management for inpatient care and reasonable inpatient medical practice.      () Observation Status Appropriate - This patient does not meet hospital inpatient criteria and is placed in observation status. If this patient's primary payer is Medicare and was admitted as an inpatient, Condition Code 44 should be used and patient status changed to \"observation\".   () Admission Status NOT Appropriate - This patient's medical care is not consistent with medical management for Inpatient or Observation Status.          RATIONALE FOR DETERMINATION     Lesli Lorenzana is an 84 yo female with s/p liver transplant (on immunosuppression), CKD, ACR, spinal stenosis, and HTN who was admitted 9/28/2023 for acute onset rectal bleeding.  Her baseline hemoglobin is 12 and on admission was 10.1 and then dropped to 7 about 3 hours after admission.  Hypertensive on admission but had hypotension to 101/51 noted.  She was transfused 1 unit pRBC and subsequent hgb 10.1 and 9.8.  Also noted to have hyponatremia on admission (123).  She is admitted for close clinical monitoring, IVF, IV PPI, transfusion, GI consultation and further evaluation.  She had flex sig which showed prior bleeding near sigmoid at diverticulum but no active bleeding or diverticulitis.  The severity of illness, acute drop in hemoglobin (12 to 7), hypotension, and underlying medical issues as well as severe hyponatremia (123) support IP status.  IP status appropriate.    The severity of illness, intensity of service " provided, expected LOS and risk for adverse outcome make the care complex, high risk and appropriate for hospital admission.        The information on this document is developed by the utilization review team in order for the business office to ensure compliance.  This only denotes the appropriateness of proper admission status and does not reflect the quality of care rendered.         The definitions of Inpatient Status and Observation Status used in making the determination above are those provided in the CMS Coverage Manual, Chapter 1 and Chapter 6, section 70.4.      Sincerely,     Deb Flowers MD  Physician Advisor   Utilization Review/ Case Management  [facility].

## 2023-09-29 NOTE — PLAN OF CARE
Assumed cares [phone]     /55 (BP Location: Right arm)   Pulse 90   Temp 98.1  F (36.7  C) (Oral)   Resp 18   Ht 1.524 m (5')   Wt 57.6 kg (127 lb)   SpO2 98%   BMI 24.80 kg/m       Pain: Patient denied pain.   Neuro: A&Ox4.    Respiratory: Lungs clear and equal, on RA.   Cardiac/Neurovascular: HR and pulse WDL. Baseline numbness/tingling in LE.   GI/: Adequate urine output. NO bloody stool reported today.   Nutrition: Regular.   Activity: SBA with walker.   Skin: No concerns.   Lines: PIV removed prior to discharge.   Events this shift: Magnesium was replaced this morning. Patient able to meet criteria for discharge. AVS was printed and given to patient. All questions were answered. Family able to provide transportation home.      Plan: Discharging home this afternoon.     Goal Outcome Evaluation:      Plan of Care Reviewed With: patient    Overall Patient Progress: improvingOverall Patient Progress: improving    Outcome Evaluation: Discharging this afternoon.

## 2023-09-29 NOTE — SUMMARY OF CARE
Reason for admission: Rectal bleeding  Admitted from:  ED  Report received from: Lobito Medeiros RN skin assessment completed by: Hortensia RN and Gisele RN      - Findings (add LDA if needed): Rigo bilateral feet. Bilateral shin bruising. R thigh bruising from fall 9/27. Bilateral forearm bruising. Blanchable redness on back of neck. Blanchable redness on coccyx - mepi applied and barrier cream applied near rectum due to redness (from wiping per pt).  Bed algorithm reevaluated: yes  Was Pulsate ordered?: no  Care plan (primary problem) and education initiated: yes  MDRO education done if applicable: n/a  Pt informed about policy regarding no IV pumps off unit: yes  Flu shot ordered? (October-April only): n/a  Detailed Belongings: glasses. Cell phone.

## 2023-09-29 NOTE — PROGRESS NOTES
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 Her hemoglobin dropped from 11 to 7.0.      RECOMMENDATIONS:  # Diverticular bleeding  - flex sig/colon 9/28 without active bleeding. Blood in the sigmoid likely from diverticular source.   - supportive care and hemoglobin improved to 9.8. Advanced diet as tolerated.   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 She has had continued hypertension while inpatient. Has been on long term therapy for htn. With current proteinuria and immunosuppression, recommended a follow up with nephrology.   - liver tests wnl.   - continue tacrolimus 0.5 mg am and 1 mg pm (clarified home dose further with her)  - continue prednisone 5 mg daily.     Patient was discussed with attending physician, Dr. Heather Valverde.  The above note reflects our joint plan.     Agree with plans for discharge today.     Thank you for the opportunity to be involved with  Lesli Lorenzana care. Please call with any questions or concerns.    RUSH Tarango, CNP  Inpatient Hepatology [facility]        SUBJECTIVE:  Feeling well, no dizziness. Has not had BM overnight. Has appetite this morning. No abdominal pain.     ROS:  A 10-point review of systems was negative.    OBJECTIVE:  VS: /55 (BP Location: Right arm)   Pulse 90   Temp 98.1  F (36.7  C) (Oral)   Resp 18   Ht 1.524 m (5')   Wt 57.6 kg (127 lb)   SpO2 98%   BMI 24.80 kg/m     Date 09/29/23 0700 - 09/30/23 0659   Shift 8423-4855 [id number] 7825-6941 24 Hour Total   INTAKE   P.O. 400   400   Shift Total(mL/kg) 400(6.94)   400(6.94)   OUTPUT    Shift Total(mL/kg)       Weight (kg) 57.61 57.61 57.61 57.61      General: In no acute distress, no facial muscle wasting  Neuro: AOx3, No asterixis  HEENT:  Noscleral icterus, Nooral lesions  CV: . Skin warm and dry  Lungs:  Respirations even and nonlabored on room air  Abd:  Nondistended, nontender   Extrem: Noperipheral edema   Skin: Nojaundice  Psych: pleasant    MEDICATIONS:  Current Facility-Administered Medications   Medication    acetaminophen (TYLENOL) tablet 500 mg    fentaNYL (PF) (SUBLIMAZE) injection    fentaNYL (PF) (SUBLIMAZE) injection    fentaNYL (PF) (SUBLIMAZE) injection    isosorbide mononitrate (IMDUR) 24 hr tablet 60 mg    labetalol (NORMODYNE/TRANDATE) injection 10 mg    lisinopril (ZESTRIL) tablet 2.5 mg    magnesium hydroxide (MILK OF MAGNESIA) suspension 30 mL    melatonin tablet 1 mg    metoprolol tartrate (LOPRESSOR) tablet 50 mg    midazolam (VERSED) injection    midazolam (VERSED) injection    midazolam (VERSED) injection    ondansetron (ZOFRAN ODT) ODT tab 4 mg    Or    ondansetron (ZOFRAN) injection 4 mg    predniSONE (DELTASONE) tablet 5 mg    tacrolimus (GENERIC EQUIVALENT) capsule 0.5 mg    tacrolimus (GENERIC EQUIVALENT) capsule 1 mg       REVIEW OF LABORATORY, PATHOLOGY AND IMAGING RESULTS:  BMP  Recent Labs   Lab 09/29/23  0630 09/28/23  1423 09/28/23  0902 09/28/23  0157   * 126* 126* 123*   POTASSIUM 4.2  --  4.6 4.3   CHLORIDE 99  --  97* 89*   HAYES 7.8*  --  8.0* 9.3   CO2 17*  --  19* 22   BUN 36.2*  --  42.6* 46.3*   CR 1.42*  --  1.51* 1.65*   GLC 79  --  91 106*     CBC  Recent Labs   Lab 09/29/23  0630 09/28/23  0709 09/28/23  0157   WBC 4.7  --  5.1   RBC 3.11*  --  3.18*   HGB 9.8*   < > 10.1*   HCT 27.6*  --  28.9*   MCV 89  --  91   MCH 31.5  --  31.8   MCHC 35.5  --  34.9   RDW 13.9  --  13.0     --  220    < > = values in this interval not displayed.     INR  Recent Labs   Lab 09/28/23  0709 09/28/23  0157   INR 1.19* 1.08     LFTs  Recent Labs   Lab  09/28/23  0157   ALKPHOS 95   AST 37   ALT 27   BILITOTAL 0.3   PROTTOTAL 6.3*   ALBUMIN 3.3*        Previous work-up:   Lab Results   Component Value Date    HEPBANG Negative 08/09/2014    HBCAB Negative 12/13/2007    HCVAB Negative 12/13/2007    SUNSHINE 504 (H) 03/17/2008    SUSNHINE Canceled, Test credited 03/17/2008     12/14/2007    TSH 1.81 04/25/2017    CHOL 181 09/19/2023    HDL 83 09/19/2023    LDL 86 09/19/2023    LDL 76 09/19/2023    TRIG 111 09/19/2023      No results found for: SPECDES, LDRESULTS      Imaging Results:    None today

## 2023-09-29 NOTE — DISCHARGE SUMMARY
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, present on admission    Follow-up Planning:     Medication changes:   Holding Aspirin 81mg qday, defer to pcp follow-up for if/when to restart  Provided prn scripts for Miralax and Senna as needed for constipation and/or straining for BM in the future  Follow-up appointments:   Recommended pcp follow-up in 1-2 weeks  Liver transplant coordinator will reach out to pt in ~1 week  Pending diagnostics:   Tacro level drawn 9/29 pending at time of discharge, transplant  will follow up as above  Other: Pt hoping to get fitted for rollator in outpt setting, defer to pcp follow-up    Follow-up Appointments     Adult [facility] Follow-up and recommended labs and tests      Follow up with primary care provider, Dee Chiu, within 1-2   weeks, for hospital follow- up. The following labs/tests are recommended:   Repeat Hemoglobin.     Appointments on [place] and/or [facility] (with [facility] or [facility]   provider or service). Call [phone] if you haven't heard regarding   these appointments within 7 days of discharge.           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  Please see H&P from 9/28/2023 for full details of presentation. The following problems were addressed during hospitalization:    BRBPR ultimately concerning for diverticular bleed  Acute on chronic normocytic anemia  Presented with rather acute onset (<6hrs) of BRBPR with bowel movements, first-time occurrence, absence of additional symptoms. Hgb ~10 on admission, slightly below baseline of ~11-13. Ultimately downtrended to ~7 with possibly some positional dizziness, resolved w/ 1u pRBC's transfused. Hemodynamically stable throughout. Hepatology was consulted, performed flex sig (and were able to visualize through to the terminal ileum without bowel prep), no active bleeding but feel likely this was a resolving diverticular bleed. Post-procedurally, no further bleeding. Hemoglobin stabilized in ~10's range (suspect the low of ~7 was not entirely accurate given only transfused 1u pRBC's total). Given hemodynamic stability, stability of Hgb, and resolution of symptoms, was determined safe for discharge home. Did  on use of regular fiber supplementation and having prn stool softeners on hand to reduce straining or constipation to reduce risk of recurrent diverticular bleed, though acknowledging that these can be unpredictable and there is always a risk of recurrence. Counseled on symptoms which would warrant return to the ER for close reevaluation. Was advised to hold her PTA Aspirin. Close pcp follow-up in 1-2 weeks is recommended to recheck Hgb, advise on if/when to restart aspirin, and to consider whether or not may benefit from iron supplementation moving forward.    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 No changes made to chronic immunosuppression regimen this hospital stay. Tacro level pending 9/29 at time of discharge.  Transplant coordinator will touch base with pt in the next week to check in on symptoms, coordinate follow-up, and relay any new immunosuppression changes based on the 9/29 level when results.    Acute on chronic hyponatremia  Nml serum osms. Urine studies not collected while inpt, review of prior w/ elevated Starla and Uosms. Euvolemic throughout admission. Na was 123 on admission, w/ restarting diet improved to 126 and stable on serial checks. This is in line with her baseline for the past year.    Chronic hypomagnesemia - perhaps secondary to Tacro, not on supplementation, repleted w/ IV while inpt but may benefit from recheck as outpt    Mechanical fall prior to admission - sounds like this was purely mechanical and not related to any symptoms and preceded bleeding onset. No head injury. Ambulates with walker, she was wondering if she could be fitted for a rollator at some point. Encouraged to bring this up with pcp on follow-up.    Otherwise no additional changes made to chronic medication regimen    Clinically-significant risk factors on discharge:  # Hyponatremia: Lowest Na = 123 mmol/L in last 2 days, will monitor as appropriate  # Hypocalcemia: Lowest Ca = 7.8 mg/dL in last 2 days, will monitor and replace as appropriate   # Hypomagnesemia: Lowest Mg = 1.5 mg/dL in last 2 days, will replace as needed   # Hypoalbuminemia: Lowest albumin = 3.3 g/dL at 9/28/2023  1:57 AM, will monitor as appropriate  # Coagulation Defect: INR = 1.19 (Ref range: 0.85 - 1.15) and/or PTT = N/A, will monitor for bleeding    # Hypertension: Noted on problem list                Discharge Disposition   Discharged to home  Condition at discharge: Good    Code Status on Discharge   Full Code      The patient was discussed on day of discharge with Dr. Espinoza.    Denton Jimenez MD    Internal Medicine PGY-3  [place] [facility]    Pager #[phone]    [employer] Messaging Enabled  (Please see sign-in/sign-out for up-to-date physician coverage  information)      Discharge Physical Exam   Vital Signs: /55 (BP Location: Right arm)   Pulse 90   Temp 98.1  F (36.7  C) (Oral)   Resp 18   Ht 1.524 m (5')   Wt 57.6 kg (127 lb)   SpO2 98%   BMI 24.80 kg/m    Weight: 127 lbs 0 oz  General: Laying back in bed, pleasantly conversational, no acute distress  Resp: No increased WOB on room air, CTA b/l  CV: RRR  Abd: Soft, NT/ND  Neuro: Awake, alert, appropriately converstional, moving all four extremities against gravity    Significant Results and Procedures     Results for orders placed or performed during the hospital encounter of 09/28/23   CT Abdomen Pelvis w Contrast    Narrative    EXAM: CT ABDOMEN PELVIS W CONTRAST  LOCATION: [facility]  DATE: 9/28/2023    INDICATION: b l LQ tenderness, rectal bleeding, fall yesterday  COMPARISON: None.  TECHNIQUE: CT scan of the abdomen and pelvis was performed following injection of IV contrast. Multiplanar reformats were obtained. Dose reduction techniques were used.  CONTRAST: iopamidol (ISOVUE 370) solution 78 mL    FINDINGS:   LOWER CHEST: Normal.    HEPATOBILIARY: Probable hepatic steatosis. Cholecystectomy. Mild extrahepatic biliary enhancement which may be seen with cholangitis.    PANCREAS: Normal.    SPLEEN: Normal.    ADRENAL GLANDS: Normal.    KIDNEYS/BLADDER: Renal cysts which require no dedicated follow-up. No hydronephrosis. Unremarkable urinary bladder.    BOWEL: Colonic diverticulosis. Normal appendix. Small sliding-type hiatal hernia. Diffuse thickening of the stomach with trace perigastric stranding. Distal esophagus also appears somewhat thickened. No bowel obstruction.    LYMPH NODES: No lymphadenopathy.    VASCULATURE: Atherosclerotic calcifications of the aortoiliac vessels without evidence of aneurysmal dilatation.    PELVIC ORGANS: Normal.    MUSCULOSKELETAL: Small left fat-containing ventral hernia. Tiny fat-containing umbilical hernia. Left  hip arthroplasty. Severe degenerative changes of the right hip. Multilevel degenerative changes of the thoracolumbar spine. No acute osseous abnormality   or suspicious bony lesion.      Impression    IMPRESSION:   1.  Diffuse thickening of the stomach with trace perigastric stranding. Distal esophagus also appears somewhat thickened and there is a small sliding-type hiatal hernia. Findings suggestive of gastritis/esophagitis.  2.  Cholecystectomy. Mild extrahepatic enhancement which may be seen with cholangitis.  3.  No other acute process within the abdomen or pelvis.     *Note: Due to a large number of results and/or encounters for the requested time period, some results have not been displayed. A complete set of results can be found in Results Review.       Consultations this Admission   GI LUMINAL ADULT IP CONSULT  GI HEPATOLOGY ADULT IP CONSULT  PHYSICAL THERAPY ADULT IP CONSULT  NURSING TO CONSULT FOR VASCULAR ACCESS CARE IP CONSULT    Discharge Orders        Reason for your hospital stay    You were briefly admitted to the hospital for blood in your stool. We performed a colonoscopy and while we did not find any areas of active bleeding, we have high suspicion that your bleeding was likely from a pocket in your colon called diverticulosis. These types of bleeds are difficult to do anything about and often stop on their own, as yours did in the hospital. We gave you a blood transfusion when your blood counts were low, but all of your other blood count tests looked much better following. Below are some tips to help keep you healthy as you return home to heal:    Medications:  1. Stop your Aspirin until cleared to restart by your primary care doctor  2. Otherwise continue taking all other medications as you were prior to coming into the hospital  3. I have provided you with some stool softeners you can have at home to start taking anytime you feel you may be straining to have a bowel movement. This will help  reduce risk of bleeding in some of those pockets in the colon (diverticulosis).    Follow-up:  1. We recommend following up with your primary care doctor within 1-2 weeks to recheck your blood levels, discuss when/if to restart your aspirin, and see if you need any other medications for your blood counts such as iron.  2. Continue to follow up with your kidney team as you always have been. It sounds like your transplant coordinator will give you a call this next week to check in with you.    If you have any recurrent bleeding in your stool that is persistent or new light-headedness, dizziness, passing out, trouble breathing, you should return to the emergency department for reevaluation.     Activity    Your activity upon discharge: activity as tolerated     Adult [facility] Follow-up and recommended labs and tests    Follow up with primary care provider, Dee Chiu, within 1-2 weeks, for hospital follow- up. The following labs/tests are recommended: Repeat Hemoglobin.     Appointments on [place] and/or [facility] (with [facility] or [facility] provider or service). Call [phone] if you haven't heard regarding these appointments within 7 days of discharge.     Diet    Follow this diet upon discharge: Regular diet       Discharge Medications     Current Discharge Medication List        START taking these medications    Details   polyethylene glycol (MIRALAX) 17 GM/Dose powder Take 17 g by mouth daily as needed for constipation  Qty: 510 g, Refills: 0    Associated Diagnoses: Diverticulosis of large intestine with hemorrhage      SENNA-docusate sodium (SENNA S) 8.6-50 MG tablet Take 1 tablet by mouth daily as needed (Constipation)  Qty: 30 tablet, Refills: 0    Associated Diagnoses: Diverticulosis of large intestine with hemorrhage           CONTINUE these medications which have NOT CHANGED    Details   Blood Pressure Monitor KIT 1 each daily Monitor home blood pressure as instructed by physician.  Dispense  [facility] blood pressure kit.  Qty: 1 kit, Refills: 0    Associated Diagnoses: Essential hypertension with goal blood pressure less than 140/90      calcium-vitamin D (CALTRATE) 600-400 MG-UNIT per tablet Take 1 tablet by mouth daily      clindamycin (CLEOCIN) 300 MG capsule Take 2 capsules (600 mg) 1 hour before dental work.  Qty: 2 capsule, Refills: 3    Associated Diagnoses: S/P joint replacement      isosorbide mononitrate (IMDUR) 60 MG 24 hr tablet Take 1 tablet (60 mg) by mouth 2 times daily  Qty: 180 tablet, Refills: 3    Associated Diagnoses: Coronary artery disease involving native heart without angina pectoris, unspecified vessel or lesion type; Essential hypertension with goal blood pressure less than 140/90; Coronary artery disease due to lipid rich plaque; Secondary hypertension with goal blood pressure less than 140/90      lisinopril (ZESTRIL) 2.5 MG tablet Take 1 tablet (2.5 mg) by mouth daily .Please keep 6-12-23 clinic appt for refills  Qty: 90 tablet, Refills: 3    Associated Diagnoses: Coronary artery disease involving native heart without angina pectoris, unspecified vessel or lesion type; Essential hypertension with goal blood pressure less than 140/90      metoprolol tartrate (LOPRESSOR) 50 MG tablet Take 1 tablet (50 mg) by mouth 2 times daily . Follow up as scheduled for further refills.  Qty: 180 tablet, Refills: 3    Associated Diagnoses: Essential hypertension with goal blood pressure less than 140/90; Coronary artery disease involving native heart with angina pectoris, unspecified vessel or lesion type (H24)      Multiple Vitamins-Minerals (PRESERVISION AREDS 2) CAPS       predniSONE (DELTASONE) 5 MG tablet TAKE ONE TABLET BY MOUTH ONCE DAILY  Qty: 90 tablet, Refills: 0    Comments: Pt Needs an appt  Associated Diagnoses: Liver transplant rejection (H); Liver transplanted (H)      tacrolimus (GENERIC EQUIVALENT) 0.5 MG capsule TAKE 1 CAPSULE (0.5 MG) BY MOUTH EVERY MORNING AND 2 CAPSULES (1  MG) EVERY EVENING.  Qty: 270 capsule, Refills: 3    Associated Diagnoses: Liver replaced by transplant (H)           STOP taking these medications       aspirin 81 MG tablet Comments:   Reason for Stopping:                Primary Care Physician   Dee Chiu

## 2023-09-29 NOTE — PROGRESS NOTES
Shift: 5416-9416     VS: hypertensive  Pain: denies pain  Neuro: WDL  Cardiac: WDL  Respiratory:WDL  Diet/Appetite: cleared for clear liquid diet. Tolerating oral intake well  GI/: mild abdominal discomfort  LDAs: L PIV SL  Skin: opal discoloration of lower legs (chronic neuropathy)  Activity: ambulates independently at baseline. Generalized weakness with assistx1  Test/Procedures: colonoscopy and blood transfusion  Labs: hemoglobin was 7 this morning.     Shift Summary: Pt admitted for rectal bleeding and complaint of feeling weak. Pt received one unit of blood this afternoon and she tolerated it well. Pt has had a 4-5 bowel movements today that are semi-formed that were brown with dark red streaks. Pt is A&Ox4. Pt has been hypertensive due to BP meds being held.

## 2023-10-09 ENCOUNTER — OFFICE VISIT (OUTPATIENT)
Dept: FAMILY MEDICINE | Facility: CLINIC | Age: 83
End: 2023-10-09
Payer: COMMERCIAL

## 2023-10-09 VITALS
WEIGHT: 123.6 LBS | RESPIRATION RATE: 12 BRPM | SYSTOLIC BLOOD PRESSURE: 150 MMHG | BODY MASS INDEX: 24.26 KG/M2 | DIASTOLIC BLOOD PRESSURE: 74 MMHG | TEMPERATURE: 97.6 F | HEART RATE: 76 BPM | HEIGHT: 60 IN | OXYGEN SATURATION: 97 %

## 2023-10-09 DIAGNOSIS — G62.9 NEUROPATHY: ICD-10-CM

## 2023-10-09 DIAGNOSIS — K20.90 ESOPHAGITIS: ICD-10-CM

## 2023-10-09 DIAGNOSIS — D62 ANEMIA DUE TO BLOOD LOSS, ACUTE: ICD-10-CM

## 2023-10-09 DIAGNOSIS — K57.91 GASTROINTESTINAL HEMORRHAGE ASSOCIATED WITH INTESTINAL DIVERTICULOSIS: Primary | ICD-10-CM

## 2023-10-09 DIAGNOSIS — E83.42 HYPOMAGNESEMIA: ICD-10-CM

## 2023-10-09 DIAGNOSIS — E87.1 HYPONATREMIA: ICD-10-CM

## 2023-10-09 LAB
ERYTHROCYTE [DISTWIDTH] IN BLOOD BY AUTOMATED COUNT: 13.6 % (ref 10–15)
HCT VFR BLD AUTO: 32.6 % (ref 35–47)
HGB BLD-MCNC: 11.3 G/DL (ref 11.7–15.7)
MCH RBC QN AUTO: 31.5 PG (ref 26.5–33)
MCHC RBC AUTO-ENTMCNC: 34.7 G/DL (ref 31.5–36.5)
MCV RBC AUTO: 91 FL (ref 78–100)
PLATELET # BLD AUTO: 240 10E3/UL (ref 150–450)
RBC # BLD AUTO: 3.59 10E6/UL (ref 3.8–5.2)
WBC # BLD AUTO: 5.1 10E3/UL (ref 4–11)

## 2023-10-09 PROCEDURE — 83735 ASSAY OF MAGNESIUM: CPT | Performed by: FAMILY MEDICINE

## 2023-10-09 PROCEDURE — 82746 ASSAY OF FOLIC ACID SERUM: CPT | Performed by: FAMILY MEDICINE

## 2023-10-09 PROCEDURE — 85027 COMPLETE CBC AUTOMATED: CPT | Performed by: FAMILY MEDICINE

## 2023-10-09 PROCEDURE — 80053 COMPREHEN METABOLIC PANEL: CPT | Performed by: FAMILY MEDICINE

## 2023-10-09 PROCEDURE — 36415 COLL VENOUS BLD VENIPUNCTURE: CPT | Performed by: FAMILY MEDICINE

## 2023-10-09 PROCEDURE — 82607 VITAMIN B-12: CPT | Performed by: FAMILY MEDICINE

## 2023-10-09 PROCEDURE — 99214 OFFICE O/P EST MOD 30 MIN: CPT | Performed by: FAMILY MEDICINE

## 2023-10-09 ASSESSMENT — PAIN SCALES - GENERAL: PAINLEVEL: NO PAIN (0)

## 2023-10-09 NOTE — PROGRESS NOTES
Assessment & Plan     Gastrointestinal hemorrhage associated with intestinal diverticulosis  Recheck hgb today. No further bleeding since discharge  - **CBC with platelets FUTURE 2mo; Future  - Adult GI  Referral - Consult Only; Future  - **CBC with platelets FUTURE 2mo    Anemia due to blood loss, acute  recheck  - **CBC with platelets FUTURE 2mo; Future  - **CBC with platelets FUTURE 2mo    Hyponatremia  Recheck, probably from the tacrolimun  - **Comprehensive metabolic panel FUTURE 2mo; Future  - **Comprehensive metabolic panel FUTURE 2mo    Hypomagnesemia  same  - Magnesium; Future  - Magnesium    Neuropathy  Check labs, does not want any medication at this time  - **Vitamin B12 FUTURE 2mo; Future  - **Folate FUTURE 2mo; Future  - **Vitamin B12 FUTURE 2mo  - **Folate FUTURE 2mo    Esophagitis  Refer to GI given findings on CT scan  - Adult GI  Referral - Consult Only; Future           MED REC REQUIRED  Post Medication Reconciliation Status: discharge medications reconciled and changed, per note/orders      Dee Chiu MD  [facility]    Nadeen Ballesteros is a 83 year old, presenting for the following health issues:  Hospital F/U      [facility]     Hospital Follow-up Visit:    Hospital/Nursing Home/IP Rehab Facility: [facility]  Date of Admission: 09/28/2023  Date of Discharge: 09/29/2023  Reason(s) for Admission: Lower GI bleed, likely diverticular     Was your hospitalization related to COVID-19? No   Problems taking medications regularly:  None  Medication changes since discharge: None  Problems adhering to non-medication therapy:  None    Summary of hospitalization:  [facility] discharge summary reviewed  Diagnostic Tests/Treatments reviewed.  Follow up needed: labwork  Other Healthcare Providers Involved in Patient s Care:          transplant team , needs to follow-up with GI  Update since  discharge: improved.   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  Please see H&P from 9/28/2023 for full details of presentation. The following problems were addressed during hospitalization:     BRBPR ultimately concerning for diverticular bleed  Acute on chronic normocytic anemia  Presented with rather acute onset (<6hrs) of BRBPR with bowel movements, first-time occurrence, absence of additional symptoms. Hgb ~10 on admission, slightly below baseline of ~11-13. Ultimately downtrended to ~7 with possibly some positional dizziness, resolved w/ 1u pRBC's transfused. Hemodynamically stable throughout. Hepatology was consulted, performed flex sig (and were able to visualize through to the terminal ileum without bowel prep), no active bleeding but feel likely this was a resolving diverticular bleed. Post-procedurally, no further bleeding. Hemoglobin stabilized in ~10's range (suspect the low of ~7 was not entirely accurate given only transfused 1u pRBC's total). Given hemodynamic stability, stability of Hgb, and resolution of symptoms, was determined safe for discharge home. Did  on use of regular fiber supplementation and having prn stool softeners on hand to reduce straining or constipation to reduce risk of recurrent diverticular bleed, though acknowledging that these can be unpredictable and there is always a risk of recurrence. Counseled on symptoms which would warrant return to the ER for close reevaluation. Was advised to hold her PTA Aspirin. Close pcp follow-up in 1-2 weeks is recommended to recheck Hgb, advise on if/when to restart aspirin, and to consider whether or not may benefit from iron supplementation moving forward.     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 No changes made to chronic  immunosuppression regimen this hospital stay. Tacro level pending 9/29 at time of discharge. Transplant coordinator will touch base with pt in the next week to check in on symptoms, coordinate follow-up, and relay any new immunosuppression changes based on the 9/29 level when results.     Acute on chronic hyponatremia  Nml serum osms. Urine studies not collected while inpt, review of prior w/ elevated Starla and Uosms. Euvolemic throughout admission. Na was 123 on admission, w/ restarting diet improved to 126 and stable on serial checks. This is in line with her baseline for the past year.     Chronic hypomagnesemia - perhaps secondary to Tacro, not on supplementation, repleted w/ IV while inpt but may benefit from recheck as outpt     Mechanical fall prior to admission - sounds like this was purely mechanical and not related to any symptoms and preceded bleeding onset. No head injury. Ambulates with walker, she was wondering if she could be fitted for a rollator at some point. Encouraged to bring this up with pcp on follow-up.    Pt feels well at this time  No further rectal bleeding  Needs follow-up with GI since she also had abnormal CT scan of abdomen and esophagus which suggested inflammation.     She needs follow-up with her transplant team as well     Pt with neuropathy described as pins and needles and burning and numbness  This started about a year ago  She does not want any medication for it at this time    Plan of care communicated with patient and family    Review of Systems   Constitutional, HEENT, cardiovascular, pulmonary, gi and gu systems are negative, except as otherwise noted.      Objective    BP (!) 150/74   Pulse 76   Temp 97.6  F (36.4  C) (Oral)   Resp 12   Ht 1.524 m (5')   Wt 56.1 kg (123 lb 9.6 oz)   SpO2 97%   BMI 24.14 kg/m    Body mass index is 24.14 kg/m .  Physical Exam   GENERAL: healthy, alert and no distress  RESP: lungs clear to auscultation - no rales, rhonchi or wheezes  CV:  regular rate and rhythm, normal S1 S2, no S3 or S4, no murmur, click or rub, no peripheral edema and peripheral pulses strong  ABDOMEN: soft, nontender, no hepatosplenomegaly, no masses and bowel sounds normal    Results for orders placed or performed in visit on 10/09/23 (from the past 24 hour(s))   **CBC with platelets FUTURE 2mo   Result Value Ref Range    WBC Count 5.1 4.0 - 11.0 10e3/uL    RBC Count 3.59 (L) 3.80 - 5.20 10e6/uL    Hemoglobin 11.3 (L) 11.7 - 15.7 g/dL    Hematocrit 32.6 (L) 35.0 - 47.0 %    MCV 91 78 - 100 fL    MCH 31.5 26.5 - 33.0 pg    MCHC 34.7 31.5 - 36.5 g/dL    RDW 13.6 10.0 - 15.0 %    Platelet Count 240 150 - 450 10e3/uL     *Note: Due to a large number of results and/or encounters for the requested time period, some results have not been displayed. A complete set of results can be found in Results Review.

## 2023-10-10 LAB
ALBUMIN SERPL BCG-MCNC: 3.7 G/DL (ref 3.5–5.2)
ALP SERPL-CCNC: 103 U/L (ref 35–104)
ALT SERPL W P-5'-P-CCNC: 37 U/L (ref 0–50)
ANION GAP SERPL CALCULATED.3IONS-SCNC: 10 MMOL/L (ref 7–15)
AST SERPL W P-5'-P-CCNC: 45 U/L (ref 0–45)
BILIRUB SERPL-MCNC: 0.4 MG/DL
BUN SERPL-MCNC: 26.5 MG/DL (ref 8–23)
CALCIUM SERPL-MCNC: 9 MG/DL (ref 8.8–10.2)
CHLORIDE SERPL-SCNC: 93 MMOL/L (ref 98–107)
CREAT SERPL-MCNC: 1.44 MG/DL (ref 0.51–0.95)
DEPRECATED HCO3 PLAS-SCNC: 23 MMOL/L (ref 22–29)
EGFRCR SERPLBLD CKD-EPI 2021: 36 ML/MIN/1.73M2
FOLATE SERPL-MCNC: 28 NG/ML (ref 4.6–34.8)
GLUCOSE SERPL-MCNC: 82 MG/DL (ref 70–99)
MAGNESIUM SERPL-MCNC: 1.8 MG/DL (ref 1.7–2.3)
POTASSIUM SERPL-SCNC: 5 MMOL/L (ref 3.4–5.3)
PROT SERPL-MCNC: 6.7 G/DL (ref 6.4–8.3)
SODIUM SERPL-SCNC: 126 MMOL/L (ref 135–145)
VIT B12 SERPL-MCNC: 523 PG/ML (ref 232–1245)

## 2023-10-11 ENCOUNTER — VIRTUAL VISIT (OUTPATIENT)
Dept: GASTROENTEROLOGY | Facility: CLINIC | Age: 83
End: 2023-10-11
Attending: FAMILY MEDICINE
Payer: COMMERCIAL

## 2023-10-11 VITALS — WEIGHT: 123 LBS | HEIGHT: 60 IN | BODY MASS INDEX: 24.15 KG/M2

## 2023-10-11 DIAGNOSIS — K20.90 ESOPHAGITIS: ICD-10-CM

## 2023-10-11 DIAGNOSIS — K29.70 GASTRITIS, PRESENCE OF BLEEDING UNSPECIFIED, UNSPECIFIED CHRONICITY, UNSPECIFIED GASTRITIS TYPE: ICD-10-CM

## 2023-10-11 DIAGNOSIS — K57.91 GASTROINTESTINAL HEMORRHAGE ASSOCIATED WITH INTESTINAL DIVERTICULOSIS: Primary | ICD-10-CM

## 2023-10-11 PROCEDURE — 99204 OFFICE O/P NEW MOD 45 MIN: CPT | Mod: VID | Performed by: PHYSICIAN ASSISTANT

## 2023-10-11 ASSESSMENT — PAIN SCALES - GENERAL: PAINLEVEL: NO PAIN (0)

## 2023-10-11 NOTE — PATIENT INSTRUCTIONS
It was a pleasure taking care of you today.  I've included a brief summary of our discussion and care plan from today's visit below.  Please review this information with your primary care provider.  _______________________________________________________________________     My recommendations are summarized as follows:     - Please schedule an upper endoscopy to further assess the findings on your CT scan.   - continue fiber supplement for diverticular disease.   - Avoid NSAID's   - return to the ER for any recurrent bleeding.   - report any new, persistent or worsening symptoms    ______________________________________________________________________     How do I schedule labs, imaging studies, or procedures that were ordered in clinic today?      Labs: To schedule lab appointment you can contact your local [facility] or call [phone] to schedule at any convenient [facility] location.     Procedures: If a colonoscopy, upper endoscopy, breath test, esophageal manometry, or pH impedence was ordered today, our endoscopy team will call you to schedule this. If you have not heard from our endoscopy team within a week, please call [phone] to schedule.      Imaging Studies: If you were scheduled for a CT scan, X-ray, MRI, ultrasound, HIDA scan or other imaging study, please call [phone] to have this scheduled.      Referral: If a referral to another specialty was ordered, expect a phone call or follow instructions above. If you have not heard from anyone regarding your referral in a week, please call our clinic to check the status.      Who do I call with any questions after my visit?  Please be in touch if there are any further questions that arise following today's visit.  There are multiple ways to contact your gastroenterology care team.       During business hours, you may reach a Gastroenterology nurse at [phone]     To schedule or reschedule an appointment, please call  [phone].      You can always send a secure message through [employer].  [employer] messages are answered by your nurse or doctor typically within 24 hours.  Please allow extra time on weekends and holidays.       For urgent/emergent questions after business hours, you may reach the on-call GI Fellow by contacting the [facility]  at [phone].     How will I get the results of any tests ordered?    You will receive all of your results.  If you have signed up for [employer], any tests ordered at your visit will be available to you after your physician reviews them.  Typically this takes 1-2 weeks.  If there are urgent results that require a change in your care plan, your physician or nurse will call you to discuss the next steps.       What is [employer]?  [employer] is a secure way for you to access all of your healthcare records from the [facility].  It is a web based computer program, so you can sign on to it from any location.  It also allows you to send secure messages to your care team.  I recommend signing up for [employer] access if you have not already done so and are comfortable with using a computer.       How to I schedule a follow-up visit?  If you did not schedule a follow-up visit today, please call [phone] to schedule a follow-up office visit.      Patricia Freed PA-C  Division of Gastroenterology, Hepatology and Nutrition   [facility]

## 2023-10-11 NOTE — PROGRESS NOTES
GI NEW PATIENT VISIT       CC/REFERRING MD:    Dee Chiu MD     REASON FOR CONSULTATION:   Referred by Dee Chiu for Consult      HISTORY OF PRESENT ILLNESS:    Lesli Lorenzana is 83 year old female with pmhx PBC s/p liver transplant currently on tacrolimus with a post operative course complicated by choldocholithiasis requiring ERCP (last 2019), CKD, ACR (2014), spinal stenosis, HTN, who presents for GI consult.     She was recently admitted to [facility] for rectal bleeding with acute blood loss anemia, hgb drop from 11 to 7, requiring pRBC. Flex sigmoidoscopy performed inpatient. Prep of colon was inadequate however endoscopist able to visualize the cecum. Prior bleeding noted near sigmoid at diverticulum. No active bleeding. No evidence of diverticulitis. Rectal bleeding suspected to be diverticular, resolved without further occurrence. She does take baby aspirin regularly, which was held during hospitalization. She otherwise denies NSAID use. She was advised to take fiber supplement with prn senna to prevent any constipation/straining which she did have prior to rectal bleeding occurrence. She reports having formed easy to pass BM's. No further rectal bleeding. Cbc has improved at 11.3 several days ago. Last screening colonoscopy in September 2017 besides diverticulosis was unremarkable.     Incidentally she was found to have concerns for esophagitis and gastritis on CT scan during admission. She denies upper GI symptoms. No n/v. No dysphagia. No reflux or heartburn. Not on acid reducing medications. Has a good appetite. No early satiety. Her weight is stable.     No known family hx of digestive issues.     COLONOSCOPY 9/28/2023  Impression:     - Preparation of the colon was inadequate.                          - Stool in the entire examined colon.                          - Diverticulosis in the sigmoid colon.                          - Blood in the rectum and in the proximal sigmoid  colon.                          - No specimens collected.       Lesli  has a past medical history of Anemia, Arthritis, CAD (coronary artery disease) (1/2009), Cholelithiasis, Hallux valgus, History of blood transfusion, HTN (hypertension), Hyperlipidemia LDL goal < 100, Liver transplant (2/2008), Osteoarthritis of left hip, Osteopenia, Overweight(278.02), Palpitations, and Spinal stenosis.    She  has a past surgical history that includes biopsy; surgical history of - ; surgical history of - ; Arthroplasty hip (Left, 4/11/2017); STOMACH SURGERY PROCEDURE UNLISTED; transplant (02/03/2008); Endoscopic retrograde cholangiopancreatogram (N/A, 7/17/2017); Colonoscopy with CO2 insufflation (N/A, 9/15/2017); Endoscopic retrograde cholangiopancreatogram (N/A, 5/29/2019); GYN surgery; Esophagoscopy, gastroscopy, duodenoscopy (EGD), combined (N/A, 7/2/2019); Inject epidural lumbar (N/A, 12/3/2021); and Colonoscopy (N/A, 9/28/2023).    She  reports that she quit smoking about 47 years ago. Her smoking use included cigarettes. She has a 5.00 pack-year smoking history. She has never used smokeless tobacco. She reports that she does not drink alcohol and does not use drugs.    Her family history includes C.A.D. in her father; Cancer in her father and mother; Cerebrovascular Disease in her maternal grandmother; Other Cancer in her mother; Thyroid Disease in her father and mother; Unknown/Adopted in her paternal grandfather.    ALLERGIES:  Amlodipine besylate and Amoxicillin    PERTINENT MEDICATIONS:    Current Outpatient Medications:     Blood Pressure Monitor KIT, 1 each daily Monitor home blood pressure as instructed by physician. Dispense [place] blood pressure kit., Disp: 1 kit, Rfl: 0    calcium-vitamin D (CALTRATE) 600-400 MG-UNIT per tablet, Take 1 tablet by mouth daily, Disp: , Rfl:     clindamycin (CLEOCIN) 300 MG capsule, Take 2 capsules (600 mg) 1 hour before dental work. (Patient not taking: Reported on 10/9/2023),  Disp: 2 capsule, Rfl: 3    isosorbide mononitrate (IMDUR) 60 MG 24 hr tablet, Take 1 tablet (60 mg) by mouth 2 times daily, Disp: 180 tablet, Rfl: 3    lisinopril (ZESTRIL) 2.5 MG tablet, Take 1 tablet (2.5 mg) by mouth daily .Please keep 6-12-23 clinic appt for refills, Disp: 90 tablet, Rfl: 3    metoprolol tartrate (LOPRESSOR) 50 MG tablet, Take 1 tablet (50 mg) by mouth 2 times daily . Follow up as scheduled for further refills., Disp: 180 tablet, Rfl: 3    Multiple Vitamins-Minerals (PRESERVISION AREDS 2) CAPS, , Disp: , Rfl:     polyethylene glycol (MIRALAX) 17 GM/Dose powder, Take 17 g by mouth daily as needed for constipation (Patient not taking: Reported on 10/9/2023), Disp: 510 g, Rfl: 0    predniSONE (DELTASONE) 5 MG tablet, TAKE ONE TABLET BY MOUTH ONCE DAILY, Disp: 90 tablet, Rfl: 0    SENNA-docusate sodium (SENNA S) 8.6-50 MG tablet, Take 1 tablet by mouth daily as needed (Constipation) (Patient not taking: Reported on 10/9/2023), Disp: 30 tablet, Rfl: 0    tacrolimus (GENERIC EQUIVALENT) 0.5 MG capsule, TAKE 1 CAPSULE (0.5 MG) BY MOUTH EVERY MORNING AND 2 CAPSULES (1 MG) EVERY EVENING., Disp: 270 capsule, Rfl: 3        PHYSICAL EXAMINATION:  Constitutional: aaox3, cooperative, pleasant, not dyspneic/diaphoretic, no acute distress      GENERAL: Healthy, alert and no distress  EYES: Eyes grossly normal to inspection.  No discharge or erythema, or obvious scleral/conjunctival abnormalities.  RESP: No audible wheeze, cough, or visible cyanosis.  No visible retractions or increased work of breathing.    SKIN: Visible skin clear. No significant rash, abnormal pigmentation or lesions.  NEURO: Cranial nerves grossly intact.  Mentation and speech appropriate for age.  PSYCH: Mentation appears normal, affect normal/bright, judgement and insight intact, normal speech and appearance well-groomed.          PERTINENT STUDIES: (I personally reviewed these laboratory studies today)  Most recent CBC:   Recent Labs    Lab Test 10/09/23  1244 09/29/23  0630   WBC 5.1 4.7   HGB 11.3* 9.8*   HCT 32.6* 27.6*    179     Most recent hepatic panel:  Recent Labs   Lab Test 10/09/23  1244 09/28/23  0157   ALT 37 27   AST 45 37     Most recent creatinine:  Recent Labs   Lab Test 10/09/23  1244 09/29/23  0630   CR 1.44* 1.42*       TSH   Date Value Ref Range Status   04/25/2017 1.81 0.40 - 4.00 mU/L Final       ASSESSMENT/PLAN:    Lower GI bleeding suspected to be diverticular   Acute blood loss anemia   CT scan findings of esophagitis and gastritis    Lesli Lorenzana is a 83 year old female with pmhx liver transplant for PBC with a post operative course complicated by choldocholithiasis requiring ERCP (last 2019), CKD, ACR (2014), spinal stenosis, HTN, who presents for GI consult.     Recently admitted for lower GI bleeding with acute blood loss anemia hgb 11 --> 7, suspected to be diverticular. Bleeding resolved. No further rectal bleeding occurrences. Hgb as of a few days ago has improved and is currently at 11.3. recommended continuing fiber supplement and treating constipation with miralax and/or senna prn. Avoid NSAID's. Report any new or worsening symptoms. ED warnings given if symptoms return.     Incidentally found to have esophagitis and gastritis on CT scan during admission. She is not having any upper GI symptoms or concerns however I recommended an upper endoscopy to further assess these findings. Pt verbalizes understanding and agrees with plan.     Thank you for this consultation.  It was a pleasure to participate in the care of this patient; please contact us with any further questions.        This note was created with voice recognition software, and while reviewed for accuracy, typos may remain.       I spent a total of 45 minutes on the day of the visit.   Time spent by me doing chart review, history and exam, documentation and further activities per the note      Patricia Freed PA-C  Division of  Gastroenterology, Hepatology and Nutrition   [facility]        Video-Visit Details    Type of service:  Video Visit    Joined the call at 10/11/2023, 12:30:11 pm.    Left the call at 10/11/2023, 12:46:40 pm.    You were on the call for 16 minutes 28 seconds     Distant Location (provider location):  Off-site    Platform used for Video Visit: [employer]

## 2023-10-11 NOTE — NURSING NOTE
Is the patient currently in the state of [place]? YES    Visit mode:VIDEO    If the visit is dropped, the patient can be reconnected by: VIDEO VISIT: Text to cell phone:   Telephone Information:   Mobile [phone]       Will anyone else be joining the visit? NO  (If patient encounters technical issues they should call [phone])    How would you like to obtain your AVS? MyChart    Are changes needed to the allergy or medication list? Pt stated no changes to allergies and Pt stated no med changes    Reason for visit: Consult    Kiel ZARCOF

## 2023-10-23 DIAGNOSIS — Z94.4 LIVER TRANSPLANTED (H): ICD-10-CM

## 2023-10-23 DIAGNOSIS — T86.41 LIVER TRANSPLANT REJECTION (H): ICD-10-CM

## 2023-10-24 RX ORDER — PREDNISONE 5 MG/1
TABLET ORAL
Qty: 90 TABLET | Refills: 0 | Status: SHIPPED | OUTPATIENT
Start: 2023-10-24 | End: 2024-01-11

## 2023-10-28 ENCOUNTER — OFFICE VISIT (OUTPATIENT)
Dept: URGENT CARE | Facility: URGENT CARE | Age: 83
End: 2023-10-28
Payer: COMMERCIAL

## 2023-10-28 VITALS
SYSTOLIC BLOOD PRESSURE: 202 MMHG | OXYGEN SATURATION: 98 % | DIASTOLIC BLOOD PRESSURE: 92 MMHG | RESPIRATION RATE: 18 BRPM | TEMPERATURE: 97.6 F | HEART RATE: 77 BPM

## 2023-10-28 DIAGNOSIS — B02.9 HERPES ZOSTER WITHOUT COMPLICATION: Primary | ICD-10-CM

## 2023-10-28 PROCEDURE — 99213 OFFICE O/P EST LOW 20 MIN: CPT | Performed by: FAMILY MEDICINE

## 2023-10-28 RX ORDER — VALACYCLOVIR HYDROCHLORIDE 1 G/1
1000 TABLET, FILM COATED ORAL 2 TIMES DAILY
Qty: 14 TABLET | Refills: 0 | Status: SHIPPED | OUTPATIENT
Start: 2023-10-28 | End: 2023-11-04

## 2023-10-28 RX ORDER — ACETAMINOPHEN 500 MG
500-1000 TABLET ORAL EVERY 6 HOURS PRN
COMMUNITY

## 2023-10-28 NOTE — PROGRESS NOTES
Assessment & Plan     Herpes zoster without complication  Physical examination consistent with shingles.  Valtrex prescribed, renal dose suggested.  Home care discussed and written information provided.  Patient will schedule appointment with PCP regarding ongoing uncontrolled hypertension.  All questions answered.  - valACYclovir (VALTREX) 1000 mg tablet; Take 1 tablet (1,000 mg) by mouth 2 times daily for 7 days      Trino Bates MD  [facility] [place]    Nadeen Ballesteros is a 83 year old, presenting for the following health issues:  Derm Problem (Burning rash on back and left side x1 week)      HPI     Rash  Onset/Duration: 1 week ago  Description  Location: left sided back   Character: burning  Itching: no  Intensity:  moderate  Progression of Symptoms:  worsening  Accompanying signs and symptoms:   Fever: No  Body aches or joint pain: No  Sore throat symptoms: No  Recent cold symptoms: No  History:           Previous episodes of similar rash: None  New exposures:  None  Recent travel: No  Exposure to similar rash: No  Therapies tried and outcome: antibiotic and cortisone cream      Review of Systems   Constitutional, HEENT, cardiovascular, pulmonary, gi and gu systems are negative, except as otherwise noted.      Objective    BP (!) 202/92   Pulse 77   Temp 97.6  F (36.4  C) (Oral)   Resp 18   SpO2 98%   There is no height or weight on file to calculate BMI.  Physical Exam   GENERAL: alert and no distress  EYES: Eyes grossly normal to inspection  RESP: no wheezes  SKIN: Erythematous vesicular rashes in group pattern involving back skin as shown below  NEURO: Normal strength and tone, mentation intact and speech normal  PSYCH: mentation appears normal, affect normal/bright

## 2023-10-30 ENCOUNTER — HOSPITAL ENCOUNTER (EMERGENCY)
Facility: CLINIC | Age: 83
Discharge: HOME | End: 2023-10-30
Attending: EMERGENCY MEDICINE | Admitting: EMERGENCY MEDICINE
Payer: COMMERCIAL

## 2023-10-30 VITALS
HEART RATE: 81 BPM | TEMPERATURE: 97.9 F | SYSTOLIC BLOOD PRESSURE: 183 MMHG | OXYGEN SATURATION: 94 % | HEIGHT: 59 IN | WEIGHT: 125 LBS | BODY MASS INDEX: 25.2 KG/M2 | DIASTOLIC BLOOD PRESSURE: 91 MMHG | RESPIRATION RATE: 18 BRPM

## 2023-10-30 DIAGNOSIS — B02.9 HERPES ZOSTER WITHOUT COMPLICATION: ICD-10-CM

## 2023-10-30 DIAGNOSIS — K62.5 BRBPR (BRIGHT RED BLOOD PER RECTUM): ICD-10-CM

## 2023-10-30 LAB
ALBUMIN SERPL BCG-MCNC: 3.3 G/DL (ref 3.5–5.2)
ALBUMIN UR-MCNC: 50 MG/DL
ALP SERPL-CCNC: 93 U/L (ref 35–104)
ALT SERPL W P-5'-P-CCNC: 29 U/L (ref 0–50)
ANION GAP SERPL CALCULATED.3IONS-SCNC: 12 MMOL/L (ref 7–15)
APPEARANCE UR: CLEAR
APTT PPP: 25 SECONDS (ref 22–38)
AST SERPL W P-5'-P-CCNC: 40 U/L (ref 0–45)
BASOPHILS # BLD AUTO: 0 10E3/UL (ref 0–0.2)
BASOPHILS NFR BLD AUTO: 0 %
BILIRUB SERPL-MCNC: 0.4 MG/DL
BILIRUB UR QL STRIP: NEGATIVE
BUN SERPL-MCNC: 22.5 MG/DL (ref 8–23)
CALCIUM SERPL-MCNC: 8.3 MG/DL (ref 8.8–10.2)
CHLORIDE SERPL-SCNC: 90 MMOL/L (ref 98–107)
COLOR UR AUTO: ABNORMAL
CREAT SERPL-MCNC: 1.16 MG/DL (ref 0.51–0.95)
DEPRECATED HCO3 PLAS-SCNC: 21 MMOL/L (ref 22–29)
EGFRCR SERPLBLD CKD-EPI 2021: 47 ML/MIN/1.73M2
EOSINOPHIL # BLD AUTO: 0 10E3/UL (ref 0–0.7)
EOSINOPHIL NFR BLD AUTO: 1 %
ERYTHROCYTE [DISTWIDTH] IN BLOOD BY AUTOMATED COUNT: 13.7 % (ref 10–15)
ERYTHROCYTE [DISTWIDTH] IN BLOOD BY AUTOMATED COUNT: 13.9 % (ref 10–15)
GLUCOSE SERPL-MCNC: 88 MG/DL (ref 70–99)
GLUCOSE UR STRIP-MCNC: NEGATIVE MG/DL
HCT VFR BLD AUTO: 31.1 % (ref 35–47)
HCT VFR BLD AUTO: 32.1 % (ref 35–47)
HGB BLD-MCNC: 10.9 G/DL (ref 11.7–15.7)
HGB BLD-MCNC: 10.9 G/DL (ref 11.7–15.7)
HGB UR QL STRIP: NEGATIVE
HOLD SPECIMEN: NORMAL
IMM GRANULOCYTES # BLD: 0 10E3/UL
IMM GRANULOCYTES NFR BLD: 1 %
INR PPP: 1.02 (ref 0.85–1.15)
KETONES UR STRIP-MCNC: NEGATIVE MG/DL
LEUKOCYTE ESTERASE UR QL STRIP: NEGATIVE
LIPASE SERPL-CCNC: 80 U/L (ref 13–60)
LYMPHOCYTES # BLD AUTO: 1 10E3/UL (ref 0.8–5.3)
LYMPHOCYTES NFR BLD AUTO: 24 %
MCH RBC QN AUTO: 31.7 PG (ref 26.5–33)
MCH RBC QN AUTO: 32.1 PG (ref 26.5–33)
MCHC RBC AUTO-ENTMCNC: 34 G/DL (ref 31.5–36.5)
MCHC RBC AUTO-ENTMCNC: 35 G/DL (ref 31.5–36.5)
MCV RBC AUTO: 92 FL (ref 78–100)
MCV RBC AUTO: 93 FL (ref 78–100)
MONOCYTES # BLD AUTO: 0.7 10E3/UL (ref 0–1.3)
MONOCYTES NFR BLD AUTO: 16 %
MUCOUS THREADS #/AREA URNS LPF: PRESENT /LPF
NEUTROPHILS # BLD AUTO: 2.5 10E3/UL (ref 1.6–8.3)
NEUTROPHILS NFR BLD AUTO: 58 %
NITRATE UR QL: NEGATIVE
NRBC # BLD AUTO: 0 10E3/UL
NRBC BLD AUTO-RTO: 0 /100
PH UR STRIP: 7 [PH] (ref 5–7)
PLATELET # BLD AUTO: 200 10E3/UL (ref 150–450)
PLATELET # BLD AUTO: 215 10E3/UL (ref 150–450)
POTASSIUM SERPL-SCNC: 3.7 MMOL/L (ref 3.4–5.3)
PROT SERPL-MCNC: 6.2 G/DL (ref 6.4–8.3)
RBC # BLD AUTO: 3.4 10E6/UL (ref 3.8–5.2)
RBC # BLD AUTO: 3.44 10E6/UL (ref 3.8–5.2)
RBC URINE: <1 /HPF
SODIUM SERPL-SCNC: 123 MMOL/L (ref 135–145)
SP GR UR STRIP: 1.01 (ref 1–1.03)
SQUAMOUS EPITHELIAL: <1 /HPF
UROBILINOGEN UR STRIP-MCNC: NORMAL MG/DL
WBC # BLD AUTO: 4.3 10E3/UL (ref 4–11)
WBC # BLD AUTO: 4.3 10E3/UL (ref 4–11)
WBC URINE: <1 /HPF

## 2023-10-30 PROCEDURE — 85610 PROTHROMBIN TIME: CPT | Performed by: EMERGENCY MEDICINE

## 2023-10-30 PROCEDURE — 85025 COMPLETE CBC W/AUTO DIFF WBC: CPT | Performed by: EMERGENCY MEDICINE

## 2023-10-30 PROCEDURE — 83690 ASSAY OF LIPASE: CPT | Performed by: EMERGENCY MEDICINE

## 2023-10-30 PROCEDURE — 99285 EMERGENCY DEPT VISIT HI MDM: CPT | Performed by: EMERGENCY MEDICINE

## 2023-10-30 PROCEDURE — 96360 HYDRATION IV INFUSION INIT: CPT | Performed by: EMERGENCY MEDICINE

## 2023-10-30 PROCEDURE — 250N000013 HC RX MED GY IP 250 OP 250 PS 637: Performed by: EMERGENCY MEDICINE

## 2023-10-30 PROCEDURE — 258N000003 HC RX IP 258 OP 636: Performed by: EMERGENCY MEDICINE

## 2023-10-30 PROCEDURE — 85027 COMPLETE CBC AUTOMATED: CPT | Mod: 91 | Performed by: EMERGENCY MEDICINE

## 2023-10-30 PROCEDURE — 96361 HYDRATE IV INFUSION ADD-ON: CPT | Performed by: EMERGENCY MEDICINE

## 2023-10-30 PROCEDURE — 80053 COMPREHEN METABOLIC PANEL: CPT | Performed by: EMERGENCY MEDICINE

## 2023-10-30 PROCEDURE — 36415 COLL VENOUS BLD VENIPUNCTURE: CPT | Performed by: EMERGENCY MEDICINE

## 2023-10-30 PROCEDURE — 99284 EMERGENCY DEPT VISIT MOD MDM: CPT | Mod: 25 | Performed by: EMERGENCY MEDICINE

## 2023-10-30 PROCEDURE — 81001 URINALYSIS AUTO W/SCOPE: CPT | Performed by: EMERGENCY MEDICINE

## 2023-10-30 PROCEDURE — 85730 THROMBOPLASTIN TIME PARTIAL: CPT | Performed by: EMERGENCY MEDICINE

## 2023-10-30 RX ORDER — GABAPENTIN 100 MG/1
100 CAPSULE ORAL 3 TIMES DAILY
Qty: 30 CAPSULE | Refills: 0 | Status: SHIPPED | OUTPATIENT
Start: 2023-10-30 | End: 2023-11-06

## 2023-10-30 RX ORDER — HYDROCODONE BITARTRATE AND ACETAMINOPHEN 5; 325 MG/1; MG/1
1 TABLET ORAL EVERY 6 HOURS PRN
Qty: 12 TABLET | Refills: 0 | Status: SHIPPED | OUTPATIENT
Start: 2023-10-30 | End: 2023-11-02

## 2023-10-30 RX ORDER — HYDROCODONE BITARTRATE AND ACETAMINOPHEN 5; 325 MG/1; MG/1
1 TABLET ORAL ONCE
Status: COMPLETED | OUTPATIENT
Start: 2023-10-30 | End: 2023-10-30

## 2023-10-30 RX ADMIN — SODIUM CHLORIDE 1000 ML: 9 INJECTION, SOLUTION INTRAVENOUS at 12:46

## 2023-10-30 RX ADMIN — HYDROCODONE BITARTRATE AND ACETAMINOPHEN 1 TABLET: 5; 325 TABLET ORAL at 10:25

## 2023-10-30 ASSESSMENT — ACTIVITIES OF DAILY LIVING (ADL)
ADLS_ACUITY_SCORE: 35

## 2023-10-30 NOTE — DISCHARGE INSTRUCTIONS
TODAY'S VISIT:  You were seen today for GI bleeding, shingles  -   - If you had any labs or imaging/radiology tests performed today, you should also discuss these tests with your usual provider.     FOLLOW-UP:  Please make an appointment to follow up with:  - Your Primary Care Provider. If you do not have a PCP, please call the Primary Care Center (phone: [phone] for an appointment  - GI Clinic (phone: [phone])     - Have your provider review the results from today's visit with you again to make sure no further follow-up or additional testing is needed based on those results.     RETURN TO THE EMERGENCY DEPARTMENT  Return to the Emergency Department at any time for any new or worsening symptoms or any concerns.

## 2023-10-30 NOTE — ED TRIAGE NOTES
Lesli is 83 yrs old she came [facility] due seen some clots in the toilet this morning. Pt had bleeding 3wks ago . C/o lower back pain/nausea. 4 mg Zofran given with no relief. . Hx: HTN .  /110 on arrival. Denied fever, diarrhea ,chest pain or SOB.

## 2023-10-30 NOTE — ED PROVIDER NOTES
History     Chief Complaint   Patient presents with    Abdominal Pain    Nausea, Vomiting, & Diarrhea     HPI  Lesli Lorenzana is a 83 year old female with a past medical history of liver transplant (2008, due to primary biliary cirrhosis), hypertension, hyperlipidemia, anemia, GI bleed, CAD (status post stenting), cholelithiasis (status postcholecystectomy at time of liver transplantation) who presents to the emergency department with a chief complaint of GI bleeding.  The patient reports that she noticed some clots in the toilet this morning after having a bowel movement.  The patient was hospitalized for a GI bleed 3 weeks ago.  A lower endoscopy was done and no source of bleed was found as bleeding had resolved, however, bleeding thought to be diverticular in nature.  The patient was told to return to the emergency department should any bleeding recur.  She is scheduled for outpatient follow-up with gastroenterology and they have discussed upper endoscopy with her.  The patient also complains of abdominal pain, lower back pain, and some nausea.  The patient was given Zofran in route to the hospital with EMS.  Blood glucose was 118.  Blood pressure noted to be elevated by EMS at 190/110 (the patient does have a history of hypertension).  No fevers, chest pain, or shortness of breath.  The patient is not anticoagulated.  The patient does note that the bleeding she had prior to her previous hospitalization was much more significant and she had had multiple episodes during that time, today, she has had only 1 bloody bowel movement and it is much less severe than last time.    Of note, the patient is currently also suffering from a shingles rash to her left side/flank.  She was seen for this and prescribed valacyclovir which she has been taking.  However, her pain has been poorly controlled with over-the-counter pain medications at home.    I have reviewed the Medications, Allergies, Past Medical and Surgical  History, and Social History in the Epic system.    EXAM: CT ABDOMEN PELVIS W CONTRAST  LOCATION: [facility]  DATE: 9/28/2023     INDICATION: b l LQ tenderness, rectal bleeding, fall yesterday  COMPARISON: None.  TECHNIQUE: CT scan of the abdomen and pelvis was performed following injection of IV contrast. Multiplanar reformats were obtained. Dose reduction techniques were used.  CONTRAST: iopamidol (ISOVUE 370) solution 78 mL     FINDINGS:   LOWER CHEST: Normal.     HEPATOBILIARY: Probable hepatic steatosis. Cholecystectomy. Mild extrahepatic biliary enhancement which may be seen with cholangitis.     PANCREAS: Normal.     SPLEEN: Normal.     ADRENAL GLANDS: Normal.     KIDNEYS/BLADDER: Renal cysts which require no dedicated follow-up. No hydronephrosis. Unremarkable urinary bladder.     BOWEL: Colonic diverticulosis. Normal appendix. Small sliding-type hiatal hernia. Diffuse thickening of the stomach with trace perigastric stranding. Distal esophagus also appears somewhat thickened. No bowel obstruction.     LYMPH NODES: No lymphadenopathy.     VASCULATURE: Atherosclerotic calcifications of the aortoiliac vessels without evidence of aneurysmal dilatation.     PELVIC ORGANS: Normal.     MUSCULOSKELETAL: Small left fat-containing ventral hernia. Tiny fat-containing umbilical hernia. Left hip arthroplasty. Severe degenerative changes of the right hip. Multilevel degenerative changes of the thoracolumbar spine. No acute osseous abnormality   or suspicious bony lesion.                                                                      IMPRESSION:   1.  Diffuse thickening of the stomach with trace perigastric stranding. Distal esophagus also appears somewhat thickened and there is a small sliding-type hiatal hernia. Findings suggestive of gastritis/esophagitis.  2.  Cholecystectomy. Mild extrahepatic enhancement which may be seen with cholangitis.  3.  No other acute  process within the abdomen or pelvis.    Past Medical History:   Diagnosis Date    Anemia     Arthritis     CAD (coronary artery disease) 1/2009    stent    Cholelithiasis     gallbladder removed with liver in 2008    Hallux valgus     History of blood transfusion     HTN (hypertension)     Hyperlipidemia LDL goal < 100     Liver transplant 2/2008    due to Primary biliary cirrhosis    Osteoarthritis of left hip     Osteopenia     Overweight(278.02)     Palpitations     Spinal stenosis      Past Surgical History:   Procedure Laterality Date    ARTHROPLASTY HIP Left 4/11/2017    Procedure: ARTHROPLASTY HIP;  Surgeon: Zhen Armstrong MD;  Location: [facility] OR    BIOPSY      liver     COLONOSCOPY N/A 9/28/2023    Procedure: Colonoscopy;  Surgeon: Heather Valverde MD;  [facility]    COLONOSCOPY WITH CO2 INSUFFLATION N/A 9/15/2017    Procedure: COLONOSCOPY WITH CO2 INSUFFLATION;  Colonoscopy, History of colonic polyps, Ref by Muhammad, BMI 25, Weiser [id number];  Surgeon: Dolly Hearn MD;  Location: [facility] OR    ENDOSCOPIC RETROGRADE CHOLANGIOPANCREATOGRAM N/A 7/17/2017    Procedure: COMBINED ENDOSCOPIC RETROGRADE CHOLANGIOPANCREATOGRAPHY, PLACE TUBE/STENT;  Endoscopic Retrograde Cholangiopancreatogram with bile duct spinchterotomy, ballon sweep of bile duct for stones, dilatation of bile duct;  Surgeon: Narendra Diana MD;  [facility]    ENDOSCOPIC RETROGRADE CHOLANGIOPANCREATOGRAM N/A 5/29/2019    Procedure: Endoscopic Retrograde Cholangiopancreatogram with bile duct spinchterotomy, balloon sweep of bile ducts for stone, balloon dilatation ofbileduct and bile duct stent placement;  Surgeon: Guru Harpal Luu MD;  Location:  OR    ESOPHAGOSCOPY, GASTROSCOPY, DUODENOSCOPY (EGD), COMBINED N/A 7/2/2019    Procedure: ESOPHAGOGASTRODUODENOSCOPY, WITH FOREIGN BODY REMOVAL;  Surgeon: Guru Harpal Luu MD;  [facility]    GYN SURGERY      hysterectomy    INJECT  EPIDURAL LUMBAR N/A 12/3/2021    Procedure: INJECTION, SPINE, LUMBAR, EPIDURAL - L5-S1 interlaminar epidural steroid injection;  Surgeon: Brent Smith MD;  Location: [facility] OR    SURGICAL HISTORY OF -       liver transplant     SURGICAL HISTORY OF -       CAD with stent placement     TRANSPLANT  2008    Liver transplant     [facility] STOMACH SURGERY PROCEDURE UNLISTED       No current facility-administered medications for this encounter.     Current Outpatient Medications   Medication    acetaminophen (TYLENOL) 500 MG tablet    isosorbide mononitrate (IMDUR) 60 MG 24 hr tablet    lisinopril (ZESTRIL) 2.5 MG tablet    metoprolol tartrate (LOPRESSOR) 50 MG tablet    polyethylene glycol (MIRALAX) 17 GM/Dose powder    predniSONE (DELTASONE) 5 MG tablet    tacrolimus (GENERIC EQUIVALENT) 0.5 MG capsule    valACYclovir (VALTREX) 1000 mg tablet    Blood Pressure Monitor KIT    calcium-vitamin D (CALTRATE) 600-400 MG-UNIT per tablet    clindamycin (CLEOCIN) 300 MG capsule    Multiple Vitamins-Minerals (PRESERVISION AREDS 2) CAPS    SENNA-docusate sodium (SENNA S) 8.6-50 MG tablet     Allergies   Allergen Reactions    Amlodipine Besylate Swelling     Edema in legs    Amoxicillin Rash     Past medical history, past surgical history, medications, and allergies were reviewed with the patient. Additional pertinent items: None    Social History     Socioeconomic History    Marital status:      Spouse name: Not on file    Number of children: 3    Years of education: Not on file    Highest education level: Not on file   Occupational History     Employer: RETIRED   Tobacco Use    Smoking status: Former     Packs/day: 0.50     Years: 10.00     Additional pack years: 0.00     Total pack years: 5.00     Types: Cigarettes     Quit date: 1975     Years since quittin.9    Smokeless tobacco: Never   Vaping Use    Vaping Use: Never used   Substance and Sexual Activity    Alcohol use: No     Comment: Glass of wine  occassionally    Drug use: No     Comment: Never    Sexual activity: Not Currently     Birth control/protection: Post-menopausal   Other Topics Concern    Parent/sibling w/ CABG, MI or angioplasty before 65F 55M? No     Service Not Asked    Blood Transfusions Yes     Comment: 2007    Caffeine Concern Not Asked    Occupational Exposure Not Asked    Hobby Hazards Not Asked    Sleep Concern Not Asked    Stress Concern Not Asked    Weight Concern Not Asked    Special Diet Not Asked    Back Care Not Asked    Exercise No    Bike Helmet Not Asked    Seat Belt Not Asked    Self-Exams Not Asked   Social History Narrative    Lives alone with her dog. . Three grown children, very supportive and all live close by.  6 grandchildren. Feels safe in her environment.     Social Determinants of Health     Financial Resource Strain: Low Risk  (9/25/2023)    Financial Resource Strain     Within the past 12 months, have you or your family members you live with been unable to get utilities (heat, electricity) when it was really needed?: No   Food Insecurity: Low Risk  (9/25/2023)    Food Insecurity     Within the past 12 months, did you worry that your food would run out before you got money to buy more?: No     Within the past 12 months, did the food you bought just not last and you didn t have money to get more?: No   Transportation Needs: Low Risk  (9/25/2023)    Transportation Needs     Within the past 12 months, has lack of transportation kept you from medical appointments, getting your medicines, non-medical meetings or appointments, work, or from getting things that you need?: No   Physical Activity: Not on file   Stress: Not on file   Social Connections: Not on file   Interpersonal Safety: Low Risk  (9/25/2023)    Interpersonal Safety     Do you feel physically and emotionally safe where you currently live?: Yes     Within the past 12 months, have you been hit, slapped, kicked or otherwise physically hurt by  "someone?: No     Within the past 12 months, have you been humiliated or emotionally abused in other ways by your partner or ex-partner?: No   Housing Stability: Low Risk  (9/25/2023)    Housing Stability     Do you have housing? : Yes     Are you worried about losing your housing?: No     Social history was reviewed with the patient. Additional pertinent items: None    Review of Systems  A medically appropriate review of systems was performed with pertinent positives and negatives noted in the HPI, and all other systems negative.    Physical Exam   Height: 150 cm (4' 11.06\")  Weight: 56.7 kg (125 lb)      General: Well nourished, well developed, NAD  HEENT: EOMI, anicteric. NCAT, MMM  Neck: no jugular venous distension, supple, nl ROM  Cardiac: Regular rate and rhythm. No murmurs, rubs, or gallops. Normal S1, S2.  Intact peripheral pulses  Pulm: CTAB, no stridor, wheezes, rales, rhonchi  Abd: Soft, nontender, nondistended.  No masses palpated.    Skin: Warm and dry to the touch.  Vesicular tender rash to left side consistent with shingles  Extremities: No LE edema, no cyanosis, w/w/p  Neuro: A&Ox3, no gross focal deficits    ED Course        Procedures                        Labs Ordered and Resulted from Time of ED Arrival to Time of ED Departure   COMPREHENSIVE METABOLIC PANEL - Abnormal       Result Value    Sodium 123 (*)     Potassium 3.7      Carbon Dioxide (CO2) 21 (*)     Anion Gap 12      Urea Nitrogen 22.5      Creatinine 1.16 (*)     GFR Estimate 47 (*)     Calcium 8.3 (*)     Chloride 90 (*)     Glucose 88      Alkaline Phosphatase 93      AST 40      ALT 29      Protein Total 6.2 (*)     Albumin 3.3 (*)     Bilirubin Total 0.4     LIPASE - Abnormal    Lipase 80 (*)    ROUTINE UA WITH MICROSCOPIC REFLEX TO CULTURE - Abnormal    Color Urine Straw      Appearance Urine Clear      Glucose Urine Negative      Bilirubin Urine Negative      Ketones Urine Negative      Specific Gravity Urine 1.007      Blood " Urine Negative      pH Urine 7.0      Protein Albumin Urine 50 (*)     Urobilinogen Urine Normal      Nitrite Urine Negative      Leukocyte Esterase Urine Negative      Mucus Urine Present (*)     RBC Urine <1      WBC Urine <1      Squamous Epithelials Urine <1     CBC WITH PLATELETS AND DIFFERENTIAL - Abnormal    WBC Count 4.3      RBC Count 3.44 (*)     Hemoglobin 10.9 (*)     Hematocrit 32.1 (*)     MCV 93      MCH 31.7      MCHC 34.0      RDW 13.7      Platelet Count 215      % Neutrophils 58      % Lymphocytes 24      % Monocytes 16      % Eosinophils 1      % Basophils 0      % Immature Granulocytes 1      NRBCs per 100 WBC 0      Absolute Neutrophils 2.5      Absolute Lymphocytes 1.0      Absolute Monocytes 0.7      Absolute Eosinophils 0.0      Absolute Basophils 0.0      Absolute Immature Granulocytes 0.0      Absolute NRBCs 0.0     CBC WITH PLATELETS - Abnormal    WBC Count 4.3      RBC Count 3.40 (*)     Hemoglobin 10.9 (*)     Hematocrit 31.1 (*)     MCV 92      MCH 32.1      MCHC 35.0      RDW 13.9      Platelet Count 200     INR - Normal    INR 1.02     PARTIAL THROMBOPLASTIN TIME - Normal    aPTT 25              Results for orders placed or performed during the hospital encounter of 10/30/23 (from the past 24 hour(s))   UA with Microscopic reflex to Culture    Specimen: Urine, Clean Catch   Result Value Ref Range    Color Urine Straw Colorless, Straw, Light Yellow, Yellow    Appearance Urine Clear Clear    Glucose Urine Negative Negative mg/dL    Bilirubin Urine Negative Negative    Ketones Urine Negative Negative mg/dL    Specific Gravity Urine 1.007 1.003 - 1.035    Blood Urine Negative Negative    pH Urine 7.0 5.0 - 7.0    Protein Albumin Urine 50 (A) Negative mg/dL    Urobilinogen Urine Normal Normal, 2.0 mg/dL    Nitrite Urine Negative Negative    Leukocyte Esterase Urine Negative Negative    Mucus Urine Present (A) None Seen /LPF    RBC Urine <1 <=2 /HPF    WBC Urine <1 <=5 /HPF    Squamous  Epithelials Urine <1 <=1 /HPF    Narrative    Urine Culture not indicated   CBC with platelets   Result Value Ref Range    WBC Count 4.3 4.0 - 11.0 10e3/uL    RBC Count 3.40 (L) 3.80 - 5.20 10e6/uL    Hemoglobin 10.9 (L) 11.7 - 15.7 g/dL    Hematocrit 31.1 (L) 35.0 - 47.0 %    MCV 92 78 - 100 fL    MCH 32.1 26.5 - 33.0 pg    MCHC 35.0 31.5 - 36.5 g/dL    RDW 13.9 10.0 - 15.0 %    Platelet Count 200 150 - 450 10e3/uL     *Note: Due to a large number of results and/or encounters for the requested time period, some results have not been displayed. A complete set of results can be found in Results Review.       Labs, vital signs, and imaging studies were reviewed by me.    Medications   HYDROcodone-acetaminophen (NORCO) 5-325 MG per tablet 1 tablet (1 tablet Oral $Given 10/30/23 1025)   sodium chloride 0.9% BOLUS 1,000 mL (0 mLs Intravenous Stopped 10/30/23 4863)       Assessments & Plan (with Medical Decision Making)   Lesli Lorenzana is a 83 year old female who presents to the emergency department with GI bleeding.  Differential diagnosis includes diverticular bleeding, hemorrhoidal bleeding, brisk upper GI bleeding (from peptic ulcer, gastritis, etc.).  Labs ordered to further evaluate the patient.    Laboratory work-up is remarkable for hemoglobin of 10.9 (essentially stable compared to prior value on file, 11.3 when it was last checked 3 weeks ago, 9.8 when last checked 1 month ago)    Critical care was not performed.     Medical Decision Making  The patient's presentation was of high complexity (a chronic illness severe exacerbation, progression, or side effect of treatment).    The patient's evaluation involved:  an assessment requiring an independent historian (family member at bedside)  review of external note(s) from 2 sources (notes from previous hospitalization, gastroenterology notes)  review of 3+ test result(s) ordered prior to this encounter (previous labs for comparison, previous imaging)  strong  consideration of a test (CT of the abdomen and pelvis was considered, but deferred as this was recently performed) that was ultimately deferred  ordering and/or review of 3+ test(s) in this encounter (see separate area of note for details)  discussion of management or test interpretation with another health professional (gastroenterology (hepatology))    The patient's management necessitated moderate risk (prescription drug management including medications given in the ED) and high risk (a decision regarding hospitalization).    Patient was discussed with gastroenterology/hepatology.  They are familiar with the patient and agree with plan for 4-hour recheck of hemoglobin.  If hemoglobin remains stable, they recommend discharge home with outpatient follow-up in their clinic.    Repeat hemoglobin is stable at 10.9    I have reviewed the nursing notes.    I have reviewed the findings, diagnosis, plan and need for follow up with the patient.    Patient to be discharged home. Advised to follow up with PCP within 1 week and gastroenterology/hepatology as directed. To return to ER immediately with any new/worsening symptoms. Plan of care discussed with patient who expresses understanding and agrees with plan of care.    Patient prescribed medications for control of her shingles related pain    Discharge Medication List as of 10/30/2023  4:55 PM        START taking these medications    Details   gabapentin (NEURONTIN) 100 MG capsule Take 1 capsule (100 mg) by mouth 3 times daily, Disp-30 capsule, R-0, E-Prescribe      HYDROcodone-acetaminophen (NORCO) 5-325 MG tablet Take 1 tablet by mouth every 6 hours as needed for pain, Disp-12 tablet, R-0, E-Prescribe             Final diagnoses:   BRBPR (bright red blood per rectum)   Herpes zoster without complication       MARIVEL MCDOWELL MD  10/30/2023   [facility] EMERGENCY DEPARTMENT       Marivel Mcdowell MD  10/31/23 9882

## 2023-11-06 ENCOUNTER — OFFICE VISIT (OUTPATIENT)
Dept: FAMILY MEDICINE | Facility: CLINIC | Age: 83
End: 2023-11-06
Payer: COMMERCIAL

## 2023-11-06 VITALS
HEIGHT: 59 IN | OXYGEN SATURATION: 100 % | TEMPERATURE: 97.7 F | HEART RATE: 63 BPM | RESPIRATION RATE: 16 BRPM | BODY MASS INDEX: 24.8 KG/M2 | SYSTOLIC BLOOD PRESSURE: 151 MMHG | DIASTOLIC BLOOD PRESSURE: 78 MMHG | WEIGHT: 123 LBS

## 2023-11-06 DIAGNOSIS — K62.5 BRBPR (BRIGHT RED BLOOD PER RECTUM): Primary | ICD-10-CM

## 2023-11-06 DIAGNOSIS — B02.29 POST HERPETIC NEURALGIA: ICD-10-CM

## 2023-11-06 LAB
ERYTHROCYTE [DISTWIDTH] IN BLOOD BY AUTOMATED COUNT: 13.7 % (ref 10–15)
HCT VFR BLD AUTO: 32.5 % (ref 35–47)
HGB BLD-MCNC: 11.4 G/DL (ref 11.7–15.7)
MCH RBC QN AUTO: 32.3 PG (ref 26.5–33)
MCHC RBC AUTO-ENTMCNC: 35.1 G/DL (ref 31.5–36.5)
MCV RBC AUTO: 92 FL (ref 78–100)
PLATELET # BLD AUTO: 227 10E3/UL (ref 150–450)
RBC # BLD AUTO: 3.53 10E6/UL (ref 3.8–5.2)
WBC # BLD AUTO: 4.8 10E3/UL (ref 4–11)

## 2023-11-06 PROCEDURE — 36415 COLL VENOUS BLD VENIPUNCTURE: CPT | Performed by: FAMILY MEDICINE

## 2023-11-06 PROCEDURE — 85027 COMPLETE CBC AUTOMATED: CPT | Performed by: FAMILY MEDICINE

## 2023-11-06 PROCEDURE — 90480 ADMN SARSCOV2 VAC 1/ONLY CMP: CPT | Performed by: FAMILY MEDICINE

## 2023-11-06 PROCEDURE — 91320 SARSCV2 VAC 30MCG TRS-SUC IM: CPT | Performed by: FAMILY MEDICINE

## 2023-11-06 PROCEDURE — 99213 OFFICE O/P EST LOW 20 MIN: CPT | Performed by: FAMILY MEDICINE

## 2023-11-06 RX ORDER — RESPIRATORY SYNCYTIAL VIRUS VACCINE 120MCG/0.5
0.5 KIT INTRAMUSCULAR ONCE
Qty: 1 EACH | Refills: 0 | Status: CANCELLED | OUTPATIENT
Start: 2023-11-06 | End: 2023-11-06

## 2023-11-06 RX ORDER — GABAPENTIN 100 MG/1
100 CAPSULE ORAL 3 TIMES DAILY
Qty: 90 CAPSULE | Refills: 3 | Status: SHIPPED | OUTPATIENT
Start: 2023-11-06

## 2023-11-06 ASSESSMENT — PAIN SCALES - GENERAL: PAINLEVEL: NO PAIN (0)

## 2023-11-06 NOTE — PROGRESS NOTES
Assessment & Plan     BRBPR (bright red blood per rectum)  Hgb stable  Is aware she needs to follow-up with gastro  - **CBC with platelets FUTURE 2mo; Future  - **CBC with platelets FUTURE 2mo    Post herpetic neuralgia  Meds working well, will refill  Is making her sleepy , may try to wean if able  - gabapentin (NEURONTIN) 100 MG capsule; Take 1 capsule (100 mg) by mouth 3 times daily           MED REC REQUIRED  Post Medication Reconciliation Status: discharge medications reconciled and changed, per note/orders      Dee Chiu MD  [facility]    Subjective   Lesli is a 83 year old, presenting for the following health issues:  Hospital F/U        11/6/2023    11:27 AM   Additional Questions   Roomed by roxanna   Accompanied by son       HPI       ED/UC Followup:    Facility: [facility]  Date of visit: 10/30/23  Reason for visit: bright red blood per rectum and shingles   Current Status: still has shingles, no bleeding    Assessments & Plan (with Medical Decision Making)   Lesli Lorenzana is a 83 year old female who presents to the emergency department with GI bleeding.  Differential diagnosis includes diverticular bleeding, hemorrhoidal bleeding, brisk upper GI bleeding (from peptic ulcer, gastritis, etc.).  Labs ordered to further evaluate the patient.     Laboratory work-up is remarkable for hemoglobin of 10.9 (essentially stable compared to prior value on file, 11.3 when it was last checked 3 weeks ago, 9.8 when last checked 1 month ago)    The patient's management necessitated moderate risk (prescription drug management including medications given in the ED) and high risk (a decision regarding hospitalization).     Patient was discussed with gastroenterology/hepatology.  They are familiar with the patient and agree with plan for 4-hour recheck of hemoglobin.  If hemoglobin remains stable, they recommend discharge home with outpatient follow-up in their clinic.     Repeat  "hemoglobin is stable at 10.9     I have reviewed the nursing notes.     I have reviewed the findings, diagnosis, plan and need for follow up with the patient.     Patient to be discharged home. Advised to follow up with PCP within 1 week and gastroenterology/hepatology as directed. To return to ER immediately with any new/worsening symptoms. Plan of care discussed with patient who expresses understanding and agrees with plan of care.     Patient prescribed medications for control of her shingles related pain     Pt needs recheck of hgb  Gabapentin is helping with pain, it is much  improved  Rash is resolving    Review of Systems   Constitutional, HEENT, cardiovascular, pulmonary, gi and gu systems are negative, except as otherwise noted.      Objective    BP (!) 151/78   Pulse 63   Temp 97.7  F (36.5  C) (Oral)   Resp 16   Ht 1.499 m (4' 11\")   Wt 55.8 kg (123 lb)   SpO2 100%   BMI 24.84 kg/m    Body mass index is 24.84 kg/m .  Physical Exam   GENERAL: healthy, alert and no distress  RESP: lungs clear to auscultation - no rales, rhonchi or wheezes  CV: regular rate and rhythm, normal S1 S2, no S3 or S4, no murmur, click or rub, no peripheral edema and peripheral pulses strong    Results for orders placed or performed in visit on 11/06/23 (from the past 24 hour(s))   **CBC with platelets FUTURE 2mo   Result Value Ref Range    WBC Count 4.8 4.0 - 11.0 10e3/uL    RBC Count 3.53 (L) 3.80 - 5.20 10e6/uL    Hemoglobin 11.4 (L) 11.7 - 15.7 g/dL    Hematocrit 32.5 (L) 35.0 - 47.0 %    MCV 92 78 - 100 fL    MCH 32.3 26.5 - 33.0 pg    MCHC 35.1 31.5 - 36.5 g/dL    RDW 13.7 10.0 - 15.0 %    Platelet Count 227 150 - 450 10e3/uL     *Note: Due to a large number of results and/or encounters for the requested time period, some results have not been displayed. A complete set of results can be found in Results Review.                     "

## 2024-01-10 DIAGNOSIS — Z94.4 LIVER TRANSPLANTED (H): ICD-10-CM

## 2024-01-10 DIAGNOSIS — T86.41 LIVER TRANSPLANT REJECTION (H): ICD-10-CM

## 2024-01-11 RX ORDER — PREDNISONE 5 MG/1
TABLET ORAL
Qty: 90 TABLET | Refills: 3 | Status: SHIPPED | OUTPATIENT
Start: 2024-01-11

## 2024-03-20 DIAGNOSIS — Z13.220 LIPID SCREENING: ICD-10-CM

## 2024-03-20 DIAGNOSIS — Z94.4 LIVER REPLACED BY TRANSPLANT (H): Primary | ICD-10-CM

## 2024-03-26 ENCOUNTER — DOCUMENTATION ONLY (OUTPATIENT)
Dept: TRANSPLANT | Facility: CLINIC | Age: 84
End: 2024-03-26
Payer: COMMERCIAL

## 2024-03-26 NOTE — LETTER
Lesli Lorenzana  [id number] [address]  [place] [address]                March 26, 2024    Dear Lesli Lorenzana,    We have been trying to reach you for your needed follow up lab work and / or  liver transplant clinic appointment.   For your best outcomes, we recommend:    Lab work every 3 months.   We have not received any since 11/6/23.   Doctor (transplant hepatologist) follow up every year.   We have not seen you for a visit since 3/27/20.    You may NOT have signs or symptoms if you develop rejection or liver dysfunction.  Taking your transplant medications without lab follow up and doctor visits leaves you at risk for serious health complications: rejection, infection, and cancers.    Please have your labs drawn now.    Call us right away at [phone] to get a copy of your lab orders, schedule your appointment, and talk to your transplant care team.      Thank you!    Christen Carey RN    .      Your Liver Transplant Care Team  [facility] Transplant

## 2024-03-26 NOTE — PROGRESS NOTES
Annual chart review completed.      Pt is not up to date with post transplant follow-up.    Sent letter reminding to schedule an appointment to see a liver doctor (hepatologist) here every year and to have lab testing every 3 months.

## 2024-04-06 ENCOUNTER — LAB (OUTPATIENT)
Dept: LAB | Facility: CLINIC | Age: 84
End: 2024-04-06
Payer: COMMERCIAL

## 2024-04-06 DIAGNOSIS — Z94.4 LIVER REPLACED BY TRANSPLANT (H): ICD-10-CM

## 2024-04-06 LAB
ALBUMIN SERPL BCG-MCNC: 3.9 G/DL (ref 3.5–5.2)
ALP SERPL-CCNC: 141 U/L (ref 40–150)
ALT SERPL W P-5'-P-CCNC: 31 U/L (ref 0–50)
ANION GAP SERPL CALCULATED.3IONS-SCNC: 9 MMOL/L (ref 7–15)
AST SERPL W P-5'-P-CCNC: 40 U/L (ref 0–45)
BILIRUB DIRECT SERPL-MCNC: <0.2 MG/DL (ref 0–0.3)
BILIRUB SERPL-MCNC: 0.4 MG/DL
BUN SERPL-MCNC: 36 MG/DL (ref 8–23)
CALCIUM SERPL-MCNC: 9.3 MG/DL (ref 8.8–10.2)
CHLORIDE SERPL-SCNC: 98 MMOL/L (ref 98–107)
CREAT SERPL-MCNC: 1.49 MG/DL (ref 0.51–0.95)
DEPRECATED HCO3 PLAS-SCNC: 24 MMOL/L (ref 22–29)
EGFRCR SERPLBLD CKD-EPI 2021: 35 ML/MIN/1.73M2
ERYTHROCYTE [DISTWIDTH] IN BLOOD BY AUTOMATED COUNT: 13.3 % (ref 10–15)
GLUCOSE SERPL-MCNC: 90 MG/DL (ref 70–99)
HCT VFR BLD AUTO: 34.4 % (ref 35–47)
HGB BLD-MCNC: 11.7 G/DL (ref 11.7–15.7)
MAGNESIUM SERPL-MCNC: 1.8 MG/DL (ref 1.7–2.3)
MCH RBC QN AUTO: 32.2 PG (ref 26.5–33)
MCHC RBC AUTO-ENTMCNC: 34 G/DL (ref 31.5–36.5)
MCV RBC AUTO: 95 FL (ref 78–100)
PHOSPHATE SERPL-MCNC: 3.7 MG/DL (ref 2.5–4.5)
PLATELET # BLD AUTO: 243 10E3/UL (ref 150–450)
POTASSIUM SERPL-SCNC: 4.5 MMOL/L (ref 3.4–5.3)
PROT SERPL-MCNC: 7.1 G/DL (ref 6.4–8.3)
RBC # BLD AUTO: 3.63 10E6/UL (ref 3.8–5.2)
SODIUM SERPL-SCNC: 131 MMOL/L (ref 135–145)
WBC # BLD AUTO: 4.6 10E3/UL (ref 4–11)

## 2024-04-06 PROCEDURE — 36415 COLL VENOUS BLD VENIPUNCTURE: CPT

## 2024-04-06 PROCEDURE — 82248 BILIRUBIN DIRECT: CPT

## 2024-04-06 PROCEDURE — 85027 COMPLETE CBC AUTOMATED: CPT

## 2024-04-06 PROCEDURE — 84100 ASSAY OF PHOSPHORUS: CPT

## 2024-04-06 PROCEDURE — 80053 COMPREHEN METABOLIC PANEL: CPT

## 2024-04-06 PROCEDURE — 80197 ASSAY OF TACROLIMUS: CPT

## 2024-04-06 PROCEDURE — 83735 ASSAY OF MAGNESIUM: CPT

## 2024-04-07 LAB
TACROLIMUS BLD-MCNC: 3.9 UG/L (ref 5–15)
TME LAST DOSE: ABNORMAL H
TME LAST DOSE: ABNORMAL H

## 2024-04-16 ENCOUNTER — TELEPHONE (OUTPATIENT)
Dept: CARDIOLOGY | Facility: CLINIC | Age: 84
End: 2024-04-16
Payer: COMMERCIAL

## 2024-04-16 NOTE — TELEPHONE ENCOUNTER
Left Voicemail (1st Attempt) for the patient to call back and schedule the following:    Appointment type: RTN  Provider: CHRISTOPHER  Return date: 06/12/24  Specialty phone number: [phone] OPT 1  Additional appointment(s) needed: N/A   Additonal Notes: N/A

## 2024-04-17 DIAGNOSIS — Z94.4 LIVER REPLACED BY TRANSPLANT (H): ICD-10-CM

## 2024-04-17 RX ORDER — TACROLIMUS 0.5 MG/1
CAPSULE ORAL
Qty: 270 CAPSULE | Refills: 3 | Status: SHIPPED | OUTPATIENT
Start: 2024-04-17

## 2024-04-22 ENCOUNTER — TELEPHONE (OUTPATIENT)
Dept: CARDIOLOGY | Facility: CLINIC | Age: 84
End: 2024-04-22
Payer: COMMERCIAL

## 2024-04-22 NOTE — TELEPHONE ENCOUNTER
4/22/2024 6:02PM Yael Montoya  Patient confirmed scheduled appointment:  Date: 7/12/2024  Time: 11AM  Visit type: New Cardiology  Provider: Dr. Rashid  Location: [address] [address]  Testing/imaging: NA  Additional notes: 4/22 Scheduled New Cardiology w/ Dr. Rashid in  7/12. Sent staff message to Radha Cortes to extend active request. SHANI Montoya 4/22/2024 6:02PM

## 2024-07-03 DIAGNOSIS — I25.119 CORONARY ARTERY DISEASE INVOLVING NATIVE HEART WITH ANGINA PECTORIS, UNSPECIFIED VESSEL OR LESION TYPE (H): ICD-10-CM

## 2024-07-03 DIAGNOSIS — I10 ESSENTIAL HYPERTENSION WITH GOAL BLOOD PRESSURE LESS THAN 140/90: ICD-10-CM

## 2024-07-03 DIAGNOSIS — I25.10 CORONARY ARTERY DISEASE INVOLVING NATIVE HEART WITHOUT ANGINA PECTORIS, UNSPECIFIED VESSEL OR LESION TYPE: ICD-10-CM

## 2024-07-05 ENCOUNTER — LAB (OUTPATIENT)
Dept: LAB | Facility: CLINIC | Age: 84
End: 2024-07-05
Attending: INTERNAL MEDICINE
Payer: COMMERCIAL

## 2024-07-05 DIAGNOSIS — Z13.220 LIPID SCREENING: ICD-10-CM

## 2024-07-05 DIAGNOSIS — Z94.4 LIVER REPLACED BY TRANSPLANT (H): ICD-10-CM

## 2024-07-05 LAB
ALBUMIN MFR UR ELPH: 67.6 MG/DL
ALBUMIN SERPL BCG-MCNC: 3.5 G/DL (ref 3.5–5.2)
ALBUMIN UR-MCNC: 100 MG/DL
ALP SERPL-CCNC: 138 U/L (ref 40–150)
ALT SERPL W P-5'-P-CCNC: 27 U/L (ref 0–50)
ANION GAP SERPL CALCULATED.3IONS-SCNC: 10 MMOL/L (ref 7–15)
APPEARANCE UR: CLEAR
AST SERPL W P-5'-P-CCNC: 39 U/L (ref 0–45)
BILIRUB DIRECT SERPL-MCNC: <0.2 MG/DL (ref 0–0.3)
BILIRUB SERPL-MCNC: 0.3 MG/DL
BILIRUB UR QL STRIP: NEGATIVE
BUN SERPL-MCNC: 29.4 MG/DL (ref 8–23)
CALCIUM SERPL-MCNC: 8.7 MG/DL (ref 8.8–10.2)
CHLORIDE SERPL-SCNC: 93 MMOL/L (ref 98–107)
CHOLEST SERPL-MCNC: 172 MG/DL
COLOR UR AUTO: YELLOW
CREAT SERPL-MCNC: 1.4 MG/DL (ref 0.51–0.95)
CREAT UR-MCNC: 32.1 MG/DL
DEPRECATED HCO3 PLAS-SCNC: 23 MMOL/L (ref 22–29)
EGFRCR SERPLBLD CKD-EPI 2021: 38 ML/MIN/1.73M2
ERYTHROCYTE [DISTWIDTH] IN BLOOD BY AUTOMATED COUNT: 12.3 % (ref 10–15)
FASTING STATUS PATIENT QL REPORTED: YES
FASTING STATUS PATIENT QL REPORTED: YES
GLUCOSE SERPL-MCNC: 89 MG/DL (ref 70–99)
GLUCOSE UR STRIP-MCNC: NEGATIVE MG/DL
HCT VFR BLD AUTO: 32 % (ref 35–47)
HDLC SERPL-MCNC: 79 MG/DL
HGB BLD-MCNC: 11.2 G/DL (ref 11.7–15.7)
HGB UR QL STRIP: NEGATIVE
KETONES UR STRIP-MCNC: NEGATIVE MG/DL
LDLC SERPL CALC-MCNC: 73 MG/DL
LEUKOCYTE ESTERASE UR QL STRIP: NEGATIVE
MAGNESIUM SERPL-MCNC: 1.7 MG/DL (ref 1.7–2.3)
MCH RBC QN AUTO: 31.6 PG (ref 26.5–33)
MCHC RBC AUTO-ENTMCNC: 35 G/DL (ref 31.5–36.5)
MCV RBC AUTO: 90 FL (ref 78–100)
NITRATE UR QL: NEGATIVE
NONHDLC SERPL-MCNC: 93 MG/DL
PH UR STRIP: 7 [PH] (ref 5–7)
PHOSPHATE SERPL-MCNC: 3.6 MG/DL (ref 2.5–4.5)
PLATELET # BLD AUTO: 227 10E3/UL (ref 150–450)
POTASSIUM SERPL-SCNC: 5.1 MMOL/L (ref 3.4–5.3)
PROT SERPL-MCNC: 6.8 G/DL (ref 6.4–8.3)
PROT/CREAT 24H UR: 2.11 MG/MG CR (ref 0–0.2)
RBC # BLD AUTO: 3.54 10E6/UL (ref 3.8–5.2)
RBC #/AREA URNS AUTO: ABNORMAL /HPF
SODIUM SERPL-SCNC: 126 MMOL/L (ref 135–145)
SP GR UR STRIP: 1.01 (ref 1–1.03)
SQUAMOUS #/AREA URNS AUTO: ABNORMAL /LPF
TACROLIMUS BLD-MCNC: 4.3 UG/L (ref 5–15)
TME LAST DOSE: ABNORMAL H
TME LAST DOSE: ABNORMAL H
TRIGL SERPL-MCNC: 101 MG/DL
UROBILINOGEN UR STRIP-ACNC: 0.2 E.U./DL
WBC # BLD AUTO: 5.2 10E3/UL (ref 4–11)
WBC #/AREA URNS AUTO: ABNORMAL /HPF

## 2024-07-05 PROCEDURE — 80053 COMPREHEN METABOLIC PANEL: CPT

## 2024-07-05 PROCEDURE — 82248 BILIRUBIN DIRECT: CPT

## 2024-07-05 PROCEDURE — 80061 LIPID PANEL: CPT

## 2024-07-05 PROCEDURE — 81001 URINALYSIS AUTO W/SCOPE: CPT

## 2024-07-05 PROCEDURE — 83735 ASSAY OF MAGNESIUM: CPT

## 2024-07-05 PROCEDURE — 84156 ASSAY OF PROTEIN URINE: CPT

## 2024-07-05 PROCEDURE — 85027 COMPLETE CBC AUTOMATED: CPT

## 2024-07-05 PROCEDURE — 80197 ASSAY OF TACROLIMUS: CPT

## 2024-07-05 PROCEDURE — 36415 COLL VENOUS BLD VENIPUNCTURE: CPT

## 2024-07-05 PROCEDURE — 84100 ASSAY OF PHOSPHORUS: CPT

## 2024-07-08 ENCOUNTER — TELEPHONE (OUTPATIENT)
Dept: TRANSPLANT | Facility: CLINIC | Age: 84
End: 2024-07-08
Payer: COMMERCIAL

## 2024-07-09 ENCOUNTER — TELEPHONE (OUTPATIENT)
Dept: TRANSPLANT | Facility: CLINIC | Age: 84
End: 2024-07-09
Payer: COMMERCIAL

## 2024-07-09 RX ORDER — LISINOPRIL 2.5 MG/1
2.5 TABLET ORAL DAILY
Qty: 90 TABLET | Refills: 3 | Status: SHIPPED | OUTPATIENT
Start: 2024-07-09

## 2024-07-09 RX ORDER — METOPROLOL TARTRATE 50 MG
50 TABLET ORAL 2 TIMES DAILY
Qty: 180 TABLET | Refills: 3 | Status: SHIPPED | OUTPATIENT
Start: 2024-07-09

## 2024-07-09 NOTE — TELEPHONE ENCOUNTER
Instructed pt the following:    Restrict fluid intake to 2 quarts per day, urine protein is going up.     Pt states that she drinks about 2 bottles of H20 and a cup of coffee per day.    Carmen Simon LPN

## 2024-07-09 NOTE — TELEPHONE ENCOUNTER
Message from Dr. Muhammad:    Tucker Muhammad MD sent to Dee Chiu MD  Cc: Porsha Sanchez RN  Restrict fluid intake to 2 quarts per day; urine protein is going up

## 2024-07-30 NOTE — PROGRESS NOTES
Service Date: 2024    Dee Chiu MD  [address] [place] [address]     RE:  Lesli Lorenzana   MRN:  [id number]   :  1940       Dear Dr. Chiu:    It was a pleasure participating in the care of your patient, Lesli Lorenzana .  As you know, she is a 83 year old year old person who I met in person today to establish cardiac care for coronary artery disease, hypertension and hyperlipidemia.    Past medical history is significant for the followin.  Hypertension  2.  Hyperlipidemia  3.  Chronic renal insufficiency with a current baseline GFR of 38 mL/min as of 2024  4.  History of biliary cirrhosis status post liver transplant   5.  History of rectal bleed  6.  History of spinal stenosis with low back pain and radiculopathy  7.  Decreased hearing  8.  Osteopenia  9.  History of left shoulder issues  10.  History of onychomycosis  11.  History of total hip replacement     The patient has been followed by Dr. Lagunas since , however the patient feels stressed traveling down to the [facility] and requested to follow-up in the [place] area.    Apparently the patient has a history of coronary artery disease having had a drug-eluting stent placed in the OM1 branch in .  Old records not available for review.    Patient last saw Dr. Lagunas on 2023, at which time he continued her current medications and plan for new [facility] provider follow-up in [place].    From a clinical standpoint, she is able to ride her recumbent bike every other day for about 15 minutes.  She is unable to ambulate very far due to chronic arthritis in her right leg and hip.  However when she does exercise, she does not complain of any chest pain, shortness of breath or gross exertional limitation.  She feels good with exercise.    She says that her blood pressures are elevated at home, however she does not take them after her meds, and she does not relax or keep in mind  that she needs to be relaxed when taking.    The patient otherwise denies gross chest pain, shortness of breath, PND, orthopnea, edema, palpitations, syncope or near syncope.    10 Point Review of Systems: Positive for numbness in the legs and feet from neuropathy and chronic arthritic pain.    Social history:  she enjoys playing board games like [employer], [employer] and card games    MEDICATIONS:    1.  Gabapentin  2.  Imdur 60 mg twice daily  3.  Lisinopril 2.5 mg a day  4.  Metoprolol titrate 50 mg twice daily  5.  Prednisone 5 mg a day  6.  Tacrolimus  7.  Valtrex    PHYSICAL EXAM:    Vitals: Blood pressures currently running around 150/74 mmHg, weight 121 pounds  General:  Patient appears comfortable, well groomed  Psych:  Patient is alert and oriented X 3  Eyes:  No gross erythema, exudate  Neck:  JVP: Normal  Pulm:  CTA  CV: Regular rate and rhythm no gross murmur  Abdomen:  soft, non tender, positive bowel sounds  Lower extremities: Diffuse bruising mild edema noted      LABS:    7/5/2024: LDL 73, potassium 5.1, GFR 38, hemoglobin 11.2    Echocardiogram 3/3/2017 revealed normal LV systolic function ejection fraction 55 to 60% no significant valvular pathology identified    IMPRESSION:    Lesli is an 83-year-old lady whose past medical history significant for biliary cirrhosis status post liver transplant 2008 who also has a history of spinal stenosis with low back pain and radiculopathy, along with chronic renal insufficiency with a baseline GFR of 38 mL/min as of 7/5/2024 with several active issues:    1.  Coronary artery disease    Apparently she had drug-eluting stent placed to her first obtuse marginal branch back in 2009 (old records not available for review).    From a clinical standpoint, she never felt any real symptoms prior to her stent, and never felt any improvement afterwards.    Currently she is asymptomatic at a relatively low level of exertion.  Will continue clinical monitoring for now.    2.   Hypertension    She will be gathering further information regarding this    3.  Hyperlipidemia    7/5/2024, LDL 73 interestingly, she is currently not on a statin, but would like to avoid this class of medication due to her prior liver transplant.      PLAN:    1.  7 g Metamucil per day to daily diet.    2.  She will track her blood pressures at home several hours after her morning meds and after resting quietly for 10 minutes    3.  Renal referral for significant renal insufficiency and ongoing BP management    4.  Follow-up 1 year with labs prior, earlier if needed.    Once again, it was a pleasure participating in the care of your patient, Lesli Lorenzana .  Please feel free to contact me at any time if there are any questions regarding her care in the future.      Sincerely,          Jona Marroquin M.D.  Cardiovascular Division  [facility]      Total time:  Including pre-visit chart review, in person face to face visit, post visit charting time as well as time for coordination of care:  56min

## 2024-08-07 ENCOUNTER — OFFICE VISIT (OUTPATIENT)
Dept: CARDIOLOGY | Facility: CLINIC | Age: 84
End: 2024-08-07
Payer: COMMERCIAL

## 2024-08-07 VITALS
DIASTOLIC BLOOD PRESSURE: 72 MMHG | SYSTOLIC BLOOD PRESSURE: 183 MMHG | BODY MASS INDEX: 24.54 KG/M2 | WEIGHT: 121.5 LBS | HEART RATE: 69 BPM | OXYGEN SATURATION: 98 %

## 2024-08-07 DIAGNOSIS — I25.10 CORONARY ARTERY DISEASE INVOLVING NATIVE HEART WITHOUT ANGINA PECTORIS, UNSPECIFIED VESSEL OR LESION TYPE: ICD-10-CM

## 2024-08-07 DIAGNOSIS — I10 ESSENTIAL HYPERTENSION WITH GOAL BLOOD PRESSURE LESS THAN 140/90: Primary | ICD-10-CM

## 2024-08-07 PROCEDURE — 99215 OFFICE O/P EST HI 40 MIN: CPT | Performed by: INTERNAL MEDICINE

## 2024-08-07 NOTE — NURSING NOTE
Med Reconcile: Reviewed and verified all current medications with the patient. The updated medication list was printed and given to the patient./ No medication changes./ No medication changes.     Return Appointment  -Follow-up in 1 year with labs prior.       Patient stated she understood all health information given and agreed to call with further questions or concerns.

## 2024-08-07 NOTE — PATIENT INSTRUCTIONS
Take your medicines every day, as directed     Changes made today:  No medication changes  Take over the counter Metamucil  Referral to Nephrology      When checking your blood pressure at home:  ~upper arm cuff is preferred versus wrist cuff due to accuracy  ~Sit in a chair  ~Rest for 5 minutes  ~Avoid alcohol, nicotine, caffeine, exercise, food or drink 30 minutes prior  ~urinate prior to checking if needed  ~Elbow is at about heart level  ~Feet flat on the floor, no crossing legs or feet  ~No talking, minimal movement   ~Place cuff on bare skin     Cardiology Care Coordinators:      Sandra WOMACK RN     Cardiology Rooming staff:  Mary AVILEZ CNA    Phone  [phone]      Fax [phone]    To Contact us     During Business Hours:  [phone]     If you are needing refills please contact your pharmacy.     For urgent after hour care please call the [facility] at [phone] or the [facility] at [phone] and ask to speak to the cardiologist on call.            HOW TO CHECK YOUR BLOOD PRESSURE AT HOME:     Avoid eating, smoking, and exercising for at least 30 minutes before taking a reading.     Be sure you have taken your BP medication at least 2-3 hours before you check it.      Sit quietly for 10 minutes before a reading.      Sit in a chair with your feet flat on the floor. Rest your  arm on a table so that the arm cuff is at the same level as your heart.     Remain still during the reading.  Record your blood pressure and pulse in a log and bring to your next appointment.       Use [employer] allows you to communicate directly with your heart team through secure messaging.  [employer] can be accessed any time on your phone, computer, or tablet.  If you need assistance, we'd be happy to help!             Keep your Heart Appointments:      Follow-up in 1 year with lab prior

## 2024-08-07 NOTE — NURSING NOTE
"Chief Complaint   Patient presents with    Coronary Artery Disease     New General Cardiology for CAD       Initial BP (!) 211/76 (BP Location: Right arm, Patient Position: Chair, Cuff Size: Adult Regular)   Pulse 80   Wt 55.1 kg (121 lb 8 oz)   SpO2 98%   BMI 24.54 kg/m   Estimated body mass index is 24.54 kg/m  as calculated from the following:    Height as of 11/6/23: 1.499 m (4' 11\").    Weight as of this encounter: 55.1 kg (121 lb 8 oz)..  BP completed using cuff size: regular    LAURA Dove    "

## 2024-08-26 ENCOUNTER — PATIENT OUTREACH (OUTPATIENT)
Dept: CARE COORDINATION | Facility: CLINIC | Age: 84
End: 2024-08-26
Payer: COMMERCIAL

## 2024-09-09 ENCOUNTER — PATIENT OUTREACH (OUTPATIENT)
Dept: CARE COORDINATION | Facility: CLINIC | Age: 84
End: 2024-09-09
Payer: COMMERCIAL

## 2024-09-17 ENCOUNTER — MYC MEDICAL ADVICE (OUTPATIENT)
Dept: TRANSPLANT | Facility: CLINIC | Age: 84
End: 2024-09-17

## 2024-10-03 DIAGNOSIS — I12.9 MALIGNANT HYPERTENSIVE KIDNEY DISEASE WITH CHRONIC KIDNEY DISEASE STAGE I THROUGH STAGE IV, OR UNSPECIFIED(403.00): ICD-10-CM

## 2024-10-03 DIAGNOSIS — R80.8 OTHER PROTEINURIA: ICD-10-CM

## 2024-10-03 DIAGNOSIS — N18.32 CHRONIC KIDNEY DISEASE (CKD) STAGE G3B/A1, MODERATELY DECREASED GLOMERULAR FILTRATION RATE (GFR) BETWEEN 30-44 ML/MIN/1.73 SQUARE METER AND ALBUMINURIA CREATININE RATIO LESS THAN 30 MG/G (H): Primary | ICD-10-CM

## 2024-10-03 DIAGNOSIS — E87.1 HYPOSMOLALITY SYNDROME: ICD-10-CM

## 2024-10-03 DIAGNOSIS — Z94.4 LIVER REPLACED BY TRANSPLANT (H): ICD-10-CM

## 2024-10-03 DIAGNOSIS — N18.9 CHRONIC KIDNEY DISEASE, UNSPECIFIED: ICD-10-CM

## 2024-10-03 DIAGNOSIS — T86.20: ICD-10-CM

## 2024-10-03 DIAGNOSIS — K74.3 CHOLANGITIC CIRRHOSIS (H): ICD-10-CM

## 2024-10-03 DIAGNOSIS — T86.40 COMPLICATION OF TRANSPLANTED LIVER (H): ICD-10-CM
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
## 2024-10-12 ENCOUNTER — LAB (OUTPATIENT)
Dept: LAB | Facility: CLINIC | Age: 84
End: 2024-10-12
Payer: COMMERCIAL

## 2024-10-12 DIAGNOSIS — Z94.4 LIVER REPLACED BY TRANSPLANT (H): ICD-10-CM

## 2024-10-12 LAB
ALBUMIN SERPL BCG-MCNC: 3.5 G/DL (ref 3.5–5.2)
ALP SERPL-CCNC: 120 U/L (ref 40–150)
ALT SERPL W P-5'-P-CCNC: 19 U/L (ref 0–50)
ANION GAP SERPL CALCULATED.3IONS-SCNC: 10 MMOL/L (ref 7–15)
AST SERPL W P-5'-P-CCNC: 33 U/L (ref 0–45)
BILIRUB DIRECT SERPL-MCNC: <0.2 MG/DL (ref 0–0.3)
BILIRUB SERPL-MCNC: 0.3 MG/DL
BUN SERPL-MCNC: 33.6 MG/DL (ref 8–23)
CALCIUM SERPL-MCNC: 8.6 MG/DL (ref 8.8–10.4)
CHLORIDE SERPL-SCNC: 98 MMOL/L (ref 98–107)
CREAT SERPL-MCNC: 1.52 MG/DL (ref 0.51–0.95)
EGFRCR SERPLBLD CKD-EPI 2021: 34 ML/MIN/1.73M2
ERYTHROCYTE [DISTWIDTH] IN BLOOD BY AUTOMATED COUNT: 13.3 % (ref 10–15)
GLUCOSE SERPL-MCNC: 91 MG/DL (ref 70–99)
HCO3 SERPL-SCNC: 21 MMOL/L (ref 22–29)
HCT VFR BLD AUTO: 32 % (ref 35–47)
HGB BLD-MCNC: 10.8 G/DL (ref 11.7–15.7)
MCH RBC QN AUTO: 31.6 PG (ref 26.5–33)
MCHC RBC AUTO-ENTMCNC: 33.8 G/DL (ref 31.5–36.5)
MCV RBC AUTO: 94 FL (ref 78–100)
PLATELET # BLD AUTO: 200 10E3/UL (ref 150–450)
POTASSIUM SERPL-SCNC: 5.5 MMOL/L (ref 3.4–5.3)
PROT SERPL-MCNC: 6.7 G/DL (ref 6.4–8.3)
RBC # BLD AUTO: 3.42 10E6/UL (ref 3.8–5.2)
SODIUM SERPL-SCNC: 129 MMOL/L (ref 135–145)
WBC # BLD AUTO: 4 10E3/UL (ref 4–11)

## 2024-10-12 PROCEDURE — 85027 COMPLETE CBC AUTOMATED: CPT

## 2024-10-12 PROCEDURE — 80197 ASSAY OF TACROLIMUS: CPT

## 2024-10-12 PROCEDURE — 80053 COMPREHEN METABOLIC PANEL: CPT

## 2024-10-12 PROCEDURE — 82248 BILIRUBIN DIRECT: CPT

## 2024-10-12 PROCEDURE — 36415 COLL VENOUS BLD VENIPUNCTURE: CPT

## 2024-10-13 LAB
TACROLIMUS BLD-MCNC: 3.7 UG/L (ref 5–15)
TME LAST DOSE: ABNORMAL H
TME LAST DOSE: ABNORMAL H

## 2024-10-14 ENCOUNTER — MYC MEDICAL ADVICE (OUTPATIENT)
Dept: CARDIOLOGY | Facility: CLINIC | Age: 84
End: 2024-10-14
Payer: COMMERCIAL

## 2024-10-14 DIAGNOSIS — I10 ESSENTIAL HYPERTENSION WITH GOAL BLOOD PRESSURE LESS THAN 140/90: ICD-10-CM

## 2024-10-14 DIAGNOSIS — I25.10 CORONARY ARTERY DISEASE DUE TO LIPID RICH PLAQUE: ICD-10-CM

## 2024-10-14 DIAGNOSIS — I25.83 CORONARY ARTERY DISEASE DUE TO LIPID RICH PLAQUE: ICD-10-CM

## 2024-10-14 DIAGNOSIS — I25.10 CORONARY ARTERY DISEASE INVOLVING NATIVE HEART WITHOUT ANGINA PECTORIS, UNSPECIFIED VESSEL OR LESION TYPE: ICD-10-CM

## 2024-10-14 DIAGNOSIS — I15.9 SECONDARY HYPERTENSION WITH GOAL BLOOD PRESSURE LESS THAN 140/90: ICD-10-CM

## 2024-10-14 RX ORDER — ISOSORBIDE MONONITRATE 60 MG/1
60 TABLET, EXTENDED RELEASE ORAL 2 TIMES DAILY
Qty: 180 TABLET | Refills: 1 | Status: SHIPPED | OUTPATIENT
Start: 2024-10-14

## 2024-10-14 NOTE — TELEPHONE ENCOUNTER
Refill sent. Reminder sent via [employer] for blood pressure update since last clinic visit.       Requested Prescriptions   Pending Prescriptions Disp Refills    isosorbide mononitrate (IMDUR) 60 MG 24 hr tablet 180 tablet 3     Sig: Take 1 tablet (60 mg) by mouth 2 times daily.       Nitrates Failed - 10/14/2024  3:39 PM        Failed - Most recent blood pressure under 140/90 in past 12 months     BP Readings from Last 3 Encounters:   08/07/24 (!) 183/72   11/06/23 (!) 151/78   10/30/23 (!) 183/91       No data recorded            Failed - Always fail criteria - needs review     Review chart. Forward refill request to provider if patient has exceeded one bottle per 3 months.           Passed - Pt is not on erectile dysfunction medications        Passed - Medication is active on med list        Passed - Recent (12 mo) or future (90 days) visit within the authorizing provider's specialty     The patient must have completed an in-person or virtual visit within the past 12 months or has a future visit scheduled within the next 90 days with the authorizing provider s specialty.  Urgent care and e-visits do not quality as an office visit for this protocol.          Passed - Medication indicated for associated diagnosis     Medication is associated with one or more of the following diagnoses:    Anal fissure   Angina pectoris   Cardiac syndrome X   Congestive heart failure   Hypertension   Myocardial infarction   Pulmonary edema   Pulmonary hypertension          Passed - Patient is age 18 or older

## 2024-11-13 ENCOUNTER — LAB (OUTPATIENT)
Dept: LAB | Facility: CLINIC | Age: 84
End: 2024-11-13
Payer: COMMERCIAL

## 2024-11-13 DIAGNOSIS — T86.40 COMPLICATION OF TRANSPLANTED LIVER (H): ICD-10-CM
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
## 2024-11-13 DIAGNOSIS — T86.20: ICD-10-CM

## 2024-11-13 DIAGNOSIS — N18.9 CHRONIC KIDNEY DISEASE, UNSPECIFIED: ICD-10-CM

## 2024-11-13 DIAGNOSIS — Z94.4 LIVER REPLACED BY TRANSPLANT (H): ICD-10-CM

## 2024-11-13 DIAGNOSIS — I12.9 MALIGNANT HYPERTENSIVE KIDNEY DISEASE WITH CHRONIC KIDNEY DISEASE STAGE I THROUGH STAGE IV, OR UNSPECIFIED(403.00): ICD-10-CM

## 2024-11-13 DIAGNOSIS — E87.1 HYPOSMOLALITY SYNDROME: ICD-10-CM

## 2024-11-13 DIAGNOSIS — K74.3 CHOLANGITIC CIRRHOSIS (H): ICD-10-CM

## 2024-11-13 DIAGNOSIS — R80.8 OTHER PROTEINURIA: ICD-10-CM

## 2024-11-13 DIAGNOSIS — N18.32 CHRONIC KIDNEY DISEASE (CKD) STAGE G3B/A1, MODERATELY DECREASED GLOMERULAR FILTRATION RATE (GFR) BETWEEN 30-44 ML/MIN/1.73 SQUARE METER AND ALBUMINURIA CREATININE RATIO LESS THAN 30 MG/G (H): ICD-10-CM

## 2024-11-13 LAB
ALBUMIN UR-MCNC: >=300 MG/DL
APPEARANCE UR: CLEAR
BILIRUB UR QL STRIP: NEGATIVE
COLOR UR AUTO: YELLOW
ERYTHROCYTE [SEDIMENTATION RATE] IN BLOOD BY WESTERGREN METHOD: 39 MM/HR (ref 0–30)
GLUCOSE UR STRIP-MCNC: NEGATIVE MG/DL
HGB UR QL STRIP: NEGATIVE
KETONES UR STRIP-MCNC: NEGATIVE MG/DL
LEUKOCYTE ESTERASE UR QL STRIP: NEGATIVE
Lab: NORMAL
NITRATE UR QL: NEGATIVE
PERFORMING LABORATORY: NORMAL
PH UR STRIP: 6 [PH] (ref 5–7)
RBC #/AREA URNS AUTO: ABNORMAL /HPF
SP GR UR STRIP: 1.01 (ref 1–1.03)
SPECIMEN STATUS: NORMAL
SQUAMOUS #/AREA URNS AUTO: ABNORMAL /LPF
TEST NAME: NORMAL
TRANS CELLS #/AREA URNS HPF: ABNORMAL /HPF
UROBILINOGEN UR STRIP-ACNC: 0.2 E.U./DL
WBC #/AREA URNS AUTO: ABNORMAL /HPF

## 2024-11-13 PROCEDURE — 86235 NUCLEAR ANTIGEN ANTIBODY: CPT

## 2024-11-13 PROCEDURE — 80069 RENAL FUNCTION PANEL: CPT

## 2024-11-13 PROCEDURE — 86160 COMPLEMENT ANTIGEN: CPT

## 2024-11-13 PROCEDURE — 83521 IG LIGHT CHAINS FREE EACH: CPT

## 2024-11-13 PROCEDURE — 84550 ASSAY OF BLOOD/URIC ACID: CPT

## 2024-11-13 PROCEDURE — 83935 ASSAY OF URINE OSMOLALITY: CPT

## 2024-11-13 PROCEDURE — 84300 ASSAY OF URINE SODIUM: CPT

## 2024-11-13 PROCEDURE — 84165 PROTEIN E-PHORESIS SERUM: CPT | Performed by: PATHOLOGY

## 2024-11-13 PROCEDURE — 84156 ASSAY OF PROTEIN URINE: CPT

## 2024-11-13 PROCEDURE — 84155 ASSAY OF PROTEIN SERUM: CPT

## 2024-11-13 PROCEDURE — 86225 DNA ANTIBODY NATIVE: CPT

## 2024-11-13 PROCEDURE — 86235 NUCLEAR ANTIGEN ANTIBODY: CPT | Mod: 59

## 2024-11-13 PROCEDURE — 86038 ANTINUCLEAR ANTIBODIES: CPT

## 2024-11-13 NOTE — TELEPHONE ENCOUNTER
Scheduled patient for nephrologist labs, Dr. Bynum, at [facility] on 11/13/24 at 2:30 pm.  Patrica STRICKLAND    [facility]

## 2024-11-13 NOTE — TELEPHONE ENCOUNTER
Reason for Call:  Appointment Request    Patient requesting this type of appt:  Lab    Requested provider: Dee Chiu    Reason patient unable to be scheduled:  Wants to come in today for a lab because she is already picking up a prescription    When does patient want to be seen/preferred time: Same day    Comments: Has a walker so multiple trips are a burden for her.  Goes to [facility] and wants the 1:30 or 2:30 appt time today.    Could we send this information to you in [employer] or would you prefer to receive a phone call?:   Patient would prefer a phone call   Okay to leave a detailed message?: Yes at Cell number on file:    Telephone Information:   Mobile [phone]       Call taken on 11/13/2024 at 10:08 AM by Cammie Aguilar

## 2024-11-13 NOTE — TELEPHONE ENCOUNTER
I have not seen her in over a year and she has no pending appt with me  Is she wanting the labwork that nephrology or cardiology ordered be drawn?    Dee Chiu MD

## 2024-11-14 LAB
ALBUMIN MFR UR ELPH: 114 MG/DL
ALBUMIN SERPL BCG-MCNC: 3.5 G/DL (ref 3.5–5.2)
ALBUMIN SERPL ELPH-MCNC: 3.4 G/DL (ref 3.7–5.1)
ALPHA1 GLOB SERPL ELPH-MCNC: 0.3 G/DL (ref 0.2–0.4)
ALPHA2 GLOB SERPL ELPH-MCNC: 0.7 G/DL (ref 0.5–0.9)
ANA SER QL IF: NEGATIVE
ANION GAP SERPL CALCULATED.3IONS-SCNC: 13 MMOL/L (ref 7–15)
B-GLOBULIN SERPL ELPH-MCNC: 0.7 G/DL (ref 0.6–1)
BUN SERPL-MCNC: 38 MG/DL (ref 8–23)
C3 SERPL-MCNC: 64 MG/DL (ref 81–157)
C4 SERPL-MCNC: 19 MG/DL (ref 13–39)
CALCIUM SERPL-MCNC: 8.8 MG/DL (ref 8.8–10.4)
CHLORIDE SERPL-SCNC: 95 MMOL/L (ref 98–107)
CREAT SERPL-MCNC: 1.58 MG/DL (ref 0.51–0.95)
CREAT UR-MCNC: 40.3 MG/DL
DSDNA AB SER-ACNC: 1.3 IU/ML
EGFRCR SERPLBLD CKD-EPI 2021: 32 ML/MIN/1.73M2
ENA SS-B IGG SER IA-ACNC: 1.8 U/ML
ENA SS-B IGG SER IA-ACNC: NEGATIVE
GAMMA GLOB SERPL ELPH-MCNC: 1.5 G/DL (ref 0.7–1.6)
GLUCOSE SERPL-MCNC: 84 MG/DL (ref 70–99)
HCO3 SERPL-SCNC: 20 MMOL/L (ref 22–29)
KAPPA LC FREE SER-MCNC: 11.03 MG/DL (ref 0.33–1.94)
KAPPA LC FREE/LAMBDA FREE SER NEPH: 1.59 {RATIO} (ref 0.26–1.65)
LAMBDA LC FREE SERPL-MCNC: 6.92 MG/DL (ref 0.57–2.63)
M PROTEIN SERPL ELPH-MCNC: 0 G/DL
OSMOLALITY UR: 281 MMOL/KG (ref 100–1200)
PHOSPHATE SERPL-MCNC: 3.9 MG/DL (ref 2.5–4.5)
POTASSIUM SERPL-SCNC: 4.4 MMOL/L (ref 3.4–5.3)
PROT PATTERN SERPL ELPH-IMP: ABNORMAL
PROT/CREAT 24H UR: 2.83 MG/MG CR (ref 0–0.2)
SODIUM SERPL-SCNC: 128 MMOL/L (ref 135–145)
SODIUM UR-SCNC: 46 MMOL/L
TOTAL PROTEIN SERUM FOR ELP: 6.6 G/DL (ref 6.4–8.3)
URATE SERPL-MCNC: 6.2 MG/DL (ref 2.4–5.7)

## 2024-11-15 LAB
CENTROMERE B AB SER-ACNC: 67 U/ML
CENTROMERE B AB SER-ACNC: POSITIVE
ENA JO1 AB SER IA-ACNC: <0.5 U/ML
ENA JO1 IGG SER-ACNC: NEGATIVE
ENA SCL70 IGG SER IA-ACNC: <0.6 U/ML
ENA SCL70 IGG SER IA-ACNC: NEGATIVE
ENA SM IGG SER IA-ACNC: <0.7 U/ML
ENA SM IGG SER IA-ACNC: NEGATIVE
ENA SS-A AB SER IA-ACNC: 59 U/ML
ENA SS-A AB SER IA-ACNC: POSITIVE
U1 SNRNP IGG SER IA-ACNC: 1.7 U/ML
U1 SNRNP IGG SER IA-ACNC: NEGATIVE

## 2024-11-16 ENCOUNTER — HEALTH MAINTENANCE LETTER (OUTPATIENT)
Age: 84
End: 2024-11-16

## 2024-11-17 LAB — MISCELLANEOUS TEST 1 (ARUP): NORMAL

## 2025-01-15 ENCOUNTER — LAB (OUTPATIENT)
Dept: LAB | Facility: CLINIC | Age: 85
End: 2025-01-15
Payer: COMMERCIAL

## 2025-01-15 DIAGNOSIS — E87.1 HYPOSMOLALITY SYNDROME: ICD-10-CM

## 2025-01-15 DIAGNOSIS — I10 ESSENTIAL HYPERTENSION WITH GOAL BLOOD PRESSURE LESS THAN 140/90: ICD-10-CM

## 2025-01-15 DIAGNOSIS — T86.40 COMPLICATION OF TRANSPLANTED LIVER (H): ICD-10-CM

## 2025-01-15 DIAGNOSIS — N18.9 CHRONIC KIDNEY DISEASE, UNSPECIFIED: ICD-10-CM

## 2025-01-15 DIAGNOSIS — Z94.4 LIVER REPLACED BY TRANSPLANT (H): ICD-10-CM

## 2025-01-15 DIAGNOSIS — I12.9 MALIGNANT HYPERTENSIVE KIDNEY DISEASE WITH CHRONIC KIDNEY DISEASE STAGE I THROUGH STAGE IV, OR UNSPECIFIED(403.00): ICD-10-CM

## 2025-01-15 DIAGNOSIS — T86.20: ICD-10-CM

## 2025-01-15 DIAGNOSIS — N18.32 CHRONIC KIDNEY DISEASE (CKD) STAGE G3B/A1, MODERATELY DECREASED GLOMERULAR FILTRATION RATE (GFR) BETWEEN 30-44 ML/MIN/1.73 SQUARE METER AND ALBUMINURIA CREATININE RATIO LESS THAN 30 MG/G (H): ICD-10-CM

## 2025-01-15 DIAGNOSIS — K74.3 CHOLANGITIC CIRRHOSIS (H): ICD-10-CM

## 2025-01-15 DIAGNOSIS — R80.8 OTHER PROTEINURIA: ICD-10-CM
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
## 2025-01-15 DIAGNOSIS — I25.10 CORONARY ARTERY DISEASE INVOLVING NATIVE HEART WITHOUT ANGINA PECTORIS, UNSPECIFIED VESSEL OR LESION TYPE: ICD-10-CM

## 2025-01-15 LAB
ERYTHROCYTE [DISTWIDTH] IN BLOOD BY AUTOMATED COUNT: 12.7 % (ref 10–15)
HCT VFR BLD AUTO: 31 % (ref 35–47)
HGB BLD-MCNC: 10.6 G/DL (ref 11.7–15.7)
MCH RBC QN AUTO: 30.9 PG (ref 26.5–33)
MCHC RBC AUTO-ENTMCNC: 34.2 G/DL (ref 31.5–36.5)
MCV RBC AUTO: 90 FL (ref 78–100)
PLATELET # BLD AUTO: 244 10E3/UL (ref 150–450)
RBC # BLD AUTO: 3.43 10E6/UL (ref 3.8–5.2)
WBC # BLD AUTO: 4.9 10E3/UL (ref 4–11)

## 2025-01-15 PROCEDURE — 36415 COLL VENOUS BLD VENIPUNCTURE: CPT

## 2025-01-15 PROCEDURE — 80061 LIPID PANEL: CPT

## 2025-01-15 PROCEDURE — 85027 COMPLETE CBC AUTOMATED: CPT

## 2025-01-15 PROCEDURE — 84550 ASSAY OF BLOOD/URIC ACID: CPT

## 2025-01-15 PROCEDURE — 84156 ASSAY OF PROTEIN URINE: CPT

## 2025-01-15 PROCEDURE — 80069 RENAL FUNCTION PANEL: CPT

## 2025-01-16 LAB
ALBUMIN MFR UR ELPH: 148 MG/DL
ALBUMIN SERPL BCG-MCNC: 3.4 G/DL (ref 3.5–5.2)
ANION GAP SERPL CALCULATED.3IONS-SCNC: 11 MMOL/L (ref 7–15)
BUN SERPL-MCNC: 32.6 MG/DL (ref 8–23)
CALCIUM SERPL-MCNC: 8.6 MG/DL (ref 8.8–10.4)
CHLORIDE SERPL-SCNC: 96 MMOL/L (ref 98–107)
CHOLEST SERPL-MCNC: 149 MG/DL
CREAT SERPL-MCNC: 1.75 MG/DL (ref 0.51–0.95)
CREAT UR-MCNC: 78.4 MG/DL
EGFRCR SERPLBLD CKD-EPI 2021: 29 ML/MIN/1.73M2
FASTING STATUS PATIENT QL REPORTED: NO
GLUCOSE SERPL-MCNC: 97 MG/DL (ref 70–99)
HCO3 SERPL-SCNC: 20 MMOL/L (ref 22–29)
HDLC SERPL-MCNC: 67 MG/DL
LDLC SERPL CALC-MCNC: 63 MG/DL
NONHDLC SERPL-MCNC: 82 MG/DL
PHOSPHATE SERPL-MCNC: 3.7 MG/DL (ref 2.5–4.5)
POTASSIUM SERPL-SCNC: 4.8 MMOL/L (ref 3.4–5.3)
PROT/CREAT 24H UR: 1.89 MG/MG CR (ref 0–0.2)
SODIUM SERPL-SCNC: 127 MMOL/L (ref 135–145)
TRIGL SERPL-MCNC: 97 MG/DL
URATE SERPL-MCNC: 6.5 MG/DL (ref 2.4–5.7)

## 2025-01-27 DIAGNOSIS — Z94.4 LIVER TRANSPLANTED (H): ICD-10-CM

## 2025-01-27 DIAGNOSIS — T86.41 LIVER TRANSPLANT REJECTION (H): ICD-10-CM

## 2025-01-27 RX ORDER — PREDNISONE 5 MG/1
TABLET ORAL
Qty: 90 TABLET | Refills: 3 | Status: SHIPPED | OUTPATIENT
Start: 2025-01-27

## 2025-03-24 ENCOUNTER — PATIENT OUTREACH (OUTPATIENT)
Dept: CARE COORDINATION | Facility: CLINIC | Age: 85
End: 2025-03-24
Payer: COMMERCIAL

## 2025-04-02 ENCOUNTER — LAB (OUTPATIENT)
Dept: LAB | Facility: CLINIC | Age: 85
End: 2025-04-02
Payer: COMMERCIAL

## 2025-04-02 DIAGNOSIS — I12.9 MALIGNANT HYPERTENSIVE KIDNEY DISEASE WITH CHRONIC KIDNEY DISEASE STAGE I THROUGH STAGE IV, OR UNSPECIFIED(403.00): ICD-10-CM

## 2025-04-02 DIAGNOSIS — Z94.4 LIVER REPLACED BY TRANSPLANT (H): ICD-10-CM

## 2025-04-02 DIAGNOSIS — N18.32 CHRONIC KIDNEY DISEASE (CKD) STAGE G3B/A1, MODERATELY DECREASED GLOMERULAR FILTRATION RATE (GFR) BETWEEN 30-44 ML/MIN/1.73 SQUARE METER AND ALBUMINURIA CREATININE RATIO LESS THAN 30 MG/G (H): ICD-10-CM

## 2025-04-02 DIAGNOSIS — Z94.4 LIVER REPLACED BY TRANSPLANT (H): Primary | ICD-10-CM

## 2025-04-02 DIAGNOSIS — E87.1 HYPOSMOLALITY SYNDROME: ICD-10-CM

## 2025-04-02 DIAGNOSIS — N18.9 CHRONIC KIDNEY DISEASE, UNSPECIFIED: ICD-10-CM

## 2025-04-02 DIAGNOSIS — R80.8 OTHER PROTEINURIA: ICD-10-CM

## 2025-04-02 LAB
ALBUMIN MFR UR ELPH: 214 MG/DL
ALBUMIN SERPL BCG-MCNC: 3.5 G/DL (ref 3.5–5.2)
ALBUMIN UR-MCNC: >=300 MG/DL
ANION GAP SERPL CALCULATED.3IONS-SCNC: 11 MMOL/L (ref 7–15)
APPEARANCE UR: CLEAR
BILIRUB UR QL STRIP: NEGATIVE
BUN SERPL-MCNC: 41.1 MG/DL (ref 8–23)
CALCIUM SERPL-MCNC: 8.5 MG/DL (ref 8.8–10.4)
CHLORIDE SERPL-SCNC: 101 MMOL/L (ref 98–107)
COLOR UR AUTO: YELLOW
CREAT SERPL-MCNC: 1.81 MG/DL (ref 0.51–0.95)
CREAT UR-MCNC: 103 MG/DL
CREAT UR-MCNC: 103 MG/DL
EGFRCR SERPLBLD CKD-EPI 2021: 27 ML/MIN/1.73M2
GLUCOSE SERPL-MCNC: 87 MG/DL (ref 70–99)
GLUCOSE UR STRIP-MCNC: NEGATIVE MG/DL
HCO3 SERPL-SCNC: 21 MMOL/L (ref 22–29)
HGB BLD-MCNC: 10.3 G/DL (ref 11.7–15.7)
HGB UR QL STRIP: NEGATIVE
HYALINE CASTS #/AREA URNS LPF: ABNORMAL /LPF
KETONES UR STRIP-MCNC: NEGATIVE MG/DL
LEUKOCYTE ESTERASE UR QL STRIP: NEGATIVE
MICROALBUMIN UR-MCNC: 1344 MG/L
MICROALBUMIN/CREAT UR: 1304.85 MG/G CR (ref 0–25)
MUCOUS THREADS #/AREA URNS LPF: PRESENT /LPF
NITRATE UR QL: NEGATIVE
PH UR STRIP: 6 [PH] (ref 5–7)
PHOSPHATE SERPL-MCNC: 3.8 MG/DL (ref 2.5–4.5)
POTASSIUM SERPL-SCNC: 4.7 MMOL/L (ref 3.4–5.3)
PROT/CREAT 24H UR: 2.08 MG/MG CR (ref 0–0.2)
PTH-INTACT SERPL-MCNC: 75 PG/ML (ref 15–65)
RBC #/AREA URNS AUTO: ABNORMAL /HPF
SODIUM SERPL-SCNC: 133 MMOL/L (ref 135–145)
SP GR UR STRIP: 1.02 (ref 1–1.03)
SQUAMOUS #/AREA URNS AUTO: ABNORMAL /LPF
UROBILINOGEN UR STRIP-ACNC: 0.2 E.U./DL
VIT D+METAB SERPL-MCNC: 53 NG/ML (ref 20–50)
WBC #/AREA URNS AUTO: ABNORMAL /HPF

## 2025-04-02 PROCEDURE — 85018 HEMOGLOBIN: CPT

## 2025-04-02 PROCEDURE — 81001 URINALYSIS AUTO W/SCOPE: CPT

## 2025-04-02 PROCEDURE — 80069 RENAL FUNCTION PANEL: CPT

## 2025-04-02 PROCEDURE — 36415 COLL VENOUS BLD VENIPUNCTURE: CPT

## 2025-04-02 PROCEDURE — 82306 VITAMIN D 25 HYDROXY: CPT

## 2025-04-02 PROCEDURE — 82043 UR ALBUMIN QUANTITATIVE: CPT

## 2025-04-02 PROCEDURE — 82570 ASSAY OF URINE CREATININE: CPT

## 2025-04-02 PROCEDURE — 84156 ASSAY OF PROTEIN URINE: CPT

## 2025-04-02 PROCEDURE — 83970 ASSAY OF PARATHORMONE: CPT

## 2025-04-10 DIAGNOSIS — Z94.4 LIVER REPLACED BY TRANSPLANT (H): ICD-10-CM

## 2025-04-10 DIAGNOSIS — N18.9 CHRONIC KIDNEY DISEASE, UNSPECIFIED: ICD-10-CM

## 2025-04-10 DIAGNOSIS — I12.9 MALIGNANT HYPERTENSIVE KIDNEY DISEASE WITH CHRONIC KIDNEY DISEASE STAGE I THROUGH STAGE IV, OR UNSPECIFIED(403.00): ICD-10-CM

## 2025-04-10 DIAGNOSIS — N18.32 CHRONIC KIDNEY DISEASE (CKD) STAGE G3B/A1, MODERATELY DECREASED GLOMERULAR FILTRATION RATE (GFR) BETWEEN 30-44 ML/MIN/1.73 SQUARE METER AND ALBUMINURIA CREATININE RATIO LESS THAN 30 MG/G (H): Primary | ICD-10-CM

## 2025-04-10 DIAGNOSIS — E87.1 HYPOSMOLALITY SYNDROME: ICD-10-CM

## 2025-04-10 DIAGNOSIS — R80.8 OTHER PROTEINURIA: ICD-10-CM

## 2025-05-16 DIAGNOSIS — Z94.4 LIVER REPLACED BY TRANSPLANT (H): ICD-10-CM

## 2025-05-19 ENCOUNTER — RESULTS FOLLOW-UP (OUTPATIENT)
Dept: TRANSPLANT | Facility: CLINIC | Age: 85
End: 2025-05-19

## 2025-05-19 ENCOUNTER — LAB (OUTPATIENT)
Dept: LAB | Facility: CLINIC | Age: 85
End: 2025-05-19
Payer: COMMERCIAL

## 2025-05-19 DIAGNOSIS — N18.4 CHRONIC KIDNEY DISEASE, STAGE IV (SEVERE) (H): ICD-10-CM

## 2025-05-19 DIAGNOSIS — I12.9 MALIGNANT HYPERTENSIVE KIDNEY DISEASE WITH CHRONIC KIDNEY DISEASE STAGE I THROUGH STAGE IV, OR UNSPECIFIED(403.00): ICD-10-CM

## 2025-05-19 DIAGNOSIS — N18.9 CHRONIC KIDNEY DISEASE, UNSPECIFIED: ICD-10-CM

## 2025-05-19 DIAGNOSIS — Z94.4 LIVER REPLACED BY TRANSPLANT (H): Primary | ICD-10-CM

## 2025-05-19 DIAGNOSIS — T86.40 COMPLICATION OF TRANSPLANTED LIVER (H): ICD-10-CM
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
## 2025-05-19 DIAGNOSIS — E87.1 HYPOSMOLALITY SYNDROME: ICD-10-CM

## 2025-05-19 DIAGNOSIS — R80.8 OTHER PROTEINURIA: ICD-10-CM

## 2025-05-19 DIAGNOSIS — K74.4 SECONDARY BILIARY CIRRHOSIS (H): ICD-10-CM

## 2025-05-19 DIAGNOSIS — Z94.4 LIVER REPLACED BY TRANSPLANT (H): ICD-10-CM

## 2025-05-19 DIAGNOSIS — N18.32 CHRONIC KIDNEY DISEASE (CKD) STAGE G3B/A1, MODERATELY DECREASED GLOMERULAR FILTRATION RATE (GFR) BETWEEN 30-44 ML/MIN/1.73 SQUARE METER AND ALBUMINURIA CREATININE RATIO LESS THAN 30 MG/G (H): ICD-10-CM

## 2025-05-19 LAB
ALBUMIN MFR UR ELPH: 106 MG/DL
ALBUMIN SERPL BCG-MCNC: 3.3 G/DL (ref 3.5–5.2)
ALBUMIN UR-MCNC: >=300 MG/DL
ALP SERPL-CCNC: 144 U/L (ref 40–150)
ALT SERPL W P-5'-P-CCNC: 29 U/L (ref 0–50)
ANION GAP SERPL CALCULATED.3IONS-SCNC: 12 MMOL/L (ref 7–15)
APPEARANCE UR: CLEAR
AST SERPL W P-5'-P-CCNC: 44 U/L (ref 0–45)
BILIRUB SERPL-MCNC: 0.3 MG/DL
BILIRUB UR QL STRIP: NEGATIVE
BILIRUBIN DIRECT (ROCHE PRO & PURE): 0.15 MG/DL (ref 0–0.45)
BUN SERPL-MCNC: 49.5 MG/DL (ref 8–23)
CALCIUM SERPL-MCNC: 9.2 MG/DL (ref 8.8–10.4)
CHLORIDE SERPL-SCNC: 99 MMOL/L (ref 98–107)
COLOR UR AUTO: YELLOW
CREAT SERPL-MCNC: 1.88 MG/DL (ref 0.51–0.95)
CREAT UR-MCNC: 35.1 MG/DL
CREAT UR-MCNC: 35.1 MG/DL
EGFRCR SERPLBLD CKD-EPI 2021: 26 ML/MIN/1.73M2
ERYTHROCYTE [DISTWIDTH] IN BLOOD BY AUTOMATED COUNT: 13.2 % (ref 10–15)
GLUCOSE SERPL-MCNC: 94 MG/DL (ref 70–99)
GLUCOSE UR STRIP-MCNC: NEGATIVE MG/DL
HCO3 SERPL-SCNC: 22 MMOL/L (ref 22–29)
HCT VFR BLD AUTO: 31 % (ref 35–47)
HGB BLD-MCNC: 10.3 G/DL (ref 11.7–15.7)
HGB UR QL STRIP: NEGATIVE
KETONES UR STRIP-MCNC: NEGATIVE MG/DL
LEUKOCYTE ESTERASE UR QL STRIP: NEGATIVE
MAGNESIUM SERPL-MCNC: 1.7 MG/DL (ref 1.7–2.3)
MCH RBC QN AUTO: 31.1 PG (ref 26.5–33)
MCHC RBC AUTO-ENTMCNC: 33.2 G/DL (ref 31.5–36.5)
MCV RBC AUTO: 94 FL (ref 78–100)
MICROALBUMIN UR-MCNC: 702 MG/L
MICROALBUMIN/CREAT UR: 2000 MG/G CR (ref 0–25)
NITRATE UR QL: NEGATIVE
PH UR STRIP: 7 [PH] (ref 5–7)
PHOSPHATE SERPL-MCNC: 3.6 MG/DL (ref 2.5–4.5)
PLATELET # BLD AUTO: 229 10E3/UL (ref 150–450)
POTASSIUM SERPL-SCNC: 4.5 MMOL/L (ref 3.4–5.3)
PROT SERPL-MCNC: 6.7 G/DL (ref 6.4–8.3)
PROT/CREAT 24H UR: 3.02 MG/MG CR (ref 0–0.2)
PTH-INTACT SERPL-MCNC: 33 PG/ML (ref 15–65)
RBC # BLD AUTO: 3.31 10E6/UL (ref 3.8–5.2)
RBC #/AREA URNS AUTO: ABNORMAL /HPF
SODIUM SERPL-SCNC: 133 MMOL/L (ref 135–145)
SP GR UR STRIP: 1.01 (ref 1–1.03)
SQUAMOUS #/AREA URNS AUTO: ABNORMAL /LPF
TACROLIMUS BLD-MCNC: 3.8 UG/L (ref 5–15)
TME LAST DOSE: ABNORMAL H
TME LAST DOSE: ABNORMAL H
UROBILINOGEN UR STRIP-ACNC: 0.2 E.U./DL
VIT D+METAB SERPL-MCNC: 49 NG/ML (ref 20–50)
WBC # BLD AUTO: 5 10E3/UL (ref 4–11)
WBC #/AREA URNS AUTO: ABNORMAL /HPF

## 2025-05-19 PROCEDURE — 82570 ASSAY OF URINE CREATININE: CPT

## 2025-05-19 PROCEDURE — 82248 BILIRUBIN DIRECT: CPT

## 2025-05-19 PROCEDURE — 85027 COMPLETE CBC AUTOMATED: CPT

## 2025-05-19 PROCEDURE — 84100 ASSAY OF PHOSPHORUS: CPT

## 2025-05-19 PROCEDURE — 36415 COLL VENOUS BLD VENIPUNCTURE: CPT

## 2025-05-19 PROCEDURE — 81001 URINALYSIS AUTO W/SCOPE: CPT

## 2025-05-19 PROCEDURE — 80053 COMPREHEN METABOLIC PANEL: CPT

## 2025-05-19 PROCEDURE — 83970 ASSAY OF PARATHORMONE: CPT

## 2025-05-19 PROCEDURE — 82306 VITAMIN D 25 HYDROXY: CPT | Mod: GZ

## 2025-05-19 PROCEDURE — 83735 ASSAY OF MAGNESIUM: CPT

## 2025-05-19 PROCEDURE — 82043 UR ALBUMIN QUANTITATIVE: CPT

## 2025-05-19 PROCEDURE — 80197 ASSAY OF TACROLIMUS: CPT

## 2025-05-19 PROCEDURE — 84156 ASSAY OF PROTEIN URINE: CPT

## 2025-05-19 RX ORDER — TACROLIMUS 0.5 MG/1
CAPSULE ORAL
Qty: 270 CAPSULE | Refills: 3 | Status: SHIPPED | OUTPATIENT
Start: 2025-05-19 | End: 2025-05-19 | Stop reason: ALTCHOICE

## 2025-05-19 RX ORDER — TACROLIMUS 0.5 MG/1
CAPSULE ORAL
Qty: 270 CAPSULE | Refills: 3 | Status: SHIPPED | OUTPATIENT
Start: 2025-05-19

## 2025-07-21 DIAGNOSIS — I25.10 CORONARY ARTERY DISEASE INVOLVING NATIVE HEART WITHOUT ANGINA PECTORIS, UNSPECIFIED VESSEL OR LESION TYPE: ICD-10-CM

## 2025-07-21 DIAGNOSIS — I25.119 CORONARY ARTERY DISEASE INVOLVING NATIVE HEART WITH ANGINA PECTORIS, UNSPECIFIED VESSEL OR LESION TYPE: ICD-10-CM

## 2025-07-21 DIAGNOSIS — I10 ESSENTIAL HYPERTENSION WITH GOAL BLOOD PRESSURE LESS THAN 140/90: ICD-10-CM

## 2025-07-21 DIAGNOSIS — I25.10 CORONARY ARTERY DISEASE DUE TO LIPID RICH PLAQUE: ICD-10-CM

## 2025-07-21 DIAGNOSIS — I25.83 CORONARY ARTERY DISEASE DUE TO LIPID RICH PLAQUE: ICD-10-CM

## 2025-07-21 DIAGNOSIS — I15.9 SECONDARY HYPERTENSION WITH GOAL BLOOD PRESSURE LESS THAN 140/90: ICD-10-CM

## 2025-07-23 RX ORDER — ISOSORBIDE MONONITRATE 60 MG/1
60 TABLET, EXTENDED RELEASE ORAL 2 TIMES DAILY
Qty: 180 TABLET | Refills: 0 | Status: SHIPPED | OUTPATIENT
Start: 2025-07-23

## 2025-07-23 RX ORDER — METOPROLOL TARTRATE 50 MG
50 TABLET ORAL 2 TIMES DAILY
Qty: 180 TABLET | Refills: 0 | Status: SHIPPED | OUTPATIENT
Start: 2025-07-23

## 2025-07-23 NOTE — TELEPHONE ENCOUNTER
Last Written Prescription:  isosorbide mononitrate (IMDUR) 60 MG 24 hr tablet 180 tablet 1 10/14/2024 -- No   Sig - Route: Take 1 tablet (60 mg) by mouth 2 times daily. - Oral   Sent to pharmacy as: Isosorbide Mononitrate ER 60 MG Oral Tablet Extended Release 24 Hour (IMDUR)   Class: E-Prescribe   Order: [id number]     ----------------------  Last Visit Date: 8/7/24  Future Visit Date: 11/12/25  ----------------------      Refill decision:   [x] Medication refilled per  Medication Refill in Ambulatory Care  policy.    BP Readings from Last 3 Encounters:   08/07/24 (!) 183/72   11/06/23 (!) 151/78   10/30/23 (!) 183/91         Request from pharmacy:  Requested Prescriptions   Pending Prescriptions Disp Refills    isosorbide mononitrate (IMDUR) 60 MG 24 hr tablet [Pharmacy Med Name: ISOSORBIDE MONONITRATE ER 60 TB24] 180 tablet 1     Sig: TAKE 1 TABLET (60 MG) BY MOUTH 2 TIMES DAILY.       Nitrates Failed - 7/23/2025  1:00 PM        Failed - Always fail criteria - needs review     Review chart. Forward refill request to provider if patient has exceeded one bottle per 3 months.           Passed - Pt is not on erectile dysfunction medications        Passed - Medication is active on med list and the sig matches. RN to manually verify dose and sig if red X/fail.     If the protocol passes (green check), you do not need to verify med dose and sig.    A prescription matches if they are the same clinical intention.    For Example: once daily and every morning are the same.    The protocol can not identify upper and lower case letters as matching and will fail.     For Example: Take 1 tablet (50 mg) by mouth daily     TAKE 1 TABLET (50 MG) BY MOUTH DAILY    For all fails (red x), verify dose and sig.    If the refill does match what is on file, the RN can still proceed to approve the refill request.       If they do not match, route to the appropriate provider.             Passed - Blood pressure on file in past 12 months      BP Readings from Last 3 Encounters:   08/07/24 (!) 183/72   11/06/23 (!) 151/78   10/30/23 (!) 183/91       No data recorded            Passed - Recent (12 month) or future (90 days) visit with authorizing provider's specialty (provided they have been seen in the past 15 months)     The patient must have completed an in-person or virtual visit within the past 12 months or has a future visit scheduled within the next 90 days with the authorizing provider s specialty.  Urgent care and e-visits do not qualify as an office visit for this protocol.          Passed - Medication indicated for associated diagnosis     Medication is associated with one or more of the following diagnoses:    Anal fissure   Angina pectoris   Cardiac syndrome X   Congestive heart failure   Hypertension   Myocardial infarction   Pulmonary edema   Pulmonary hypertension          Passed - Patient is age 18 or older

## 2025-07-23 NOTE — TELEPHONE ENCOUNTER
Last Written Prescription:  metoprolol tartrate (LOPRESSOR) 50 MG tablet 180 tablet 3 7/9/2024 -- No   Sig - Route: TAKE ONE TABLET BY MOUTH TWICE A DAY - Oral   Sent to pharmacy as: Metoprolol Tartrate 50 MG Oral Tablet (LOPRESSOR)   Class: E-Prescribe   Order: [id number]     ----------------------  Last Visit Date: 8/7/24  Future Visit Date: 11/12/25  ----------------------      Refill decision:   [x] Medication refilled per  Medication Refill in Ambulatory Care  policy.    BP Readings from Last 3 Encounters:   08/07/24 (!) 183/72   11/06/23 (!) 151/78   10/30/23 (!) 183/91         Request from pharmacy:  Requested Prescriptions   Pending Prescriptions Disp Refills    metoprolol tartrate (LOPRESSOR) 50 MG tablet [Pharmacy Med Name: METOPROLOL TARTRATE 50MG TABS] 180 tablet 3     Sig: TAKE ONE TABLET BY MOUTH TWICE A DAY       Beta-Blockers Protocol Failed - 7/23/2025  1:05 PM        Failed - Recent (12 month) or future (90 days) visit with authorizing provider's specialty (provided they have been seen in the past 15 months)     The patient must have completed an in-person or virtual visit within the past 12 months or has a future visit scheduled within the next 90 days with the authorizing provider s specialty.  Urgent care and e-visits do not qualify as an office visit for this protocol.          Passed - Patient is age 6 or older        Passed - Medication is active on med list and the sig matches. RN to manually verify dose and sig if red X/fail.     If the protocol passes (green check), you do not need to verify med dose and sig.    A prescription matches if they are the same clinical intention.    For Example: once daily and every morning are the same.    The protocol can not identify upper and lower case letters as matching and will fail.     For Example: Take 1 tablet (50 mg) by mouth daily     TAKE 1 TABLET (50 MG) BY MOUTH DAILY    For all fails (red x), verify dose and sig.    If the refill does match  what is on file, the RN can still proceed to approve the refill request.       If they do not match, route to the appropriate provider.             Passed - Medication indicated for associated diagnosis     Medication is associated with one or more of the following diagnoses:     Hypertension (HTN)   Atrial fibrillation/flutter   Angina   ASCVD   Migraine   Heart Failure   Tremor   Anxiety   Ocular hypertension   Glaucoma   PTSD   Obstructive hypertrophic cardiomyopathy   History of myocarditis   Palpitations   POTS (postural orthostatic tachycardia syndrome)   SVT (supraventricular tachycardia)   Hyperthyroidism   Tachycardia   Acute non-st segment elevation myocardial infarction   Subsequent non-st segment elevation myocardial infarction   Ischemic myocardial dysfunction          Passed - Most recent blood pressure on file in last 12 months     BP Readings from Last 3 Encounters:   08/07/24 (!) 183/72   11/06/23 (!) 151/78   10/30/23 (!) 183/91       No data recorded

## 2025-07-24 ENCOUNTER — PATIENT OUTREACH (OUTPATIENT)
Dept: CARE COORDINATION | Facility: CLINIC | Age: 85
End: 2025-07-24
Payer: COMMERCIAL

## 2025-08-05 DIAGNOSIS — N18.9 CHRONIC KIDNEY DISEASE, UNSPECIFIED: ICD-10-CM

## 2025-08-05 DIAGNOSIS — R80.8 OTHER PROTEINURIA: ICD-10-CM

## 2025-08-05 DIAGNOSIS — E87.1 HYPONATREMIA: ICD-10-CM

## 2025-08-05 DIAGNOSIS — K74.4 SECONDARY BILIARY CIRRHOSIS (H): ICD-10-CM

## 2025-08-05 DIAGNOSIS — T86.40 COMPLICATION OF TRANSPLANTED LIVER (H): ICD-10-CM

## 2025-08-05 DIAGNOSIS — N18.4 CHRONIC KIDNEY DISEASE, STAGE IV (SEVERE) (H): Primary | ICD-10-CM
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
## 2025-08-05 DIAGNOSIS — I12.9 MALIGNANT HYPERTENSIVE KIDNEY DISEASE WITH CHRONIC KIDNEY DISEASE STAGE I THROUGH STAGE IV, OR UNSPECIFIED(403.00): ICD-10-CM

## 2025-08-08 ENCOUNTER — OFFICE VISIT (OUTPATIENT)
Dept: GASTROENTEROLOGY | Facility: CLINIC | Age: 85
End: 2025-08-08
Attending: INTERNAL MEDICINE
Payer: COMMERCIAL

## 2025-08-08 VITALS
HEART RATE: 79 BPM | BODY MASS INDEX: 24.94 KG/M2 | WEIGHT: 123.5 LBS | OXYGEN SATURATION: 97 % | DIASTOLIC BLOOD PRESSURE: 83 MMHG | SYSTOLIC BLOOD PRESSURE: 213 MMHG

## 2025-08-08 DIAGNOSIS — Z94.4 LIVER REPLACED BY TRANSPLANT (H): ICD-10-CM

## 2025-08-08 PROCEDURE — 3077F SYST BP >= 140 MM HG: CPT | Performed by: INTERNAL MEDICINE

## 2025-08-08 PROCEDURE — 99000 SPECIMEN HANDLING OFFICE-LAB: CPT | Performed by: PATHOLOGY

## 2025-08-08 PROCEDURE — G0463 HOSPITAL OUTPT CLINIC VISIT: HCPCS | Performed by: INTERNAL MEDICINE

## 2025-08-08 PROCEDURE — 99215 OFFICE O/P EST HI 40 MIN: CPT | Performed by: INTERNAL MEDICINE

## 2025-08-08 PROCEDURE — 82306 VITAMIN D 25 HYDROXY: CPT | Performed by: INTERNAL MEDICINE

## 2025-08-08 PROCEDURE — 3079F DIAST BP 80-89 MM HG: CPT | Performed by: INTERNAL MEDICINE

## 2025-08-08 PROCEDURE — 80197 ASSAY OF TACROLIMUS: CPT | Performed by: INTERNAL MEDICINE

## 2025-08-09 ENCOUNTER — LAB (OUTPATIENT)
Dept: LAB | Facility: CLINIC | Age: 85
End: 2025-08-09
Payer: COMMERCIAL

## 2025-08-09 DIAGNOSIS — E87.1 HYPONATREMIA: ICD-10-CM

## 2025-08-09 DIAGNOSIS — N18.4 CHRONIC KIDNEY DISEASE, STAGE IV (SEVERE) (H): ICD-10-CM

## 2025-08-09 DIAGNOSIS — N18.9 CHRONIC KIDNEY DISEASE, UNSPECIFIED: ICD-10-CM

## 2025-08-09 DIAGNOSIS — T86.40 COMPLICATION OF TRANSPLANTED LIVER (H): ICD-10-CM

## 2025-08-09 DIAGNOSIS — R80.8 OTHER PROTEINURIA: ICD-10-CM

## 2025-08-09 DIAGNOSIS — I12.9 MALIGNANT HYPERTENSIVE KIDNEY DISEASE WITH CHRONIC KIDNEY DISEASE STAGE I THROUGH STAGE IV, OR UNSPECIFIED(403.00): ICD-10-CM
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
## 2025-08-09 DIAGNOSIS — Z94.4 LIVER REPLACED BY TRANSPLANT (H): ICD-10-CM

## 2025-08-09 DIAGNOSIS — K74.4 SECONDARY BILIARY CIRRHOSIS (H): ICD-10-CM

## 2025-08-09 LAB
ALBUMIN MFR UR ELPH: 130 MG/DL
ALBUMIN UR-MCNC: >=300 MG/DL
APPEARANCE UR: CLEAR
BACTERIA #/AREA URNS HPF: ABNORMAL /HPF
BILIRUB UR QL STRIP: NEGATIVE
COLOR UR AUTO: YELLOW
CREAT UR-MCNC: 53.1 MG/DL
CREAT UR-MCNC: 53.1 MG/DL
GLUCOSE UR STRIP-MCNC: NEGATIVE MG/DL
HGB UR QL STRIP: NEGATIVE
KETONES UR STRIP-MCNC: NEGATIVE MG/DL
LEUKOCYTE ESTERASE UR QL STRIP: ABNORMAL
MICROALBUMIN UR-MCNC: 804 MG/L
MICROALBUMIN/CREAT UR: 1514.12 MG/G CR (ref 0–25)
NITRATE UR QL: NEGATIVE
PH UR STRIP: 6.5 [PH] (ref 5–7)
PROT/CREAT 24H UR: 2.45 MG/MG CR (ref 0–0.2)
RBC #/AREA URNS AUTO: ABNORMAL /HPF
RENAL EPI CELLS #/AREA URNS HPF: ABNORMAL /HPF
SP GR UR STRIP: 1.01 (ref 1–1.03)
SQUAMOUS #/AREA URNS AUTO: ABNORMAL /LPF
UROBILINOGEN UR STRIP-ACNC: 0.2 E.U./DL
WBC #/AREA URNS AUTO: ABNORMAL /HPF
WBC CLUMPS #/AREA URNS HPF: PRESENT /HPF

## 2025-08-09 PROCEDURE — 82043 UR ALBUMIN QUANTITATIVE: CPT

## 2025-08-09 PROCEDURE — 84156 ASSAY OF PROTEIN URINE: CPT

## 2025-08-09 PROCEDURE — 81001 URINALYSIS AUTO W/SCOPE: CPT

## 2025-08-09 PROCEDURE — 82570 ASSAY OF URINE CREATININE: CPT

## (undated) DEVICE — GLOVE PROTEXIS W/NEU-THERA 7.0  2D73TE70

## (undated) DEVICE — ENDO BASKET RETRIEVAL TRAPEZOID WIREGUIDED  2.5CM M00510880

## (undated) DEVICE — SYR 10ML PERFIX LL 332152

## (undated) DEVICE — SU SILK 2-0 SH 30" K833H

## (undated) DEVICE — ESU ELEC BLADE 6" COATED E1450-6

## (undated) DEVICE — LINEN MAYO STAND COVER OVERSIZE PACK 5458

## (undated) DEVICE — BLADE SAW SAGITTAL STRK 25X79.5X1.24MM 4/2000 2108-318-000

## (undated) DEVICE — ENDO BASKET RETRIEVAL TRAPEZOID 2.0CM 1087

## (undated) DEVICE — SU ETHIBOND 1 CTX CR 8/18" CX30D

## (undated) DEVICE — DRSG KERLIX 4 1/2"X4YDS ROLL 6730

## (undated) DEVICE — NDL EPIDURAL TUOHY 20GA 3.5" 405028

## (undated) DEVICE — GLOVE PROTEXIS BLUE W/NEU-THERA 7.5  2D73EB75

## (undated) DEVICE — SOL WATER IRRIG 1000ML BOTTLE 2F7114

## (undated) DEVICE — COVER CAMERA IN-LIGHT DISP LT-C02

## (undated) DEVICE — SUCTION MANIFOLD DORNOCH ULTRA CART UL-CL500

## (undated) DEVICE — STRAP KNEE/BODY 31143004

## (undated) DEVICE — DEVICE RETRIEVER HEWSON 71111579

## (undated) DEVICE — GLOVE PROTEXIS BLUE W/NEU-THERA 8.5  2D73EB85

## (undated) DEVICE — SPONGE LAP 18X18" X8435

## (undated) DEVICE — ENDO TUBING CO2 SMARTCAP STERILE DISP 100145CO2EXT

## (undated) DEVICE — ENDO CATH BALLOON DILATION HURRICANE 10MMX4X180CM M00545960

## (undated) DEVICE — ENDO CATH BALLOON EXTRACTOR PRO RX 09-12MM 4700

## (undated) DEVICE — PACK ENDOSCOPY GI CUSTOM UMMC

## (undated) DEVICE — BIOPSY VALVE BIOSHIELD 00711135

## (undated) DEVICE — SU VICRYL 2-0 CT 36" J957H

## (undated) DEVICE — SOL NACL 0.9% IRRIG 1000ML BOTTLE 2F7124

## (undated) DEVICE — ENDO DEVICE LOCKING AND BIOPSY CAP M00545261

## (undated) DEVICE — SU PDS II 3-0 PS-1 18" Z683G

## (undated) DEVICE — WIPES FOLEY CARE SURESTEP PROVON DFC100

## (undated) DEVICE — WIRE GUIDE 0.025"X450CM STR VISIGLIDE G-240-2545S

## (undated) DEVICE — DRSG TEGADERM 4X4 3/4" 1626W

## (undated) DEVICE — GLOVE PROTEXIS W/NEU-THERA 8.5  2D73TE85

## (undated) DEVICE — DRAPE STOCKINETTE 8" 8586

## (undated) DEVICE — PREP DURAPREP REMOVER 4OZ 8611

## (undated) DEVICE — LINEN BACK PACK 5440

## (undated) DEVICE — SOL WATER IRRIG 1000ML BOTTLE 07139-09

## (undated) DEVICE — GLOVE PROTEXIS W/NEU-THERA 8.0  2D73TE80

## (undated) DEVICE — Device

## (undated) DEVICE — PREP DURAPREP 26ML APL 8630

## (undated) DEVICE — ENDO FUSION OMNI-TOME G31903

## (undated) DEVICE — CATH TRAY FOLEY SURESTEP 16FR WDRAIN BAG STLK LATEX A300316A

## (undated) DEVICE — PREP CHLORAPREP W/ORANGE TINT 10.5ML 930715

## (undated) DEVICE — DRAIN JACKSON PRATT 15FR ROUND SU130-1323

## (undated) DEVICE — SU VICRYL 0 CT-1 3X27" J430T

## (undated) DEVICE — KIT ENDO FIRST STEP DISINFECTANT 200ML W/POUCH EP-4

## (undated) DEVICE — TAPE DURAPORE 3" SILK 1538-3

## (undated) DEVICE — IMM PILLOW ABDUCT HIP MED 31143061

## (undated) DEVICE — TRAY PAIN INJECTION 97A 640

## (undated) DEVICE — LINEN GOWN OVERSIZE 5408

## (undated) DEVICE — LINEN GOWN X4 5410

## (undated) DEVICE — LINEN TOWEL PACK X5 5464

## (undated) DEVICE — DRSG STERI STRIP 1/2X4" R1547

## (undated) DEVICE — ESU GROUND PAD UNIVERSAL W/O CORD

## (undated) DEVICE — INFLATION DEVICE BIG 60 ENDO-AN6012

## (undated) DEVICE — ENDO BITE BLOCK ADULT OMNI-BLOC

## (undated) DEVICE — SYR 50ML LL W/O NDL 309653

## (undated) DEVICE — GLOVE PROTEXIS BLUE W/NEU-THERA 8.0  2D73EB80

## (undated) DEVICE — SU DERMABOND ADVANCED .7ML DNX12

## (undated) DEVICE — SU VICRYL 0 CTX 36" J370H

## (undated) DEVICE — DRSG DRAIN 4X4" 7086

## (undated) DEVICE — STRAP STIRRUP W/SLIP 30187-030

## (undated) DEVICE — INTR ENDOSCOPIC STENT FUSION OASIS 09.0FRX200CM

## (undated) DEVICE — ENDO EXTRACTOR BALLOON 09-12MM 4690

## (undated) DEVICE — ENDO EXTRACTOR BALLOON 12-15MM 4691

## (undated) DEVICE — NEEDLE EPIDURAL TUOHY 20G X4.5 18324

## (undated) DEVICE — NDL SPINAL 18GA 3.5" 405184

## (undated) DEVICE — ESU GROUND PAD ADULT W/CORD E7507

## (undated) DEVICE — GLOVE BIOGEL PI ULTRATOUCH G SZ 7.0 42170

## (undated) DEVICE — GOWN IMPERVIOUS SPECIALTY XLG/XLONG 32474

## (undated) DEVICE — PACK TOTAL HIP W/U DRAPE RIVERSIDE LATEX FREE

## (undated) DEVICE — DRSG AQUACEL AG 3.5X6.0" HYDROFIBER 412010

## (undated) DEVICE — BLADE KNIFE SURG 15 371115

## (undated) RX ORDER — HYDROMORPHONE HYDROCHLORIDE 1 MG/ML
INJECTION, SOLUTION INTRAMUSCULAR; INTRAVENOUS; SUBCUTANEOUS
Status: DISPENSED
Start: 2017-04-11

## (undated) RX ORDER — IOPAMIDOL 408 MG/ML
INJECTION, SOLUTION INTRATHECAL
Status: DISPENSED
Start: 2021-12-03

## (undated) RX ORDER — ACETAMINOPHEN 325 MG/1
TABLET ORAL
Status: DISPENSED
Start: 2017-04-11

## (undated) RX ORDER — FENTANYL CITRATE 50 UG/ML
INJECTION, SOLUTION INTRAMUSCULAR; INTRAVENOUS
Status: DISPENSED
Start: 2017-06-19

## (undated) RX ORDER — LEVOFLOXACIN 5 MG/ML
INJECTION, SOLUTION INTRAVENOUS
Status: DISPENSED
Start: 2019-05-29

## (undated) RX ORDER — DEXAMETHASONE SODIUM PHOSPHATE 4 MG/ML
INJECTION, SOLUTION INTRA-ARTICULAR; INTRALESIONAL; INTRAMUSCULAR; INTRAVENOUS; SOFT TISSUE
Status: DISPENSED
Start: 2017-07-17

## (undated) RX ORDER — SIMETHICONE 40MG/0.6ML
SUSPENSION, DROPS(FINAL DOSAGE FORM)(ML) ORAL
Status: DISPENSED
Start: 2019-05-29

## (undated) RX ORDER — PROPOFOL 10 MG/ML
INJECTION, EMULSION INTRAVENOUS
Status: DISPENSED
Start: 2019-05-29

## (undated) RX ORDER — ONDANSETRON 2 MG/ML
INJECTION INTRAMUSCULAR; INTRAVENOUS
Status: DISPENSED
Start: 2017-07-17

## (undated) RX ORDER — LABETALOL HYDROCHLORIDE 5 MG/ML
INJECTION, SOLUTION INTRAVENOUS
Status: DISPENSED
Start: 2017-07-17

## (undated) RX ORDER — EPHEDRINE SULFATE 50 MG/ML
INJECTION, SOLUTION INTRAMUSCULAR; INTRAVENOUS; SUBCUTANEOUS
Status: DISPENSED
Start: 2017-07-17

## (undated) RX ORDER — DEXAMETHASONE SODIUM PHOSPHATE 10 MG/ML
INJECTION, SOLUTION INTRAMUSCULAR; INTRAVENOUS
Status: DISPENSED
Start: 2021-12-03

## (undated) RX ORDER — PROPOFOL 10 MG/ML
INJECTION, EMULSION INTRAVENOUS
Status: DISPENSED
Start: 2017-07-17

## (undated) RX ORDER — LABETALOL 20 MG/4 ML (5 MG/ML) INTRAVENOUS SYRINGE
Status: DISPENSED
Start: 2019-05-29

## (undated) RX ORDER — FENTANYL CITRATE 50 UG/ML
INJECTION, SOLUTION INTRAMUSCULAR; INTRAVENOUS
Status: DISPENSED
Start: 2023-09-28

## (undated) RX ORDER — IOPAMIDOL 755 MG/ML
INJECTION, SOLUTION INTRAVASCULAR
Status: DISPENSED
Start: 2019-05-29

## (undated) RX ORDER — LIDOCAINE HYDROCHLORIDE 20 MG/ML
INJECTION, SOLUTION EPIDURAL; INFILTRATION; INTRACAUDAL; PERINEURAL
Status: DISPENSED
Start: 2017-07-17

## (undated) RX ORDER — FENTANYL CITRATE 50 UG/ML
INJECTION, SOLUTION INTRAMUSCULAR; INTRAVENOUS
Status: DISPENSED
Start: 2019-05-29

## (undated) RX ORDER — LIDOCAINE HYDROCHLORIDE 10 MG/ML
INJECTION, SOLUTION EPIDURAL; INFILTRATION; INTRACAUDAL; PERINEURAL
Status: DISPENSED
Start: 2021-12-03

## (undated) RX ORDER — FENTANYL CITRATE 50 UG/ML
INJECTION, SOLUTION INTRAMUSCULAR; INTRAVENOUS
Status: DISPENSED
Start: 2017-07-17

## (undated) RX ORDER — SODIUM CHLORIDE 9 MG/ML
INJECTION, SOLUTION INTRAVENOUS
Status: DISPENSED
Start: 2017-06-19

## (undated) RX ORDER — HYDRALAZINE HYDROCHLORIDE 20 MG/ML
INJECTION INTRAMUSCULAR; INTRAVENOUS
Status: DISPENSED
Start: 2017-07-17

## (undated) RX ORDER — ONDANSETRON 2 MG/ML
INJECTION INTRAMUSCULAR; INTRAVENOUS
Status: DISPENSED
Start: 2019-05-29

## (undated) RX ORDER — SODIUM CHLORIDE, SODIUM LACTATE, POTASSIUM CHLORIDE, CALCIUM CHLORIDE 600; 310; 30; 20 MG/100ML; MG/100ML; MG/100ML; MG/100ML
INJECTION, SOLUTION INTRAVENOUS
Status: DISPENSED
Start: 2017-07-17

## (undated) RX ORDER — HYDROMORPHONE HYDROCHLORIDE 1 MG/ML
INJECTION, SOLUTION INTRAMUSCULAR; INTRAVENOUS; SUBCUTANEOUS
Status: DISPENSED
Start: 2017-07-17

## (undated) RX ORDER — TRIAMCINOLONE ACETONIDE 40 MG/ML
INJECTION, SUSPENSION INTRA-ARTICULAR; INTRAMUSCULAR
Status: DISPENSED
Start: 2021-12-03

## (undated) RX ORDER — FENTANYL CITRATE 50 UG/ML
INJECTION, SOLUTION INTRAMUSCULAR; INTRAVENOUS
Status: DISPENSED
Start: 2019-07-02